# Patient Record
Sex: MALE | Race: WHITE | NOT HISPANIC OR LATINO | Employment: OTHER | URBAN - METROPOLITAN AREA
[De-identification: names, ages, dates, MRNs, and addresses within clinical notes are randomized per-mention and may not be internally consistent; named-entity substitution may affect disease eponyms.]

---

## 2023-11-09 PROBLEM — D18.01 HEMANGIOMA OF SKIN AND SUBCUTANEOUS TISSUE: Status: ACTIVE | Noted: 2023-05-15

## 2023-11-09 PROBLEM — L57.0 ACTINIC KERATOSIS: Status: ACTIVE | Noted: 2023-05-15

## 2023-11-09 PROBLEM — D22.5 MELANOCYTIC NEVI OF TRUNK: Status: ACTIVE | Noted: 2023-05-15

## 2023-11-09 PROBLEM — K40.30 INCARCERATED RIGHT INGUINAL HERNIA: Status: ACTIVE | Noted: 2023-11-09

## 2023-11-09 PROBLEM — L82.1 OTHER SEBORRHEIC KERATOSIS: Status: ACTIVE | Noted: 2023-05-15

## 2023-11-09 PROBLEM — L81.4 OTHER MELANIN HYPERPIGMENTATION: Status: ACTIVE | Noted: 2023-05-15

## 2023-11-09 PROBLEM — D22.62 MELANOCYTIC NEVI OF LEFT UPPER LIMB, INCLUDING SHOULDER: Status: ACTIVE | Noted: 2023-05-15

## 2023-11-09 PROBLEM — D22.72 MELANOCYTIC NEVI OF LEFT LOWER LIMB, INCLUDING HIP: Status: ACTIVE | Noted: 2023-05-15

## 2023-11-09 PROBLEM — L91.8 OTHER HYPERTROPHIC DISORDERS OF THE SKIN: Status: ACTIVE | Noted: 2023-05-15

## 2023-11-09 PROBLEM — D23.9 OTHER BENIGN NEOPLASM OF SKIN, UNSPECIFIED: Status: ACTIVE | Noted: 2023-05-15

## 2023-11-09 PROBLEM — K40.90 RIGHT INGUINAL HERNIA: Status: ACTIVE | Noted: 2023-11-09

## 2023-11-09 PROBLEM — L81.7 PIGMENTED PURPURIC DERMATOSIS: Status: ACTIVE | Noted: 2023-05-15

## 2023-11-09 RX ORDER — ATORVASTATIN CALCIUM 10 MG/1
10 TABLET, FILM COATED ORAL DAILY
COMMUNITY
Start: 2018-06-11

## 2023-11-13 ENCOUNTER — OFFICE VISIT (OUTPATIENT)
Dept: DERMATOLOGY | Facility: CLINIC | Age: 83
End: 2023-11-13
Payer: MEDICARE

## 2023-11-13 DIAGNOSIS — L82.1 SEBORRHEIC KERATOSIS: ICD-10-CM

## 2023-11-13 DIAGNOSIS — L81.4 LENTIGO: ICD-10-CM

## 2023-11-13 DIAGNOSIS — D22.9 NEVUS: ICD-10-CM

## 2023-11-13 DIAGNOSIS — Z12.83 SKIN CANCER SCREENING: Primary | ICD-10-CM

## 2023-11-13 PROCEDURE — 99213 OFFICE O/P EST LOW 20 MIN: CPT | Performed by: NURSE PRACTITIONER

## 2023-11-13 PROCEDURE — 1159F MED LIST DOCD IN RCRD: CPT | Performed by: NURSE PRACTITIONER

## 2023-11-13 ASSESSMENT — ITCH NUMERIC RATING SCALE: HOW SEVERE IS YOUR ITCHING?: 0

## 2023-11-13 NOTE — PROGRESS NOTES
Subjective     Sebas Soto is a 82 y.o. male who presents for the following: Skin Check.   Established patient of Jemima Ambrosio. In for 6 month full body skin exam. Denies any new or changed lesions. Hx of Ak's.          Review of Systems:  No other skin or systemic complaints other than what is documented elsewhere in the note.    The following portions of the chart were reviewed this encounter and updated as appropriate:       Skin Cancer History  No skin cancer on file.    Specialty Problems          Dermatology Problems    Actinic keratosis    Hemangioma of skin and subcutaneous tissue    Melanocytic nevi of left lower limb, including hip    Melanocytic nevi of left upper limb, including shoulder    Melanocytic nevi of trunk    Other benign neoplasm of skin, unspecified    Other hypertrophic disorders of the skin    Other melanin hyperpigmentation    Other seborrheic keratosis    Pigmented purpuric dermatosis     Past Medical History:  Sebas Soto  has a past medical history of Personal history of other diseases of the digestive system and Personal history of other infectious and parasitic diseases.    Past Surgical History:  Sebas Soto  has a past surgical history that includes Hernia repair (07/30/2018); Colonoscopy (07/30/2018); Tonsillectomy (07/30/2018); and Back surgery (07/30/2018).    Family History:  Patient family history is not on file.    Social History:  Sebas Soto  has no history on file for tobacco use, alcohol use, and drug use.    Allergies:  Patient has no known allergies.    Current Medications / CAM's:    Current Outpatient Medications:     atorvastatin (Lipitor) 10 mg tablet, Take by mouth., Disp: , Rfl:      Objective   Well appearing patient in no apparent distress; mood and affect are within normal limits.    A full examination was performed including scalp, head, eyes, ears, nose, lips, neck, chest, axillae, abdomen, back, buttocks, bilateral upper extremities, bilateral  lower extremities, hands, feet, fingers, toes, fingernails, and toenails. All findings within normal limits unless otherwise noted below.    Assessment/Plan

## 2024-06-04 ENCOUNTER — OFFICE VISIT (OUTPATIENT)
Dept: NEUROSURGERY | Facility: CLINIC | Age: 84
End: 2024-06-04
Payer: MEDICARE

## 2024-06-04 VITALS
TEMPERATURE: 97.5 F | SYSTOLIC BLOOD PRESSURE: 114 MMHG | RESPIRATION RATE: 20 BRPM | WEIGHT: 196.8 LBS | HEART RATE: 92 BPM | HEIGHT: 68 IN | DIASTOLIC BLOOD PRESSURE: 62 MMHG | BODY MASS INDEX: 29.83 KG/M2

## 2024-06-04 DIAGNOSIS — M47.12 CERVICAL SPONDYLOSIS WITH MYELOPATHY: Primary | ICD-10-CM

## 2024-06-04 DIAGNOSIS — M48.062 NEUROGENIC CLAUDICATION DUE TO LUMBAR SPINAL STENOSIS: ICD-10-CM

## 2024-06-04 DIAGNOSIS — H90.A31 MIXED CONDUCTIVE AND SENSORINEURAL HEARING LOSS OF RIGHT EAR WITH RESTRICTED HEARING OF LEFT EAR: ICD-10-CM

## 2024-06-04 PROCEDURE — 99204 OFFICE O/P NEW MOD 45 MIN: CPT | Performed by: NEUROLOGICAL SURGERY

## 2024-06-04 PROCEDURE — 1159F MED LIST DOCD IN RCRD: CPT | Performed by: NEUROLOGICAL SURGERY

## 2024-06-04 PROCEDURE — 1036F TOBACCO NON-USER: CPT | Performed by: NEUROLOGICAL SURGERY

## 2024-06-04 PROCEDURE — 1125F AMNT PAIN NOTED PAIN PRSNT: CPT | Performed by: NEUROLOGICAL SURGERY

## 2024-06-04 RX ORDER — TROSPIUM CHLORIDE 20 MG/1
20 TABLET, FILM COATED ORAL DAILY
COMMUNITY
Start: 2023-09-25

## 2024-06-04 ASSESSMENT — PAIN SCALES - GENERAL: PAINLEVEL: 5

## 2024-06-04 NOTE — PROGRESS NOTES
[facility] [place]  Department of Neurological Surgery  New Patient Visit    History of Present Illness:  Sebas Soto is a 83 y.o. year old male who presents to the spine clinic with his wife complaining of significant pain in the back of his right leg when he stands or walks and numbness and weakness on the left side of his body from his arm down to his leg.  Back in 2006 he saw me at [facility] where he was spastic and had significant myelopathy and we decompressed his canal at C2-3.  His walking improved but his left arm numbness and weakness persisted.  Over the last 5 to 10 years his balance has gotten worse.  He is falling at least 1-2 times every year.  He had a significant fall on his bottom on January and has had back pain ever since.  No in his done additional studies since that fall.  I do have MRIs from 2020 one of the cervical and lumbar and I have a new MRI of his cervical from 2024.      Prior Spine Surgeries: Decompressive cervical laminectomy at C2 and C3    Physical Therapy: None recently   Diabetic: No  Osteoporosis: Unknown  Patient's BMI is Body mass index is 29.92 kg/m².    14/14 systems reviewed and negative other than what is listed in the history of present illness    Patient Active Problem List   Diagnosis    Other melanin hyperpigmentation    Other hypertrophic disorders of the skin    Other benign neoplasm of skin, unspecified    Hemangioma of skin and subcutaneous tissue    Melanocytic nevi of trunk    Melanocytic nevi of left upper limb, including shoulder    Melanocytic nevi of left lower limb, including hip    Right inguinal hernia    Incarcerated right inguinal hernia    Pigmented purpuric dermatosis    Other seborrheic keratosis    Actinic keratosis    Mixed conductive and sensorineural hearing loss of right ear with restricted hearing of left ear    Cervical spondylosis with myelopathy     Past Medical History:   Diagnosis Date    Personal history of other  diseases of the digestive system     History of diverticulitis    Personal history of other infectious and parasitic diseases     History of herpes zoster     Past Surgical History:   Procedure Laterality Date    COLONOSCOPY  07/30/2018    Colonoscopy (Fiberoptic)    HERNIA REPAIR  07/30/2018    Hernia Repair    NECK SURGERY  2007    TONSILLECTOMY  07/30/2018    Tonsillectomy     Social History     Tobacco Use    Smoking status: Never    Smokeless tobacco: Never   Substance Use Topics    Alcohol use: Never     family history is not on file.    Current Outpatient Medications:     atorvastatin (Lipitor) 10 mg tablet, Take 1 tablet (10 mg) by mouth once daily., Disp: , Rfl:     trospium (Sanctura) 20 mg tablet, Take 1 tablet (20 mg) by mouth once daily., Disp: , Rfl:   No Known Allergies    Physical Examination      General: NAD, AOx 3,  no aphasia or dysarthria, normal fund of knowledge  Cranial Nerves II-XII: VFF, PERRL, EOMI, Face Symm, Facial SILT, Palate/Tongue midline and symmetric, he is very hard of hearing right significantly less hearing than the left  Motor: 5/5 Throughout on the right but arm weakness on the left side and some leg psoas weakness again on the left,  No drift, no dysmetria on finger to nose  Sensation: SILT and PP throughout all extremities, subjective decreased touch on the left side of his body  DTRS: 2+ Throughout, No Hoffmans or Clonus  He did not get up to walk because he barely can stand    Results    I personally reviewed and interpreted the imaging results which included the MRI of his cervical spine which shows that he has significant myelomalacia at C2-3 and C3-4 but he is adequately decompressed at those levels.  We need to see dynamic studies to see if there is abnormal movement at those levels the scan from 2021 looks essentially the same.  I also reviewed the lumbar scan from 2021 and had lumbar canal stenosis at that time with a grade 1 spondylolisthesis at L4-5.  We have no  studies since.    Assessment and Plan:    Sebas Soto is a 83 y.o. year old male who presents to the spine clinic with progressive falling with known myelomalacia.  We need to get dynamic films to see if there is some instability in the cervical spine.  We also need to get dynamic films of the lumbar spine for the same reason.  We will get a CAT scan of the cervical spine to see if there is anything that needs to be stabilized with a fusion and we will get an MRI of the lumbar spine to see if the lumbar canal stenosis has gotten worse and if it is time to consider surgical decompression.      I have reviewed all prior documentation and reviewed the electronic medical record since admission. I have personally have reviewed all advanced imaging not just the reports and used my interpretation as documented as the relevant findings. I have reviewed the risks and benefits of all treatment recommendations listed in this note with the patient and family. I spent a total of 45 minutes in service to this patient's care during this date of service.      The above clinical summary has been dictated with voice recognition software. It has not been proofread for grammatical errors, typographical mistakes, or other semantic inconsistencies.    Thank you for visiting our office today. It was our pleasure to take part in your healthcare.     Do not hesitate to call with any questions regarding your plan of care after leaving. My office can be reached at [phone] M-F 8am-4pm.     To clinicians, thank you very much for this kind referral. It is a privilege to partner with you in the care of your patients. My office would be delighted to assist you with any further consultations or with questions regarding the plan of care outlined. Do not hesitate to call the office or contact me directly.     Sincerely,    Piotr Carrion MD, FAANS, FACS  Board Certified Neurological Surgeon  , Department of  Neurological Surgery  [facility] [employer]    [facility]  [address]  [address]  [place] [address]    [facility]  [address]  [address]  [place] [address]    Phone: [phone]  Fax: [phone]

## 2024-06-11 ENCOUNTER — APPOINTMENT (OUTPATIENT)
Dept: NEUROSURGERY | Facility: CLINIC | Age: 84
End: 2024-06-11
Payer: MEDICARE

## 2024-06-18 ENCOUNTER — APPOINTMENT (OUTPATIENT)
Dept: DERMATOLOGY | Facility: CLINIC | Age: 84
End: 2024-06-18
Payer: MEDICARE

## 2024-06-18 DIAGNOSIS — L81.4 LENTIGO: ICD-10-CM

## 2024-06-18 DIAGNOSIS — D22.9 NEVUS: ICD-10-CM

## 2024-06-18 DIAGNOSIS — Z12.83 SKIN CANCER SCREENING: Primary | ICD-10-CM

## 2024-06-18 DIAGNOSIS — L82.1 SEBORRHEIC KERATOSIS: ICD-10-CM

## 2024-06-18 PROCEDURE — 1036F TOBACCO NON-USER: CPT | Performed by: NURSE PRACTITIONER

## 2024-06-18 PROCEDURE — 99213 OFFICE O/P EST LOW 20 MIN: CPT | Performed by: NURSE PRACTITIONER

## 2024-06-18 PROCEDURE — 1159F MED LIST DOCD IN RCRD: CPT | Performed by: NURSE PRACTITIONER

## 2024-06-18 NOTE — PROGRESS NOTES
Subjective     Sebas Soto is a 83 y.o. male who presents for the following: waist up skin exam.   Established patient in for a waist up skin exam.     Review of Systems:  No other skin or systemic complaints other than what is documented elsewhere in the note.    The following portions of the chart were reviewed this encounter and updated as appropriate:          Skin Cancer History  No skin cancer on file.      Specialty Problems          Dermatology Problems    Actinic keratosis    Hemangioma of skin and subcutaneous tissue    Melanocytic nevi of left lower limb, including hip    Melanocytic nevi of left upper limb, including shoulder    Melanocytic nevi of trunk    Other benign neoplasm of skin, unspecified    Other hypertrophic disorders of the skin    Other melanin hyperpigmentation    Other seborrheic keratosis    Pigmented purpuric dermatosis        Objective   Well appearing patient in no apparent distress; mood and affect are within normal limits.    A focused skin examination was performed. All findings within normal limits unless otherwise noted below.    Assessment/Plan   1. Skin cancer screening    The patient presented for a routine skin examination today. There are no specific concerns regarding skin health and no new or changing moles, lesions, or rashes.     Assessment: Based on the comprehensive skin examination, there were no concerning or abnormal findings. The patient's skin appeared to be in good health, without any notable dermatologic conditions or lesions.    Plan: Given the absence of any significant skin findings, no specific interventions or treatments are warranted at this time. The patient was educated on the importance of regular skin self-examinations and advised to promptly report any changes or concerns. Routine follow-up for a skin examination was recommended.    -These lesions have benign, reassuring patterns on dermoscopy.  -There were no concerning features found on exam  "today.  -Recommend continued self-observation, and to contact the office if any changes in nevi are  noticed.    Discussed/information given on safe sun practices and use of sunscreen, sun protective clothing or sun avoidance. Recommend to use OTC medication of sunscreen SPF 30 or higher on a daily basis prior to sun exposure to reduce the risk of skin cancer.    Contact Office if: Any lesions change in size, shape or color; itch, bum or bleed.         2. Nevus  Multiple benign appearing flesh colored to pigmented macules and papules     Plan: Counseling.  I counseled the patient regarding the following:  Instructions: Monthly self-skin checks to monitor for any changes in moles are recommended. Expectations: Benign Nevi are pigmented nests of cells within the skin.No treatment is necessary. Contact Office if: Any moles change in size, shape or color; itch, bum or bleed.    3. Lentigo  Scattered tan macules in sun-exposed areas.    Solar lentigo (a type of lentigo also known as a senile lentigo, age spot, or liver spot) is a benign pigmented macule appearing on fair-skinned individuals that is related to ultraviolet radiation (UVR) exposure, typically from the sun.     PLAN:  Limiting sun exposure through avoidance, protective clothing, and use of sunscreens can help prevent the appearance of solar lentigines.    If lesion changes or becomes symptomatic she should return to clinic    4. Seborrheic keratosis    Seborrheic keratoses (SKs) are extremely common benign neoplasms of the skin. There can be few or hundreds of these raised, \"stuck-on\"-appearing papules and plaques with well-defined borders. The cause is unknown, although there is a familial trait for the development of multiple SKs.      SKs tend to increase in incidence and number with increasing age.     Skin Care: Seborrheic Keratoses are benign. No treatment is necessary.    Patient was instructed to call the office if any lesions become irritated or " inflamed

## 2024-06-21 ENCOUNTER — HOSPITAL ENCOUNTER (OUTPATIENT)
Dept: RADIOLOGY | Facility: HOSPITAL | Age: 84
Discharge: HOME | End: 2024-06-21
Payer: MEDICARE

## 2024-06-21 DIAGNOSIS — M48.062 NEUROGENIC CLAUDICATION DUE TO LUMBAR SPINAL STENOSIS: ICD-10-CM

## 2024-06-21 DIAGNOSIS — M47.12 CERVICAL SPONDYLOSIS WITH MYELOPATHY: ICD-10-CM

## 2024-06-21 PROCEDURE — 72125 CT NECK SPINE W/O DYE: CPT

## 2024-06-21 PROCEDURE — 72052 X-RAY EXAM NECK SPINE 6/>VWS: CPT

## 2024-06-21 PROCEDURE — 72148 MRI LUMBAR SPINE W/O DYE: CPT

## 2024-06-21 PROCEDURE — 72120 X-RAY BEND ONLY L-S SPINE: CPT

## 2024-07-16 ENCOUNTER — APPOINTMENT (OUTPATIENT)
Dept: NEUROSURGERY | Facility: CLINIC | Age: 84
End: 2024-07-16
Payer: MEDICARE

## 2024-07-16 VITALS
RESPIRATION RATE: 16 BRPM | HEART RATE: 101 BPM | SYSTOLIC BLOOD PRESSURE: 128 MMHG | DIASTOLIC BLOOD PRESSURE: 80 MMHG | TEMPERATURE: 97.6 F

## 2024-07-16 DIAGNOSIS — G71.20: Primary | ICD-10-CM

## 2024-07-16 DIAGNOSIS — M47.12 CERVICAL SPONDYLOSIS WITH MYELOPATHY: ICD-10-CM

## 2024-07-16 DIAGNOSIS — Q76.1: Primary | ICD-10-CM

## 2024-07-16 DIAGNOSIS — M43.16 SPONDYLOLISTHESIS OF LUMBAR REGION: ICD-10-CM

## 2024-07-16 DIAGNOSIS — Q18.9: Primary | ICD-10-CM

## 2024-07-16 DIAGNOSIS — M48.062 NEUROGENIC CLAUDICATION DUE TO LUMBAR SPINAL STENOSIS: ICD-10-CM

## 2024-07-16 DIAGNOSIS — G95.89 MYELOMALACIA OF CERVICAL CORD (MULTI): ICD-10-CM

## 2024-07-16 DIAGNOSIS — Q87.89: Primary | ICD-10-CM

## 2024-07-16 PROCEDURE — 1036F TOBACCO NON-USER: CPT | Performed by: NEUROLOGICAL SURGERY

## 2024-07-16 PROCEDURE — 1125F AMNT PAIN NOTED PAIN PRSNT: CPT | Performed by: NEUROLOGICAL SURGERY

## 2024-07-16 PROCEDURE — 1159F MED LIST DOCD IN RCRD: CPT | Performed by: NEUROLOGICAL SURGERY

## 2024-07-16 PROCEDURE — 99215 OFFICE O/P EST HI 40 MIN: CPT | Performed by: NEUROLOGICAL SURGERY

## 2024-07-16 ASSESSMENT — ENCOUNTER SYMPTOMS
LOSS OF SENSATION IN FEET: 1
DEPRESSION: 0
OCCASIONAL FEELINGS OF UNSTEADINESS: 1

## 2024-07-16 ASSESSMENT — PATIENT HEALTH QUESTIONNAIRE - PHQ9
1. LITTLE INTEREST OR PLEASURE IN DOING THINGS: NOT AT ALL
SUM OF ALL RESPONSES TO PHQ9 QUESTIONS 1 AND 2: 0
2. FEELING DOWN, DEPRESSED OR HOPELESS: NOT AT ALL

## 2024-07-16 ASSESSMENT — PAIN SCALES - GENERAL: PAINLEVEL: 5

## 2024-07-16 NOTE — PROGRESS NOTES
[facility] [place]  Department of Neurological Surgery  Established Patient Visit    History of Present Illness  Sebas Soto is a 83 y.o. year old male who presents to the spine clinic in follow up with his wife to review his studies of the cervical and the lumbar region.  The MRI that was done in January showed significant myelomalacia where he previously been decompressed.  Since he did well for several years following the decompression it is impossible for me to remember how much signal change had been present at the time of his original surgery.  But I wanted to see if he had instability.  He does seem to have some hypermobility at C4-5.  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  We also ordered images of his lumbar region because he sounded like there was a component of neurogenic claudication.  He does have significant lumbar canal stenosis and he has a grade 1 spondylolisthesis at L4-5 with a very incompetent facet on the right side at L4-5.  Initially I did not know which level of the spine would take priority but with further questioning he clearly talks about the weakness developing on the left side in the leg but also in the arm and that the developed at the same time.  This situation to fix both regions of the spine is getting to be too complex for my skill set in my hospital.  I am going to ask him to see one of my colleagues and possibly 2 colleagues to see if they can give him some possible hope for improvement.    Patient's BMI is There is no height or weight on file to calculate BMI.    14/14 systems reviewed and negative other than what is listed in the history of present illness    Patient Active Problem List   Diagnosis    Other melanin hyperpigmentation    Other hypertrophic disorders of the skin    Other benign neoplasm of skin, unspecified    Hemangioma of skin and subcutaneous tissue    Melanocytic nevi of trunk    Melanocytic nevi of left upper  limb, including shoulder    Melanocytic nevi of left lower limb, including hip    Right inguinal hernia    Incarcerated right inguinal hernia    Pigmented purpuric dermatosis    Other seborrheic keratosis    Actinic keratosis    Mixed conductive and sensorineural hearing loss of right ear with restricted hearing of left ear    Cervical spondylosis with myelopathy    Neurogenic claudication due to lumbar spinal stenosis    Klippel-Feil anomaly, myopathy, and facial dysmorphism syndrome (Multi)    Spondylolisthesis of lumbar region    Myelomalacia of cervical cord (Multi)     Past Medical History:   Diagnosis Date    Personal history of other diseases of the digestive system     History of diverticulitis    Personal history of other infectious and parasitic diseases     History of herpes zoster     Past Surgical History:   Procedure Laterality Date    COLONOSCOPY  07/30/2018    Colonoscopy (Fiberoptic)    HERNIA REPAIR  07/30/2018    Hernia Repair    NECK SURGERY  2007    TONSILLECTOMY  07/30/2018    Tonsillectomy     Social History     Tobacco Use    Smoking status: Never     Passive exposure: Never    Smokeless tobacco: Never   Substance Use Topics    Alcohol use: Not Currently     family history is not on file.    Current Outpatient Medications:     atorvastatin (Lipitor) 10 mg tablet, Take 1 tablet (10 mg) by mouth once daily., Disp: , Rfl:     trospium (Sanctura) 20 mg tablet, Take 1 tablet (20 mg) by mouth once daily., Disp: , Rfl:   No Known Allergies        Results:  I personally reviewed and interpreted the imaging results which included the plain x-rays of his spine which shows that he does have some hypermobility at C4-5 and his degenerative scoliosis in the lumbar spine is significantly more pronounced on his upright films then when he is lying on the table.  It also included the MRI of the lumbar spine which does show that there is multilevels of canal stenosis as well as the spondylolisthesis at  L4-5.    Assessment and Plan:      Sebas Soto is a 83 y.o. year old male who presents to the spine clinic in follow up with his wife to review his studies of the cervical and the lumbar region.  The MRI that was done in January showed significant myelomalacia where he previously been decompressed.  Since he did well for several years following the decompression it is impossible for me to remember how much signal change had been present at the time of his original surgery.  But I wanted to see if he had instability.  He does seem to have some hypermobility at C4-5.  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  We also ordered images of his lumbar region because he sounded like there was a component of neurogenic claudication.  He does have significant lumbar canal stenosis and he has a grade 1 spondylolisthesis at L4-5 with a very incompetent facet on the right side at L4-5.  Initially I did not know which level of the spine would take priority but with further questioning he clearly talks about the weakness developing on the left side in the leg but also in the arm and that the developed at the same time.  This situation to fix both regions of the spine is getting to be too complex for my skill set in my hospital.  I am going to ask him to see one of my colleagues and possibly 2 colleagues to see if they can give him some possible hope for improvement.      I have reviewed all prior documentation and reviewed the electronic medical record since admission. I have personally have reviewed all advanced imaging not just the reports and used my interpretation as documented as the relevant findings. I have reviewed the risks and benefits of all treatment recommendations listed in this note with the patient and family. I spent a total of 50 minutes in service to this patient's care during this date of service.      The above clinical summary has been dictated with voice recognition software. It has  not been proofread for grammatical errors, typographical mistakes, or other semantic inconsistencies.    Thank you for visiting our office today. It was our pleasure to take part in your healthcare.     Do not hesitate to call with any questions regarding your plan of care after leaving. My office can be reached at [phone] M-F 8am-4pm.     To clinicians, thank you very much for this kind referral. It is a privilege to partner with you in the care of your patients. My office would be delighted to assist you with any further consultations or with questions regarding the plan of care outlined. Do not hesitate to call the office or contact me directly.     Sincerely,    Piotr Carrion MD, FAANS, FACS  Board Certified Neurological Surgeon  , Department of Neurological Surgery  [facility] [employer]    [facility]  [address]  [address]  [place] [address]    [facility]  [address]  [address]  [place] [address]    Phone: [phone]  Fax: [phone]

## 2024-09-03 ENCOUNTER — APPOINTMENT (OUTPATIENT)
Dept: NEUROSURGERY | Facility: CLINIC | Age: 84
End: 2024-09-03
Payer: MEDICARE

## 2024-09-03 VITALS
DIASTOLIC BLOOD PRESSURE: 82 MMHG | WEIGHT: 190 LBS | SYSTOLIC BLOOD PRESSURE: 150 MMHG | BODY MASS INDEX: 28.79 KG/M2 | TEMPERATURE: 97.4 F | HEART RATE: 79 BPM | HEIGHT: 68 IN

## 2024-09-03 DIAGNOSIS — M47.12 CERVICAL SPONDYLOSIS WITH MYELOPATHY: Primary | ICD-10-CM

## 2024-09-03 DIAGNOSIS — M43.16 SPONDYLOLISTHESIS OF LUMBAR REGION: ICD-10-CM

## 2024-09-03 DIAGNOSIS — M48.062 NEUROGENIC CLAUDICATION DUE TO LUMBAR SPINAL STENOSIS: ICD-10-CM

## 2024-09-03 DIAGNOSIS — G95.89 MYELOMALACIA OF CERVICAL CORD (MULTI): ICD-10-CM

## 2024-09-03 ASSESSMENT — PATIENT HEALTH QUESTIONNAIRE - PHQ9
10. IF YOU CHECKED OFF ANY PROBLEMS, HOW DIFFICULT HAVE THESE PROBLEMS MADE IT FOR YOU TO DO YOUR WORK, TAKE CARE OF THINGS AT HOME, OR GET ALONG WITH OTHER PEOPLE: NOT DIFFICULT AT ALL
1. LITTLE INTEREST OR PLEASURE IN DOING THINGS: SEVERAL DAYS
2. FEELING DOWN, DEPRESSED OR HOPELESS: NOT AT ALL
SUM OF ALL RESPONSES TO PHQ9 QUESTIONS 1 & 2: 1

## 2024-09-03 ASSESSMENT — PAIN SCALES - GENERAL: PAINLEVEL: 5

## 2024-09-03 NOTE — PROGRESS NOTES
[facility]  Department of Neurological Surgery  Established Patient Visit    History of Present Illness:  Sebas Soto is a 83 y.o. year old male who presents to the spine clinic in follow up with right leg pain.  He s he says that the pain comes to the back of his right leg when he stands or walks and there is associated numbness and weakness on the left side of his body.  He normally ambulates with a walker at baseline.  He gets up the pain involves his leg down the posterior aspect of his thigh and the lower aspect of the leg.  It was worse when he stands and walks.  And much improved when he lays down.  He is minimal to no back pain.  He has some left leg weakness as well as been chronic since his severe fall in 2001.    He has an MRI cervical spine from 2001 showing significant myelomalacia in the upper cervical spine and the evidence of prior cervical laminectomy.    According to the chart and the patient the had cervical decompression where he did well for many years.  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  The numbness and tingling after his surgery in 2001 improved though he has had persistent gait issues since then that has gotten slightly worse.  His left upper extremity and lower extremity weakness has not changed for many years.  His wife says that he has had many falls every year though the worst one has been times January.  He has been unstable on his feet for some time.    He also has pretty significant lower extremity peripheral edema secondary to instability.  He says that he has not worked out for DVTs at [facility] that were negative.    Patient's BMI is 28    Diabetic: no       Osteoporosis: Unknown  No DXA results found for the past 12 months    Review of Systems:  14/14 systems reviewed and negative other than what is listed in the history of present illness    Patient Active Problem List   Diagnosis    Other  melanin hyperpigmentation    Other hypertrophic disorders of the skin    Other benign neoplasm of skin, unspecified    Hemangioma of skin and subcutaneous tissue    Melanocytic nevi of trunk    Melanocytic nevi of left upper limb, including shoulder    Melanocytic nevi of left lower limb, including hip    Right inguinal hernia    Incarcerated right inguinal hernia    Pigmented purpuric dermatosis    Other seborrheic keratosis    Actinic keratosis    Mixed conductive and sensorineural hearing loss of right ear with restricted hearing of left ear    Cervical spondylosis with myelopathy    Neurogenic claudication due to lumbar spinal stenosis    Klippel-Feil anomaly, myopathy, and facial dysmorphism syndrome (Multi)    Spondylolisthesis of lumbar region    Myelomalacia of cervical cord (Multi)     Past Medical History:   Diagnosis Date    Personal history of other diseases of the digestive system     History of diverticulitis    Personal history of other infectious and parasitic diseases     History of herpes zoster     Past Surgical History:   Procedure Laterality Date    COLONOSCOPY  07/30/2018    Colonoscopy (Fiberoptic)    HERNIA REPAIR  07/30/2018    Hernia Repair    NECK SURGERY  2007    TONSILLECTOMY  07/30/2018    Tonsillectomy     Social History     Tobacco Use    Smoking status: Never     Passive exposure: Never    Smokeless tobacco: Never   Substance Use Topics    Alcohol use: Not Currently     family history is not on file.    Current Outpatient Medications:     atorvastatin (Lipitor) 10 mg tablet, Take 1 tablet (10 mg) by mouth once daily., Disp: , Rfl:     trospium (Sanctura) 20 mg tablet, Take 1 tablet (20 mg) by mouth once daily., Disp: , Rfl:   No Known Allergies    Physical Examination:    General: Well developed, awake/alert/oriented x3, no distress, alert and cooperative  Skin: Warm and dry, no lesions, no rashes  ENMT: Mucous membranes moist, no apparent injury, no lesions seen  Head/Neck: Neck  Supple, no apparent injury  Respiratory/Thorax: Normal breath sounds with good chest expansion, thorax symmetric  Cardiovascular: No pitting edema, no JVD    Motor Strength: LUE D3 B/T 4- HG 4+  RUE 5/5  LLE HF4+ KE 4+ DF 4 PF 4-  RLE 5/5  Significant bilateral lower extremity peripheral edema  Muscle Bulk: decreased on left side    Posture:   Loss of lordosis and cervical and lumbar spine  Paraspinal muscle spasm/tenderness absent.     Sensation: intact to light touch  Decreased on the left side    Results:  I personally reviewed and interpreted the imaging results which included MRI L-spine from June 2024 showing various levels of lumbar spondylosis with L1 compression fracture approximately 50% height loss mild to moderate L3-4 lateral recess stenosis with some foraminal stenosis, L4-5 spondylolisthesis with severe lateral recess stenosis and canal stenosis with facet joint effusions, facet joint effusions at L5-S1 CT C-spine showing evidence of global file with autofusion from occiput to C2 with hypoplastic C4 and autofused C5 and C6.  Has also autofused C7-T1 anteriorly.  There is mild spondylolisthesis of C4 on C5.  Evidence of also a cervical laminectomy spanning from C2-C5 there is also evidence of hypoplastic posterior elements and hypoplastic pedicles throughout the upper cervical spine.  X-ray cervical spine showing some mobility of C4 on the C5 with flexion.  X-rays of the lumbar spine showing some dynamic instability at L4-5 with more anterolisthesis on flexion.  '  Although not able to review the actual images the MRI read from January 2024 of the MRI cervical spine showing stable myelomalacia in the cervical cord at C2-3 and 3 4 with no further canal compression.    Assessment and Plan:      Sebas Soto is a 83 y.o. year old male who presents to the spine clinic in follow up with lumbar radiculopathy and neurogenic claudication secondary to the L4-5 spondylolisthesis.  He has severe central  stenosis at L2-3, as well as moderate to severe central stenosis at L3-4 and L4-5.  He has severe foraminal stenosis on the right at L4-5.  In the cervical spine, he has moderate to severe central stenosis at C5-6.  I had a lengthy discussion with the patient regarding his condition and treatment options.  At this time, I do not believe that the stenosis at C5-6 is symptomatic.  I am going to order an EMG/nerve conduction study of the upper extremities for further evaluation.  I do believe that the bulk of his symptoms are from his lumbar stenosis.  I discussed multiple treatment options with the patient, including further conservative care as well as different surgical approaches.  At this time, I am recommending a L2-5 laminectomy with L4-5 TLIF.  I went over the risk associate with surgery, including infection, bleeding, neurologic injury, spinal fluid leak, and the need for further procedures.  All the patient's questions were answered and he is elected to proceed with surgery.

## 2024-09-04 ENCOUNTER — HOSPITAL ENCOUNTER (OUTPATIENT)
Facility: HOSPITAL | Age: 84
Setting detail: OUTPATIENT SURGERY
End: 2024-09-04
Attending: NEUROLOGICAL SURGERY | Admitting: NEUROLOGICAL SURGERY
Payer: MEDICARE

## 2024-09-04 DIAGNOSIS — M48.062 NEUROGENIC CLAUDICATION DUE TO LUMBAR SPINAL STENOSIS: ICD-10-CM

## 2024-09-04 DIAGNOSIS — M43.16 SPONDYLOLISTHESIS, LUMBAR REGION: Primary | ICD-10-CM

## 2024-09-04 DIAGNOSIS — M43.16 SPONDYLOLISTHESIS, LUMBAR REGION: ICD-10-CM

## 2024-09-04 RX ORDER — SODIUM CHLORIDE, SODIUM LACTATE, POTASSIUM CHLORIDE, CALCIUM CHLORIDE 600; 310; 30; 20 MG/100ML; MG/100ML; MG/100ML; MG/100ML
20 INJECTION, SOLUTION INTRAVENOUS CONTINUOUS
OUTPATIENT
Start: 2024-09-04

## 2024-09-10 ENCOUNTER — APPOINTMENT (OUTPATIENT)
Dept: DERMATOLOGY | Facility: CLINIC | Age: 84
End: 2024-09-10
Payer: MEDICARE

## 2024-10-10 ASSESSMENT — DUKE ACTIVITY SCORE INDEX (DASI)
CAN YOU WALK INDOORS, SUCH AS AROUND YOUR HOUSE: YES
CAN YOU HAVE SEXUAL RELATIONS: NO
CAN YOU CLIMB A FLIGHT OF STAIRS OR WALK UP A HILL: NO
TOTAL_SCORE: 10.7
CAN YOU PARTICIPATE IN MODERATE RECREATIONAL ACTIVITIES LIKE GOLF, BOWLING, DANCING, DOUBLES TENNIS OR THROWING A BASEBALL OR FOOTBALL: NO
CAN YOU DO MODERATE WORK AROUND THE HOUSE LIKE VACUUMING, SWEEPING FLOORS OR CARRYING GROCERIES: YES
CAN YOU RUN A SHORT DISTANCE: NO
CAN YOU DO LIGHT WORK AROUND THE HOUSE LIKE DUSTING OR WASHING DISHES: YES
CAN YOU TAKE CARE OF YOURSELF (EAT, DRESS, BATHE, OR USE TOILET): YES
CAN YOU DO YARD WORK LIKE RAKING LEAVES, WEEDING OR PUSHING A MOWER: NO
CAN YOU DO HEAVY WORK AROUND THE HOUSE LIKE SCRUBBING FLOORS OR LIFTING AND MOVING HEAVY FURNITURE: NO
CAN YOU PARTICIPATE IN STRENOUS SPORTS LIKE SWIMMING, SINGLES TENNIS, FOOTBALL, BASKETBALL, OR SKIING: NO
DASI METS SCORE: 4.1
CAN YOU WALK A BLOCK OR TWO ON LEVEL GROUND: NO

## 2024-10-10 ASSESSMENT — LIFESTYLE VARIABLES: SMOKING_STATUS: NONSMOKER

## 2024-10-10 ASSESSMENT — ACTIVITIES OF DAILY LIVING (ADL): ADL_SCORE: 3

## 2024-10-11 ENCOUNTER — HOSPITAL ENCOUNTER (OUTPATIENT)
Dept: RADIOLOGY | Facility: HOSPITAL | Age: 84
Discharge: HOME | End: 2024-10-11
Payer: MEDICARE

## 2024-10-11 ENCOUNTER — LAB (OUTPATIENT)
Dept: LAB | Facility: LAB | Age: 84
End: 2024-10-11
Payer: MEDICARE

## 2024-10-11 ENCOUNTER — PRE-ADMISSION TESTING (OUTPATIENT)
Dept: PREADMISSION TESTING | Facility: HOSPITAL | Age: 84
End: 2024-10-11
Payer: MEDICARE

## 2024-10-11 VITALS
SYSTOLIC BLOOD PRESSURE: 129 MMHG | RESPIRATION RATE: 18 BRPM | HEIGHT: 68 IN | BODY MASS INDEX: 30.27 KG/M2 | DIASTOLIC BLOOD PRESSURE: 67 MMHG | WEIGHT: 199.74 LBS | TEMPERATURE: 97.7 F | HEART RATE: 80 BPM | OXYGEN SATURATION: 96 %

## 2024-10-11 DIAGNOSIS — R79.9 ABNORMAL FINDING OF BLOOD CHEMISTRY, UNSPECIFIED: ICD-10-CM

## 2024-10-11 DIAGNOSIS — M43.16 SPONDYLOLISTHESIS, LUMBAR REGION: ICD-10-CM

## 2024-10-11 DIAGNOSIS — Z01.818 PRE-OP TESTING: ICD-10-CM

## 2024-10-11 DIAGNOSIS — Z01.818 PRE-OP TESTING: Primary | ICD-10-CM

## 2024-10-11 DIAGNOSIS — M48.062 NEUROGENIC CLAUDICATION DUE TO LUMBAR SPINAL STENOSIS: ICD-10-CM

## 2024-10-11 LAB
ABO GROUP (TYPE) IN BLOOD: NORMAL
ANION GAP SERPL CALC-SCNC: 9 MMOL/L (ref 10–20)
ANTIBODY SCREEN: NORMAL
BASOPHILS # BLD AUTO: 0.02 X10*3/UL (ref 0–0.1)
BASOPHILS NFR BLD AUTO: 0.5 %
BUN SERPL-MCNC: 15 MG/DL (ref 6–23)
CALCIUM SERPL-MCNC: 8.9 MG/DL (ref 8.6–10.3)
CHLORIDE SERPL-SCNC: 103 MMOL/L (ref 98–107)
CO2 SERPL-SCNC: 29 MMOL/L (ref 21–32)
CREAT SERPL-MCNC: 0.71 MG/DL (ref 0.5–1.3)
EGFRCR SERPLBLD CKD-EPI 2021: >90 ML/MIN/1.73M*2
EOSINOPHIL # BLD AUTO: 0.09 X10*3/UL (ref 0–0.4)
EOSINOPHIL NFR BLD AUTO: 2.1 %
ERYTHROCYTE [DISTWIDTH] IN BLOOD BY AUTOMATED COUNT: 16.4 % (ref 11.5–14.5)
EST. AVERAGE GLUCOSE BLD GHB EST-MCNC: 114 MG/DL
GLUCOSE SERPL-MCNC: 97 MG/DL (ref 74–99)
HBA1C MFR BLD: 5.6 %
HCT VFR BLD AUTO: 44.9 % (ref 41–52)
HGB BLD-MCNC: 14.7 G/DL (ref 13.5–17.5)
IMM GRANULOCYTES # BLD AUTO: 0.02 X10*3/UL (ref 0–0.5)
IMM GRANULOCYTES NFR BLD AUTO: 0.5 % (ref 0–0.9)
LYMPHOCYTES # BLD AUTO: 1.15 X10*3/UL (ref 0.8–3)
LYMPHOCYTES NFR BLD AUTO: 27.4 %
MCH RBC QN AUTO: 32.4 PG (ref 26–34)
MCHC RBC AUTO-ENTMCNC: 32.7 G/DL (ref 32–36)
MCV RBC AUTO: 99 FL (ref 80–100)
MONOCYTES # BLD AUTO: 0.98 X10*3/UL (ref 0.05–0.8)
MONOCYTES NFR BLD AUTO: 23.3 %
NEUTROPHILS # BLD AUTO: 1.94 X10*3/UL (ref 1.6–5.5)
NEUTROPHILS NFR BLD AUTO: 46.2 %
NRBC BLD-RTO: 0 /100 WBCS (ref 0–0)
PLATELET # BLD AUTO: 166 X10*3/UL (ref 150–450)
POTASSIUM SERPL-SCNC: 4.1 MMOL/L (ref 3.5–5.3)
PREALB SERPL-MCNC: 19.7 MG/DL (ref 18–40)
RBC # BLD AUTO: 4.54 X10*6/UL (ref 4.5–5.9)
RH FACTOR (ANTIGEN D): NORMAL
SODIUM SERPL-SCNC: 137 MMOL/L (ref 136–145)
WBC # BLD AUTO: 4.2 X10*3/UL (ref 4.4–11.3)

## 2024-10-11 PROCEDURE — 36415 COLL VENOUS BLD VENIPUNCTURE: CPT

## 2024-10-11 PROCEDURE — 86850 RBC ANTIBODY SCREEN: CPT

## 2024-10-11 PROCEDURE — 93010 ELECTROCARDIOGRAM REPORT: CPT | Performed by: INTERNAL MEDICINE

## 2024-10-11 PROCEDURE — 71046 X-RAY EXAM CHEST 2 VIEWS: CPT | Mod: FY

## 2024-10-11 PROCEDURE — 86901 BLOOD TYPING SEROLOGIC RH(D): CPT

## 2024-10-11 PROCEDURE — 80048 BASIC METABOLIC PNL TOTAL CA: CPT

## 2024-10-11 PROCEDURE — 85025 COMPLETE CBC W/AUTO DIFF WBC: CPT

## 2024-10-11 PROCEDURE — 87081 CULTURE SCREEN ONLY: CPT | Mod: STJLAB

## 2024-10-11 PROCEDURE — 86900 BLOOD TYPING SEROLOGIC ABO: CPT

## 2024-10-11 PROCEDURE — 83036 HEMOGLOBIN GLYCOSYLATED A1C: CPT

## 2024-10-11 PROCEDURE — 84134 ASSAY OF PREALBUMIN: CPT

## 2024-10-11 PROCEDURE — 99203 OFFICE O/P NEW LOW 30 MIN: CPT | Performed by: NURSE PRACTITIONER

## 2024-10-11 RX ORDER — CHLORHEXIDINE GLUCONATE ORAL RINSE 1.2 MG/ML
SOLUTION DENTAL
Qty: 473 ML | Refills: 0 | Status: SHIPPED | OUTPATIENT
Start: 2024-10-11

## 2024-10-11 ASSESSMENT — ENCOUNTER SYMPTOMS
NECK NEGATIVE: 1
SINUS CONGESTION: 1
ENDOCRINE NEGATIVE: 1
RESPIRATORY NEGATIVE: 1
GASTROINTESTINAL NEGATIVE: 1
EYES NEGATIVE: 1
NEUROLOGICAL NEGATIVE: 1
CONSTITUTIONAL NEGATIVE: 1

## 2024-10-11 ASSESSMENT — PAIN SCALES - GENERAL: PAINLEVEL_OUTOF10: 0 - NO PAIN

## 2024-10-11 ASSESSMENT — PAIN - FUNCTIONAL ASSESSMENT: PAIN_FUNCTIONAL_ASSESSMENT: 0-10

## 2024-10-11 NOTE — PREPROCEDURE INSTRUCTIONS
Medication List            Accurate as of October 11, 2024 11:55 AM. Always use your most recent med list.                atorvastatin 10 mg tablet  Commonly known as: Lipitor  Medication Adjustments for Surgery: Take/Use as prescribed     chlorhexidine 0.12 % solution  Commonly known as: Peridex  Sig: 15 mls swish and spit the night before surgery and 15mls swish and spit the morning of surgery do not swallow  Medication Adjustments for Surgery: Take/Use as prescribed     LUTEIN ORAL  Additional Medication Adjustments for Surgery: Take last dose 7 days before surgery     trospium 20 mg tablet  Commonly known as: Sanctura  Medication Adjustments for Surgery: Take last dose 1 day (24 hours) before surgery                                PRE-OPERATIVE INSTRUCTIONS    You will receive notification one business day prior to your procedure to confirm your arrival time. It is important that you answer your phone and/or check your messages during this time. If you do not hear from the surgery center by 5 pm. the day before your procedure, please call [phone].     Please enter the building through the Outpatient entrance and take the elevator off the lobby to the 2nd floor then check in at the Outpatient Surgery desk on the 2nd floor.    INSTRUCTIONS:  Talk to your surgeon for instructions if you should stop your aspirin, blood thinner, or diabetes medicines.  DO NOT take any multivitamins or over the counter supplements for 7-10 days before surgery.  If not being admitted, you must have an adult immediately available to drive you home after surgery. We also highly recommend you have someone stay with you for the entire day and night of your surgery.  For children having surgery, a parent or legal guardian must accompany them to the surgery center. If this is not possible, please call [phone] to make additional arrangements.  For adults who are unable to consent or make medical decisions for themselves, a legal  guardian or Power of  must accompany them to the surgery center. If this is not possible, please call [phone] to make additional arrangements.  Wear comfortable, loose fitting clothing.  All jewelry and piercings must be removed. If you are unable to remove an item or have a dermal piercing, please be sure to tell the nurse when you arrive for surgery.  Nail polish and make-up must be removed.  Avoid smoking or consuming alcohol for 24 hours before surgery.  To help prevent infection, please take a shower/bath and wash your hair the night before and/or morning of surgery (or follow other specific bathing instructions provided).    Preoperative Fasting Guidelines    Why must I stop eating and drinking near surgery time?  With sedation, food or liquid in your stomach can enter your lungs causing serious complications  Increases nausea and vomiting    When do I need to stop eating and drinking before my surgery?  Do not eat any solid food after midnight the night before your surgery/procedure unless otherwise instructed by your surgeon.   You may have up to 13.5 ounces of clear liquid until TWO hours before your instructed arrival time to the hospital.  This includes water, black tea/coffee, (no milk or cream) apple juice, and electrolyte drinks (Gatorade).   You may chew gum until TWO hours before your surgery/procedure      If applicable, notify your surgeons office immediately of any new skin changes that occur to the surgical limb.      If you have any questions or concerns, please call Pre-Admission Testing at [phone].

## 2024-10-11 NOTE — CPM/PAT H&P
CPM/PAT Evaluation       Name: Sebas Soto (Sebas Soto)  /Age: 1940/83 y.o.     In-Person       Chief Complaint: RLE pain    HPI This is a very pleasant 83-year-old fair to poor historian with a past medical history of hyperlipidemia, lower extremity edema, persistent gait issues,  cervical spine myelopathy, spondylolisthesis, and neurogenic claudication.  Patient reports he has significant right lower extremity leg pain, and bilateral leg weakness.  Patient uses a walker to ambulate.  He denies recent fever or chills    Past Medical History:   Diagnosis Date    Abnormal ECG     Arthritis     Bilateral lower extremity edema     Diverticulosis     Hyperlipidemia     Neurogenic claudication     Personal history of other diseases of the digestive system     History of diverticulitis    Personal history of other infectious and parasitic diseases     History of herpes zoster    Prostate cancer (Multi)     Short-term memory loss     Spondylolisthesis        Past Surgical History:   Procedure Laterality Date    COLONOSCOPY  2018    Colonoscopy (Fiberoptic)    HERNIA REPAIR  2018    Hernia Repair    NECK SURGERY  2007    TONSILLECTOMY  2018    Tonsillectomy       Patient  has no history on file for sexual activity.    Family History   Problem Relation Name Age of Onset    Alzheimer's disease Mother      Anemia Mother      Breast cancer Sister      Stroke Sister         No Known Allergies    Prior to Admission medications    Medication Sig Start Date End Date Taking? Authorizing Provider   atorvastatin (Lipitor) 10 mg tablet Take 1 tablet (10 mg) by mouth once daily.    Historical Provider, MD   LUTEIN ORAL Take 1 capsule by mouth 3 (three) times a week.    Historical Provider, MD   trospium (Sanctura) 20 mg tablet Take 1 tablet (20 mg) by mouth once daily.    Historical Provider, MD EVANS ROS:   Constitutional:   neg    Neuro/Psych:    Denies hx of seizure or stroke  Numbness left  arm  Numbness both legs and weakness and pain with standing  Recent EMG of UE  neg    Eyes:   neg     use of corrective lenses  Ears:    hearing loss  Nose:   neg     sinus congestion  Mouth:   neg    Throat:   neg    Neck:    Significant limited neck range of motion  Due to previous cervical spine surgery  No carotid bruits auscultated  neg    Cardio:    Patient denies any chest pain or new onset shortness of breath  Lower extremity edema is chronic patient has been wearing Velcro compression hose    Saw Dr Coleman in past for LE edema- 2021    Respiratory:   neg    Endocrine:   neg    GI:   neg    :    Prostate cancer s/p radiation   Now has urinary incontinence  Musculoskeletal:    See HPI  Hematologic:   neg    Skin:  neg        Physical Exam  Constitutional:       Appearance: Normal appearance.   HENT:      Head: Normocephalic and atraumatic.      Nose: Nose normal.      Mouth/Throat:      Mouth: Mucous membranes are moist.   Eyes:      Pupils: Pupils are equal, round, and reactive to light.   Neck:      Comments: Significant limited neck range of motion  Due to previous cervical spine surgery  No carotid bruits auscultated  Cardiovascular:      Rate and Rhythm: Normal rate and regular rhythm.   Pulmonary:      Effort: Pulmonary effort is normal.      Breath sounds: Normal breath sounds.   Abdominal:      General: Bowel sounds are normal.      Palpations: Abdomen is soft.   Musculoskeletal:      Comments: 3+ bilateral pedal ankle KIAH tibial edema   Skin:     General: Skin is warm and dry.   Neurological:      General: No focal deficit present.      Mental Status: He is alert and oriented to person, place, and time.   Psychiatric:         Mood and Affect: Mood normal.         Behavior: Behavior normal.      Airway:limited ROM  Anesthesia:  remote hx of 17 years ago difficult intubation                        No problems recent hernia surgery   Teeth: full dentures  ECG: normal sinus rhythm left anterior  fascicular block LVH with QRS widening  No previous EKGs for comparison    Recent Results (from the past 168 hour(s))   ECG 12 lead    Collection Time: 10/11/24 11:18 AM   Result Value Ref Range    Ventricular Rate 77 BPM    Atrial Rate 77 BPM    WY Interval 136 ms    QRS Duration 118 ms    QT Interval 390 ms    QTC Calculation(Bazett) 441 ms    P Axis 2 degrees    R Axis -52 degrees    T Axis 67 degrees    QRS Count 13 beats    Q Onset 208 ms    P Onset 140 ms    P Offset 174 ms    T Offset 403 ms    QTC Fredericia 423 ms   Basic Metabolic Panel    Collection Time: 10/11/24 12:26 PM   Result Value Ref Range    Glucose 97 74 - 99 mg/dL    Sodium 137 136 - 145 mmol/L    Potassium 4.1 3.5 - 5.3 mmol/L    Chloride 103 98 - 107 mmol/L    Bicarbonate 29 21 - 32 mmol/L    Anion Gap 9 (L) 10 - 20 mmol/L    Urea Nitrogen 15 6 - 23 mg/dL    Creatinine 0.71 0.50 - 1.30 mg/dL    eGFR >90 >60 mL/min/1.73m*2    Calcium 8.9 8.6 - 10.3 mg/dL   Type And Screen    Collection Time: 10/11/24 12:26 PM   Result Value Ref Range    ABO TYPE A     Rh TYPE POS     ANTIBODY SCREEN NEG    CBC and Auto Differential    Collection Time: 10/11/24 12:26 PM   Result Value Ref Range    WBC 4.2 (L) 4.4 - 11.3 x10*3/uL    nRBC 0.0 0.0 - 0.0 /100 WBCs    RBC 4.54 4.50 - 5.90 x10*6/uL    Hemoglobin 14.7 13.5 - 17.5 g/dL    Hematocrit 44.9 41.0 - 52.0 %    MCV 99 80 - 100 fL    MCH 32.4 26.0 - 34.0 pg    MCHC 32.7 32.0 - 36.0 g/dL    RDW 16.4 (H) 11.5 - 14.5 %    Platelets 166 150 - 450 x10*3/uL    Neutrophils % 46.2 40.0 - 80.0 %    Immature Granulocytes %, Automated 0.5 0.0 - 0.9 %    Lymphocytes % 27.4 13.0 - 44.0 %    Monocytes % 23.3 2.0 - 10.0 %    Eosinophils % 2.1 0.0 - 6.0 %    Basophils % 0.5 0.0 - 2.0 %    Neutrophils Absolute 1.94 1.60 - 5.50 x10*3/uL    Immature Granulocytes Absolute, Automated 0.02 0.00 - 0.50 x10*3/uL    Lymphocytes Absolute 1.15 0.80 - 3.00 x10*3/uL    Monocytes Absolute 0.98 (H) 0.05 - 0.80 x10*3/uL    Eosinophils  Absolute 0.09 0.00 - 0.40 x10*3/uL    Basophils Absolute 0.02 0.00 - 0.10 x10*3/uL           Temp 36.5  Pulse 80  RR 18   Pulse ox 96  /67    DASI Risk Score      Flowsheet Row Pre-Admission Testing from 10/11/2024 in [facility]   Can you take care of yourself (eat, dress, bathe, or use toilet)?  2.75 filed at 10/10/2024 1424   Can you walk indoors, such as around your house? 1.75 filed at 10/10/2024 1424   Can you walk a block or two on level ground?  0 filed at 10/10/2024 1424   Can you climb a flight of stairs or walk up a hill? 0 filed at 10/10/2024 1424   Can you run a short distance? 0 filed at 10/10/2024 1424   Can you do light work around the house like dusting or washing dishes? 2.7 filed at 10/10/2024 1424   Can you do moderate work around the house like vacuuming, sweeping floors or carrying groceries? 3.5 filed at 10/10/2024 1424   Can you do heavy work around the house like scrubbing floors or lifting and moving heavy furniture?  0 filed at 10/10/2024 1424   Can you do yard work like raking leaves, weeding or pushing a mower? 0 filed at 10/10/2024 1424   Can you have sexual relations? 0 filed at 10/10/2024 1424   Can you participate in moderate recreational activities like golf, bowling, dancing, doubles tennis or throwing a baseball or football? 0 filed at 10/10/2024 1424   Can you participate in strenous sports like swimming, singles tennis, football, basketball, or skiing? 0 filed at 10/10/2024 1424   DASI SCORE 10.7 filed at 10/10/2024 1424   METS Score (Will be calculated only when all the questions are answered) 4.1 filed at 10/10/2024 1424          Caprini DVT Assessment      Flowsheet Row Pre-Admission Testing from 10/11/2024 in [facility]   DVT Score 10 filed at 10/10/2024 1423   Medical Factors Present cancer, chemotherapy, or previous malignancy filed at 10/10/2024 1423   Surgical Factors Major surgery planned, lasting over 3 hours filed at 10/10/2024  1423   BMI 30 or less filed at 10/10/2024 1423          Modified Frailty Index      Flowsheet Row [facility] [place] [place] [facility] [place]   Non-independent functional status (problems with dressing, bathing, personal grooming, or cooking) 0.0909 filed at 10/10/2024 1424   History of diabetes mellitus  0 filed at 10/10/2024 1424   History of COPD 0 filed at 10/10/2024 1424   History of CHF No filed at 10/10/2024 1424   History of MI 0 filed at 10/10/2024 1424   History of Percutaneous Coronary Intervention, Cardiac Surgery, or Angina No filed at 10/10/2024 1424   Hypertension requiring the use of medication  0 filed at 10/10/2024 1424   Peripheral vascular disease 0 filed at 10/10/2024 1424   Impaired sensorium (cognitive impairement or loss, clouding, or delirium) 0.0909 filed at 10/10/2024 1424   TIA or CVA withouy residual deficit 0 filed at 10/10/2024 1424   Cerebrovascular accident with deficit 0 filed at 10/10/2024 1424   Modified Frailty Index Calculator .1818 filed at 10/10/2024 1424          CHADS2 Stroke Risk         N/A 3 to 100%: High Risk   2 to < 3%: Medium Risk   0 to < 2%: Low Risk     Last Change: N/A          This score determines the patient's risk of having a stroke if the patient has atrial fibrillation.        This score is not applicable to this patient. Components are not calculated.          Revised Cardiac Risk Index      Flowsheet Row Pre-Admission Testing [facility] [place]   High-Risk Surgery (Intraperitoneal, Intrathoracic,Suprainguinal vascular) 0 filed at 10/10/2024 1424   History of ischemic heart disease (History of MI, History of positive exercuse test, Current chest paint considered due to myocardial ischemia, Use of nitrate therapy, ECG with pathological Q Waves) 0 filed at 10/10/2024 1424   History of congestive heart failure (pulmonary edemia, bilateral rales or S3 gallop, Paroxysmal nocturnal dyspnea, CXR showing pulmonary  vascular redistribution) 0 filed at 10/10/2024 1424   History of cerebrovascular disease (Prior TIA or stroke) 0 filed at 10/10/2024 1424   Pre-operative insulin treatment 0 filed at 10/10/2024 1424   Pre-operative creatinine>2 mg/dl 0 filed at 10/10/2024 1424   Revised Cardiac Risk Calculator 0 filed at 10/10/2024 1424          Apfel Simplified Score      Flowsheet Row Pre-Admission Testing from 10/11/2024 in [facility]   Smoking status 1 filed at 10/10/2024 1424   History of motion sickness or PONV  0 filed at 10/10/2024 1424   Use of postoperative opioids 1 filed at 10/10/2024 1424   Gender - Female 0=No filed at 10/10/2024 1424   Apfel Simplified Score Calculator 2 filed at 10/10/2024 1424          Risk Analysis Index Results This Encounter         10/10/2024  1425             Do you live in a place other than your own home?: 0    When did you begin living in the place you are currently residing?: Greater than one year ago    Any kidney failure, kidney not working well, or seeing a kidney doctor (nephrologist)? If yes, was this for kidney stones or another problem?: 0 No    Any history of chronic (long-term) congestive heart failure (CHF)?: 0 No    Any shortness of breath when resting?: 0 No    In the past five years, have you been diagnosed with or treated for cancer?: No    During the last 3 months has it become difficult for you to remember things or organize your thoughts?: 1 Yes    Have you lost weight of 10 pounds or more in the past 3 months without trying?: 0 No    Do you have any loss of appetitie?: 0 No    Getting Around (Mobility): 1 Needs help from cane, walker, or scooter    Eatin Needs help planning meals    Toiletin Can use toilet without any help    Personal Hygiene (Bathing, Hand Washing, Changing Clothes): 1 Can shower or bathe without help when prompted    SMITH Cancer History: Patient does not indicate history of cancer    Total Risk Analysis Index Score Without Cancer:  36    Total Risk Analysis Index Score: 36          Stop Bang Score      Flowsheet Row Pre-Admission Testing from 10/11/2024 in [facility]   Do you snore loudly? 0 filed at 10/10/2024 1423   Do you often feel tired or fatigued after your sleep? 1 filed at 10/10/2024 1423   Has anyone ever observed you stop breathing in your sleep? 0 filed at 10/10/2024 1423   Do you have or are you being treated for high blood pressure? 0 filed at 10/10/2024 1423   Recent BMI (Calculated) 28.9 filed at 10/10/2024 1423   Is BMI greater than 35 kg/m2? 0=No filed at 10/10/2024 1423   Age older than 50 years old? 1=Yes filed at 10/10/2024 1423   Is your neck circumference greater than 17 inches (Male) or 16 inches (Female)? 0 filed at 10/10/2024 1423   Gender - Male 1=Yes filed at 10/10/2024 1423   STOP-BANG Total Score 3 filed at 10/10/2024 1423            Assessment and Plan:     Plan or OR on 10/25 for L2-5 laminectomies; L4-5 Fusion Spine Transforaminal Interbody Lumbar with Navigation [90696 (CPT®)]   For   Spondylolisthesis, lumbar region   Neurogenic claudication due to lumbar spinal stenosis   T & S, Prealb, BMP, CBC with diff, MRSA, A1C    HEENT/Airway:  no significant findings on chart review or clinical presentation    Cardiovascular:    saw cardilogy 3+ years ago for LE edema  Abnormal ECG-cardiac clearance requested appointment made with Dr. Leal for October 17 at 10:45 AM  D[facility] Activity Status Index (DASI)  DASI Score: 10.7   MET Score: 4.1    RCRI: 0 points, 3.9%  risk for postoperative MACE       Pulmonary:  no significant findings on chart review or clinical presentation    Preoperative deep breathing educational handout provided to patient.  STOP BANG:  3   points which is a intermediate risk for moderate to severe BENJAMIN    Renal: no significant findings on chart review or clinical presentation    Endocrine:  no significant findings on chart review or clinical presentation    Hematologic:   no  significant findings on chart review or clinical presentation  Preoperative DVT educational handout provided to patient.  Caprini Score: 10   points which is a highest risk of perioperative VTE    Gastrointestinal:   no significant findings on chart review or clinical presentation  Apfel: 2 points 39% risk for post operative N/V    Infectious disease:  no significant findings on chart review or clinical presentation     Musculoskeletal:  persistent gait issues had hx of c spine surgery  Plan for lumbar spine surgery on 10/25    Neuro: Patient denies history of stroke   neurogenic claudicaiton   History of persistent gait issues  Pt is at an increased risk for post operative delirium secondary to age >/= 65 and decreased functinoal status.  Preoperative brain exercise educational handout provided to patient.    The patient is at an increased risk for perioperative stroke secondary to increased age and general anesthesia.

## 2024-10-11 NOTE — PREPROCEDURE INSTRUCTIONS
No outpatient medications have been marked as taking for the 10/25/24 encounter (Hospital Encounter).                       NPO Instructions:    {NPO Instructions:[id number]}    Additional Instructions:     { PAT [facility] AVS INSTR:39401}

## 2024-10-11 NOTE — PREPROCEDURE INSTRUCTIONS
Thank you for visiting Preadmission Testing at [facility]. If you have any changes to your health condition, please call the SURGEON's office to alert them and give them details of your symptoms.        Preoperative Brain Exercises    What are brain exercises?  A brain exercise is any activity that engages your thinking (cognitive) skills.    What types of activities are considered brain exercises?  Jigsaw puzzles, crossword puzzles, word jumble, memory games, word search, and many more.  Many can be found free online or on your phone via a mobile noreen.    Why should I do brain exercises before my surgery?  More recent research has shown brain exercise before surgery can lower the risk of postoperative delirium (confusion) which can be especially important for older adults.  Patients who did brain exercises for 5 to 10 hours the days before surgery, cut their risk of postoperative delirium in half up to 1 week after surgery.      Preoperative Deep Breathing Exercises    Why it is important to do deep breathing exercises before my surgery?  Deep breathing exercises strengthen your breathing muscles.  This helps you to recover after your surgery and decreases the chance of breathing complications.    How are the deep breathing exercises done?  Sit straight with your back supported.  Breathe in deeply and slowly through your nose. Your lower rib cage should expand and your abdomen may move forward.  Hold that breath for 3 to 5 seconds.  Breathe out through pursed lips, slowly and completely.  Rest and repeat 10 times every hour while awake.  Rest longer if you become dizzy or lightheaded.      Patient and Family Education   Ways You Can Help Prevent Blood Clots     This handout explains some simple things you can do to help prevent blood clots.      Blood clots are blockages that can form in the body's veins. When a blood clot forms in your deep veins, it may be called a deep vein thrombosis, or DVT for short. Blood clots can  happen in any part of the body where blood flows, but they are most common in the arms and legs. If a piece of a blood clot breaks free and travels to the lungs, it is called a pulmonary embolus (PE). A PE can be a very serious problem.      Being in the hospital or having surgery can raise your chances of getting a blood clot because you may not be well enough to move around as much as you normally do.      Ways you can help prevent blood clots in the hospital         Wearing SCDs. SCDs stands for Sequential Compression Devices.   SCDs are special sleeves that wrap around your legs  They attach to a pump that fills them with air to gently squeeze your legs every few minutes.   This helps return the blood in your legs to your heart.   SCDs should only be taken off when walking or bathing.   SCDs may not be comfortable, but they can help save your life.               Wearing compression stockings - if your doctor orders them. These special snug fitting stockings gently squeeze your legs to help blood flow.       Walking. Walking helps move the blood in your legs.   If your doctor says it is ok, try walking the halls at least   5 times a day. Ask us to help you get up, so you don't fall.      Taking any blood thinning medicines your doctor orders.          ©[facility]; 3/23        Ways you can help prevent blood clots at home       Wearing compression stockings - if your doctor orders them. ? Walking - to help move the blood in your legs.       Taking any blood thinning medicines your doctor orders.      Signs of a blood clot or PE      Tell your doctor or nurse know right away if you have of the problems listed below.    If you are at home, seek medical care right away. Call 911 for chest pain or problems breathing.          Signs of a blood clot (DVT) - such as pain,  swelling, redness or warmth in your arm or leg      Signs of a pulmonary embolism (PE) - such as chest     pain or feeling short of breath

## 2024-10-13 LAB — STAPHYLOCOCCUS SPEC CULT: NORMAL

## 2024-10-15 ENCOUNTER — TELEPHONE (OUTPATIENT)
Facility: CLINIC | Age: 84
End: 2024-10-15
Payer: MEDICARE

## 2024-10-15 DIAGNOSIS — M48.062 NEUROGENIC CLAUDICATION DUE TO LUMBAR SPINAL STENOSIS: Primary | ICD-10-CM

## 2024-10-16 LAB
ATRIAL RATE: 77 BPM
P AXIS: 2 DEGREES
P OFFSET: 174 MS
P ONSET: 140 MS
PR INTERVAL: 136 MS
Q ONSET: 208 MS
QRS COUNT: 13 BEATS
QRS DURATION: 118 MS
QT INTERVAL: 390 MS
QTC CALCULATION(BAZETT): 441 MS
QTC FREDERICIA: 423 MS
R AXIS: -52 DEGREES
T AXIS: 67 DEGREES
T OFFSET: 403 MS
VENTRICULAR RATE: 77 BPM

## 2024-10-17 ENCOUNTER — APPOINTMENT (OUTPATIENT)
Dept: CARDIOLOGY | Facility: CLINIC | Age: 84
End: 2024-10-17
Payer: MEDICARE

## 2024-11-15 ENCOUNTER — APPOINTMENT (OUTPATIENT)
Facility: CLINIC | Age: 84
End: 2024-11-15
Payer: MEDICARE

## 2024-12-02 ENCOUNTER — TELEPHONE (OUTPATIENT)
Dept: NEUROSURGERY | Facility: CLINIC | Age: 84
End: 2024-12-02
Payer: MEDICARE

## 2024-12-26 ENCOUNTER — OFFICE VISIT (OUTPATIENT)
Facility: CLINIC | Age: 84
End: 2024-12-26
Payer: MEDICARE

## 2024-12-26 VITALS
HEART RATE: 78 BPM | HEIGHT: 68 IN | WEIGHT: 190 LBS | SYSTOLIC BLOOD PRESSURE: 120 MMHG | BODY MASS INDEX: 28.79 KG/M2 | DIASTOLIC BLOOD PRESSURE: 76 MMHG

## 2024-12-26 DIAGNOSIS — M43.16 SPONDYLOLISTHESIS OF LUMBAR REGION: Primary | ICD-10-CM

## 2024-12-26 DIAGNOSIS — M48.062 NEUROGENIC CLAUDICATION DUE TO LUMBAR SPINAL STENOSIS: ICD-10-CM

## 2024-12-26 RX ORDER — METOPROLOL SUCCINATE 25 MG/1
25 TABLET, EXTENDED RELEASE ORAL DAILY
COMMUNITY
Start: 2024-11-22

## 2024-12-26 ASSESSMENT — PATIENT HEALTH QUESTIONNAIRE - PHQ9
10. IF YOU CHECKED OFF ANY PROBLEMS, HOW DIFFICULT HAVE THESE PROBLEMS MADE IT FOR YOU TO DO YOUR WORK, TAKE CARE OF THINGS AT HOME, OR GET ALONG WITH OTHER PEOPLE: SOMEWHAT DIFFICULT
SUM OF ALL RESPONSES TO PHQ9 QUESTIONS 1 & 2: 1
1. LITTLE INTEREST OR PLEASURE IN DOING THINGS: SEVERAL DAYS
2. FEELING DOWN, DEPRESSED OR HOPELESS: NOT AT ALL

## 2024-12-26 ASSESSMENT — PAIN SCALES - GENERAL: PAINLEVEL_OUTOF10: 5

## 2024-12-26 NOTE — PROGRESS NOTES
[facility] [place]  Department of Neurological Surgery  Established Patient Visit  History of Present Illness:  Sebas Soto is a 83 y.o. year old male who presents to the spine clinic in follow up with right leg pain. He has been seen previously as well and scheduled for surgery.  The pain continues to go down the back of his right leg when he stands or walks and there is associated numbness and weakness on the left side of his body.  He normally ambulates with a walker at baseline.  It is worse when he stands and walks and improves with rest.  He has minimal back pain. He has some left leg weakness as well as been chronic since his severe fall in 2001.     He has an MRI cervical spine from 2001 showing significant myelomalacia in the upper cervical spine and the evidence of prior cervical laminectomy.     According to the chart and the patient the had cervical decompression where he did well for many years.  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  The numbness and tingling after his surgery in 2001 improved though he has had persistent gait issues since then that has gotten slightly worse.  His left upper extremity and lower extremity weakness has not changed for many years.  His wife says that he has had many falls every year though the worst one has been times January.  He has been unstable on his feet for some time.     Since we last saw him, he had a trial of PT which has failed to improve his symptoms. He is here to discuss surgery again.    BMI: There is no height or weight on file to calculate BMI.    Constitutional: No fever or chills  Respiratory: No shortness of breath or wheezing  Musculoskeletal: see HPI above   Neurologic: See HPI above    Patient Active Problem List   Diagnosis    Other melanin hyperpigmentation    Other hypertrophic disorders of the skin    Other benign neoplasm of skin, unspecified    Hemangioma of skin  and subcutaneous tissue    Melanocytic nevi of trunk    Melanocytic nevi of left upper limb, including shoulder    Melanocytic nevi of left lower limb, including hip    Right inguinal hernia    Incarcerated right inguinal hernia    Pigmented purpuric dermatosis    Other seborrheic keratosis    Actinic keratosis    Mixed conductive and sensorineural hearing loss of right ear with restricted hearing of left ear    Cervical spondylosis with myelopathy    Neurogenic claudication due to lumbar spinal stenosis    Klippel-Feil anomaly, myopathy, and facial dysmorphism syndrome (Multi)    Spondylolisthesis of lumbar region    Myelomalacia of cervical cord    Spondylolisthesis, lumbar region     Past Medical History:   Diagnosis Date    Abnormal ECG     Arthritis     Bilateral lower extremity edema     Diverticulosis     Hyperlipidemia     Neurogenic claudication     Personal history of other diseases of the digestive system     History of diverticulitis    Personal history of other infectious and parasitic diseases     History of herpes zoster    Prostate cancer (Multi)     Short-term memory loss     Spondylolisthesis      Past Surgical History:   Procedure Laterality Date    COLONOSCOPY  07/30/2018    Colonoscopy (Fiberoptic)    HERNIA REPAIR  07/30/2018    Hernia Repair    NECK SURGERY  2007    TONSILLECTOMY  07/30/2018    Tonsillectomy     Social History     Tobacco Use    Smoking status: Former     Types: Cigarettes     Passive exposure: Never    Smokeless tobacco: Never   Substance Use Topics    Alcohol use: Never     family history includes Alzheimer's disease in his mother; Anemia in his mother; Breast cancer in his sister; Stroke in his sister.    Current Outpatient Medications:     atorvastatin (Lipitor) 10 mg tablet, Take 1 tablet (10 mg) by mouth once daily., Disp: , Rfl:     chlorhexidine (Peridex) 0.12 % solution, Sig: 15 mls swish and spit the night before surgery and 15mls swish and spit the morning of  surgery do not swallow, Disp: 473 mL, Rfl: 0    LUTEIN ORAL, Take 1 capsule by mouth 3 (three) times a week., Disp: , Rfl:     trospium (Sanctura) 20 mg tablet, Take 1 tablet (20 mg) by mouth once daily., Disp: , Rfl:   No Known Allergies    Physical Examination:     General: Well developed, awake/alert/oriented x3, no distress, alert and cooperative  Skin: Warm and dry, no lesions, no rashes  ENMT: Mucous membranes moist, no apparent injury, no lesions seen  Head/Neck: Neck Supple, no apparent injury  Respiratory/Thorax: Normal breath sounds with good chest expansion, thorax symmetric  Cardiovascular: No pitting edema, no JVD     Motor Strength: LUE D3 B/T 4- HG 4+  RUE 5/5  LLE HF4+ KE 4+ DF 4 PF 4-  RLE 5/5  Significant bilateral lower extremity peripheral edema  Muscle Bulk: decreased on left side     Posture:   Loss of lordosis and cervical and lumbar spine  Paraspinal muscle spasm/tenderness absent.      Sensation: intact to light touch  Decreased on the left side      Results:  I personally reviewed and interpreted the imaging results which included MRI L-spine from June 2024 showing various levels of lumbar spondylosis with L1 compression fracture approximately 50% height loss mild to moderate L3-4 lateral recess stenosis with some foraminal stenosis, L4-5 spondylolisthesis with severe lateral recess stenosis and canal stenosis with facet joint effusions, facet joint effusions at L5-S1 CT C-spine showing evidence of global file with autofusion from occiput to C2 with hypoplastic C4 and autofused C5 and C6.  Has also autofused C7-T1 anteriorly.  There is mild spondylolisthesis of C4 on C5.  Evidence of also a cervical laminectomy spanning from C2-C5 there is also evidence of hypoplastic posterior elements and hypoplastic pedicles throughout the upper cervical spine.  X-ray cervical spine showing some mobility of C4 on the C5 with flexion.  X-rays of the lumbar spine showing some dynamic instability at L4-5  with more anterolisthesis on flexion.  '  Although not able to review the actual images the MRI read from January 2024 of the MRI cervical spine showing stable myelomalacia in the cervical cord at C2-3 and 3 4 with no further canal compression.    Assessment and Plan:    Sebas Soto is a 83 y.o. year old male who presents to the spine clinic in follow up with lumbar radiculopathy and neurogenic claudication secondary to the L4-5 spondylolisthesis.  He has severe central stenosis at L2-3, as well as moderate to severe central stenosis at L3-4 and L4-5.  He has severe foraminal stenosis on the right at L4-5.  In the cervical spine, he has moderate to severe central stenosis at C5-6.  I had a lengthy discussion with the patient regarding his condition and treatment options.  At this time, I do not believe that the stenosis at C5-6 is symptomatic.  I am going to order an EMG/nerve conduction study of the upper extremities for further evaluation.  I do believe that the bulk of his symptoms are from his lumbar stenosis.  I discussed multiple treatment options with the patient, including further conservative care as well as different surgical approaches.  At this time, I am recommending a L2-5 laminectomy with L4-5 TLIF.  I went over the risk associate with surgery, including infection, bleeding, neurologic injury, spinal fluid leak, and the need for further procedures.  All the patient's questions were answered and he is elected to proceed with surgery.

## 2024-12-27 DIAGNOSIS — M43.16 SPONDYLOLISTHESIS OF LUMBAR REGION: Primary | ICD-10-CM

## 2024-12-27 DIAGNOSIS — M48.062 NEUROGENIC CLAUDICATION DUE TO LUMBAR SPINAL STENOSIS: ICD-10-CM

## 2025-01-21 ENCOUNTER — LAB (OUTPATIENT)
Dept: LAB | Facility: LAB | Age: 85
End: 2025-01-21
Payer: MEDICARE

## 2025-01-21 ENCOUNTER — PRE-ADMISSION TESTING (OUTPATIENT)
Dept: PREADMISSION TESTING | Facility: HOSPITAL | Age: 85
End: 2025-01-21
Payer: MEDICARE

## 2025-01-21 VITALS
TEMPERATURE: 97.3 F | WEIGHT: 201.28 LBS | HEIGHT: 68 IN | SYSTOLIC BLOOD PRESSURE: 149 MMHG | OXYGEN SATURATION: 95 % | RESPIRATION RATE: 18 BRPM | DIASTOLIC BLOOD PRESSURE: 60 MMHG | BODY MASS INDEX: 30.51 KG/M2 | HEART RATE: 65 BPM

## 2025-01-21 DIAGNOSIS — R73.09 OTHER ABNORMAL GLUCOSE: ICD-10-CM

## 2025-01-21 DIAGNOSIS — M48.062 NEUROGENIC CLAUDICATION DUE TO LUMBAR SPINAL STENOSIS: ICD-10-CM

## 2025-01-21 DIAGNOSIS — Z01.818 PREOP EXAMINATION: ICD-10-CM

## 2025-01-21 DIAGNOSIS — M43.16 SPONDYLOLISTHESIS OF LUMBAR REGION: ICD-10-CM

## 2025-01-21 DIAGNOSIS — Z01.818 PREOP EXAMINATION: Primary | ICD-10-CM

## 2025-01-21 LAB
ABO GROUP (TYPE) IN BLOOD: NORMAL
ANION GAP SERPL CALC-SCNC: 12 MMOL/L (ref 10–20)
ANTIBODY SCREEN: NORMAL
BUN SERPL-MCNC: 16 MG/DL (ref 6–23)
CALCIUM SERPL-MCNC: 8.9 MG/DL (ref 8.6–10.3)
CHLORIDE SERPL-SCNC: 106 MMOL/L (ref 98–107)
CO2 SERPL-SCNC: 26 MMOL/L (ref 21–32)
CREAT SERPL-MCNC: 0.68 MG/DL (ref 0.5–1.3)
EGFRCR SERPLBLD CKD-EPI 2021: >90 ML/MIN/1.73M*2
ERYTHROCYTE [DISTWIDTH] IN BLOOD BY AUTOMATED COUNT: 16.3 % (ref 11.5–14.5)
EST. AVERAGE GLUCOSE BLD GHB EST-MCNC: 128 MG/DL
GLUCOSE SERPL-MCNC: 89 MG/DL (ref 74–99)
HBA1C MFR BLD: 6.1 %
HCT VFR BLD AUTO: 40.8 % (ref 41–52)
HGB BLD-MCNC: 13.4 G/DL (ref 13.5–17.5)
MCH RBC QN AUTO: 32.1 PG (ref 26–34)
MCHC RBC AUTO-ENTMCNC: 32.8 G/DL (ref 32–36)
MCV RBC AUTO: 98 FL (ref 80–100)
NRBC BLD-RTO: 0 /100 WBCS (ref 0–0)
PLATELET # BLD AUTO: 182 X10*3/UL (ref 150–450)
POTASSIUM SERPL-SCNC: 4.1 MMOL/L (ref 3.5–5.3)
PREALB SERPL-MCNC: 24.9 MG/DL (ref 18–40)
RBC # BLD AUTO: 4.18 X10*6/UL (ref 4.5–5.9)
RH FACTOR (ANTIGEN D): NORMAL
SODIUM SERPL-SCNC: 140 MMOL/L (ref 136–145)
WBC # BLD AUTO: 6 X10*3/UL (ref 4.4–11.3)

## 2025-01-21 PROCEDURE — 87081 CULTURE SCREEN ONLY: CPT | Mod: STJLAB

## 2025-01-21 PROCEDURE — 83036 HEMOGLOBIN GLYCOSYLATED A1C: CPT

## 2025-01-21 PROCEDURE — 99202 OFFICE O/P NEW SF 15 MIN: CPT

## 2025-01-21 PROCEDURE — 84134 ASSAY OF PREALBUMIN: CPT

## 2025-01-21 RX ORDER — CHLORHEXIDINE GLUCONATE ORAL RINSE 1.2 MG/ML
SOLUTION DENTAL
Qty: 473 ML | Refills: 0 | Status: SHIPPED | OUTPATIENT
Start: 2025-01-21

## 2025-01-21 ASSESSMENT — DUKE ACTIVITY SCORE INDEX (DASI)
CAN YOU DO MODERATE WORK AROUND THE HOUSE LIKE VACUUMING, SWEEPING FLOORS OR CARRYING GROCERIES: NO
CAN YOU WALK INDOORS, SUCH AS AROUND YOUR HOUSE: YES
DASI METS SCORE: 3.3
CAN YOU DO YARD WORK LIKE RAKING LEAVES, WEEDING OR PUSHING A MOWER: NO
CAN YOU WALK A BLOCK OR TWO ON LEVEL GROUND: NO
CAN YOU HAVE SEXUAL RELATIONS: NO
CAN YOU RUN A SHORT DISTANCE: NO
CAN YOU DO HEAVY WORK AROUND THE HOUSE LIKE SCRUBBING FLOORS OR LIFTING AND MOVING HEAVY FURNITURE: NO
CAN YOU PARTICIPATE IN MODERATE RECREATIONAL ACTIVITIES LIKE GOLF, BOWLING, DANCING, DOUBLES TENNIS OR THROWING A BASEBALL OR FOOTBALL: NO
CAN YOU TAKE CARE OF YOURSELF (EAT, DRESS, BATHE, OR USE TOILET): YES
CAN YOU DO LIGHT WORK AROUND THE HOUSE LIKE DUSTING OR WASHING DISHES: NO
CAN YOU CLIMB A FLIGHT OF STAIRS OR WALK UP A HILL: NO
TOTAL_SCORE: 4.5
CAN YOU PARTICIPATE IN STRENOUS SPORTS LIKE SWIMMING, SINGLES TENNIS, FOOTBALL, BASKETBALL, OR SKIING: NO

## 2025-01-21 ASSESSMENT — LIFESTYLE VARIABLES: SMOKING_STATUS: NONSMOKER

## 2025-01-21 ASSESSMENT — ACTIVITIES OF DAILY LIVING (ADL): ADL_SCORE: 1

## 2025-01-21 ASSESSMENT — PAIN - FUNCTIONAL ASSESSMENT: PAIN_FUNCTIONAL_ASSESSMENT: 0-10

## 2025-01-21 NOTE — PREPROCEDURE INSTRUCTIONS
Medication List            Accurate as of January 21, 2025 11:51 AM. Always use your most recent med list.                atorvastatin 10 mg tablet  Commonly known as: Lipitor  Medication Adjustments for Surgery: Take/Use as prescribed     chlorhexidine 0.12 % solution  Commonly known as: Peridex  15 milliliter(s) orally once a day for 2 doses 15 ml  the night before surgery and 15 ml morning of surgery - swish for 30 seconds -DO NOT SWALLOW, SPIT OUT     metoprolol succinate XL 25 mg 24 hr tablet  Commonly known as: Toprol-XL  Medication Adjustments for Surgery: Take/Use as prescribed                                PRE-OPERATIVE INSTRUCTIONS    You will receive notification one business day prior to your procedure to confirm your arrival time. It is important that you answer your phone and/or check your messages during this time. If you do not hear from the surgery center by 5 pm. the day before your procedure, please call [phone].     Please enter the building through the Outpatient entrance and take the elevator off the lobby to the 2nd floor then check in at the Outpatient Surgery desk on the 2nd floor.    INSTRUCTIONS:  Talk to your surgeon for instructions if you should stop your aspirin, blood thinner, or diabetes medicines.  DO NOT take any multivitamins or over the counter supplements for 7-10 days before surgery.  If not being admitted, you must have an adult immediately available to drive you home after surgery. We also highly recommend you have someone stay with you for the entire day and night of your surgery.  For children having surgery, a parent or legal guardian must accompany them to the surgery center. If this is not possible, please call [phone] to make additional arrangements.  For adults who are unable to consent or make medical decisions for themselves, a legal guardian or Power of  must accompany them to the surgery center. If this is not possible, please call  [phone] to make additional arrangements.  Wear comfortable, loose fitting clothing.  All jewelry and piercings must be removed. If you are unable to remove an item or have a dermal piercing, please be sure to tell the nurse when you arrive for surgery.  Nail polish and make-up must be removed.  Avoid smoking or consuming alcohol for 24 hours before surgery.  To help prevent infection, please take a shower/bath and wash your hair the night before and/or morning of surgery (or follow other specific bathing instructions provided).    Preoperative Fasting Guidelines    Why must I stop eating and drinking near surgery time?  With sedation, food or liquid in your stomach can enter your lungs causing serious complications  Increases nausea and vomiting    When do I need to stop eating and drinking before my surgery?  Do not eat any solid food after midnight the night before your surgery/procedure unless otherwise instructed by your surgeon.   You may have up to 13.5 ounces of clear liquid until TWO hours before your instructed arrival time to the hospital.  This includes water, black tea/coffee, (no milk or cream) apple juice, and electrolyte drinks (Gatorade).   You may chew gum until TWO hours before your surgery/procedure      If applicable, notify your surgeons office immediately of any new skin changes that occur to the surgical limb.      If you have any questions or concerns, please call Pre-Admission Testing at [phone].

## 2025-01-21 NOTE — CPM/PAT H&P
CPM/PAT Evaluation       Name: Sebas Soto (Sebas Soto)  /Age: 1940/84 y.o.     In-Person       Chief Complaint: Low back pain     HPI  Pleasant 84-year-old male presents with spondylitic listhesis of lumbar region and neurogenic claudication due to lumbar spinal stenosis scheduled for L2-5 laminectomies, L4-5 fusion spine lumbar on 2025. He has pain in the lower back that radiates down his right leg. He also has numbness/weakness in legs. He uses a walker for ambulation and a wheelchair for longer distances.     Past Medical History:   Diagnosis Date    Abnormal ECG     Arthritis     Bilateral lower extremity edema     Diverticulosis     Hyperlipidemia     Neurogenic claudication     Personal history of other diseases of the digestive system     History of diverticulitis    Personal history of other infectious and parasitic diseases     History of herpes zoster    Prostate cancer (Multi)     Short-term memory loss     Spondylolisthesis        Past Surgical History:   Procedure Laterality Date    COLONOSCOPY  2018    Colonoscopy (Fiberoptic)    HERNIA REPAIR  2018    Hernia Repair    NECK SURGERY      TONSILLECTOMY  2018    Tonsillectomy       Patient  has no history on file for sexual activity.    Family History   Problem Relation Name Age of Onset    Alzheimer's disease Mother      Anemia Mother      Breast cancer Sister      Stroke Sister         No Known Allergies    Prior to Admission medications    Medication Sig Start Date End Date Taking? Authorizing Provider   atorvastatin (Lipitor) 10 mg tablet Take 1 tablet (10 mg) by mouth once daily.  Patient not taking: Reported on 2024    Historical Provider, MD   chlorhexidine (Peridex) 0.12 % solution Sig: 15 mls swish and spit the night before surgery and 15mls swish and spit the morning of surgery do not swallow  Patient not taking: Reported on 2024 10/11/24   Philippe Qureshi MD PhD   LUTEIN ORAL Take 1  capsule by mouth 3 (three) times a week.  Patient not taking: Reported on 12/26/2024    Historical Provider, MD   metoprolol succinate XL (Toprol-XL) 25 mg 24 hr tablet Take 1 tablet (25 mg) by mouth once daily. 11/22/24   Historical Provider, MD   trospium (Sanctura) 20 mg tablet Take 1 tablet (20 mg) by mouth once daily.  Patient not taking: Reported on 12/26/2024    Historical Provider, MD        Constitutional: Negative for fever, chills, or sweats   ENMT: Negative for nasal discharge, congestion, ear pain, mouth pain, throat pain. Positive for glasses. Positive for complete dentures. Positive for chronic sinus drainage.   Respiratory: Negative for cough, wheezing, shortness of breath   Cardiac: Negative for chest pain, dyspnea on exertion, palpitations   Gastrointestinal: Negative for nausea, vomiting, diarrhea, constipation, abdominal pain  Genitourinary: Negative for dysuria, flank pain, frequency, hematuria. Positive for urge incontinence-chronic and nocturia.    Musculoskeletal: Negative for decreased ROM, pain, swelling, weakness. Positive for LE edema. Positive for bilat arm numbness/tingling. Positive for limited range of motion of bilat arms. Uses walker/wheelchair.   Neurological: Negative for dizziness, confusion, headache  Psychiatric: Negative for mood changes   Skin: Negative for itching, rash, ulcer    Hematologic/Lymph: Negative for bruising, easy bleeding  Allergic/Immunologic: Negative itching, sneezing, swelling      Physical Exam  Vitals reviewed.   Constitutional:       Appearance: Normal appearance.   HENT:      Head: Normocephalic.      Mouth/Throat:      Mouth: Mucous membranes are moist.      Pharynx: Oropharynx is clear.   Eyes:      Pupils: Pupils are equal, round, and reactive to light.   Cardiovascular:      Rate and Rhythm: Normal rate and regular rhythm.      Heart sounds: Normal heart sounds.      Comments: +2 lower extremity edema   Pulmonary:      Effort: Pulmonary effort is  "normal.      Breath sounds: Normal breath sounds.   Abdominal:      General: Bowel sounds are normal.      Palpations: Abdomen is soft.   Musculoskeletal:      Cervical back: Rigidity present. Decreased range of motion.      Comments: Generalized weakness    Skin:     General: Skin is warm and dry.   Neurological:      General: No focal deficit present.      Mental Status: He is alert and oriented to person, place, and time.   Psychiatric:         Mood and Affect: Mood normal.         Behavior: Behavior normal.          PAT AIRWAY:   Airway:     Mallampati::  III    Neck ROM::  Limited   lower dentures and upper dentures      Testing/Diagnostic:   Echo on file from November under media     Patient Specialist/PCP: PCP: Dr. Balderas   Cardiology: Dr. Coleman     Visit Vitals  /60   Pulse 65   Temp 36.3 °C (97.3 °F) (Temporal)   Resp 18   Ht 1.727 m (5' 8\")   Wt 91.3 kg (201 lb 4.5 oz)   SpO2 95%   BMI 30.60 kg/m²   Smoking Status Former   BSA 2.09 m²       DASI Risk Score      Flowsheet Row Questionnaire Series Submission from 1/10/2025 in [facility] with Generic Provider Kristopher Pre-Admission Testing from 10/11/2024 in [facility]   Can you take care of yourself (eat, dress, bathe, or use toilet)?  2.75  filed at 01/10/2025 1237 2.75 filed at 10/10/2024 1424   Can you walk indoors, such as around your house? 1.75  filed at 01/10/2025 1237 1.75 filed at 10/10/2024 1424   Can you walk a block or two on level ground?  0  filed at 01/10/2025 1237 0 filed at 10/10/2024 1424   Can you climb a flight of stairs or walk up a hill? 0  filed at 01/10/2025 1237 0 filed at 10/10/2024 1424   Can you run a short distance? 0  filed at 01/10/2025 1237 0 filed at 10/10/2024 1424   Can you do light work around the house like dusting or washing dishes? 0  filed at 01/10/2025 1237 2.7 filed at 10/10/2024 1424   Can you do moderate work around the house like vacuuming, sweeping floors or carrying groceries? 0  filed at " 01/10/2025 1237 3.5 filed at 10/10/2024 1424   Can you do heavy work around the house like scrubbing floors or lifting and moving heavy furniture?  0  filed at 01/10/2025 1237 0 filed at 10/10/2024 1424   Can you do yard work like raking leaves, weeding or pushing a mower? 0  filed at 01/10/2025 1237 0 filed at 10/10/2024 1424   Can you have sexual relations? 0  filed at 01/10/2025 1237 0 filed at 10/10/2024 1424   Can you participate in moderate recreational activities like golf, bowling, dancing, doubles tennis or throwing a baseball or football? 0  filed at 01/10/2025 1237 0 filed at 10/10/2024 1424   Can you participate in strenous sports like swimming, singles tennis, football, basketball, or skiing? 0  filed at 01/10/2025 1237 0 filed at 10/10/2024 1424   DASI SCORE 4.5  filed at 01/10/2025 1237 10.7 filed at 10/10/2024 1424   METS Score (Will be calculated only when all the questions are answered) 3.3  filed at 01/10/2025 1237 4.1 filed at 10/10/2024 1424          Caprini DVT Assessment      Flowsheet Row Pre-Admission Testing from 1/21/2025 in [facility] Pre-Admission Testing from 10/11/2024 in [facility]   DVT Score (IF A SCORE IS NOT CALCULATING, MUST SELECT A BMI TO COMPLETE) 11 filed at 01/21/2025 1138 10 filed at 10/10/2024 1423   Medical Factors Present cancer, chemotherapy, or previous malignancy filed at 01/21/2025 1138 Present cancer, chemotherapy, or previous malignancy filed at 10/10/2024 1423   Surgical Factors Major surgery planned, lasting over 3 hours filed at 01/21/2025 1138 Major surgery planned, lasting over 3 hours filed at 10/10/2024 1423   BMI (BMI MUST BE CHOSEN) 30 or less filed at 01/21/2025 1138 30 or less filed at 10/10/2024 1423          Modified Frailty Index      Flowsheet Row Pre-Admission Testing from 10/11/2024 in [facility]   Non-independent functional status (problems with dressing, bathing, personal grooming, or cooking)  0.0909 filed at 10/10/2024 1424   History of diabetes mellitus  0 filed at 10/10/2024 1424   History of COPD 0 filed at 10/10/2024 1424   History of CHF No filed at 10/10/2024 1424   History of MI 0 filed at 10/10/2024 1424   History of Percutaneous Coronary Intervention, Cardiac Surgery, or Angina No filed at 10/10/2024 1424   Hypertension requiring the use of medication  0 filed at 10/10/2024 1424   Peripheral vascular disease 0 filed at 10/10/2024 1424   Impaired sensorium (cognitive impairement or loss, clouding, or delirium) 0.0909 filed at 10/10/2024 1424   TIA or CVA withouy residual deficit 0 filed at 10/10/2024 1424   Cerebrovascular accident with deficit 0 filed at 10/10/2024 1424   Modified Frailty Index Calculator .1818 filed at 10/10/2024 1424          CHADS2 Stroke Risk  Current as of 5 hours ago        N/A 3 to 100%: High Risk   2 to < 3%: Medium Risk   0 to < 2%: Low Risk     Last Change: N/A          This score determines the patient's risk of having a stroke if the patient has atrial fibrillation.        This score is not applicable to this patient. Components are not calculated.          Revised Cardiac Risk Index      Flowsheet Row Pre-Admission Testing from 10/11/2024 in [facility]   High-Risk Surgery (Intraperitoneal, Intrathoracic,Suprainguinal vascular) 0 filed at 10/10/2024 1424   History of ischemic heart disease (History of MI, History of positive exercuse test, Current chest paint considered due to myocardial ischemia, Use of nitrate therapy, ECG with pathological Q Waves) 0 filed at 10/10/2024 1424   History of congestive heart failure (pulmonary edemia, bilateral rales or S3 gallop, Paroxysmal nocturnal dyspnea, CXR showing pulmonary vascular redistribution) 0 filed at 10/10/2024 1424   History of cerebrovascular disease (Prior TIA or stroke) 0 filed at 10/10/2024 1424   Pre-operative insulin treatment 0 filed at 10/10/2024 1424   Pre-operative creatinine>2 mg/dl 0 filed  at 10/10/2024 1424   Revised Cardiac Risk Calculator 0 filed at 10/10/2024 1424          Apfel Simplified Score      Flowsheet Row Pre-Admission Testing from 10/11/2024 in [facility]   Smoking status 1 filed at 10/10/2024 1424   History of motion sickness or PONV  0 filed at 10/10/2024 1424   Use of postoperative opioids 1 filed at 10/10/2024 1424   Gender - Female 0=No filed at 10/10/2024 1424   Apfel Simplified Score Calculator 2 filed at 10/10/2024 1424          Risk Analysis Index Results This Encounter    No data found in the last 10 encounters.       Stop Bang Score      Flowsheet Row Questionnaire Series Submission from 1/10/2025 in [facility] with Generic Provider Kristopher Pre-Admission Testing from 10/11/2024 in [facility]   Do you snore loudly? 0  filed at 01/10/2025 1237 0 filed at 10/10/2024 1423   Do you often feel tired or fatigued after your sleep? 0  filed at 01/10/2025 1237 1 filed at 10/10/2024 1423   Has anyone ever observed you stop breathing in your sleep? 0  filed at 01/10/2025 1237 0 filed at 10/10/2024 1423   Do you have or are you being treated for high blood pressure? 0  filed at 01/10/2025 1237 0 filed at 10/10/2024 1423   Recent BMI (Calculated) 28.9  filed at 01/10/2025 1237 28.9 filed at 10/10/2024 1423   Is BMI greater than 35 kg/m2? 0=No  filed at 01/10/2025 1237 0=No filed at 10/10/2024 1423   Age older than 50 years old? 1=Yes  filed at 01/10/2025 1237 1=Yes filed at 10/10/2024 1423   Is your neck circumference greater than 17 inches (Male) or 16 inches (Female)? -- 0 filed at 10/10/2024 1423   Gender - Male 1=Yes  filed at 01/10/2025 1237 1=Yes filed at 10/10/2024 1423   STOP-BANG Total Score -- 3 filed at 10/10/2024 1423          Prodigy: High Risk  Total Score: 24              Prodigy Age Score      Prodigy Gender Score          ARISCAT Score for Postoperative Pulmonary Complications    No data to display       Dragan Perioperative Risk for  Myocardial Infarction or Cardiac Arrest (KISHAN)    No data to display         Assessment and Plan:     Assessment and Plan:     Preop:   OR with Dr. Qureshi on 1/31/24 for L2-5 laminectomies; L4-5 fusion spine   Labs ordered per Dr. Qureshi.   EKG on file from 10/11/24-[facility]. [facility]. [facility].   CXR and EKG routed to Dr. Qureshi for his review prior to surgery    Requested cardiac clearance from Dr. Coleman-had an appt last Friday     Neurologic:   Short-term memory loss: Has been discussed with PCP. No acute changes or concerns at this time   The patient is at an increased risk for post operative delirium secondary to age >/=65 , decrease functional status, and type and duration of surgery . Preoperative brain exercise educational handout provided to patient.  The patient is at an increased risk for perioperative stroke secondary to increased age, HLD, and general anesthesia.    Cardiac:  Hyperlipidemia: On statin   States he is on metoprolol to relax his heart muscles-not for hypertension.   Dependent edema: Encouraged to wear velcro-wraps per PCP. Believed to be due to lack of mobility/activity.   [facility] Activity Status Index (DASI)  DASI Score: 4.5   MET Score: 3.3  Limited physical activity due to severe low back pain-reason for upcoming procedure   RCRI  0 which is 3.9% 30 day risk of MACE (risk for cardiac death, nonfatal myocardial infarction, and nonfactal cardiac arrest)  KISHAN score which indicates a   0.24% risk of intraoperative or 30-day postoperative MACE    Pulmonary:   STOP-BANG score of  2 . Low  risk of obstructive sleep apnea.   ARISCAT:    47  points which is a high (42.1%) risk of in-hospital post-op pulmonary complications     GI:  Apfel: 2 points 39% risk for post operative N/V    /Renal:   Prostate cancer: S/P radiation     Neuro-muscular:   Cervical myelopathy: Very stiff neck, limited ROM   Lumbar spinal stenosis: Reason for upcoming procedure     Hematologic:   Caprini score 11, patient at high  "risk for perioperative DVT. Patient provided with VTE education/handout.     Skin check: Patient was instructed to make surgeon aware of any skin changes/concerns prior to surgery.     Anesthesia: States he was told that it took roughly 30 minutes to intubate him due to \"esophageal narrowing\"-this was over 15 years ago prior to his neck surgery. Anesthesia and OR scheduling group was notified of this information.      *See risk scores as previously documented   "

## 2025-01-21 NOTE — H&P (VIEW-ONLY)
CPM/PAT Evaluation       Name: Sebas Soto (Sebas Soto)  /Age: 1940/84 y.o.     In-Person       Chief Complaint: Low back pain     HPI  Pleasant 84-year-old male presents with spondylitic listhesis of lumbar region and neurogenic claudication due to lumbar spinal stenosis scheduled for L2-5 laminectomies, L4-5 fusion spine lumbar on 2025. He has pain in the lower back that radiates down his right leg. He also has numbness/weakness in legs. He uses a walker for ambulation and a wheelchair for longer distances.     Past Medical History:   Diagnosis Date    Abnormal ECG     Arthritis     Bilateral lower extremity edema     Diverticulosis     Hyperlipidemia     Neurogenic claudication     Personal history of other diseases of the digestive system     History of diverticulitis    Personal history of other infectious and parasitic diseases     History of herpes zoster    Prostate cancer (Multi)     Short-term memory loss     Spondylolisthesis        Past Surgical History:   Procedure Laterality Date    COLONOSCOPY  2018    Colonoscopy (Fiberoptic)    HERNIA REPAIR  2018    Hernia Repair    NECK SURGERY      TONSILLECTOMY  2018    Tonsillectomy       Patient  has no history on file for sexual activity.    Family History   Problem Relation Name Age of Onset    Alzheimer's disease Mother      Anemia Mother      Breast cancer Sister      Stroke Sister         No Known Allergies    Prior to Admission medications    Medication Sig Start Date End Date Taking? Authorizing Provider   atorvastatin (Lipitor) 10 mg tablet Take 1 tablet (10 mg) by mouth once daily.  Patient not taking: Reported on 2024    Historical Provider, MD   chlorhexidine (Peridex) 0.12 % solution Sig: 15 mls swish and spit the night before surgery and 15mls swish and spit the morning of surgery do not swallow  Patient not taking: Reported on 2024 10/11/24   Philippe Qureshi MD PhD   LUTEIN ORAL Take 1  capsule by mouth 3 (three) times a week.  Patient not taking: Reported on 12/26/2024    Historical Provider, MD   metoprolol succinate XL (Toprol-XL) 25 mg 24 hr tablet Take 1 tablet (25 mg) by mouth once daily. 11/22/24   Historical Provider, MD   trospium (Sanctura) 20 mg tablet Take 1 tablet (20 mg) by mouth once daily.  Patient not taking: Reported on 12/26/2024    Historical Provider, MD        Constitutional: Negative for fever, chills, or sweats   ENMT: Negative for nasal discharge, congestion, ear pain, mouth pain, throat pain. Positive for glasses. Positive for complete dentures. Positive for chronic sinus drainage.   Respiratory: Negative for cough, wheezing, shortness of breath   Cardiac: Negative for chest pain, dyspnea on exertion, palpitations   Gastrointestinal: Negative for nausea, vomiting, diarrhea, constipation, abdominal pain  Genitourinary: Negative for dysuria, flank pain, frequency, hematuria. Positive for urge incontinence-chronic and nocturia.    Musculoskeletal: Negative for decreased ROM, pain, swelling, weakness. Positive for LE edema. Positive for bilat arm numbness/tingling. Positive for limited range of motion of bilat arms. Uses walker/wheelchair.   Neurological: Negative for dizziness, confusion, headache  Psychiatric: Negative for mood changes   Skin: Negative for itching, rash, ulcer    Hematologic/Lymph: Negative for bruising, easy bleeding  Allergic/Immunologic: Negative itching, sneezing, swelling      Physical Exam  Vitals reviewed.   Constitutional:       Appearance: Normal appearance.   HENT:      Head: Normocephalic.      Mouth/Throat:      Mouth: Mucous membranes are moist.      Pharynx: Oropharynx is clear.   Eyes:      Pupils: Pupils are equal, round, and reactive to light.   Cardiovascular:      Rate and Rhythm: Normal rate and regular rhythm.      Heart sounds: Normal heart sounds.      Comments: +2 lower extremity edema   Pulmonary:      Effort: Pulmonary effort is  "normal.      Breath sounds: Normal breath sounds.   Abdominal:      General: Bowel sounds are normal.      Palpations: Abdomen is soft.   Musculoskeletal:      Cervical back: Rigidity present. Decreased range of motion.      Comments: Generalized weakness    Skin:     General: Skin is warm and dry.   Neurological:      General: No focal deficit present.      Mental Status: He is alert and oriented to person, place, and time.   Psychiatric:         Mood and Affect: Mood normal.         Behavior: Behavior normal.          PAT AIRWAY:   Airway:     Mallampati::  III    Neck ROM::  Limited   lower dentures and upper dentures      Testing/Diagnostic:   Echo on file from November under media     Patient Specialist/PCP: PCP: Dr. Balderas   Cardiology: Dr. Coleman     Visit Vitals  /60   Pulse 65   Temp 36.3 °C (97.3 °F) (Temporal)   Resp 18   Ht 1.727 m (5' 8\")   Wt 91.3 kg (201 lb 4.5 oz)   SpO2 95%   BMI 30.60 kg/m²   Smoking Status Former   BSA 2.09 m²       DASI Risk Score      Flowsheet Row Questionnaire Series Submission from 1/10/2025 in [facility] with Generic Provider Kristopher Pre-Admission Testing from 10/11/2024 in [facility]   Can you take care of yourself (eat, dress, bathe, or use toilet)?  2.75  filed at 01/10/2025 1237 2.75 filed at 10/10/2024 1424   Can you walk indoors, such as around your house? 1.75  filed at 01/10/2025 1237 1.75 filed at 10/10/2024 1424   Can you walk a block or two on level ground?  0  filed at 01/10/2025 1237 0 filed at 10/10/2024 1424   Can you climb a flight of stairs or walk up a hill? 0  filed at 01/10/2025 1237 0 filed at 10/10/2024 1424   Can you run a short distance? 0  filed at 01/10/2025 1237 0 filed at 10/10/2024 1424   Can you do light work around the house like dusting or washing dishes? 0  filed at 01/10/2025 1237 2.7 filed at 10/10/2024 1424   Can you do moderate work around the house like vacuuming, sweeping floors or carrying groceries? 0  filed at " 01/10/2025 1237 3.5 filed at 10/10/2024 1424   Can you do heavy work around the house like scrubbing floors or lifting and moving heavy furniture?  0  filed at 01/10/2025 1237 0 filed at 10/10/2024 1424   Can you do yard work like raking leaves, weeding or pushing a mower? 0  filed at 01/10/2025 1237 0 filed at 10/10/2024 1424   Can you have sexual relations? 0  filed at 01/10/2025 1237 0 filed at 10/10/2024 1424   Can you participate in moderate recreational activities like golf, bowling, dancing, doubles tennis or throwing a baseball or football? 0  filed at 01/10/2025 1237 0 filed at 10/10/2024 1424   Can you participate in strenous sports like swimming, singles tennis, football, basketball, or skiing? 0  filed at 01/10/2025 1237 0 filed at 10/10/2024 1424   DASI SCORE 4.5  filed at 01/10/2025 1237 10.7 filed at 10/10/2024 1424   METS Score (Will be calculated only when all the questions are answered) 3.3  filed at 01/10/2025 1237 4.1 filed at 10/10/2024 1424          Caprini DVT Assessment      Flowsheet Row Pre-Admission Testing from 1/21/2025 in [facility] Pre-Admission Testing from 10/11/2024 in [facility]   DVT Score (IF A SCORE IS NOT CALCULATING, MUST SELECT A BMI TO COMPLETE) 11 filed at 01/21/2025 1138 10 filed at 10/10/2024 1423   Medical Factors Present cancer, chemotherapy, or previous malignancy filed at 01/21/2025 1138 Present cancer, chemotherapy, or previous malignancy filed at 10/10/2024 1423   Surgical Factors Major surgery planned, lasting over 3 hours filed at 01/21/2025 1138 Major surgery planned, lasting over 3 hours filed at 10/10/2024 1423   BMI (BMI MUST BE CHOSEN) 30 or less filed at 01/21/2025 1138 30 or less filed at 10/10/2024 1423          Modified Frailty Index      Flowsheet Row Pre-Admission Testing from 10/11/2024 in [facility]   Non-independent functional status (problems with dressing, bathing, personal grooming, or cooking)  0.0909 filed at 10/10/2024 1424   History of diabetes mellitus  0 filed at 10/10/2024 1424   History of COPD 0 filed at 10/10/2024 1424   History of CHF No filed at 10/10/2024 1424   History of MI 0 filed at 10/10/2024 1424   History of Percutaneous Coronary Intervention, Cardiac Surgery, or Angina No filed at 10/10/2024 1424   Hypertension requiring the use of medication  0 filed at 10/10/2024 1424   Peripheral vascular disease 0 filed at 10/10/2024 1424   Impaired sensorium (cognitive impairement or loss, clouding, or delirium) 0.0909 filed at 10/10/2024 1424   TIA or CVA withouy residual deficit 0 filed at 10/10/2024 1424   Cerebrovascular accident with deficit 0 filed at 10/10/2024 1424   Modified Frailty Index Calculator .1818 filed at 10/10/2024 1424          CHADS2 Stroke Risk  Current as of 5 hours ago        N/A 3 to 100%: High Risk   2 to < 3%: Medium Risk   0 to < 2%: Low Risk     Last Change: N/A          This score determines the patient's risk of having a stroke if the patient has atrial fibrillation.        This score is not applicable to this patient. Components are not calculated.          Revised Cardiac Risk Index      Flowsheet Row Pre-Admission Testing from 10/11/2024 in [facility]   High-Risk Surgery (Intraperitoneal, Intrathoracic,Suprainguinal vascular) 0 filed at 10/10/2024 1424   History of ischemic heart disease (History of MI, History of positive exercuse test, Current chest paint considered due to myocardial ischemia, Use of nitrate therapy, ECG with pathological Q Waves) 0 filed at 10/10/2024 1424   History of congestive heart failure (pulmonary edemia, bilateral rales or S3 gallop, Paroxysmal nocturnal dyspnea, CXR showing pulmonary vascular redistribution) 0 filed at 10/10/2024 1424   History of cerebrovascular disease (Prior TIA or stroke) 0 filed at 10/10/2024 1424   Pre-operative insulin treatment 0 filed at 10/10/2024 1424   Pre-operative creatinine>2 mg/dl 0 filed  at 10/10/2024 1424   Revised Cardiac Risk Calculator 0 filed at 10/10/2024 1424          Apfel Simplified Score      Flowsheet Row Pre-Admission Testing from 10/11/2024 in [facility]   Smoking status 1 filed at 10/10/2024 1424   History of motion sickness or PONV  0 filed at 10/10/2024 1424   Use of postoperative opioids 1 filed at 10/10/2024 1424   Gender - Female 0=No filed at 10/10/2024 1424   Apfel Simplified Score Calculator 2 filed at 10/10/2024 1424          Risk Analysis Index Results This Encounter    No data found in the last 10 encounters.       Stop Bang Score      Flowsheet Row Questionnaire Series Submission from 1/10/2025 in [facility] with Generic Provider Kristopher Pre-Admission Testing from 10/11/2024 in [facility]   Do you snore loudly? 0  filed at 01/10/2025 1237 0 filed at 10/10/2024 1423   Do you often feel tired or fatigued after your sleep? 0  filed at 01/10/2025 1237 1 filed at 10/10/2024 1423   Has anyone ever observed you stop breathing in your sleep? 0  filed at 01/10/2025 1237 0 filed at 10/10/2024 1423   Do you have or are you being treated for high blood pressure? 0  filed at 01/10/2025 1237 0 filed at 10/10/2024 1423   Recent BMI (Calculated) 28.9  filed at 01/10/2025 1237 28.9 filed at 10/10/2024 1423   Is BMI greater than 35 kg/m2? 0=No  filed at 01/10/2025 1237 0=No filed at 10/10/2024 1423   Age older than 50 years old? 1=Yes  filed at 01/10/2025 1237 1=Yes filed at 10/10/2024 1423   Is your neck circumference greater than 17 inches (Male) or 16 inches (Female)? -- 0 filed at 10/10/2024 1423   Gender - Male 1=Yes  filed at 01/10/2025 1237 1=Yes filed at 10/10/2024 1423   STOP-BANG Total Score -- 3 filed at 10/10/2024 1423          Prodigy: High Risk  Total Score: 24              Prodigy Age Score      Prodigy Gender Score          ARISCAT Score for Postoperative Pulmonary Complications    No data to display       Dragan Perioperative Risk for  Myocardial Infarction or Cardiac Arrest (KISHAN)    No data to display         Assessment and Plan:     Assessment and Plan:     Preop:   OR with Dr. Qureshi on 1/31/24 for L2-5 laminectomies; L4-5 fusion spine   Labs ordered per Dr. Qureshi.   EKG on file from 10/11/24-[facility]. [facility]. [facility].   CXR and EKG routed to Dr. Qureshi for his review prior to surgery    Requested cardiac clearance from Dr. Coleman-had an appt last Friday     Neurologic:   Short-term memory loss: Has been discussed with PCP. No acute changes or concerns at this time   The patient is at an increased risk for post operative delirium secondary to age >/=65 , decrease functional status, and type and duration of surgery . Preoperative brain exercise educational handout provided to patient.  The patient is at an increased risk for perioperative stroke secondary to increased age, HLD, and general anesthesia.    Cardiac:  Hyperlipidemia: On statin   States he is on metoprolol to relax his heart muscles-not for hypertension.   Dependent edema: Encouraged to wear velcro-wraps per PCP. Believed to be due to lack of mobility/activity.   [facility] Activity Status Index (DASI)  DASI Score: 4.5   MET Score: 3.3  Limited physical activity due to severe low back pain-reason for upcoming procedure   RCRI  0 which is 3.9% 30 day risk of MACE (risk for cardiac death, nonfatal myocardial infarction, and nonfactal cardiac arrest)  KISHAN score which indicates a   0.24% risk of intraoperative or 30-day postoperative MACE    Pulmonary:   STOP-BANG score of  2 . Low  risk of obstructive sleep apnea.   ARISCAT:    47  points which is a high (42.1%) risk of in-hospital post-op pulmonary complications     GI:  Apfel: 2 points 39% risk for post operative N/V    /Renal:   Prostate cancer: S/P radiation     Neuro-muscular:   Cervical myelopathy: Very stiff neck, limited ROM   Lumbar spinal stenosis: Reason for upcoming procedure     Hematologic:   Caprini score 11, patient at high  "risk for perioperative DVT. Patient provided with VTE education/handout.     Skin check: Patient was instructed to make surgeon aware of any skin changes/concerns prior to surgery.     Anesthesia: States he was told that it took roughly 30 minutes to intubate him due to \"esophageal narrowing\"-this was over 15 years ago prior to his neck surgery. Anesthesia and OR scheduling group was notified of this information.      *See risk scores as previously documented   "

## 2025-01-21 NOTE — PREPROCEDURE INSTRUCTIONS
Thank you for visiting Preadmission Testing at [facility]. If you have any changes to your health condition, please call the SURGEON's office to alert them and give them details of your symptoms.  STOP all NSAIDS (Ibuprofen, Motrin, Aleve), vitamins, herbals, supplements, and all over the counter medications for 7 days prior to surgery  Tylenol is okay to take up until the morning of surgery for pain or headache if needed         Preoperative Brain Exercises    What are brain exercises?  A brain exercise is any activity that engages your thinking (cognitive) skills.    What types of activities are considered brain exercises?  Jigsaw puzzles, crossword puzzles, word jumble, memory games, word search, and many more.  Many can be found free online or on your phone via a mobile noreen.    Why should I do brain exercises before my surgery?  More recent research has shown brain exercise before surgery can lower the risk of postoperative delirium (confusion) which can be especially important for older adults.  Patients who did brain exercises for 5 to 10 hours the days before surgery, cut their risk of postoperative delirium in half up to 1 week after surgery.      Preoperative Deep Breathing Exercises    Why it is important to do deep breathing exercises before my surgery?  Deep breathing exercises strengthen your breathing muscles.  This helps you to recover after your surgery and decreases the chance of breathing complications.    How are the deep breathing exercises done?  Sit straight with your back supported.  Breathe in deeply and slowly through your nose. Your lower rib cage should expand and your abdomen may move forward.  Hold that breath for 3 to 5 seconds.  Breathe out through pursed lips, slowly and completely.  Rest and repeat 10 times every hour while awake.  Rest longer if you become dizzy or lightheaded.      Patient and Family Education   Ways You Can Help Prevent Blood Clots     This handout explains some simple  things you can do to help prevent blood clots.      Blood clots are blockages that can form in the body's veins. When a blood clot forms in your deep veins, it may be called a deep vein thrombosis, or DVT for short. Blood clots can happen in any part of the body where blood flows, but they are most common in the arms and legs. If a piece of a blood clot breaks free and travels to the lungs, it is called a pulmonary embolus (PE). A PE can be a very serious problem.      Being in the hospital or having surgery can raise your chances of getting a blood clot because you may not be well enough to move around as much as you normally do.      Ways you can help prevent blood clots in the hospital         Wearing SCDs. SCDs stands for Sequential Compression Devices.   SCDs are special sleeves that wrap around your legs  They attach to a pump that fills them with air to gently squeeze your legs every few minutes.   This helps return the blood in your legs to your heart.   SCDs should only be taken off when walking or bathing.   SCDs may not be comfortable, but they can help save your life.               Wearing compression stockings - if your doctor orders them. These special snug fitting stockings gently squeeze your legs to help blood flow.       Walking. Walking helps move the blood in your legs.   If your doctor says it is ok, try walking the halls at least   5 times a day. Ask us to help you get up, so you don't fall.      Taking any blood thinning medicines your doctor orders.          ©[facility]; 3/23        Ways you can help prevent blood clots at home       Wearing compression stockings - if your doctor orders them. ? Walking - to help move the blood in your legs.       Taking any blood thinning medicines your doctor orders.      Signs of a blood clot or PE      Tell your doctor or nurse know right away if you have of the problems listed below.    If you are at home, seek medical care right away. Call [phone]  for chest pain or problems breathing.          Signs of a blood clot (DVT) - such as pain,  swelling, redness or warmth in your arm or leg      Signs of a pulmonary embolism (PE) - such as chest     pain or feeling short of breath

## 2025-01-23 ENCOUNTER — APPOINTMENT (OUTPATIENT)
Facility: CLINIC | Age: 85
End: 2025-01-23
Payer: MEDICARE

## 2025-01-23 LAB — STAPHYLOCOCCUS SPEC CULT: NORMAL

## 2025-01-30 ENCOUNTER — ANESTHESIA EVENT (OUTPATIENT)
Dept: OPERATING ROOM | Facility: HOSPITAL | Age: 85
End: 2025-01-30
Payer: MEDICARE

## 2025-01-31 ENCOUNTER — HOSPITAL ENCOUNTER (INPATIENT)
Facility: HOSPITAL | Age: 85
LOS: 4 days | Discharge: NURSING, REHAB OR LONG-TERM CARE | End: 2025-02-06
Attending: NEUROLOGICAL SURGERY | Admitting: NEUROLOGICAL SURGERY
Payer: MEDICARE

## 2025-01-31 ENCOUNTER — APPOINTMENT (OUTPATIENT)
Dept: RADIOLOGY | Facility: HOSPITAL | Age: 85
End: 2025-01-31
Payer: MEDICARE

## 2025-01-31 ENCOUNTER — ANESTHESIA (OUTPATIENT)
Dept: OPERATING ROOM | Facility: HOSPITAL | Age: 85
End: 2025-01-31
Payer: MEDICARE

## 2025-01-31 DIAGNOSIS — M48.062 LUMBAR STENOSIS WITH NEUROGENIC CLAUDICATION: Primary | ICD-10-CM

## 2025-01-31 DIAGNOSIS — G89.18 ACUTE POSTOPERATIVE PAIN: ICD-10-CM

## 2025-01-31 DIAGNOSIS — M43.16 SPONDYLOLISTHESIS OF LUMBAR REGION: ICD-10-CM

## 2025-01-31 DIAGNOSIS — M48.062 NEUROGENIC CLAUDICATION DUE TO LUMBAR SPINAL STENOSIS: ICD-10-CM

## 2025-01-31 LAB
ABO GROUP (TYPE) IN BLOOD: NORMAL
ALBUMIN SERPL BCP-MCNC: 3.9 G/DL (ref 3.4–5)
ANION GAP BLDA CALCULATED.4IONS-SCNC: 9 MMO/L (ref 10–25)
ANION GAP SERPL CALC-SCNC: 11 MMOL/L (ref 10–20)
ANTIBODY SCREEN: NORMAL
APTT PPP: 29 SECONDS (ref 27–38)
BASE EXCESS BLDA CALC-SCNC: -0.5 MMOL/L (ref -2–3)
BASOPHILS # BLD AUTO: 0 X10*3/UL (ref 0–0.1)
BASOPHILS NFR BLD AUTO: 0 %
BODY TEMPERATURE: 37 DEGREES CELSIUS
BUN SERPL-MCNC: 18 MG/DL (ref 6–23)
CA-I BLDA-SCNC: 1.11 MMOL/L (ref 1.1–1.33)
CALCIUM SERPL-MCNC: 8 MG/DL (ref 8.6–10.3)
CHLORIDE BLDA-SCNC: 106 MMOL/L (ref 98–107)
CHLORIDE SERPL-SCNC: 105 MMOL/L (ref 98–107)
CO2 SERPL-SCNC: 26 MMOL/L (ref 21–32)
CREAT SERPL-MCNC: 0.88 MG/DL (ref 0.5–1.3)
EGFRCR SERPLBLD CKD-EPI 2021: 85 ML/MIN/1.73M*2
EOSINOPHIL # BLD AUTO: 0 X10*3/UL (ref 0–0.4)
EOSINOPHIL NFR BLD AUTO: 0 %
ERYTHROCYTE [DISTWIDTH] IN BLOOD BY AUTOMATED COUNT: 16.6 % (ref 11.5–14.5)
GLUCOSE BLD MANUAL STRIP-MCNC: 228 MG/DL (ref 74–99)
GLUCOSE BLDA-MCNC: 141 MG/DL (ref 74–99)
GLUCOSE SERPL-MCNC: 273 MG/DL (ref 74–99)
HCO3 BLDA-SCNC: 25.4 MMOL/L (ref 22–26)
HCT VFR BLD AUTO: 28.8 % (ref 41–52)
HCT VFR BLD EST: 32 % (ref 41–52)
HGB BLD-MCNC: 9.2 G/DL (ref 13.5–17.5)
HGB BLDA-MCNC: 10.5 G/DL (ref 13.5–17.5)
IMM GRANULOCYTES # BLD AUTO: 0.04 X10*3/UL (ref 0–0.5)
IMM GRANULOCYTES NFR BLD AUTO: 0.6 % (ref 0–0.9)
INR PPP: 1.3 (ref 0.9–1.1)
LACTATE BLDA-SCNC: 1 MMOL/L (ref 0.4–2)
LYMPHOCYTES # BLD AUTO: 0.58 X10*3/UL (ref 0.8–3)
LYMPHOCYTES NFR BLD AUTO: 8.2 %
MAGNESIUM SERPL-MCNC: 1.95 MG/DL (ref 1.6–2.4)
MCH RBC QN AUTO: 32.7 PG (ref 26–34)
MCHC RBC AUTO-ENTMCNC: 31.9 G/DL (ref 32–36)
MCV RBC AUTO: 103 FL (ref 80–100)
MONOCYTES # BLD AUTO: 0.94 X10*3/UL (ref 0.05–0.8)
MONOCYTES NFR BLD AUTO: 13.3 %
NEUTROPHILS # BLD AUTO: 5.52 X10*3/UL (ref 1.6–5.5)
NEUTROPHILS NFR BLD AUTO: 77.9 %
NRBC BLD-RTO: 0 /100 WBCS (ref 0–0)
OXYHGB MFR BLDA: 97.5 % (ref 94–98)
PCO2 BLDA: 46 MM HG (ref 38–42)
PH BLDA: 7.35 PH (ref 7.38–7.42)
PHOSPHATE SERPL-MCNC: 5.1 MG/DL (ref 2.5–4.9)
PLATELET # BLD AUTO: 154 X10*3/UL (ref 150–450)
PO2 BLDA: 284 MM HG (ref 85–95)
POTASSIUM BLDA-SCNC: 4.5 MMOL/L (ref 3.5–5.3)
POTASSIUM SERPL-SCNC: 4.4 MMOL/L (ref 3.5–5.3)
PROTHROMBIN TIME: 14.4 SECONDS (ref 9.8–12.8)
RBC # BLD AUTO: 2.81 X10*6/UL (ref 4.5–5.9)
RH FACTOR (ANTIGEN D): NORMAL
SAO2 % BLDA: 100 % (ref 94–100)
SODIUM BLDA-SCNC: 136 MMOL/L (ref 136–145)
SODIUM SERPL-SCNC: 138 MMOL/L (ref 136–145)
WBC # BLD AUTO: 7.1 X10*3/UL (ref 4.4–11.3)

## 2025-01-31 PROCEDURE — 84132 ASSAY OF SERUM POTASSIUM: CPT | Performed by: STUDENT IN AN ORGANIZED HEALTH CARE EDUCATION/TRAINING PROGRAM

## 2025-01-31 PROCEDURE — C1889 IMPLANT/INSERT DEVICE, NOC: HCPCS | Performed by: NEUROLOGICAL SURGERY

## 2025-01-31 PROCEDURE — 86923 COMPATIBILITY TEST ELECTRIC: CPT

## 2025-01-31 PROCEDURE — 83735 ASSAY OF MAGNESIUM: CPT | Performed by: STUDENT IN AN ORGANIZED HEALTH CARE EDUCATION/TRAINING PROGRAM

## 2025-01-31 PROCEDURE — C1734 ORTH/DEVIC/DRUG BN/BN,TIS/BN: HCPCS | Performed by: NEUROLOGICAL SURGERY

## 2025-01-31 PROCEDURE — 01NB0ZZ RELEASE LUMBAR NERVE, OPEN APPROACH: ICD-10-PCS | Performed by: NEUROLOGICAL SURGERY

## 2025-01-31 PROCEDURE — C1713 ANCHOR/SCREW BN/BN,TIS/BN: HCPCS | Performed by: NEUROLOGICAL SURGERY

## 2025-01-31 PROCEDURE — 2720000007 HC OR 272 NO HCPCS: Performed by: NEUROLOGICAL SURGERY

## 2025-01-31 PROCEDURE — 63048 LAM FACETEC &FORAMOT EA ADDL: CPT | Performed by: NEUROLOGICAL SURGERY

## 2025-01-31 PROCEDURE — 0SB20ZZ EXCISION OF LUMBAR VERTEBRAL DISC, OPEN APPROACH: ICD-10-PCS | Performed by: NEUROLOGICAL SURGERY

## 2025-01-31 PROCEDURE — C1776 JOINT DEVICE (IMPLANTABLE): HCPCS | Performed by: NEUROLOGICAL SURGERY

## 2025-01-31 PROCEDURE — 63047 LAM FACETEC & FORAMOT LUMBAR: CPT | Performed by: NEUROLOGICAL SURGERY

## 2025-01-31 PROCEDURE — 61783 SCAN PROC SPINAL: CPT | Performed by: NEUROLOGICAL SURGERY

## 2025-01-31 PROCEDURE — 82947 ASSAY GLUCOSE BLOOD QUANT: CPT

## 2025-01-31 PROCEDURE — 2500000004 HC RX 250 GENERAL PHARMACY W/ HCPCS (ALT 636 FOR OP/ED): Performed by: STUDENT IN AN ORGANIZED HEALTH CARE EDUCATION/TRAINING PROGRAM

## 2025-01-31 PROCEDURE — 2780000003 HC OR 278 NO HCPCS: Performed by: NEUROLOGICAL SURGERY

## 2025-01-31 PROCEDURE — 36415 COLL VENOUS BLD VENIPUNCTURE: CPT | Performed by: STUDENT IN AN ORGANIZED HEALTH CARE EDUCATION/TRAINING PROGRAM

## 2025-01-31 PROCEDURE — 2500000005 HC RX 250 GENERAL PHARMACY W/O HCPCS: Performed by: STUDENT IN AN ORGANIZED HEALTH CARE EDUCATION/TRAINING PROGRAM

## 2025-01-31 PROCEDURE — 63052 LAM FACETC/FRMT ARTHRD LUM 1: CPT | Performed by: NEUROLOGICAL SURGERY

## 2025-01-31 PROCEDURE — 3600000004 HC OR TIME - INITIAL BASE CHARGE - PROCEDURE LEVEL FOUR: Performed by: NEUROLOGICAL SURGERY

## 2025-01-31 PROCEDURE — 2500000004 HC RX 250 GENERAL PHARMACY W/ HCPCS (ALT 636 FOR OP/ED): Mod: JZ | Performed by: ANESTHESIOLOGY

## 2025-01-31 PROCEDURE — 2500000004 HC RX 250 GENERAL PHARMACY W/ HCPCS (ALT 636 FOR OP/ED): Performed by: ANESTHESIOLOGIST ASSISTANT

## 2025-01-31 PROCEDURE — P9045 ALBUMIN (HUMAN), 5%, 250 ML: HCPCS | Mod: JZ | Performed by: ANESTHESIOLOGIST ASSISTANT

## 2025-01-31 PROCEDURE — 3700000002 HC GENERAL ANESTHESIA TIME - EACH INCREMENTAL 1 MINUTE: Performed by: NEUROLOGICAL SURGERY

## 2025-01-31 PROCEDURE — 0SG00K1 FUSION OF LUMBAR VERTEBRAL JOINT WITH NONAUTOLOGOUS TISSUE SUBSTITUTE, POSTERIOR APPROACH, POSTERIOR COLUMN, OPEN APPROACH: ICD-10-PCS | Performed by: NEUROLOGICAL SURGERY

## 2025-01-31 PROCEDURE — 2500000005 HC RX 250 GENERAL PHARMACY W/O HCPCS: Performed by: ANESTHESIOLOGIST ASSISTANT

## 2025-01-31 PROCEDURE — C1762 CONN TISS, HUMAN(INC FASCIA): HCPCS | Performed by: NEUROLOGICAL SURGERY

## 2025-01-31 PROCEDURE — 85025 COMPLETE CBC W/AUTO DIFF WBC: CPT | Performed by: STUDENT IN AN ORGANIZED HEALTH CARE EDUCATION/TRAINING PROGRAM

## 2025-01-31 PROCEDURE — 2500000001 HC RX 250 WO HCPCS SELF ADMINISTERED DRUGS (ALT 637 FOR MEDICARE OP): Performed by: STUDENT IN AN ORGANIZED HEALTH CARE EDUCATION/TRAINING PROGRAM

## 2025-01-31 PROCEDURE — 3600000009 HC OR TIME - EACH INCREMENTAL 1 MINUTE - PROCEDURE LEVEL FOUR: Performed by: NEUROLOGICAL SURGERY

## 2025-01-31 PROCEDURE — 7100000002 HC RECOVERY ROOM TIME - EACH INCREMENTAL 1 MINUTE: Performed by: NEUROLOGICAL SURGERY

## 2025-01-31 PROCEDURE — 84132 ASSAY OF SERUM POTASSIUM: CPT

## 2025-01-31 PROCEDURE — 7100000001 HC RECOVERY ROOM TIME - INITIAL BASE CHARGE: Performed by: NEUROLOGICAL SURGERY

## 2025-01-31 PROCEDURE — 2500000004 HC RX 250 GENERAL PHARMACY W/ HCPCS (ALT 636 FOR OP/ED): Performed by: NEUROLOGICAL SURGERY

## 2025-01-31 PROCEDURE — 86901 BLOOD TYPING SEROLOGIC RH(D): CPT | Performed by: STUDENT IN AN ORGANIZED HEALTH CARE EDUCATION/TRAINING PROGRAM

## 2025-01-31 PROCEDURE — 96372 THER/PROPH/DIAG INJ SC/IM: CPT | Performed by: ANESTHESIOLOGIST ASSISTANT

## 2025-01-31 PROCEDURE — 22633 ARTHRD CMBN 1NTRSPC LUMBAR: CPT | Performed by: NEUROLOGICAL SURGERY

## 2025-01-31 PROCEDURE — P9045 ALBUMIN (HUMAN), 5%, 250 ML: HCPCS | Mod: JZ | Performed by: ANESTHESIOLOGY

## 2025-01-31 PROCEDURE — 2500000004 HC RX 250 GENERAL PHARMACY W/ HCPCS (ALT 636 FOR OP/ED): Performed by: NURSE PRACTITIONER

## 2025-01-31 PROCEDURE — 0SG00AJ FUSION OF LUMBAR VERTEBRAL JOINT WITH INTERBODY FUSION DEVICE, POSTERIOR APPROACH, ANTERIOR COLUMN, OPEN APPROACH: ICD-10-PCS | Performed by: NEUROLOGICAL SURGERY

## 2025-01-31 PROCEDURE — 3700000001 HC GENERAL ANESTHESIA TIME - INITIAL BASE CHARGE: Performed by: NEUROLOGICAL SURGERY

## 2025-01-31 PROCEDURE — 22840 INSERT SPINE FIXATION DEVICE: CPT | Performed by: NEUROLOGICAL SURGERY

## 2025-01-31 PROCEDURE — 85610 PROTHROMBIN TIME: CPT | Performed by: STUDENT IN AN ORGANIZED HEALTH CARE EDUCATION/TRAINING PROGRAM

## 2025-01-31 PROCEDURE — 7100000011 HC EXTENDED STAY RECOVERY HOURLY - NURSING UNIT

## 2025-01-31 PROCEDURE — 2500000005 HC RX 250 GENERAL PHARMACY W/O HCPCS: Performed by: ANESTHESIOLOGY

## 2025-01-31 PROCEDURE — 22853 INSJ BIOMECHANICAL DEVICE: CPT | Performed by: NEUROLOGICAL SURGERY

## 2025-01-31 DEVICE — SCREW SET, SOLERA VOYAGER, 5.5/6.0: Type: IMPLANTABLE DEVICE | Site: BACK | Status: FUNCTIONAL

## 2025-01-31 DEVICE — SCREW, SOLERA 5.5/6.0 ATS, 7.5 X 50: Type: IMPLANTABLE DEVICE | Site: BACK | Status: FUNCTIONAL

## 2025-01-31 DEVICE — IMPLANTABLE DEVICE: Type: IMPLANTABLE DEVICE | Site: BACK | Status: FUNCTIONAL

## 2025-01-31 RX ORDER — SODIUM CHLORIDE 9 MG/ML
75 INJECTION, SOLUTION INTRAVENOUS CONTINUOUS
Status: ACTIVE | OUTPATIENT
Start: 2025-01-31 | End: 2025-02-01

## 2025-01-31 RX ORDER — LIDOCAINE HYDROCHLORIDE AND EPINEPHRINE 10; 10 UG/ML; MG/ML
INJECTION, SOLUTION INFILTRATION; PERINEURAL AS NEEDED
Status: DISCONTINUED | OUTPATIENT
Start: 2025-01-31 | End: 2025-01-31 | Stop reason: HOSPADM

## 2025-01-31 RX ORDER — ALBUMIN HUMAN 50 G/1000ML
SOLUTION INTRAVENOUS AS NEEDED
Status: DISCONTINUED | OUTPATIENT
Start: 2025-01-31 | End: 2025-01-31

## 2025-01-31 RX ORDER — TOPICAL ANESTHETIC 200 MG/ML
SPRAY DENTAL; PERIODONTAL AS NEEDED
Status: DISCONTINUED | OUTPATIENT
Start: 2025-01-31 | End: 2025-01-31

## 2025-01-31 RX ORDER — ACETAMINOPHEN 325 MG/1
975 TABLET ORAL EVERY 8 HOURS
Status: DISCONTINUED | OUTPATIENT
Start: 2025-01-31 | End: 2025-02-06 | Stop reason: HOSPADM

## 2025-01-31 RX ORDER — ALBUMIN HUMAN 50 G/1000ML
12.5 SOLUTION INTRAVENOUS ONCE
Status: COMPLETED | OUTPATIENT
Start: 2025-01-31 | End: 2025-01-31

## 2025-01-31 RX ORDER — VANCOMYCIN HYDROCHLORIDE 1 G/20ML
INJECTION, POWDER, LYOPHILIZED, FOR SOLUTION INTRAVENOUS AS NEEDED
Status: DISCONTINUED | OUTPATIENT
Start: 2025-01-31 | End: 2025-01-31 | Stop reason: HOSPADM

## 2025-01-31 RX ORDER — POLYETHYLENE GLYCOL 3350 17 G/17G
17 POWDER, FOR SOLUTION ORAL 2 TIMES DAILY
Status: DISCONTINUED | OUTPATIENT
Start: 2025-01-31 | End: 2025-02-06 | Stop reason: HOSPADM

## 2025-01-31 RX ORDER — DIPHENHYDRAMINE HYDROCHLORIDE 50 MG/ML
12.5 INJECTION INTRAMUSCULAR; INTRAVENOUS ONCE AS NEEDED
Status: DISCONTINUED | OUTPATIENT
Start: 2025-01-31 | End: 2025-01-31 | Stop reason: HOSPADM

## 2025-01-31 RX ORDER — HYDROMORPHONE HYDROCHLORIDE 0.2 MG/ML
0.2 INJECTION INTRAMUSCULAR; INTRAVENOUS; SUBCUTANEOUS EVERY 5 MIN PRN
Status: DISCONTINUED | OUTPATIENT
Start: 2025-01-31 | End: 2025-01-31 | Stop reason: HOSPADM

## 2025-01-31 RX ORDER — HEPARIN SODIUM 5000 [USP'U]/ML
5000 INJECTION, SOLUTION INTRAVENOUS; SUBCUTANEOUS EVERY 8 HOURS
Status: DISCONTINUED | OUTPATIENT
Start: 2025-02-01 | End: 2025-02-06 | Stop reason: HOSPADM

## 2025-01-31 RX ORDER — MAGNESIUM SULFATE HEPTAHYDRATE 500 MG/ML
INJECTION, SOLUTION INTRAMUSCULAR; INTRAVENOUS AS NEEDED
Status: DISCONTINUED | OUTPATIENT
Start: 2025-01-31 | End: 2025-01-31

## 2025-01-31 RX ORDER — ONDANSETRON HYDROCHLORIDE 2 MG/ML
INJECTION, SOLUTION INTRAVENOUS AS NEEDED
Status: DISCONTINUED | OUTPATIENT
Start: 2025-01-31 | End: 2025-01-31

## 2025-01-31 RX ORDER — ALBUTEROL SULFATE 0.83 MG/ML
2.5 SOLUTION RESPIRATORY (INHALATION) ONCE AS NEEDED
Status: DISCONTINUED | OUTPATIENT
Start: 2025-01-31 | End: 2025-01-31 | Stop reason: HOSPADM

## 2025-01-31 RX ORDER — CYCLOBENZAPRINE HCL 5 MG
5 TABLET ORAL 3 TIMES DAILY PRN
Status: DISCONTINUED | OUTPATIENT
Start: 2025-01-31 | End: 2025-02-02

## 2025-01-31 RX ORDER — PROPOFOL 10 MG/ML
INJECTION, EMULSION INTRAVENOUS CONTINUOUS PRN
Status: DISCONTINUED | OUTPATIENT
Start: 2025-01-31 | End: 2025-01-31

## 2025-01-31 RX ORDER — ONDANSETRON 4 MG/1
4 TABLET, FILM COATED ORAL EVERY 8 HOURS PRN
Status: DISCONTINUED | OUTPATIENT
Start: 2025-01-31 | End: 2025-02-06 | Stop reason: HOSPADM

## 2025-01-31 RX ORDER — GLYCOPYRROLATE 0.2 MG/ML
INJECTION INTRAMUSCULAR; INTRAVENOUS AS NEEDED
Status: DISCONTINUED | OUTPATIENT
Start: 2025-01-31 | End: 2025-01-31

## 2025-01-31 RX ORDER — LABETALOL HYDROCHLORIDE 5 MG/ML
5 INJECTION, SOLUTION INTRAVENOUS ONCE AS NEEDED
Status: DISCONTINUED | OUTPATIENT
Start: 2025-01-31 | End: 2025-01-31 | Stop reason: HOSPADM

## 2025-01-31 RX ORDER — METOPROLOL SUCCINATE 25 MG/1
25 TABLET, EXTENDED RELEASE ORAL DAILY
Status: DISCONTINUED | OUTPATIENT
Start: 2025-02-01 | End: 2025-02-06 | Stop reason: HOSPADM

## 2025-01-31 RX ORDER — CEFAZOLIN SODIUM 2 G/100ML
INJECTION, SOLUTION INTRAVENOUS AS NEEDED
Status: DISCONTINUED | OUTPATIENT
Start: 2025-01-31 | End: 2025-01-31

## 2025-01-31 RX ORDER — ONDANSETRON HYDROCHLORIDE 2 MG/ML
4 INJECTION, SOLUTION INTRAVENOUS ONCE AS NEEDED
Status: DISCONTINUED | OUTPATIENT
Start: 2025-01-31 | End: 2025-01-31 | Stop reason: HOSPADM

## 2025-01-31 RX ORDER — NALOXONE HYDROCHLORIDE 0.4 MG/ML
0.2 INJECTION, SOLUTION INTRAMUSCULAR; INTRAVENOUS; SUBCUTANEOUS EVERY 5 MIN PRN
Status: DISCONTINUED | OUTPATIENT
Start: 2025-01-31 | End: 2025-02-06 | Stop reason: HOSPADM

## 2025-01-31 RX ORDER — FENTANYL CITRATE 50 UG/ML
INJECTION, SOLUTION INTRAMUSCULAR; INTRAVENOUS AS NEEDED
Status: DISCONTINUED | OUTPATIENT
Start: 2025-01-31 | End: 2025-01-31

## 2025-01-31 RX ORDER — ROCURONIUM BROMIDE 50 MG/5 ML
SYRINGE (ML) INTRAVENOUS AS NEEDED
Status: DISCONTINUED | OUTPATIENT
Start: 2025-01-31 | End: 2025-01-31

## 2025-01-31 RX ORDER — MIDAZOLAM HYDROCHLORIDE 1 MG/ML
INJECTION, SOLUTION INTRAMUSCULAR; INTRAVENOUS AS NEEDED
Status: DISCONTINUED | OUTPATIENT
Start: 2025-01-31 | End: 2025-01-31

## 2025-01-31 RX ORDER — ATORVASTATIN CALCIUM 10 MG/1
10 TABLET, FILM COATED ORAL DAILY
Status: DISCONTINUED | OUTPATIENT
Start: 2025-02-01 | End: 2025-02-06 | Stop reason: HOSPADM

## 2025-01-31 RX ORDER — OXYCODONE HYDROCHLORIDE 5 MG/1
2.5 TABLET ORAL EVERY 4 HOURS PRN
Status: DISCONTINUED | OUTPATIENT
Start: 2025-01-31 | End: 2025-02-02

## 2025-01-31 RX ORDER — OXYCODONE AND ACETAMINOPHEN 5; 325 MG/1; MG/1
1 TABLET ORAL EVERY 4 HOURS PRN
Status: DISCONTINUED | OUTPATIENT
Start: 2025-01-31 | End: 2025-01-31 | Stop reason: HOSPADM

## 2025-01-31 RX ORDER — OXYCODONE HYDROCHLORIDE 10 MG/1
10 TABLET ORAL EVERY 4 HOURS PRN
Status: DISCONTINUED | OUTPATIENT
Start: 2025-01-31 | End: 2025-02-02

## 2025-01-31 RX ORDER — SODIUM CHLORIDE, SODIUM LACTATE, POTASSIUM CHLORIDE, CALCIUM CHLORIDE 600; 310; 30; 20 MG/100ML; MG/100ML; MG/100ML; MG/100ML
100 INJECTION, SOLUTION INTRAVENOUS CONTINUOUS
Status: DISCONTINUED | OUTPATIENT
Start: 2025-01-31 | End: 2025-01-31 | Stop reason: HOSPADM

## 2025-01-31 RX ORDER — ONDANSETRON HYDROCHLORIDE 2 MG/ML
4 INJECTION, SOLUTION INTRAVENOUS EVERY 8 HOURS PRN
Status: DISCONTINUED | OUTPATIENT
Start: 2025-01-31 | End: 2025-02-06 | Stop reason: HOSPADM

## 2025-01-31 RX ORDER — PHENYLEPHRINE 10 MG/250 ML(40 MCG/ML)IN 0.9 % SOD.CHLORIDE INTRAVENOUS
CONTINUOUS PRN
Status: DISCONTINUED | OUTPATIENT
Start: 2025-01-31 | End: 2025-01-31

## 2025-01-31 RX ORDER — DEXTROSE 50 % IN WATER (D50W) INTRAVENOUS SYRINGE
25
Status: DISCONTINUED | OUTPATIENT
Start: 2025-01-31 | End: 2025-02-06 | Stop reason: HOSPADM

## 2025-01-31 RX ORDER — DEXTROSE 50 % IN WATER (D50W) INTRAVENOUS SYRINGE
12.5
Status: DISCONTINUED | OUTPATIENT
Start: 2025-01-31 | End: 2025-02-06 | Stop reason: HOSPADM

## 2025-01-31 RX ORDER — HYDROMORPHONE HYDROCHLORIDE 0.2 MG/ML
0.2 INJECTION INTRAMUSCULAR; INTRAVENOUS; SUBCUTANEOUS EVERY 4 HOURS PRN
Status: DISCONTINUED | OUTPATIENT
Start: 2025-01-31 | End: 2025-02-02

## 2025-01-31 RX ORDER — NORETHINDRONE AND ETHINYL ESTRADIOL 0.5-0.035
KIT ORAL AS NEEDED
Status: DISCONTINUED | OUTPATIENT
Start: 2025-01-31 | End: 2025-01-31

## 2025-01-31 RX ORDER — LIDOCAINE 560 MG/1
1 PATCH PERCUTANEOUS; TOPICAL; TRANSDERMAL DAILY
Status: DISCONTINUED | OUTPATIENT
Start: 2025-01-31 | End: 2025-02-06 | Stop reason: HOSPADM

## 2025-01-31 RX ORDER — OXYCODONE HYDROCHLORIDE 5 MG/1
5 TABLET ORAL EVERY 4 HOURS PRN
Status: DISCONTINUED | OUTPATIENT
Start: 2025-01-31 | End: 2025-01-31 | Stop reason: HOSPADM

## 2025-01-31 RX ORDER — LIDOCAINE HYDROCHLORIDE 10 MG/ML
0.1 INJECTION, SOLUTION INFILTRATION; PERINEURAL ONCE
Status: DISCONTINUED | OUTPATIENT
Start: 2025-01-31 | End: 2025-01-31 | Stop reason: HOSPADM

## 2025-01-31 RX ORDER — PHENYLEPHRINE HYDROCHLORIDE 10 MG/ML
INJECTION INTRAVENOUS AS NEEDED
Status: DISCONTINUED | OUTPATIENT
Start: 2025-01-31 | End: 2025-01-31

## 2025-01-31 RX ORDER — SUCCINYLCHOLINE/SOD CL,ISO/PF 100 MG/5ML
SYRINGE (ML) INTRAVENOUS AS NEEDED
Status: DISCONTINUED | OUTPATIENT
Start: 2025-01-31 | End: 2025-01-31

## 2025-01-31 RX ORDER — BUPIVACAINE HYDROCHLORIDE 5 MG/ML
INJECTION, SOLUTION PERINEURAL AS NEEDED
Status: DISCONTINUED | OUTPATIENT
Start: 2025-01-31 | End: 2025-01-31 | Stop reason: HOSPADM

## 2025-01-31 RX ORDER — ACETAMINOPHEN 325 MG/1
975 TABLET ORAL ONCE
Status: DISCONTINUED | OUTPATIENT
Start: 2025-01-31 | End: 2025-01-31 | Stop reason: HOSPADM

## 2025-01-31 RX ORDER — OXYCODONE HYDROCHLORIDE 5 MG/1
5 TABLET ORAL EVERY 4 HOURS PRN
Status: DISCONTINUED | OUTPATIENT
Start: 2025-01-31 | End: 2025-02-02

## 2025-01-31 RX ORDER — HYDRALAZINE HYDROCHLORIDE 20 MG/ML
5 INJECTION INTRAMUSCULAR; INTRAVENOUS EVERY 30 MIN PRN
Status: DISCONTINUED | OUTPATIENT
Start: 2025-01-31 | End: 2025-01-31 | Stop reason: HOSPADM

## 2025-01-31 RX ADMIN — Medication 20 MG: at 13:20

## 2025-01-31 RX ADMIN — SUGAMMADEX 150 MG: 100 INJECTION, SOLUTION INTRAVENOUS at 14:26

## 2025-01-31 RX ADMIN — SODIUM CHLORIDE, POTASSIUM CHLORIDE, SODIUM LACTATE AND CALCIUM CHLORIDE: 600; 310; 30; 20 INJECTION, SOLUTION INTRAVENOUS at 08:40

## 2025-01-31 RX ADMIN — Medication 10 MG: at 12:38

## 2025-01-31 RX ADMIN — Medication 2 L/MIN: at 18:09

## 2025-01-31 RX ADMIN — ALBUMIN HUMAN 250 ML: 0.05 INJECTION, SOLUTION INTRAVENOUS at 13:13

## 2025-01-31 RX ADMIN — Medication 50 MG: at 10:25

## 2025-01-31 RX ADMIN — SODIUM CHLORIDE 500 ML: 9 INJECTION, SOLUTION INTRAVENOUS at 20:25

## 2025-01-31 RX ADMIN — PHENYLEPHRINE HYDROCHLORIDE 200 MCG: 10 INJECTION INTRAVENOUS at 09:21

## 2025-01-31 RX ADMIN — CYCLOBENZAPRINE HYDROCHLORIDE 5 MG: 5 TABLET, FILM COATED ORAL at 18:09

## 2025-01-31 RX ADMIN — GLYCOPYRROLATE 0.2 MG: 0.2 INJECTION, SOLUTION INTRAMUSCULAR; INTRAVENOUS at 09:49

## 2025-01-31 RX ADMIN — PHENYLEPHRINE HYDROCHLORIDE 80 MCG: 10 INJECTION INTRAVENOUS at 13:11

## 2025-01-31 RX ADMIN — CEFAZOLIN SODIUM 2 G: 2 INJECTION, SOLUTION INTRAVENOUS at 13:20

## 2025-01-31 RX ADMIN — TOPICAL ANESTHETIC 1 SPRAY: 200 SPRAY DENTAL; PERIODONTAL at 08:49

## 2025-01-31 RX ADMIN — ALBUMIN HUMAN 250 ML: 0.05 INJECTION, SOLUTION INTRAVENOUS at 12:37

## 2025-01-31 RX ADMIN — LIDOCAINE 4% 1 PATCH: 40 PATCH TOPICAL at 18:09

## 2025-01-31 RX ADMIN — PHENYLEPHRINE HYDROCHLORIDE 100 MCG: 10 INJECTION INTRAVENOUS at 11:10

## 2025-01-31 RX ADMIN — PHENYLEPHRINE HYDROCHLORIDE 300 MCG: 10 INJECTION INTRAVENOUS at 09:08

## 2025-01-31 RX ADMIN — FENTANYL CITRATE 50 MCG: 50 INJECTION, SOLUTION INTRAMUSCULAR; INTRAVENOUS at 14:14

## 2025-01-31 RX ADMIN — Medication 4 L/MIN: at 15:00

## 2025-01-31 RX ADMIN — PHENYLEPHRINE HYDROCHLORIDE 80 MCG: 10 INJECTION INTRAVENOUS at 14:27

## 2025-01-31 RX ADMIN — Medication 100 MG: at 09:16

## 2025-01-31 RX ADMIN — HYDROMORPHONE HYDROCHLORIDE 0.2 MG: 0.2 INJECTION, SOLUTION INTRAMUSCULAR; INTRAVENOUS; SUBCUTANEOUS at 15:14

## 2025-01-31 RX ADMIN — PHENYLEPHRINE HYDROCHLORIDE 120 MCG: 10 INJECTION INTRAVENOUS at 12:46

## 2025-01-31 RX ADMIN — PHENYLEPHRINE HYDROCHLORIDE 200 MCG: 10 INJECTION INTRAVENOUS at 09:25

## 2025-01-31 RX ADMIN — EPHEDRINE SULFATE 50 MG: 50 INJECTION, SOLUTION INTRAVENOUS at 14:02

## 2025-01-31 RX ADMIN — CEFAZOLIN SODIUM 2 G: 2 INJECTION, SOLUTION INTRAVENOUS at 09:26

## 2025-01-31 RX ADMIN — DEXAMETHASONE SODIUM PHOSPHATE 6 MG: 4 INJECTION, SOLUTION INTRAMUSCULAR; INTRAVENOUS at 11:26

## 2025-01-31 RX ADMIN — ONDANSETRON 4 MG: 2 INJECTION INTRAMUSCULAR; INTRAVENOUS at 14:10

## 2025-01-31 RX ADMIN — SODIUM CHLORIDE 75 ML/HR: 9 INJECTION, SOLUTION INTRAVENOUS at 18:08

## 2025-01-31 RX ADMIN — PHENYLEPHRINE HYDROCHLORIDE 80 MCG: 10 INJECTION INTRAVENOUS at 13:41

## 2025-01-31 RX ADMIN — PHENYLEPHRINE HYDROCHLORIDE 80 MCG: 10 INJECTION INTRAVENOUS at 13:29

## 2025-01-31 RX ADMIN — Medication 25 MG: at 08:50

## 2025-01-31 RX ADMIN — SUGAMMADEX 50 MG: 100 INJECTION, SOLUTION INTRAVENOUS at 14:18

## 2025-01-31 RX ADMIN — Medication 15 MG: at 10:54

## 2025-01-31 RX ADMIN — Medication 3 L/MIN: at 16:15

## 2025-01-31 RX ADMIN — Medication 8 L/MIN: at 14:50

## 2025-01-31 RX ADMIN — ALBUMIN HUMAN 12.5 G: 0.05 INJECTION, SOLUTION INTRAVENOUS at 15:50

## 2025-01-31 RX ADMIN — PHENYLEPHRINE HYDROCHLORIDE 300 MCG: 10 INJECTION INTRAVENOUS at 08:59

## 2025-01-31 RX ADMIN — SODIUM CHLORIDE, POTASSIUM CHLORIDE, SODIUM LACTATE AND CALCIUM CHLORIDE: 600; 310; 30; 20 INJECTION, SOLUTION INTRAVENOUS at 12:01

## 2025-01-31 RX ADMIN — ACETAMINOPHEN 975 MG: 325 TABLET ORAL at 18:09

## 2025-01-31 RX ADMIN — Medication 100 MG: at 08:54

## 2025-01-31 RX ADMIN — Medication 2 L/MIN: at 20:26

## 2025-01-31 RX ADMIN — FENTANYL CITRATE 50 MCG: 50 INJECTION, SOLUTION INTRAMUSCULAR; INTRAVENOUS at 08:50

## 2025-01-31 RX ADMIN — Medication 0.3 MCG/KG/MIN: at 09:25

## 2025-01-31 RX ADMIN — MIDAZOLAM 1 MG: 1 INJECTION INTRAMUSCULAR; INTRAVENOUS at 08:50

## 2025-01-31 RX ADMIN — HYDROMORPHONE HYDROCHLORIDE 0.5 MG: 1 INJECTION, SOLUTION INTRAMUSCULAR; INTRAVENOUS; SUBCUTANEOUS at 15:26

## 2025-01-31 RX ADMIN — PROPOFOL 80 MCG/KG/MIN: 10 INJECTION, EMULSION INTRAVENOUS at 08:50

## 2025-01-31 RX ADMIN — PHENYLEPHRINE HYDROCHLORIDE 120 MCG: 10 INJECTION INTRAVENOUS at 14:17

## 2025-01-31 RX ADMIN — MAGNESIUM SULFATE HEPTAHYDRATE 1 G: 500 INJECTION, SOLUTION INTRAMUSCULAR; INTRAVENOUS at 12:37

## 2025-01-31 RX ADMIN — PHENYLEPHRINE HYDROCHLORIDE 120 MCG: 10 INJECTION INTRAVENOUS at 14:21

## 2025-01-31 SDOH — ECONOMIC STABILITY: FOOD INSECURITY: WITHIN THE PAST 12 MONTHS, THE FOOD YOU BOUGHT JUST DIDN'T LAST AND YOU DIDN'T HAVE MONEY TO GET MORE.: NEVER TRUE

## 2025-01-31 SDOH — SOCIAL STABILITY: SOCIAL INSECURITY: ARE THERE ANY APPARENT SIGNS OF INJURIES/BEHAVIORS THAT COULD BE RELATED TO ABUSE/NEGLECT?: NO

## 2025-01-31 SDOH — ECONOMIC STABILITY: INCOME INSECURITY: IN THE PAST 12 MONTHS HAS THE ELECTRIC, GAS, OIL, OR WATER COMPANY THREATENED TO SHUT OFF SERVICES IN YOUR HOME?: NO

## 2025-01-31 SDOH — SOCIAL STABILITY: SOCIAL INSECURITY
WITHIN THE LAST YEAR, HAVE YOU BEEN RAPED OR FORCED TO HAVE ANY KIND OF SEXUAL ACTIVITY BY YOUR PARTNER OR EX-PARTNER?: NO

## 2025-01-31 SDOH — SOCIAL STABILITY: SOCIAL INSECURITY: WITHIN THE LAST YEAR, HAVE YOU BEEN HUMILIATED OR EMOTIONALLY ABUSED IN OTHER WAYS BY YOUR PARTNER OR EX-PARTNER?: NO

## 2025-01-31 SDOH — SOCIAL STABILITY: SOCIAL INSECURITY: WITHIN THE LAST YEAR, HAVE YOU BEEN AFRAID OF YOUR PARTNER OR EX-PARTNER?: NO

## 2025-01-31 SDOH — SOCIAL STABILITY: SOCIAL INSECURITY: WERE YOU ABLE TO COMPLETE ALL THE BEHAVIORAL HEALTH SCREENINGS?: YES

## 2025-01-31 SDOH — HEALTH STABILITY: MENTAL HEALTH: CURRENT SMOKER: 0

## 2025-01-31 SDOH — SOCIAL STABILITY: SOCIAL INSECURITY
WITHIN THE LAST YEAR, HAVE YOU BEEN KICKED, HIT, SLAPPED, OR OTHERWISE PHYSICALLY HURT BY YOUR PARTNER OR EX-PARTNER?: NO

## 2025-01-31 SDOH — SOCIAL STABILITY: SOCIAL INSECURITY: HAS ANYONE EVER THREATENED TO HURT YOUR FAMILY OR YOUR PETS?: NO

## 2025-01-31 SDOH — ECONOMIC STABILITY: FOOD INSECURITY: WITHIN THE PAST 12 MONTHS, YOU WORRIED THAT YOUR FOOD WOULD RUN OUT BEFORE YOU GOT THE MONEY TO BUY MORE.: NEVER TRUE

## 2025-01-31 SDOH — SOCIAL STABILITY: SOCIAL INSECURITY: HAVE YOU HAD THOUGHTS OF HARMING ANYONE ELSE?: NO

## 2025-01-31 SDOH — SOCIAL STABILITY: SOCIAL INSECURITY: DOES ANYONE TRY TO KEEP YOU FROM HAVING/CONTACTING OTHER FRIENDS OR DOING THINGS OUTSIDE YOUR HOME?: NO

## 2025-01-31 SDOH — SOCIAL STABILITY: SOCIAL INSECURITY: DO YOU FEEL ANYONE HAS EXPLOITED OR TAKEN ADVANTAGE OF YOU FINANCIALLY OR OF YOUR PERSONAL PROPERTY?: NO

## 2025-01-31 SDOH — SOCIAL STABILITY: SOCIAL INSECURITY: ABUSE: ADULT

## 2025-01-31 SDOH — SOCIAL STABILITY: SOCIAL INSECURITY: DO YOU FEEL UNSAFE GOING BACK TO THE PLACE WHERE YOU ARE LIVING?: NO

## 2025-01-31 SDOH — SOCIAL STABILITY: SOCIAL INSECURITY: ARE YOU OR HAVE YOU BEEN THREATENED OR ABUSED PHYSICALLY, EMOTIONALLY, OR SEXUALLY BY ANYONE?: NO

## 2025-01-31 ASSESSMENT — PAIN - FUNCTIONAL ASSESSMENT
PAIN_FUNCTIONAL_ASSESSMENT: 0-10

## 2025-01-31 ASSESSMENT — COGNITIVE AND FUNCTIONAL STATUS - GENERAL
EATING MEALS: A LITTLE
MOBILITY SCORE: 12
DRESSING REGULAR UPPER BODY CLOTHING: A LITTLE
TOILETING: A LITTLE
TURNING FROM BACK TO SIDE WHILE IN FLAT BAD: A LITTLE
STANDING UP FROM CHAIR USING ARMS: A LOT
MOVING TO AND FROM BED TO CHAIR: A LOT
MOVING FROM LYING ON BACK TO SITTING ON SIDE OF FLAT BED WITH BEDRAILS: A LITTLE
MOBILITY SCORE: 12
CLIMB 3 TO 5 STEPS WITH RAILING: TOTAL
DRESSING REGULAR LOWER BODY CLOTHING: A LITTLE
MOVING TO AND FROM BED TO CHAIR: A LOT
TURNING FROM BACK TO SIDE WHILE IN FLAT BAD: A LITTLE
HELP NEEDED FOR BATHING: A LITTLE
PATIENT BASELINE BEDBOUND: NO
TOILETING: A LITTLE
DRESSING REGULAR UPPER BODY CLOTHING: A LITTLE
WALKING IN HOSPITAL ROOM: TOTAL
DAILY ACTIVITIY SCORE: 19
PERSONAL GROOMING: A LITTLE
WALKING IN HOSPITAL ROOM: TOTAL
DAILY ACTIVITIY SCORE: 18
STANDING UP FROM CHAIR USING ARMS: A LOT
HELP NEEDED FOR BATHING: A LITTLE
DRESSING REGULAR LOWER BODY CLOTHING: A LITTLE
MOVING FROM LYING ON BACK TO SITTING ON SIDE OF FLAT BED WITH BEDRAILS: A LITTLE
PERSONAL GROOMING: A LITTLE
CLIMB 3 TO 5 STEPS WITH RAILING: TOTAL

## 2025-01-31 ASSESSMENT — PAIN SCALES - GENERAL
PAIN_LEVEL: 3
PAINLEVEL_OUTOF10: 5 - MODERATE PAIN
PAINLEVEL_OUTOF10: 5 - MODERATE PAIN
PAINLEVEL_OUTOF10: 6
PAINLEVEL_OUTOF10: 5 - MODERATE PAIN
PAINLEVEL_OUTOF10: 2
PAINLEVEL_OUTOF10: 5 - MODERATE PAIN
PAINLEVEL_OUTOF10: 5 - MODERATE PAIN
PAINLEVEL_OUTOF10: 0 - NO PAIN
PAINLEVEL_OUTOF10: 0 - NO PAIN

## 2025-01-31 ASSESSMENT — PATIENT HEALTH QUESTIONNAIRE - PHQ9
2. FEELING DOWN, DEPRESSED OR HOPELESS: NOT AT ALL
1. LITTLE INTEREST OR PLEASURE IN DOING THINGS: NOT AT ALL
SUM OF ALL RESPONSES TO PHQ9 QUESTIONS 1 & 2: 0

## 2025-01-31 ASSESSMENT — LIFESTYLE VARIABLES
HOW OFTEN DO YOU HAVE A DRINK CONTAINING ALCOHOL: NEVER
HOW OFTEN DO YOU HAVE 6 OR MORE DRINKS ON ONE OCCASION: NEVER
SKIP TO QUESTIONS 9-10: 1
HOW MANY STANDARD DRINKS CONTAINING ALCOHOL DO YOU HAVE ON A TYPICAL DAY: PATIENT DOES NOT DRINK
AUDIT-C TOTAL SCORE: 0
AUDIT-C TOTAL SCORE: 0

## 2025-01-31 ASSESSMENT — ACTIVITIES OF DAILY LIVING (ADL)
TOILETING: NEEDS ASSISTANCE
BATHING: NEEDS ASSISTANCE
WALKS IN HOME: NEEDS ASSISTANCE
HEARING - RIGHT EAR: FUNCTIONAL
JUDGMENT_ADEQUATE_SAFELY_COMPLETE_DAILY_ACTIVITIES: YES
FEEDING YOURSELF: INDEPENDENT
PATIENT'S MEMORY ADEQUATE TO SAFELY COMPLETE DAILY ACTIVITIES?: YES
ASSISTIVE_DEVICE: WALKER
ADEQUATE_TO_COMPLETE_ADL: YES
HEARING - LEFT EAR: FUNCTIONAL
GROOMING: INDEPENDENT
DRESSING YOURSELF: NEEDS ASSISTANCE
LACK_OF_TRANSPORTATION: NO

## 2025-01-31 ASSESSMENT — PAIN DESCRIPTION - ORIENTATION
ORIENTATION: LOWER
ORIENTATION: LOWER

## 2025-01-31 ASSESSMENT — COLUMBIA-SUICIDE SEVERITY RATING SCALE - C-SSRS
6. HAVE YOU EVER DONE ANYTHING, STARTED TO DO ANYTHING, OR PREPARED TO DO ANYTHING TO END YOUR LIFE?: NO
1. IN THE PAST MONTH, HAVE YOU WISHED YOU WERE DEAD OR WISHED YOU COULD GO TO SLEEP AND NOT WAKE UP?: NO
1. IN THE PAST MONTH, HAVE YOU WISHED YOU WERE DEAD OR WISHED YOU COULD GO TO SLEEP AND NOT WAKE UP?: NO
2. HAVE YOU ACTUALLY HAD ANY THOUGHTS OF KILLING YOURSELF?: NO
6. HAVE YOU EVER DONE ANYTHING, STARTED TO DO ANYTHING, OR PREPARED TO DO ANYTHING TO END YOUR LIFE?: NO
2. HAVE YOU ACTUALLY HAD ANY THOUGHTS OF KILLING YOURSELF?: NO

## 2025-01-31 ASSESSMENT — PAIN DESCRIPTION - LOCATION
LOCATION: BACK
LOCATION: BACK

## 2025-01-31 NOTE — HOSPITAL COURSE
84M with history of HLD, prostate cancer, presenting with bilateral lower extremity radiculopathy s/p L2-5 lami, L4/5 TLIF

## 2025-01-31 NOTE — ANESTHESIA PROCEDURE NOTES
Arterial Line:    Date/Time: 1/31/2025 9:11 AM    Staffing  Performed: CAA   Authorized by: Lavell Sainz MD    Performed by: BRITNEY Toussaint    An arterial line was placed. Procedure performed using surface landmarks.in the OR for the following indication(s): continuous blood pressure monitoring.    A 20 gauge (size) (length), Arrow (type) catheter was placed into the Left radial artery, secured by Tegaderm,   Seldinger technique not used.  Events:  patient tolerated procedure well with no complications.

## 2025-01-31 NOTE — ANESTHESIA POSTPROCEDURE EVALUATION
Patient: Sebas Soto    Procedure Summary       Date: 01/31/25 Room / Location: [facility] OR 09 / Virtual [facility] OR    Anesthesia Start: 0841 Anesthesia Stop: 1451    Procedure: L2-5 laminectomies; L4-5 Fusion Spine Transforaminal Interbody Lumbar with Navigation    DIFFICULT INTUBATION Diagnosis:       Spondylolisthesis of lumbar region      Neurogenic claudication due to lumbar spinal stenosis      (Spondylolisthesis of lumbar region [M43.16])      (Neurogenic claudication due to lumbar spinal stenosis [M48.062])    Surgeons: Philippe Qureshi MD PhD Responsible Provider: Lavell Sainz MD    Anesthesia Type: general ASA Status: 3            Anesthesia Type: general    Vitals Value Taken Time   /65 01/31/25 1447   Temp 36.1 01/31/25 1452   Pulse 77 01/31/25 1450   Resp 14 01/31/25 1452   SpO2 98 % 01/31/25 1450   Vitals shown include unfiled device data.    Anesthesia Post Evaluation    Patient location during evaluation: PACU  Patient participation: complete - patient participated  Level of consciousness: sleepy but conscious  Pain score: 3  Pain management: adequate  Airway patency: patent  Cardiovascular status: acceptable, hemodynamically stable and stable  Respiratory status: acceptable, nonlabored ventilation, face mask and spontaneous ventilation  Hydration status: acceptable  Postoperative Nausea and Vomiting: none        There were no known notable events for this encounter.

## 2025-01-31 NOTE — ANESTHESIA PROCEDURE NOTES
Peripheral IV  Date/Time: 1/31/2025 9:02 AM      Placement  Needle size: 16 G  Laterality: left  Location: hand  Site prep: alcohol  Technique: anatomical landmarks  Attempts: 1

## 2025-01-31 NOTE — ANESTHESIA PREPROCEDURE EVALUATION
Patient: Sebas Soto    Procedure Information       Date/Time: 01/31/25 0830    Procedure: L2-5 laminectomies; L4-5 Fusion Spine Transforaminal Interbody Lumbar with Navigation    DIFFICULT INTUBATION - DOCTOR REQUESTS 4HRS FOR THIS PROCEDURE  ALL CPT CODES FOR THIS PROCEDURE  70186, 59066, 97593, 38480, 63048 x2, 45884, 91365, 21174    Location: [facility] OR 09 / [facility] OR    Surgeons: Philippe Qureshi MD PhD            Relevant Problems   Musculoskeletal   (+) Cervical spondylosis with myelopathy   (+) Lumbar stenosis with neurogenic claudication      HEENT   (+) Mixed conductive and sensorineural hearing loss of right ear with restricted hearing of left ear      Skin   (+) Pigmented purpuric dermatosis       Clinical information reviewed:   Tobacco  Allergies  Meds   Med Hx  Surg Hx   Fam Hx          NPO Detail:  NPO/Void Status  Date of Last Liquid: 01/31/25  Time of Last Liquid: 0600  Date of Last Solid: 01/30/25  Time of Last Solid: 1900         Physical Exam    Airway  Mallampati: III  TM distance: <3 FB  Neck ROM: limited  Comments: Known difficult intubation   Cardiovascular   Rhythm: regular  Rate: normal     Dental - normal exam     Pulmonary   Breath sounds clear to auscultation     Abdominal            Anesthesia Plan    History of general anesthesia?: yes  History of complications of general anesthesia?: no    ASA 3     general     The patient is not a current smoker.    intravenous induction   Postoperative administration of opioids is intended.  Anesthetic plan and risks discussed with patient.    Plan discussed with CAA.

## 2025-01-31 NOTE — DISCHARGE INSTR - ACTIVITY
Call Dr. Qureshi for any problems and/or concerns.  *Maintain spine precautions  *Log roll as instructed  *Walk as much as possible  *Avoid pushing, pulling, bending, twisting, and sitting/laying down in the same position for long periods of time  *No baths, hot tubs, or pools until cleared by physician; no lotions, creams, ointments over incision  *You may shower, do not soak or scrub incision; pat dry  *Continue the coughing and deep breathing exercises that you learned in the hospital  *Do not engage in sports, heavy work or lifting  *If you have a brace/collar-wear as instructed by physician and/or therapy, keep the brace/collar clean and dry, check the skin around the brace/collar every day and notify your physician of any concerns    Call Doctor right away for:  *Fever above 100.4/shaking chills  *New pain, weakness, warmth, or numbness in your legs  *Foot, ankle, or calf swelling that is not relieved by elevating your feet  *Inability to urinate/move bowels  *A severe headache  *Chest pain/shortness of breath-call 911  *Difficulty swallowing (cervical surgery)    If your doctor has ordered compression stockings, wear as directed as they help to prevent blood clots and reduce swelling in your legs    Do not use any products that contain nicotine or tobacco, such as cigarettes, e-cigarettes, and chewing tobacco. These can delay bone healing after surgery. If you need help quitting, ask your healthcare provider    -No heavy lifting or straining until patient is seen in the office.  -Encourage ambulation at least 3 times a day.  -You must be accompanied by a responsible adult upon discharge and for 24 hours after surgery.  Do not drive a motor vehicle, operate machinery, power tools or appliances, drink alcoholic beverages, or make critical decisions for 24 hours and while on narcotic pain meds.  -Be aware of dizziness, which may cause a fall.  Change positions slowly.  -You may resume your regular diet, but do  so slowly.  -Use your pain medication prescription as prescribed by your physician.  If possible, take your medication with food, and drink plenty of fluids.  -Call your surgeon if you have questions, temperature of 100.4° or greater, increased bleeding swelling or pain, drainage from the incision or increased redness around the incision.

## 2025-01-31 NOTE — DISCHARGE INSTR - OTHER ORDERS
If you have any questions or concerns about your discharge instructions, please call the unit at  and ask to speak with the charge nurse. Thank you for choosing [facility]. It was our pleasure to care for you.

## 2025-01-31 NOTE — OP NOTE
L2-5 laminectomies & L4-5 TLIF Operative Note     Date: 2025  OR Location: [facility] OR    Name: Sebas Soto, : 1940, Age: 84 y.o., MRN: [id number], Sex: male    Diagnosis  Pre-op Diagnosis      * Spondylolisthesis of lumbar region [M43.16]     * Neurogenic claudication due to lumbar spinal stenosis [M48.062] Post-op Diagnosis     * Spondylolisthesis of lumbar region [M43.16]     * Neurogenic claudication due to lumbar spinal stenosis [M48.062]     Procedures  L2-L5 laminectomies and bilateral foraminotomies; L4-L5 transforaminal interbody fusion; L4-L5 instrumented posterolateral fusion; use of autograft and allograft for fusion; use of spinal stereotactic navigation    Surgeons      * Philippe Qureshi - Primary    Resident/Fellow/Other Assistant:  Surgeons and Role:     * Denise Bryson MD - Assisting    Staff:   Surgical Assistant:   Circulator: Michael  Scrub Person: Renetta  Scrub Person: Shirley  Scrub Person: Jayant Boyce Scrub: Frankie    Anesthesia Staff: Anesthesiologist: Lavell Sainz MD  C-AA: BRITNEY Toussaint    Procedure Summary  Anesthesia: General  ASA: III  Estimated Blood Loss: 400 mL  Intra-op Medications:   Administrations occurring from 0900 to 1605 on 25:   Medication Name Total Dose   lidocaine-epinephrine (Xylocaine W/EPI) 1 %-1:100,000 injection 20 mL   vancomycin (Vancocin) vial for injection 1 g   BUPivacaine HCl (Marcaine) 0.5 % (5 mg/mL) injection 20 mL   HYDROmorphone PF (Dilaudid) injection 0.2 mg 0.2 mg   oxygen (O2) therapy 340 L   albumin human 5 % 500 mL   ceFAZolin (Ancef) IV 2 g in 100 mL dextrose (iso) - premix 4 g   dexAMETHasone (Decadron) injection 4 mg/mL 6 mg   ePHEDrine injection 50 mg   fentaNYL (Sublimaze) injection 50 mcg/mL 50 mcg   glycopyrrolate (Robinul) injection 0.2 mg   ketamine injection 50 mg/ 5 mL (10 mg/mL) 25 mg   LR bolus Cannot be calculated   magnesium sulfate 50 % injection 1 g   ondansetron (Zofran) 2 mg/mL injection 4 mg    phenylephrine (Jose Armando-Synephrine) 10 mg/250 mL NS (40 mcg/mL) infusion 12.81 mg   phenylephrine (Jose Armando-Synephrine) injection 1,480 mcg   propofol (Diprivan) injection 10 mg/mL Cannot be calculated   rocuronium (Zemuron) 50 mg/5 mL prefilled syringe 170 mg   sugammadex (Bridion) 200 mg/2 mL injection 200 mg              Anesthesia Record               Intraprocedure I/O Totals          Intake    LR bolus 1300.00 mL    Phenylephrine Drip 320.17 mL    The total shown is the total volume documented since Anesthesia Start was filed.    albumin human 5 % 500.00 mL    ceFAZolin 2 gram/100 mL 200.00 mL    Total Intake 2320.17 mL       Output    Urine 400 mL    Est. Blood Loss 400 mL    Total Output 800 mL       Net    Net Volume 1520.17 mL          Specimen: No specimens collected              Drains and/or Catheters:   Closed/Suction Drain Inferior Back Accordion 10 Fr. (Active)       Urethral Catheter 16 Fr. (Active)           Implants:  Implants       Type Name Action Serial No.       fabiana dbf with cannulas 6cc Implanted [id number]      umtb deborah viable allograft 5cc Implanted [id number]     Spinal Hardware SCREW, SOLERA 5.5/6.0 ATS, 7.5 X 50 - [id number] Implanted      Spinal Hardware SCREW SET, SOLERA VOYAGER, 5.5/6.0 - [id number] Implanted       9MM X 22.5MM CATALYFT PL EXPANDABLE SPINAL CAGE Implanted      Screw OSCAR, SOLERA CHROMOLOY, 30MM - [id number] Implanted       30 oscar ti Implanted               Findings: Significant lumbar stenosis.  Good decompression and instrumentation at the conclusion of the case    Indications: Sebas Soto is an 84 y.o. male who is having surgery for Spondylolisthesis of lumbar region [M43.16]  Neurogenic claudication due to lumbar spinal stenosis [M48.062].     The patient was seen in the preoperative area. The risks, benefits, complications, treatment options, non-operative alternatives, expected recovery and outcomes were discussed with the patient. The possibilities of reaction  to medication, pulmonary aspiration, injury to surrounding structures, bleeding, recurrent infection, the need for additional procedures, failure to diagnose a condition, and creating a complication requiring transfusion or operation were discussed with the patient. The patient concurred with the proposed plan, giving informed consent.  The site of surgery was properly noted/marked if necessary per policy. The patient has been actively warmed in preoperative area. Preoperative antibiotics have been ordered and given within 1 hours of incision. Venous thrombosis prophylaxis have been ordered including bilateral sequential compression devices    Procedure Details:   Patient was identified in the preop and brought back to the operating room. A safety huddle was performed and patient identifiers, operative site, medications and allergies reviewed. General anesthesia was achieved and patient underwent endotracheal intubation.   Patient was then flipped prone onto a Chris 4 post table with hip and chest pads and arms positioned in a superman position.  All pressure points were meticulously padded.  Using lateral fluoroscopy ADL levels of the operation were localized from L2-L5 and a midline incision was marked.  Skin was then prepped and draped in usual sterile fashion.    A preincision huddle was performed operation, patient identifiers, medication allergies were reviewed for the second time.  Incision was then infiltrated with lidocaine with epinephrine.  Incision was opened using 10 blade and carried down to the level of fascia with combination of monopolar and bipolar electrocautery.  Spinous processes, lamina, pars and medial edge of the and joints were exposed from L2-L5.  Additionally the joints were exposed at L3-4 and L4-5 up to the lateral aspect of the joint and the TP on both sides.  Care was taken not to disrupt the facet capsules.  Once adequate exposure was obtained hemostasis was achieved a second  fluoroscopy shot was obtained to confirm the level of interest from L2-L5.    At this time we turned our attention to L4, L5 pedicle screw placement. To begin we placed a spinous process clamp onL3 and an intraoperative CT scan was obtained of the lumbar spine for stereotactic navigation. This was merged in 3-Dimensional space and registered to the patient. Using a stereotactic probe we marked the entry sites for pedicle cannulation at leftL4. Using a high speed drill we then placed a  hole.  Pedicle was then cannulated with navigated gearshift.  Pedicle trajectory was probed using a feeler ball.  Once good trajectory was confirmed a 75 x 50 mm (Medtronic) pedicle screw was placed.  Same procedure was repeated at right L4 and bilateral L5 without complication.     After pedicle screw placement, we performed laminectomies at L2-3, L3-4 and L4-L5 using a Leksell rongeur, high-speed drill and combination of Kerrison rongeurs and curettes.  The dura was completely freed up from L2-L5.  Once adequate decompression was obtained hemostasis was achieved using combination of Floseal and cottonoid patties.  We also performed a bilateral total facetectomy at L4-L5 with a high speed drill making an osteotomy with a high speed drill to disarticulate the inferior articulating process and superior articulating process. After removal of the bone we used Kerrison rongeurs, curettes, and blunt dissection to free the traversing and exiting nerve roots. After removal of all of the soft tissue and bone we confirmed adequate decompression and then achieved hemostasis with a bipolar and Floseal. The traversing and exiting nerve roots were free in all planes.    After decompression was completed we then turned our attention to placement of the interbody into the disc space.  We used jacek, rongeurs, and pituitary instruments to perform a discectomy. After completion of the discectomy and end plate preparation for arthrodesis, we  packed the disc space with autologous bone graft mixed with allograft comprised of demineralized bone matrix and cancellous chips. Then using live lateral fluoroscopy a 9mm expandable cage (Medtronic) was inserted into the disc space.  Placement was confirmed with lateral fluoroscopy and the cage was carefully expanded to the desirable height.  After adequate placement of our cage we then placed more autologous bone graft into the disc space for fusion mixed with demineralized bone matrix allograft. After placement of our cage and fusion graft we then completed our instrumentation with placement of rods. We performed reduction to restore alignment and compression across our hardware to achieve lordosis and correct deformity at this level. We then placed locking caps and these were final tightened and secured it into place. We then performed posterolateral arthrodesis on the remaining posterior elements of the spine exposed across the transverse processes and remaining aspects of the joint. We then packed more bone graft posterolaterally.    Next 1 gram of vancomycin powder was placed over the hardware, a 10Fr round drain was tunneled in a subfascial fashion, and hemostasis was achieved with Floseal and bipolar cautery. We then acquired final x-rays of our hardware confirming placement at our level of interest at L4-L5 and accurate placement within the bony elements and disc space. The muscle and fascia were approximated with 0 Vicryl sutures and then 2-0 Vicryl sutures were placed into the dermal layers and skin was closed with 3 -0 strata fix subcuticular and mesh with glue was applied over the skin.  Patient was then flipped supine and they were extubated and returned to PACU in stable condition.     Complications:  None; patient tolerated the procedure well.    Disposition: PACU - hemodynamically stable.  Condition: stable         Task Performed by RNFA or Surgical Assistant:  Assistance with perioperative tasks,  opening and closure.          Additional Details: none    01/31/25 at 3:22 PM - Denise Bryson MD     Attending Attestation: I was present and scrubbed for the key portions of the procedure.    Philippe Qureshi  Phone Number: [phone]

## 2025-02-01 ENCOUNTER — APPOINTMENT (OUTPATIENT)
Dept: RADIOLOGY | Facility: HOSPITAL | Age: 85
End: 2025-02-01
Payer: MEDICARE

## 2025-02-01 LAB
ANION GAP SERPL CALC-SCNC: 12 MMOL/L (ref 10–20)
BASOPHILS # BLD AUTO: 0.01 X10*3/UL (ref 0–0.1)
BASOPHILS NFR BLD AUTO: 0.1 %
BUN SERPL-MCNC: 26 MG/DL (ref 6–23)
CALCIUM SERPL-MCNC: 8 MG/DL (ref 8.6–10.3)
CHLORIDE SERPL-SCNC: 109 MMOL/L (ref 98–107)
CO2 SERPL-SCNC: 23 MMOL/L (ref 21–32)
CREAT SERPL-MCNC: 1.07 MG/DL (ref 0.5–1.3)
EGFRCR SERPLBLD CKD-EPI 2021: 69 ML/MIN/1.73M*2
EOSINOPHIL # BLD AUTO: 0.04 X10*3/UL (ref 0–0.4)
EOSINOPHIL NFR BLD AUTO: 0.4 %
ERYTHROCYTE [DISTWIDTH] IN BLOOD BY AUTOMATED COUNT: 17.3 % (ref 11.5–14.5)
ERYTHROCYTE [DISTWIDTH] IN BLOOD BY AUTOMATED COUNT: 17.8 % (ref 11.5–14.5)
GLUCOSE BLD MANUAL STRIP-MCNC: 117 MG/DL (ref 74–99)
GLUCOSE BLD MANUAL STRIP-MCNC: 124 MG/DL (ref 74–99)
GLUCOSE BLD MANUAL STRIP-MCNC: 138 MG/DL (ref 74–99)
GLUCOSE SERPL-MCNC: 148 MG/DL (ref 74–99)
HCT VFR BLD AUTO: 28.5 % (ref 41–52)
HCT VFR BLD AUTO: 28.6 % (ref 41–52)
HGB BLD-MCNC: 8.6 G/DL (ref 13.5–17.5)
HGB BLD-MCNC: 9 G/DL (ref 13.5–17.5)
IMM GRANULOCYTES # BLD AUTO: 0.05 X10*3/UL (ref 0–0.5)
IMM GRANULOCYTES NFR BLD AUTO: 0.5 % (ref 0–0.9)
LYMPHOCYTES # BLD AUTO: 1.41 X10*3/UL (ref 0.8–3)
LYMPHOCYTES NFR BLD AUTO: 14.7 %
MCH RBC QN AUTO: 32.5 PG (ref 26–34)
MCH RBC QN AUTO: 32.8 PG (ref 26–34)
MCHC RBC AUTO-ENTMCNC: 30.1 G/DL (ref 32–36)
MCHC RBC AUTO-ENTMCNC: 31.6 G/DL (ref 32–36)
MCV RBC AUTO: 103 FL (ref 80–100)
MCV RBC AUTO: 109 FL (ref 80–100)
MONOCYTES # BLD AUTO: 1.86 X10*3/UL (ref 0.05–0.8)
MONOCYTES NFR BLD AUTO: 19.5 %
NEUTROPHILS # BLD AUTO: 6.19 X10*3/UL (ref 1.6–5.5)
NEUTROPHILS NFR BLD AUTO: 64.8 %
NRBC BLD-RTO: 0 /100 WBCS (ref 0–0)
NRBC BLD-RTO: 0 /100 WBCS (ref 0–0)
PLATELET # BLD AUTO: 121 X10*3/UL (ref 150–450)
PLATELET # BLD AUTO: 138 X10*3/UL (ref 150–450)
POTASSIUM SERPL-SCNC: 4.7 MMOL/L (ref 3.5–5.3)
RBC # BLD AUTO: 2.62 X10*6/UL (ref 4.5–5.9)
RBC # BLD AUTO: 2.77 X10*6/UL (ref 4.5–5.9)
SODIUM SERPL-SCNC: 139 MMOL/L (ref 136–145)
WBC # BLD AUTO: 9.5 X10*3/UL (ref 4.4–11.3)
WBC # BLD AUTO: 9.6 X10*3/UL (ref 4.4–11.3)

## 2025-02-01 PROCEDURE — 2500000004 HC RX 250 GENERAL PHARMACY W/ HCPCS (ALT 636 FOR OP/ED): Performed by: STUDENT IN AN ORGANIZED HEALTH CARE EDUCATION/TRAINING PROGRAM

## 2025-02-01 PROCEDURE — 2500000004 HC RX 250 GENERAL PHARMACY W/ HCPCS (ALT 636 FOR OP/ED): Performed by: NURSE PRACTITIONER

## 2025-02-01 PROCEDURE — 85027 COMPLETE CBC AUTOMATED: CPT | Performed by: STUDENT IN AN ORGANIZED HEALTH CARE EDUCATION/TRAINING PROGRAM

## 2025-02-01 PROCEDURE — 2500000005 HC RX 250 GENERAL PHARMACY W/O HCPCS: Performed by: STUDENT IN AN ORGANIZED HEALTH CARE EDUCATION/TRAINING PROGRAM

## 2025-02-01 PROCEDURE — 2500000001 HC RX 250 WO HCPCS SELF ADMINISTERED DRUGS (ALT 637 FOR MEDICARE OP): Performed by: STUDENT IN AN ORGANIZED HEALTH CARE EDUCATION/TRAINING PROGRAM

## 2025-02-01 PROCEDURE — 7100000011 HC EXTENDED STAY RECOVERY HOURLY - NURSING UNIT

## 2025-02-01 PROCEDURE — 36415 COLL VENOUS BLD VENIPUNCTURE: CPT | Performed by: STUDENT IN AN ORGANIZED HEALTH CARE EDUCATION/TRAINING PROGRAM

## 2025-02-01 PROCEDURE — 85025 COMPLETE CBC W/AUTO DIFF WBC: CPT | Performed by: STUDENT IN AN ORGANIZED HEALTH CARE EDUCATION/TRAINING PROGRAM

## 2025-02-01 PROCEDURE — 96372 THER/PROPH/DIAG INJ SC/IM: CPT | Performed by: STUDENT IN AN ORGANIZED HEALTH CARE EDUCATION/TRAINING PROGRAM

## 2025-02-01 PROCEDURE — 82947 ASSAY GLUCOSE BLOOD QUANT: CPT

## 2025-02-01 PROCEDURE — 97161 PT EVAL LOW COMPLEX 20 MIN: CPT | Mod: GP

## 2025-02-01 PROCEDURE — 80048 BASIC METABOLIC PNL TOTAL CA: CPT | Performed by: STUDENT IN AN ORGANIZED HEALTH CARE EDUCATION/TRAINING PROGRAM

## 2025-02-01 PROCEDURE — 96372 THER/PROPH/DIAG INJ SC/IM: CPT | Performed by: NURSE PRACTITIONER

## 2025-02-01 PROCEDURE — 97165 OT EVAL LOW COMPLEX 30 MIN: CPT | Mod: GO | Performed by: OCCUPATIONAL THERAPIST

## 2025-02-01 RX ORDER — HALOPERIDOL LACTATE 5 MG/ML
2 INJECTION, SOLUTION INTRAMUSCULAR ONCE
Status: COMPLETED | OUTPATIENT
Start: 2025-02-01 | End: 2025-02-01

## 2025-02-01 RX ADMIN — Medication 2 L/MIN: at 08:21

## 2025-02-01 RX ADMIN — SODIUM CHLORIDE, POTASSIUM CHLORIDE, SODIUM LACTATE AND CALCIUM CHLORIDE 1000 ML: 600; 310; 30; 20 INJECTION, SOLUTION INTRAVENOUS at 09:52

## 2025-02-01 RX ADMIN — LIDOCAINE 4% 1 PATCH: 40 PATCH TOPICAL at 09:44

## 2025-02-01 RX ADMIN — ATORVASTATIN CALCIUM 10 MG: 10 TABLET, FILM COATED ORAL at 09:44

## 2025-02-01 RX ADMIN — OXYCODONE HYDROCHLORIDE 2.5 MG: 5 TABLET ORAL at 20:43

## 2025-02-01 RX ADMIN — HEPARIN SODIUM 5000 UNITS: 5000 INJECTION INTRAVENOUS; SUBCUTANEOUS at 18:11

## 2025-02-01 RX ADMIN — ACETAMINOPHEN 975 MG: 325 TABLET ORAL at 09:44

## 2025-02-01 RX ADMIN — SODIUM CHLORIDE 75 ML/HR: 9 INJECTION, SOLUTION INTRAVENOUS at 07:34

## 2025-02-01 RX ADMIN — ACETAMINOPHEN 975 MG: 325 TABLET ORAL at 18:11

## 2025-02-01 RX ADMIN — SODIUM CHLORIDE 500 ML: 9 INJECTION, SOLUTION INTRAVENOUS at 00:05

## 2025-02-01 RX ADMIN — POLYETHYLENE GLYCOL 3350 17 G: 17 POWDER, FOR SOLUTION ORAL at 20:43

## 2025-02-01 RX ADMIN — HEPARIN SODIUM 5000 UNITS: 5000 INJECTION INTRAVENOUS; SUBCUTANEOUS at 09:43

## 2025-02-01 RX ADMIN — Medication 2 L/MIN: at 20:00

## 2025-02-01 RX ADMIN — POLYETHYLENE GLYCOL 3350 17 G: 17 POWDER, FOR SOLUTION ORAL at 09:44

## 2025-02-01 RX ADMIN — HALOPERIDOL LACTATE 2 MG: 5 INJECTION, SOLUTION INTRAMUSCULAR at 23:15

## 2025-02-01 ASSESSMENT — COGNITIVE AND FUNCTIONAL STATUS - GENERAL
PERSONAL GROOMING: TOTAL
TOILETING: A LITTLE
MOVING FROM LYING ON BACK TO SITTING ON SIDE OF FLAT BED WITH BEDRAILS: TOTAL
DRESSING REGULAR UPPER BODY CLOTHING: TOTAL
CLIMB 3 TO 5 STEPS WITH RAILING: TOTAL
MOVING FROM LYING ON BACK TO SITTING ON SIDE OF FLAT BED WITH BEDRAILS: A LITTLE
EATING MEALS: A LITTLE
EATING MEALS: A LOT
TURNING FROM BACK TO SIDE WHILE IN FLAT BAD: TOTAL
MOBILITY SCORE: 12
CLIMB 3 TO 5 STEPS WITH RAILING: TOTAL
DAILY ACTIVITIY SCORE: 18
DRESSING REGULAR LOWER BODY CLOTHING: A LITTLE
PERSONAL GROOMING: A LITTLE
DAILY ACTIVITIY SCORE: 7
DRESSING REGULAR UPPER BODY CLOTHING: A LITTLE
STANDING UP FROM CHAIR USING ARMS: A LOT
WALKING IN HOSPITAL ROOM: TOTAL
HELP NEEDED FOR BATHING: TOTAL
TOILETING: TOTAL
HELP NEEDED FOR BATHING: A LITTLE
MOVING TO AND FROM BED TO CHAIR: TOTAL
MOVING TO AND FROM BED TO CHAIR: A LOT
MOBILITY SCORE: 6
TURNING FROM BACK TO SIDE WHILE IN FLAT BAD: A LITTLE
WALKING IN HOSPITAL ROOM: TOTAL
DRESSING REGULAR LOWER BODY CLOTHING: TOTAL
STANDING UP FROM CHAIR USING ARMS: TOTAL

## 2025-02-01 ASSESSMENT — PAIN SCALES - GENERAL
PAINLEVEL_OUTOF10: 3
PAINLEVEL_OUTOF10: 0 - NO PAIN
PAINLEVEL_OUTOF10: 9
PAINLEVEL_OUTOF10: 2
PAINLEVEL_OUTOF10: 3
PAINLEVEL_OUTOF10: 2
PAINLEVEL_OUTOF10: 3

## 2025-02-01 ASSESSMENT — PAIN - FUNCTIONAL ASSESSMENT
PAIN_FUNCTIONAL_ASSESSMENT: 0-10

## 2025-02-01 NOTE — NURSING NOTE
Colette Zhang NP notified that patient is having left calf pain, 150cc out from his drain, and a small amount of drainage from his back. Per Colette we will give flexeril for the calf pain and continue to leave incision LEBRON.

## 2025-02-01 NOTE — PROGRESS NOTES
Physical Therapy    Physical Therapy Evaluation & Treatment    Patient Name: Sebas Soto  MRN: [id number]  Today's Date: 2/1/2025   Time Calculation  Start Time: 1036  Stop Time: 1052  Time Calculation (min): 16 min  4127/4127-A    Assessment/Plan   PT Assessment  PT Assessment Results: Decreased strength, Decreased endurance, Impaired balance, Decreased mobility, Impaired judgement, Decreased safety awareness, Orthopedic restrictions  Rehab Prognosis: Fair  Barriers to Discharge Home: Caregiver assistance, Physical needs  Caregiver Assistance: Caregiver assistance needed per identified barriers - however, level of patient's required assistance exceeds assistance available at home  Physical Needs: Stair navigation into home limited by function/safety, 24hr mobility assistance needed, High falls risk due to function or environment, Ambulating household distances limited by function/safety  Barriers to Participation: Comorbidities  End of Session Communication: Bedside nurse  Assessment Comment: Pt requires 24 hour hands on assist for bed mobility, transfers and gait.  Pt unable to stand or ambulate today.  Pt requires cues for safety and technique.  Pt is easily fatigued.  Pt would benefit from continued, moderate intensity PT to improve independence with all functional mobility.  End of Session Patient Position: Bed, 3 rail up, Alarm on  IP OR SWING BED PT PLAN  Inpatient or Swing Bed: Inpatient  PT Plan  Treatment/Interventions: Bed mobility, Transfer training, Gait training, Balance training, Strengthening, Endurance training, Therapeutic exercise, Therapeutic activity (Pt education)  PT Plan: Ongoing PT  PT Frequency: 4 times per week  PT Discharge Recommendations: Moderate intensity level of continued care  PT - OK to Discharge:  OK to discharge from acute PT services to the next level of care when cleared by the medical team.    Current Problem:  Patient Active Problem List   Diagnosis    Other melanin  hyperpigmentation    Other hypertrophic disorders of the skin    Other benign neoplasm of skin, unspecified    Hemangioma of skin and subcutaneous tissue    Melanocytic nevi of trunk    Melanocytic nevi of left upper limb, including shoulder    Melanocytic nevi of left lower limb, including hip    Right inguinal hernia    Incarcerated right inguinal hernia    Pigmented purpuric dermatosis    Other seborrheic keratosis    Actinic keratosis    Mixed conductive and sensorineural hearing loss of right ear with restricted hearing of left ear    Cervical spondylosis with myelopathy    Neurogenic claudication due to lumbar spinal stenosis    Klippel-Feil anomaly, myopathy, and facial dysmorphism syndrome (Multi)    Spondylolisthesis of lumbar region    Myelomalacia of cervical cord    Spondylolisthesis, lumbar region    Lumbar stenosis with neurogenic claudication       Subjective     General Visit Information:  General  Reason for Referral: Unable to ambulate, weakness, s/p Procedures  1. L2-5 laminectomies  2. L4-5 Transforaminal lumbar Interbody fusion   3. Use of allograft and autograft  4. Use of IntraOp neuronavigation  65853 - [place] ARTHRODESIS COMBINED TQ 1NTRSPC LUMBAR  Referred By: Dr. Qureshi  Co-Treatment: OT  Co-Treatment Reason: Co-treatment to maximize functional independence and for safety.  Prior to Session Communication: Bedside nurse  Patient Position Received: Up in chair, Alarm on  General Comment: Pt in chair upon therapist entering the room.  Nursing staff in the room and pt unable to stand with assist from nursing.  Pt hypotensive and eyes closing.  Pt laid back into supine in the recliner.    Home Living:  Home Living  Home Living Comments: Pt reports he resides in a bilevel home with his wife, 3 stairs to enter with handrail to lower level, 12 stairs up with handrail to upper main level.    Prior Level of Function:  Prior Function Per Pt/Caregiver Report  Prior Function Comments: Pt ambulates  modified independent with a wheeled walker, assist for ADLs, spouse completes chores.  RN reports pt has fallen 3 times this week prior to admit.    Precautions:  Precautions  Medical Precautions: Fall precautions  Post-Surgical Precautions: Spinal precautions    Vital Signs:     Objective     Pain:  Pain Assessment  Pain Assessment: 0-10  0-10 (Numeric) Pain Score: 3  Pain Type: Surgical pain  Pain Location: Back    Cognition:  Cognition  Orientation Level: Disoriented to situation  Insight: Severe  Impulsive: Severely    General Assessments:            Strength  Strength Comments: B ankles 3+/5, B knee ext 2+/5 (L UE flaccid)                   Functional Assessments:     Bed Mobility  Bed Mobility:  (roll right and left dependent x3.)  Transfers  Transfer:  (Pt dependently transferred from reclined position in recliner with assist of 7 people and transfer board due to pt unable to stand.)             Extremity/Trunk Assessments:        RLE   RLE :  (R LE ROM WFL)  LLE   LLE :  (L LE ROM WFL)    Treatments:           Bed Mobility  Bed Mobility:  (roll right and left dependent x3.)     Transfers  Transfer:  (Pt dependently transferred from reclined position in recliner with assist of 7 people and transfer board due to pt unable to stand.)       Outcome Measures:  [facility] Basic Mobility  Turning from your back to your side while in a flat bed without using bedrails: Total  Moving from lying on your back to sitting on the side of a flat bed without using bedrails: Total  Moving to and from bed to chair (including a wheelchair): Total  Standing up from a chair using your arms (e.g. wheelchair or bedside chair): Total  To walk in hospital room: Total  Climbing 3-5 steps with railing: Total  Basic Mobility - Total Score: 6                            Goals:  Encounter Problems       Encounter Problems (Active)       PT Problem       PT Goal 1 (Progressing)       Start:  02/01/25    Expected End:  02/15/25       Pt able to  perform bed mobility with max assist of 1-2.           PT Goal 2 (Progressing)       Start:  02/01/25    Expected End:  02/15/25       Pt able to complete all transfers with max assist of 1-2.            PT Goal 3 (Progressing)       Start:  02/01/25    Expected End:  02/15/25       Pt able to ambulate 5 feet with front wheeled walker and max assist of 1-2.                Education Documentation  Precautions, taught by Milly Alaniz, PT at 2/1/2025 12:11 PM.  Learner: Patient  Readiness: Acceptance  Method: Explanation  Response: Needs Reinforcement    Mobility Training, taught by Milly Alaniz, PT at 2/1/2025 12:11 PM.  Learner: Patient  Readiness: Acceptance  Method: Explanation  Response: Needs Reinforcement    Education Comments  No comments found.

## 2025-02-01 NOTE — CARE PLAN
Problem: Pain - Adult  Goal: Verbalizes/displays adequate comfort level or baseline comfort level  2/1/2025 1746 by Libia Yanez RN  Outcome: Progressing  2/1/2025 1746 by Libia Yanez RN  Outcome: Progressing     Problem: Safety - Adult  Goal: Free from fall injury  2/1/2025 1746 by Libia Yanez RN  Outcome: Progressing  2/1/2025 1746 by Libia Yanez RN  Outcome: Progressing     Problem: Chronic Conditions and Co-morbidities  Goal: Patient's chronic conditions and co-morbidity symptoms are monitored and maintained or improved  2/1/2025 1746 by Libia Yanez RN  Outcome: Progressing  2/1/2025 1746 by Libia Yanez RN  Outcome: Progressing

## 2025-02-01 NOTE — PROGRESS NOTES
"Sebas Soto is a 84 y.o. male on day 0 of admission presenting with Lumbar stenosis with neurogenic claudication.    Subjective   Soft SBP, pain controlled, holding metop, hgb 9       Objective     Physical Exam  BUE 5/5  RLE HF 4- KE 4+ DF/PF 5  LLE HF 3 KE4- DF/PF 5  Drain intact    Last Recorded Vitals  Blood pressure 106/58, pulse 72, temperature 36.1 °C (97 °F), temperature source Temporal, resp. rate 16, height 1.727 m (5' 8\"), weight 90.7 kg (200 lb), SpO2 97%.     Intake/Output last 3 Shifts:  I/O last 3 completed shifts:  In: 2320.2 (25.6 mL/kg) [I.V.:320.2 (3.5 mL/kg); IV Piggyback:2000]  Out: 1110 (12.2 mL/kg) [Urine:530 (0.2 mL/kg/hr); Drains:180; Blood:400]  Weight: 90.7 kg     Relevant Results  Lab Results   Component Value Date    WBC 7.1 01/31/2025    HGB 9.2 (L) 01/31/2025    HCT 28.8 (L) 01/31/2025     (H) 01/31/2025     01/31/2025      Lab Results   Component Value Date    GLUCOSE 273 (H) 01/31/2025    CALCIUM 8.0 (L) 01/31/2025     01/31/2025    K 4.4 01/31/2025    CO2 26 01/31/2025     01/31/2025    BUN 18 01/31/2025    CREATININE 0.88 01/31/2025      Lab Results   Component Value Date    CREATININE 0.88 01/31/2025    BUN 18 01/31/2025     01/31/2025    K 4.4 01/31/2025     01/31/2025    CO2 26 01/31/2025      Lab Results   Component Value Date    CALCIUM 8.0 (L) 01/31/2025    PHOS 5.1 (H) 01/31/2025      Lab Results   Component Value Date    INR 1.3 (H) 01/31/2025    PROTIME 14.4 (H) 01/31/2025                        This patient has a urinary catheter   Reason for the urinary catheter remaining today? Urine catheter unnecessary, will be removed today               Assessment/Plan   Assessment & Plan  Lumbar stenosis with neurogenic claudication    Neurogenic claudication due to lumbar spinal stenosis    Spondylolisthesis of lumbar region      84M with history of HLD, prostate cancer, presenting with bilateral lower extremity radiculopathy s/p L2-5 " lami, L4/5 TLIF      Floor  Drain, maintain   BID labs  Xrays today  Dc suazo  Holding metop  PM CBC( possible transfuse if SBP still soft and hgb<9)  Bowel reg- miralax  PO pain mgmt  SCD, SQH   PTOT          Kaleb Mcclelland MD

## 2025-02-01 NOTE — CARE PLAN
Problem: Skin  Goal: Decreased wound size/increased tissue granulation at next dressing change  2/1/2025 1846 by Libia Yanez RN  Flowsheets (Taken 2/1/2025 1846)  Decreased wound size/increased tissue granulation at next dressing change: Promote sleep for wound healing  2/1/2025 1746 by Libia Yanez RN  Outcome: Progressing  Goal: Participates in plan/prevention/treatment measures  2/1/2025 1846 by Libia Yanez RN  Flowsheets (Taken 2/1/2025 1846)  Participates in plan/prevention/treatment measures: Elevate heels  2/1/2025 1746 by Libia Yanez RN  Outcome: Progressing  Goal: Prevent/manage excess moisture  2/1/2025 1846 by Libia Yanez RN  Flowsheets (Taken 2/1/2025 1846)  Prevent/manage excess moisture: Cleanse incontinence/protect with barrier cream  2/1/2025 1746 by Libia Yanez RN  Outcome: Progressing  Goal: Prevent/minimize sheer/friction injuries  2/1/2025 1846 by Libia Yanez RN  Flowsheets (Taken 2/1/2025 1846)  Prevent/minimize sheer/friction injuries: HOB 30 degrees or less  2/1/2025 1746 by Libia Yanez RN  Outcome: Progressing  Goal: Promote/optimize nutrition  2/1/2025 1846 by Libia Yanez RN  Flowsheets (Taken 2/1/2025 1846)  Promote/optimize nutrition: Assist with feeding  2/1/2025 1746 by Libia Yanez RN  Outcome: Progressing  Goal: Promote skin healing  2/1/2025 1846 by Libia Yanez RN  Flowsheets (Taken 2/1/2025 1846)  Promote skin healing: Assess skin/pad under line(s)/device(s)  2/1/2025 1746 by Libia Yanez RN  Outcome: Progressing

## 2025-02-01 NOTE — CARE PLAN
Problem: Pain - Adult  Goal: Verbalizes/displays adequate comfort level or baseline comfort level  Outcome: Progressing     Problem: Safety - Adult  Goal: Free from fall injury  Outcome: Progressing     Problem: Discharge Planning  Goal: Discharge to home or other facility with appropriate resources  Outcome: Progressing     Problem: Chronic Conditions and Co-morbidities  Goal: Patient's chronic conditions and co-morbidity symptoms are monitored and maintained or improved  Outcome: Progressing     Problem: Fall/Injury  Goal: Not fall by end of shift  Outcome: Progressing  Goal: Be free from injury by end of the shift  Outcome: Progressing  Goal: Verbalize understanding of personal risk factors for fall in the hospital  Outcome: Progressing     Problem: Skin  Goal: Promote skin healing  Outcome: Progressing     Problem: Pain  Goal: Performs ADL's with improved pain control throughout shift  Outcome: Progressing   The patient's goals for the shift include      The clinical goals for the shift include Patient will remain hemodynamically stable this shift    Patient remains safe and free of falls or injury this shift. Patient alert and oriented. Patient had minimal c/o lower back pain and denied the need for pain medication this shift. Patient attempted to stand and reposition in the chair this shift with heavy assist to stand from the chair with the walker. Patient c/o double vision to right eye at start of shift. [facility] and Neurosurgery resident notified with no new orders. Vision resolved and returned to normal with reassessment at midnight. Blood remained soft this shift as low as 80's systolic. Patient given bolus x2 of 500 mL this shift and BP improved but remains soft with SBP of 's, see flowsheet. Neurovascular remains unchanged this shift with left arm weakness at baseline per patient and BLE weakness. BLE remained swollen with 3+ edema noted. Patient preferred to keep foot of recliner down d/t back  pain with feet elevated. Patient also remained in the chair this shift for comfort. Hemovac had 110 mL out in first 2 hours of shift. [facility] [place] and neurosurgery resident aware. Hemovac output slowed throughout the shift.

## 2025-02-01 NOTE — PROGRESS NOTES
Occupational Therapy    Evaluation    Patient Name: Sebas Soto  MRN: [id number]  Today's Date: 2/1/2025  Time Calculation  Start Time: 1037  Stop Time: 1052  Time Calculation (min): 15 min  4127/4127-A  Eval only     Assessment  IP OT Assessment  Prognosis: Fair  End of Session Communication: Bedside nurse  End of Session Patient Position: Bed, 3 rail up, Alarm on  Patient presents with decline in ADLs, functional transfers, and functional mobility tasks and would benefit from OT during acute stay to maximize functional independence and safety.  Patient requires 24 hours hands on assistance.  Recommend moderate intensity OT to maximize functional independence and safety.     Plan:  Treatment Interventions: ADL retraining, Functional transfer training, Equipment evaluation/education, Patient/family training, Compensatory technique education  OT Frequency: 4 times per week  OT Discharge Recommendations: Moderate intensity level of continued care  OT - OK to Discharge: Yes from acute care OT services to the next level of care when cleared by medical team      Subjective   Current Problem:  1. Spondylolisthesis of lumbar region  Insert and maintain peripheral IV    Saline lock IV    Type And Screen    Place in outpatient/hospital ambulatory surgery    Insert and maintain peripheral IV    Saline lock IV    Place in outpatient/hospital ambulatory surgery    DISCONTINUED: povidone-iodine 5 % kit kit    CANCELED: Full code    CANCELED: NPO Diet Except: Sips with meds; Effective now    CANCELED: Height and weight    CANCELED: Apply sequential compression device    CANCELED: Apply SAGAR hose    CANCELED: Full code    CANCELED: NPO Diet Except: Sips with meds; Effective now    CANCELED: Height and weight    CANCELED: Apply sequential compression device    CANCELED: Apply SAGAR hose    CANCELED: Inpatient consult to Respiratory Care    CANCELED: Inpatient consult to Respiratory Care      2. Lumbar stenosis with neurogenic  claudication        3. Neurogenic claudication due to lumbar spinal stenosis  Insert and maintain peripheral IV    Saline lock IV    Type And Screen    Place in outpatient/hospital ambulatory surgery    Insert and maintain peripheral IV    Saline lock IV    Place in outpatient/hospital ambulatory surgery    DISCONTINUED: povidone-iodine 5 % kit kit    CANCELED: Full code    CANCELED: NPO Diet Except: Sips with meds; Effective now    CANCELED: Height and weight    CANCELED: Apply sequential compression device    CANCELED: Apply SAGAR hose    CANCELED: Full code    CANCELED: NPO Diet Except: Sips with meds; Effective now    CANCELED: Height and weight    CANCELED: Apply sequential compression device    CANCELED: Apply SAGAR hose    CANCELED: Inpatient consult to Respiratory Care    CANCELED: Inpatient consult to Respiratory Care          General:  General  Reason for Referral: recent spine surgery  Referred By: Dr Qureshi  Past Medical History Relevant to Rehab: Admitted 1/31/2025 after having the following completed by Dr Qureshi: L2-5 laminectomies; L4-5 Fusion Spine Transforaminal Interbody Lumbar with Navigation  PMH: HLD, neurogenic claudication , hernia repair  Co-Treatment: PT  Co-Treatment Reason: for safety  Prior to Session Communication: Bedside nurse  Patient Position Received: Up in chair, Alarm on  General Comment: Pt in chair upon therapist entering the room.  Nursing staff in the room and pt unable to stand with assist from nursing.  Pt hypotensive and with eyes closing.  Pt laid back into supine in the recliner.  Lines: hemovac, suazo    Precautions:  Medical Precautions: Fall precautions  Post-Surgical Precautions: Spinal precautions    Pain:  Pain Assessment  Pain Assessment: 0-10  0-10 (Numeric) Pain Score: 3  Pain Type: Surgical pain  Pain Location: Back  Pain Interventions: Rest    Objective   Cognition:  Orientation Level: Disoriented to situation  Insight: Severe    Home Living:  Home Living  Comments: Pt reports he resides in a bilevel home with his wife, 3 stairs to enter with handrail to lower level, 12 stairs up with handrail to upper main level     Prior Function:  Prior Function Comments: Pt ambulates modified independent with a wheeled walker, assist for ADLs, spouse completes chores.  RN reports pt has fallen 3 times this week prior to admit.    ADL:  LE Dressing Assistance: Total    Activity Tolerance:  Endurance: Decreased tolerance for upright activites    Bed Mobility/Transfers:   Bed Mobility  Bed Mobility:  (dependent assist of 3 to roll left and right)  Transfers  Transfer:  (Patient dependently transferred from reclined position in recliner with assist of 7 people and transfer board due to patient unable to stand.)    Extremities: RUE   RUE :  (AROM <25%) and LUE   LUE:  (no AROM left shoulder and elbow.  Gross grasp hand)    Outcome Measures: [facility] Daily Activity  Putting on and taking off regular lower body clothing: Total  Bathing (including washing, rinsing, drying): Total  Putting on and taking off regular upper body clothing: Total  Toileting, which includes using toilet, bedpan or urinal: Total  Taking care of personal grooming such as brushing teeth: Total  Eating Meals: A lot  Daily Activity - Total Score: 7    EDUCATION:  Education  Individual(s) Educated: Patient  Education Provided: Fall precautons (safety)  Patient Response to Education: Patient/Caregiver Verbalized Understanding of Information    Goals:   Encounter Problems       Encounter Problems (Active)       Eating       STG - Patient feeds self with min assist with adapted utensils PRN (Progressing)       Start:  02/01/25    Expected End:  02/15/25               Functional Balance       STG-Patient will be CGA static sit balance >5 minutes for ADL participation  (Progressing)       Start:  02/01/25    Expected End:  02/15/25               OT Transfers       STG-Patient will be min assist with sit to stand transition in  preparation for transfers (Progressing)       Start:  02/01/25    Expected End:  02/15/25

## 2025-02-02 ENCOUNTER — APPOINTMENT (OUTPATIENT)
Dept: CARDIOLOGY | Facility: HOSPITAL | Age: 85
End: 2025-02-02
Payer: MEDICARE

## 2025-02-02 LAB
ANION GAP SERPL CALC-SCNC: 11 MMOL/L (ref 10–20)
BUN SERPL-MCNC: 20 MG/DL (ref 6–23)
CALCIUM SERPL-MCNC: 8.3 MG/DL (ref 8.6–10.3)
CHLORIDE SERPL-SCNC: 108 MMOL/L (ref 98–107)
CO2 SERPL-SCNC: 25 MMOL/L (ref 21–32)
CREAT SERPL-MCNC: 0.67 MG/DL (ref 0.5–1.3)
EGFRCR SERPLBLD CKD-EPI 2021: >90 ML/MIN/1.73M*2
ERYTHROCYTE [DISTWIDTH] IN BLOOD BY AUTOMATED COUNT: 17.4 % (ref 11.5–14.5)
GLUCOSE SERPL-MCNC: 125 MG/DL (ref 74–99)
HCT VFR BLD AUTO: 26.3 % (ref 41–52)
HGB BLD-MCNC: 8.3 G/DL (ref 13.5–17.5)
MCH RBC QN AUTO: 32.3 PG (ref 26–34)
MCHC RBC AUTO-ENTMCNC: 31.6 G/DL (ref 32–36)
MCV RBC AUTO: 102 FL (ref 80–100)
NRBC BLD-RTO: 0 /100 WBCS (ref 0–0)
PLATELET # BLD AUTO: 133 X10*3/UL (ref 150–450)
POTASSIUM SERPL-SCNC: 4.1 MMOL/L (ref 3.5–5.3)
RBC # BLD AUTO: 2.57 X10*6/UL (ref 4.5–5.9)
SODIUM SERPL-SCNC: 140 MMOL/L (ref 136–145)
WBC # BLD AUTO: 9.4 X10*3/UL (ref 4.4–11.3)

## 2025-02-02 PROCEDURE — 2500000001 HC RX 250 WO HCPCS SELF ADMINISTERED DRUGS (ALT 637 FOR MEDICARE OP): Performed by: STUDENT IN AN ORGANIZED HEALTH CARE EDUCATION/TRAINING PROGRAM

## 2025-02-02 PROCEDURE — 2500000005 HC RX 250 GENERAL PHARMACY W/O HCPCS: Performed by: STUDENT IN AN ORGANIZED HEALTH CARE EDUCATION/TRAINING PROGRAM

## 2025-02-02 PROCEDURE — 93005 ELECTROCARDIOGRAM TRACING: CPT

## 2025-02-02 PROCEDURE — 1100000001 HC PRIVATE ROOM DAILY

## 2025-02-02 PROCEDURE — 2500000004 HC RX 250 GENERAL PHARMACY W/ HCPCS (ALT 636 FOR OP/ED): Performed by: NEUROLOGICAL SURGERY

## 2025-02-02 PROCEDURE — 80048 BASIC METABOLIC PNL TOTAL CA: CPT | Performed by: STUDENT IN AN ORGANIZED HEALTH CARE EDUCATION/TRAINING PROGRAM

## 2025-02-02 PROCEDURE — 96372 THER/PROPH/DIAG INJ SC/IM: CPT | Performed by: STUDENT IN AN ORGANIZED HEALTH CARE EDUCATION/TRAINING PROGRAM

## 2025-02-02 PROCEDURE — 36415 COLL VENOUS BLD VENIPUNCTURE: CPT | Performed by: STUDENT IN AN ORGANIZED HEALTH CARE EDUCATION/TRAINING PROGRAM

## 2025-02-02 PROCEDURE — 85027 COMPLETE CBC AUTOMATED: CPT | Performed by: STUDENT IN AN ORGANIZED HEALTH CARE EDUCATION/TRAINING PROGRAM

## 2025-02-02 PROCEDURE — 2500000004 HC RX 250 GENERAL PHARMACY W/ HCPCS (ALT 636 FOR OP/ED): Performed by: STUDENT IN AN ORGANIZED HEALTH CARE EDUCATION/TRAINING PROGRAM

## 2025-02-02 PROCEDURE — 93010 ELECTROCARDIOGRAM REPORT: CPT | Performed by: INTERNAL MEDICINE

## 2025-02-02 PROCEDURE — 96372 THER/PROPH/DIAG INJ SC/IM: CPT | Performed by: NEUROLOGICAL SURGERY

## 2025-02-02 RX ORDER — QUETIAPINE FUMARATE 25 MG/1
25 TABLET, FILM COATED ORAL 2 TIMES DAILY
Status: DISCONTINUED | OUTPATIENT
Start: 2025-02-02 | End: 2025-02-02

## 2025-02-02 RX ORDER — TRAMADOL HYDROCHLORIDE 50 MG/1
25 TABLET ORAL EVERY 6 HOURS PRN
Status: DISCONTINUED | OUTPATIENT
Start: 2025-02-02 | End: 2025-02-06 | Stop reason: HOSPADM

## 2025-02-02 RX ORDER — HALOPERIDOL LACTATE 5 MG/ML
2 INJECTION, SOLUTION INTRAMUSCULAR ONCE
Status: DISCONTINUED | OUTPATIENT
Start: 2025-02-02 | End: 2025-02-06 | Stop reason: HOSPADM

## 2025-02-02 RX ORDER — ACETAMINOPHEN 500 MG
5 TABLET ORAL NIGHTLY
Status: DISCONTINUED | OUTPATIENT
Start: 2025-02-02 | End: 2025-02-06 | Stop reason: HOSPADM

## 2025-02-02 RX ORDER — TRAMADOL HYDROCHLORIDE 50 MG/1
50 TABLET ORAL EVERY 6 HOURS PRN
Status: DISCONTINUED | OUTPATIENT
Start: 2025-02-02 | End: 2025-02-06 | Stop reason: HOSPADM

## 2025-02-02 RX ORDER — KETOROLAC TROMETHAMINE 15 MG/ML
15 INJECTION, SOLUTION INTRAMUSCULAR; INTRAVENOUS EVERY 6 HOURS
Status: DISPENSED | OUTPATIENT
Start: 2025-02-02 | End: 2025-02-04

## 2025-02-02 RX ORDER — DIAZEPAM 2 MG/1
2 TABLET ORAL EVERY 8 HOURS PRN
Status: DISCONTINUED | OUTPATIENT
Start: 2025-02-02 | End: 2025-02-06 | Stop reason: HOSPADM

## 2025-02-02 RX ADMIN — ATORVASTATIN CALCIUM 10 MG: 10 TABLET, FILM COATED ORAL at 09:35

## 2025-02-02 RX ADMIN — Medication 2 L/MIN: at 09:24

## 2025-02-02 RX ADMIN — KETOROLAC TROMETHAMINE 15 MG: 15 INJECTION, SOLUTION INTRAMUSCULAR; INTRAVENOUS at 09:35

## 2025-02-02 RX ADMIN — POLYETHYLENE GLYCOL 3350 17 G: 17 POWDER, FOR SOLUTION ORAL at 09:35

## 2025-02-02 RX ADMIN — HEPARIN SODIUM 5000 UNITS: 5000 INJECTION INTRAVENOUS; SUBCUTANEOUS at 18:12

## 2025-02-02 RX ADMIN — HEPARIN SODIUM 5000 UNITS: 5000 INJECTION INTRAVENOUS; SUBCUTANEOUS at 09:50

## 2025-02-02 RX ADMIN — POLYETHYLENE GLYCOL 3350 17 G: 17 POWDER, FOR SOLUTION ORAL at 21:56

## 2025-02-02 RX ADMIN — ACETAMINOPHEN 975 MG: 325 TABLET ORAL at 18:11

## 2025-02-02 RX ADMIN — METOPROLOL SUCCINATE 25 MG: 25 TABLET, EXTENDED RELEASE ORAL at 09:36

## 2025-02-02 RX ADMIN — LIDOCAINE 4% 1 PATCH: 40 PATCH TOPICAL at 09:35

## 2025-02-02 ASSESSMENT — COGNITIVE AND FUNCTIONAL STATUS - GENERAL
TOILETING: A LOT
STANDING UP FROM CHAIR USING ARMS: A LOT
DRESSING REGULAR UPPER BODY CLOTHING: A LITTLE
HELP NEEDED FOR BATHING: A LOT
MOVING TO AND FROM BED TO CHAIR: A LOT
CLIMB 3 TO 5 STEPS WITH RAILING: TOTAL
PERSONAL GROOMING: A LOT
DAILY ACTIVITIY SCORE: 15
MOVING FROM LYING ON BACK TO SITTING ON SIDE OF FLAT BED WITH BEDRAILS: A LOT
MOBILITY SCORE: 10
TURNING FROM BACK TO SIDE WHILE IN FLAT BAD: A LOT
DRESSING REGULAR LOWER BODY CLOTHING: A LOT
WALKING IN HOSPITAL ROOM: TOTAL

## 2025-02-02 ASSESSMENT — PAIN SCALES - GENERAL
PAINLEVEL_OUTOF10: 0 - NO PAIN
PAINLEVEL_OUTOF10: 5 - MODERATE PAIN

## 2025-02-02 ASSESSMENT — PAIN - FUNCTIONAL ASSESSMENT
PAIN_FUNCTIONAL_ASSESSMENT: 0-10
PAIN_FUNCTIONAL_ASSESSMENT: 0-10

## 2025-02-02 NOTE — PROGRESS NOTES
02/02/25 1439   Discharge Planning   Living Arrangements Spouse/significant other   Support Systems Spouse/significant other   Type of Residence Private residence   Home or Post Acute Services Post acute facilities (Rehab/SNF/etc)   Type of Post Acute Facility Services Skilled nursing   Expected Discharge Disposition SNF   Does the patient need discharge transport arranged? Yes   RoundTrip coordination needed? Yes     Met with patient at bedside. Admitted for lumbar stenosis. Pt lives with spouse who assists patient with bathing, dressing, meals and ambulation. Pt has been unable to walk lately. PCP is Jassi Balderas. PT [facility] 6 OT 7, recommending moderate intensity therapy. Pt is unwilling/unable to make any discharge decisions. Nursing states wife wants patient to go to [facility]. Attempted to call spouse Chrissie x2 to discuss discharge planning. TCC team will continue to follow.

## 2025-02-02 NOTE — NURSING NOTE
"Writer messaged Dr. Qureshi via secure chat to clarify about suazo removal order.     1052: Writer stated \"I seen order to dc suazo. I dont think its an appropriate time to remove suazo. It took 7 people to put him back in bed. With the fluid bolus, and the inability to even hold a urinal, can we keep suazo in until tomorrow?\"     1054: Dr. Qureshi replied, \"sure, that is fine\"     Writer modified order to remove suazo tomorrow, 2-2-25 at 0900.   "

## 2025-02-02 NOTE — NURSING NOTE
2300: Pt is getting really confused and non-redirectable, he is very agitated and he fights with our staff. He took his O2 off and he does not allow us to put it back. He does not even allow us to touch him, he is very combative. PAWAN De Guzman notified at this time.  Order is in.     2315: It took three of our staff to hold him to give him the haldol. This RN also wrapped his two IV accesses with gauze to prevent him to pull them out.      0000: Pt is calming down a little bit. Vitals are stable, see chart. But Pt is still refusing his heparin injection, meds and NC O2. His SPO2 is fluctuating from 91 to 94%.  Pt still does not allow us to touch him.      0200: Pt is resting in bed, bed alarm is on, call light in reach, will continue to watch.     0400: Pt is calm down some more, but he is still confused with time, place and the situation and he is restless again in bed. Vitals taken, VSS except HR is fast due to his moving.      0500: This RN asked him a few times if he needs some tylenol for pain, he denies pain and refused Tylenol.  Offered him some water, repositioned him, redirected and re-oriented him a few times throughout the night.     0600: His urine output is 900 mL and his drain output is 80 mL this shift. Bed in lowest position, bed alarm is on, call light in reach, will continue to monitor.

## 2025-02-02 NOTE — CARE PLAN
The clinical goals for the shift include Pt will remain safe and HDS this shift.    Over the shift, the patient did make progress toward the above goals.       Problem: Pain - Adult  Goal: Verbalizes/displays adequate comfort level or baseline comfort level  Outcome: Progressing     Problem: Safety - Adult  Goal: Free from fall injury  Outcome: Progressing     Problem: Discharge Planning  Goal: Discharge to home or other facility with appropriate resources  Outcome: Progressing     Problem: Chronic Conditions and Co-morbidities  Goal: Patient's chronic conditions and co-morbidity symptoms are monitored and maintained or improved  Outcome: Progressing     Problem: Nutrition  Goal: Nutrient intake appropriate for maintaining nutritional needs  Outcome: Progressing     Problem: Fall/Injury  Goal: Not fall by end of shift  Outcome: Progressing  Goal: Be free from injury by end of the shift  Outcome: Progressing  Goal: Verbalize understanding of personal risk factors for fall in the hospital  Outcome: Progressing  Goal: Verbalize understanding of risk factor reduction measures to prevent injury from fall in the home  Outcome: Progressing  Goal: Use assistive devices by end of the shift  Outcome: Progressing  Goal: Pace activities to prevent fatigue by end of the shift  Outcome: Progressing     Problem: Skin  Goal: Decreased wound size/increased tissue granulation at next dressing change  Outcome: Progressing  Flowsheets (Taken 2/2/2025 0633)  Decreased wound size/increased tissue granulation at next dressing change:   Promote sleep for wound healing   Protective dressings over bony prominences  Goal: Participates in plan/prevention/treatment measures  Outcome: Progressing  Flowsheets (Taken 2/2/2025 0633)  Participates in plan/prevention/treatment measures: Elevate heels  Goal: Prevent/manage excess moisture  Outcome: Progressing  Flowsheets (Taken 2/2/2025 0633)  Prevent/manage excess moisture:   Monitor for/manage  Notified by RN, QTc, 464. EKG reviewed. Advised RN to hold evening Tikosyn dose. Repeat EKG in am prior to next scheduled dose of Tikosyn. infection if present   Cleanse incontinence/protect with barrier cream  Goal: Prevent/minimize sheer/friction injuries  Outcome: Progressing  Flowsheets (Taken 2/2/2025 0633)  Prevent/minimize sheer/friction injuries:   Use pull sheet   HOB 30 degrees or less  Goal: Promote/optimize nutrition  Outcome: Progressing  Flowsheets (Taken 2/2/2025 0633)  Promote/optimize nutrition:   Assist with feeding   Monitor/record intake including meals   Offer water/supplements/favorite foods  Goal: Promote skin healing  Outcome: Progressing  Flowsheets (Taken 2/2/2025 0633)  Promote skin healing:   Assess skin/pad under line(s)/device(s)   Rotate device position/do not position patient on device     Problem: Pain  Goal: Takes deep breaths with improved pain control throughout the shift  Outcome: Progressing  Goal: Turns in bed with improved pain control throughout the shift  Outcome: Progressing  Goal: Walks with improved pain control throughout the shift  Outcome: Progressing  Goal: Performs ADL's with improved pain control throughout shift  Outcome: Progressing  Goal: Participates in PT with improved pain control throughout the shift  Outcome: Progressing  Goal: Free from opioid side effects throughout the shift  Outcome: Progressing  Goal: Free from acute confusion related to pain meds throughout the shift  Outcome: Progressing     Problem: Eating  Goal: STG - Patient feeds self with min assist with adapted utensils PRN  Outcome: Progressing

## 2025-02-03 ENCOUNTER — APPOINTMENT (OUTPATIENT)
Dept: RADIOLOGY | Facility: HOSPITAL | Age: 85
End: 2025-02-03
Payer: MEDICARE

## 2025-02-03 LAB
ANION GAP SERPL CALC-SCNC: 12 MMOL/L (ref 10–20)
ATRIAL RATE: 103 BPM
BLOOD EXPIRATION DATE: NORMAL
BUN SERPL-MCNC: 20 MG/DL (ref 6–23)
CALCIUM SERPL-MCNC: 8.4 MG/DL (ref 8.6–10.3)
CHLORIDE SERPL-SCNC: 107 MMOL/L (ref 98–107)
CO2 SERPL-SCNC: 25 MMOL/L (ref 21–32)
CREAT SERPL-MCNC: 0.69 MG/DL (ref 0.5–1.3)
DISPENSE STATUS: NORMAL
EGFRCR SERPLBLD CKD-EPI 2021: >90 ML/MIN/1.73M*2
ERYTHROCYTE [DISTWIDTH] IN BLOOD BY AUTOMATED COUNT: 18 % (ref 11.5–14.5)
ERYTHROCYTE [DISTWIDTH] IN BLOOD BY AUTOMATED COUNT: 18.6 % (ref 11.5–14.5)
GLUCOSE SERPL-MCNC: 100 MG/DL (ref 74–99)
HCT VFR BLD AUTO: 24.3 % (ref 41–52)
HCT VFR BLD AUTO: 26.1 % (ref 41–52)
HGB BLD-MCNC: 7.8 G/DL (ref 13.5–17.5)
HGB BLD-MCNC: 8.6 G/DL (ref 13.5–17.5)
HOLD SPECIMEN: NORMAL
MCH RBC QN AUTO: 32.1 PG (ref 26–34)
MCH RBC QN AUTO: 32.5 PG (ref 26–34)
MCHC RBC AUTO-ENTMCNC: 32.1 G/DL (ref 32–36)
MCHC RBC AUTO-ENTMCNC: 33 G/DL (ref 32–36)
MCV RBC AUTO: 100 FL (ref 80–100)
MCV RBC AUTO: 99 FL (ref 80–100)
NRBC BLD-RTO: 0 /100 WBCS (ref 0–0)
NRBC BLD-RTO: 0 /100 WBCS (ref 0–0)
P AXIS: 44 DEGREES
P OFFSET: 177 MS
P ONSET: 121 MS
PLATELET # BLD AUTO: 138 X10*3/UL (ref 150–450)
PLATELET # BLD AUTO: 140 X10*3/UL (ref 150–450)
POTASSIUM SERPL-SCNC: 4 MMOL/L (ref 3.5–5.3)
PR INTERVAL: 172 MS
PRODUCT BLOOD TYPE: 6200
PRODUCT CODE: NORMAL
Q ONSET: 207 MS
QRS COUNT: 17 BEATS
QRS DURATION: 108 MS
QT INTERVAL: 352 MS
QTC CALCULATION(BAZETT): 461 MS
QTC FREDERICIA: 421 MS
R AXIS: -41 DEGREES
RBC # BLD AUTO: 2.43 X10*6/UL (ref 4.5–5.9)
RBC # BLD AUTO: 2.65 X10*6/UL (ref 4.5–5.9)
SODIUM SERPL-SCNC: 140 MMOL/L (ref 136–145)
T AXIS: 92 DEGREES
T OFFSET: 383 MS
UNIT ABO: NORMAL
UNIT NUMBER: NORMAL
UNIT RH: NORMAL
UNIT VOLUME: 286
VENTRICULAR RATE: 103 BPM
WBC # BLD AUTO: 8 X10*3/UL (ref 4.4–11.3)
WBC # BLD AUTO: 8.9 X10*3/UL (ref 4.4–11.3)
XM INTEP: NORMAL

## 2025-02-03 PROCEDURE — 2500000004 HC RX 250 GENERAL PHARMACY W/ HCPCS (ALT 636 FOR OP/ED): Performed by: STUDENT IN AN ORGANIZED HEALTH CARE EDUCATION/TRAINING PROGRAM

## 2025-02-03 PROCEDURE — 2500000001 HC RX 250 WO HCPCS SELF ADMINISTERED DRUGS (ALT 637 FOR MEDICARE OP): Performed by: STUDENT IN AN ORGANIZED HEALTH CARE EDUCATION/TRAINING PROGRAM

## 2025-02-03 PROCEDURE — 85027 COMPLETE CBC AUTOMATED: CPT | Performed by: PHYSICIAN ASSISTANT

## 2025-02-03 PROCEDURE — 2500000005 HC RX 250 GENERAL PHARMACY W/O HCPCS: Performed by: STUDENT IN AN ORGANIZED HEALTH CARE EDUCATION/TRAINING PROGRAM

## 2025-02-03 PROCEDURE — 36430 TRANSFUSION BLD/BLD COMPNT: CPT

## 2025-02-03 PROCEDURE — 80048 BASIC METABOLIC PNL TOTAL CA: CPT | Performed by: STUDENT IN AN ORGANIZED HEALTH CARE EDUCATION/TRAINING PROGRAM

## 2025-02-03 PROCEDURE — 85027 COMPLETE CBC AUTOMATED: CPT | Performed by: STUDENT IN AN ORGANIZED HEALTH CARE EDUCATION/TRAINING PROGRAM

## 2025-02-03 PROCEDURE — 1100000001 HC PRIVATE ROOM DAILY

## 2025-02-03 PROCEDURE — 36415 COLL VENOUS BLD VENIPUNCTURE: CPT | Performed by: PHYSICIAN ASSISTANT

## 2025-02-03 PROCEDURE — P9016 RBC LEUKOCYTES REDUCED: HCPCS

## 2025-02-03 PROCEDURE — 36415 COLL VENOUS BLD VENIPUNCTURE: CPT | Performed by: STUDENT IN AN ORGANIZED HEALTH CARE EDUCATION/TRAINING PROGRAM

## 2025-02-03 RX ADMIN — ACETAMINOPHEN 975 MG: 325 TABLET ORAL at 08:33

## 2025-02-03 RX ADMIN — POLYETHYLENE GLYCOL 3350 17 G: 17 POWDER, FOR SOLUTION ORAL at 08:22

## 2025-02-03 RX ADMIN — LIDOCAINE 4% 1 PATCH: 40 PATCH TOPICAL at 08:23

## 2025-02-03 RX ADMIN — KETOROLAC TROMETHAMINE 15 MG: 15 INJECTION, SOLUTION INTRAMUSCULAR; INTRAVENOUS at 23:05

## 2025-02-03 RX ADMIN — METOPROLOL SUCCINATE 25 MG: 25 TABLET, EXTENDED RELEASE ORAL at 08:23

## 2025-02-03 RX ADMIN — KETOROLAC TROMETHAMINE 15 MG: 15 INJECTION, SOLUTION INTRAMUSCULAR; INTRAVENOUS at 08:23

## 2025-02-03 RX ADMIN — HEPARIN SODIUM 5000 UNITS: 5000 INJECTION INTRAVENOUS; SUBCUTANEOUS at 02:02

## 2025-02-03 RX ADMIN — Medication 5 MG: at 23:05

## 2025-02-03 RX ADMIN — POLYETHYLENE GLYCOL 3350 17 G: 17 POWDER, FOR SOLUTION ORAL at 23:05

## 2025-02-03 RX ADMIN — ATORVASTATIN CALCIUM 10 MG: 10 TABLET, FILM COATED ORAL at 08:23

## 2025-02-03 ASSESSMENT — PAIN SCALES - GENERAL
PAINLEVEL_OUTOF10: 3
PAINLEVEL_OUTOF10: 0 - NO PAIN
PAINLEVEL_OUTOF10: 0 - NO PAIN

## 2025-02-03 ASSESSMENT — PAIN - FUNCTIONAL ASSESSMENT: PAIN_FUNCTIONAL_ASSESSMENT: 0-10

## 2025-02-03 NOTE — CARE PLAN
Problem: Skin  Goal: Decreased wound size/increased tissue granulation at next dressing change  2/3/2025 1702 by Libia Yanez RN  Flowsheets (Taken 2/2/2025 0633 by Pam Pineda RN)  Decreased wound size/increased tissue granulation at next dressing change:   Promote sleep for wound healing   Protective dressings over bony prominences  2/3/2025 1700 by Libia Yanez RN  Outcome: Progressing  Goal: Participates in plan/prevention/treatment measures  2/3/2025 1702 by Libia Yanez RN  Flowsheets (Taken 2/2/2025 0633 by Pam Pineda RN)  Participates in plan/prevention/treatment measures: Elevate heels  2/3/2025 1700 by Libia Yanez RN  Outcome: Progressing  Goal: Prevent/manage excess moisture  2/3/2025 1702 by Libia Yanez RN  Flowsheets (Taken 2/2/2025 0633 by Pam Pineda RN)  Prevent/manage excess moisture:   Monitor for/manage infection if present   Cleanse incontinence/protect with barrier cream  2/3/2025 1700 by Libia Yanez RN  Outcome: Progressing  Goal: Prevent/minimize sheer/friction injuries  2/3/2025 1702 by Libia Yanez RN  Flowsheets (Taken 2/2/2025 0633 by Pam Pineda RN)  Prevent/minimize sheer/friction injuries:   Use pull sheet   HOB 30 degrees or less  2/3/2025 1700 by Libia Yanez RN  Outcome: Progressing  Goal: Promote/optimize nutrition  2/3/2025 1702 by Libia Yanez RN  Flowsheets (Taken 2/2/2025 0633 by Pam Pineda RN)  Promote/optimize nutrition:   Assist with feeding   Monitor/record intake including meals   Offer water/supplements/favorite foods  2/3/2025 1700 by Libia Yanez RN  Outcome: Progressing  Goal: Promote skin healing  2/3/2025 1702 by Libia Yanez RN  Flowsheets (Taken 2/2/2025 0633 by Pam Pineda RN)  Promote skin healing:   Assess skin/pad under line(s)/device(s)   Rotate device position/do not position patient on device  2/3/2025 1700 by Libia Yanez RN  Outcome: Progressing

## 2025-02-03 NOTE — PROGRESS NOTES
"Sebas Soto is a 84 y.o. male on day 1 of admission presenting with Lumbar stenosis with neurogenic claudication.    Subjective   Gurrola remained, hemoglobin 7.8 with BP low. Ordered 1 unit PRBC. , drain output around 95 cc. Patient agitated overnight and received haldol. Likely 2/2 narcotic. Oxy dc'd and tramadol ordered. Continues with hallucinations though improved.       Objective     Physical Exam  BUE 5/5  RLE HF 3 KE 4 DF/PF 5  LLE HF 3 KE4- DF/PF 5  Drain intact    Last Recorded Vitals  Blood pressure 125/72, pulse 74, temperature 36.6 °C (97.9 °F), temperature source Temporal, resp. rate 18, height 1.727 m (5' 8\"), weight 90.7 kg (200 lb), SpO2 96%.     Intake/Output last 3 Shifts:  I/O last 3 completed shifts:  In: 460 (5.1 mL/kg) [P.O.:460]  Out: 2570 (28.3 mL/kg) [Urine:2325 (0.7 mL/kg/hr); Drains:245]  Weight: 90.7 kg     Relevant Results  Lab Results   Component Value Date    WBC 8.9 02/03/2025    HGB 7.8 (L) 02/03/2025    HCT 24.3 (L) 02/03/2025     02/03/2025     (L) 02/03/2025      Lab Results   Component Value Date    GLUCOSE 100 (H) 02/03/2025    CALCIUM 8.4 (L) 02/03/2025     02/03/2025    K 4.0 02/03/2025    CO2 25 02/03/2025     02/03/2025    BUN 20 02/03/2025    CREATININE 0.69 02/03/2025      Lab Results   Component Value Date    CREATININE 0.69 02/03/2025    BUN 20 02/03/2025     02/03/2025    K 4.0 02/03/2025     02/03/2025    CO2 25 02/03/2025      Lab Results   Component Value Date    CALCIUM 8.4 (L) 02/03/2025    PHOS 5.1 (H) 01/31/2025      Lab Results   Component Value Date    INR 1.3 (H) 01/31/2025    PROTIME 14.4 (H) 01/31/2025            This patient has a urinary catheter   Reason for the urinary catheter remaining today?  Hallucinations, patient care        Lab Results   Component Value Date    WBC 8.9 02/03/2025    HGB 7.8 (L) 02/03/2025    HCT 24.3 (L) 02/03/2025     02/03/2025     (L) 02/03/2025     Lab Results   Component " "Value Date    GLUCOSE 100 (H) 02/03/2025    CALCIUM 8.4 (L) 02/03/2025     02/03/2025    K 4.0 02/03/2025    CO2 25 02/03/2025     02/03/2025    BUN 20 02/03/2025    CREATININE 0.69 02/03/2025     No components found for: \"URINALYSIS\"      Assessment/Plan   Assessment & Plan  Lumbar stenosis with neurogenic claudication    Neurogenic claudication due to lumbar spinal stenosis    Spondylolisthesis of lumbar region      84M with history of HLD, prostate cancer, presenting with bilateral lower extremity radiculopathy s/p L2-5 lami, L4/5 TLIF      Floor  Drain, maintain (last 95cc output)  Labs reviewed, hgb 7.8. will transfusion 1 unit  Daily labs  Tylenol/tramadol for pain, dc'd oxy and dilaudid  Xrays when able to stand better  Possible DC Gurrola tomorrow if ambulating more  Bowel reg- miralax  PO pain mgmt, Toradol started today  SCD, SQH   PTOT-moderate intensity          Beau Agarwal PA-C     "

## 2025-02-03 NOTE — PROGRESS NOTES
Occupational Therapy                 Therapy Communication Note    Patient Name: Sebas Soto  MRN: [id number]  Department: [address]  Room: [address] [place] [address]  Today's Date: 2/3/2025     Discipline: Occupational Therapy    OT Missed Visit: Yes     Missed Visit Reason: Missed Visit Reason: Other (Comment)    Missed Time: Attempt    Comment: Attempted OT treatment, pt receiving PRBC's at this time. Will continue to follow and attempt at next opportunity as appropriate.

## 2025-02-03 NOTE — CARE PLAN
Problem: Skin  Goal: Decreased wound size/increased tissue granulation at next dressing change  2/3/2025 1705 by Libia Yanez RN  Outcome: Progressing  Flowsheets (Taken 2/2/2025 0633 by Pam Pineda RN)  Decreased wound size/increased tissue granulation at next dressing change:   Promote sleep for wound healing   Protective dressings over bony prominences  2/3/2025 1702 by Libia Yanez RN  Flowsheets (Taken 2/2/2025 0633 by Pam Pineda RN)  Decreased wound size/increased tissue granulation at next dressing change:   Promote sleep for wound healing   Protective dressings over bony prominences  2/3/2025 1700 by Libia Yanez RN  Outcome: Progressing  Goal: Participates in plan/prevention/treatment measures  2/3/2025 1705 by Libia Yanez RN  Flowsheets (Taken 2/2/2025 0633 by Pam Pineda RN)  Participates in plan/prevention/treatment measures: Elevate heels  2/3/2025 1702 by Libia Yanez RN  Flowsheets (Taken 2/2/2025 0633 by Pam Pineda RN)  Participates in plan/prevention/treatment measures: Elevate heels  2/3/2025 1700 by Libia Yanez RN  Outcome: Progressing  Goal: Prevent/manage excess moisture  2/3/2025 1705 by Libia Yanez RN  Flowsheets (Taken 2/2/2025 0633 by Pam Pineda RN)  Prevent/manage excess moisture:   Monitor for/manage infection if present   Cleanse incontinence/protect with barrier cream  2/3/2025 1702 by Libia Yanez RN  Flowsheets (Taken 2/2/2025 0633 by Pam Pineda RN)  Prevent/manage excess moisture:   Monitor for/manage infection if present   Cleanse incontinence/protect with barrier cream  2/3/2025 1700 by Libia Yanez RN  Outcome: Progressing  Goal: Prevent/minimize sheer/friction injuries  2/3/2025 1705 by Libia Yanez RN  Flowsheets (Taken 2/2/2025 0633 by Pam Pineda RN)  Prevent/minimize sheer/friction injuries:   Use pull sheet   HOB 30 degrees or less  2/3/2025 1702 by Libia Yanez RN  Flowsheets (Taken 2/2/2025 0633 by Pam Pineda  RN)  Prevent/minimize sheer/friction injuries:   Use pull sheet   HOB 30 degrees or less  2/3/2025 1700 by Libia Yanez RN  Outcome: Progressing  Goal: Promote/optimize nutrition  2/3/2025 1705 by Libia Yanez RN  Flowsheets (Taken 2/2/2025 0633 by Pam Pineda RN)  Promote/optimize nutrition:   Assist with feeding   Monitor/record intake including meals   Offer water/supplements/favorite foods  2/3/2025 1702 by Libia Yanez RN  Flowsheets (Taken 2/2/2025 0633 by Pam Pineda RN)  Promote/optimize nutrition:   Assist with feeding   Monitor/record intake including meals   Offer water/supplements/favorite foods  2/3/2025 1700 by Libia Yanez RN  Outcome: Progressing  Goal: Promote skin healing  2/3/2025 1705 by Libia Yanez RN  Flowsheets (Taken 2/2/2025 0633 by Pam Pineda RN)  Promote skin healing:   Assess skin/pad under line(s)/device(s)   Rotate device position/do not position patient on device  2/3/2025 1702 by Libia Yanez RN  Flowsheets (Taken 2/2/2025 0633 by Pam Pineda RN)  Promote skin healing:   Assess skin/pad under line(s)/device(s)   Rotate device position/do not position patient on device  2/3/2025 1700 by Libia Yanez RN  Outcome: Progressing

## 2025-02-03 NOTE — PROGRESS NOTES
Physical Therapy                 Therapy Communication Note    Patient Name: Sebas Soto  MRN: [id number]  Department: [address]  Room: [address] [place] [address]  Today's Date: 2/3/2025     Discipline: Physical Therapy    PT Missed Visit: Yes     Missed Visit Reason: Missed Visit Reason: Other (Comment)    Missed Time: Attempt    Comment: Pt receiving blood at this time. Will continue to see per POC.

## 2025-02-03 NOTE — PROGRESS NOTES
No family at bedside. TC to wife Chrissie to discuss dc plans. Chrissie is requesting a referral to [facility]. Reviewed with her that it is possible the pt will not be accepted/approved and we should explore SNF choices as well. She will be coming to see the pt today and this TCC will provide a SNF list to her at that time. DSC tasked to send AR referral.     [facility] is reviewing. Pt wife and dtr present, given SNF list for a back up plan.     [facility] is following along but did not accept/decline. Family updated at bedside and encouraged to pick a SNF back up plan.

## 2025-02-04 LAB
ANION GAP SERPL CALC-SCNC: 10 MMOL/L (ref 10–20)
BUN SERPL-MCNC: 24 MG/DL (ref 6–23)
CALCIUM SERPL-MCNC: 8 MG/DL (ref 8.6–10.3)
CHLORIDE SERPL-SCNC: 106 MMOL/L (ref 98–107)
CO2 SERPL-SCNC: 25 MMOL/L (ref 21–32)
CREAT SERPL-MCNC: 0.71 MG/DL (ref 0.5–1.3)
EGFRCR SERPLBLD CKD-EPI 2021: >90 ML/MIN/1.73M*2
ERYTHROCYTE [DISTWIDTH] IN BLOOD BY AUTOMATED COUNT: 18 % (ref 11.5–14.5)
GLUCOSE SERPL-MCNC: 117 MG/DL (ref 74–99)
HCT VFR BLD AUTO: 25.7 % (ref 41–52)
HGB BLD-MCNC: 8.2 G/DL (ref 13.5–17.5)
MCH RBC QN AUTO: 31.7 PG (ref 26–34)
MCHC RBC AUTO-ENTMCNC: 31.9 G/DL (ref 32–36)
MCV RBC AUTO: 99 FL (ref 80–100)
NRBC BLD-RTO: 0 /100 WBCS (ref 0–0)
PLATELET # BLD AUTO: 128 X10*3/UL (ref 150–450)
POTASSIUM SERPL-SCNC: 4.2 MMOL/L (ref 3.5–5.3)
RBC # BLD AUTO: 2.59 X10*6/UL (ref 4.5–5.9)
SODIUM SERPL-SCNC: 137 MMOL/L (ref 136–145)
WBC # BLD AUTO: 7.4 X10*3/UL (ref 4.4–11.3)

## 2025-02-04 PROCEDURE — 2500000001 HC RX 250 WO HCPCS SELF ADMINISTERED DRUGS (ALT 637 FOR MEDICARE OP): Performed by: STUDENT IN AN ORGANIZED HEALTH CARE EDUCATION/TRAINING PROGRAM

## 2025-02-04 PROCEDURE — 85027 COMPLETE CBC AUTOMATED: CPT | Performed by: STUDENT IN AN ORGANIZED HEALTH CARE EDUCATION/TRAINING PROGRAM

## 2025-02-04 PROCEDURE — 2500000004 HC RX 250 GENERAL PHARMACY W/ HCPCS (ALT 636 FOR OP/ED): Performed by: STUDENT IN AN ORGANIZED HEALTH CARE EDUCATION/TRAINING PROGRAM

## 2025-02-04 PROCEDURE — 1100000001 HC PRIVATE ROOM DAILY

## 2025-02-04 PROCEDURE — 97530 THERAPEUTIC ACTIVITIES: CPT | Mod: GP,CQ

## 2025-02-04 PROCEDURE — 97535 SELF CARE MNGMENT TRAINING: CPT | Mod: GO,CO

## 2025-02-04 PROCEDURE — 36415 COLL VENOUS BLD VENIPUNCTURE: CPT | Performed by: STUDENT IN AN ORGANIZED HEALTH CARE EDUCATION/TRAINING PROGRAM

## 2025-02-04 PROCEDURE — 97110 THERAPEUTIC EXERCISES: CPT | Mod: GP,CQ

## 2025-02-04 PROCEDURE — 80048 BASIC METABOLIC PNL TOTAL CA: CPT | Performed by: STUDENT IN AN ORGANIZED HEALTH CARE EDUCATION/TRAINING PROGRAM

## 2025-02-04 RX ORDER — ACETAMINOPHEN 325 MG/1
650 TABLET ORAL EVERY 6 HOURS PRN
Qty: 240 TABLET | Refills: 0 | Status: ON HOLD | OUTPATIENT
Start: 2025-02-04 | End: 2025-03-06

## 2025-02-04 RX ORDER — TRAMADOL HYDROCHLORIDE 50 MG/1
50 TABLET ORAL EVERY 6 HOURS PRN
Qty: 20 TABLET | Refills: 0 | Status: SHIPPED | OUTPATIENT
Start: 2025-02-04 | End: 2025-02-09

## 2025-02-04 RX ADMIN — POLYETHYLENE GLYCOL 3350 17 G: 17 POWDER, FOR SOLUTION ORAL at 08:38

## 2025-02-04 RX ADMIN — POLYETHYLENE GLYCOL 3350 17 G: 17 POWDER, FOR SOLUTION ORAL at 23:01

## 2025-02-04 RX ADMIN — HEPARIN SODIUM 5000 UNITS: 5000 INJECTION INTRAVENOUS; SUBCUTANEOUS at 05:34

## 2025-02-04 RX ADMIN — KETOROLAC TROMETHAMINE 15 MG: 15 INJECTION, SOLUTION INTRAMUSCULAR; INTRAVENOUS at 05:33

## 2025-02-04 RX ADMIN — METOPROLOL SUCCINATE 25 MG: 25 TABLET, EXTENDED RELEASE ORAL at 08:38

## 2025-02-04 RX ADMIN — Medication 5 MG: at 23:01

## 2025-02-04 RX ADMIN — HEPARIN SODIUM 5000 UNITS: 5000 INJECTION INTRAVENOUS; SUBCUTANEOUS at 13:23

## 2025-02-04 RX ADMIN — HEPARIN SODIUM 5000 UNITS: 5000 INJECTION INTRAVENOUS; SUBCUTANEOUS at 23:01

## 2025-02-04 RX ADMIN — ATORVASTATIN CALCIUM 10 MG: 10 TABLET, FILM COATED ORAL at 08:38

## 2025-02-04 ASSESSMENT — COGNITIVE AND FUNCTIONAL STATUS - GENERAL
TOILETING: TOTAL
HELP NEEDED FOR BATHING: TOTAL
MOVING TO AND FROM BED TO CHAIR: A LOT
MOBILITY SCORE: 8
CLIMB 3 TO 5 STEPS WITH RAILING: A LOT
DAILY ACTIVITIY SCORE: 9
MOVING TO AND FROM BED TO CHAIR: TOTAL
PERSONAL GROOMING: A LOT
PERSONAL GROOMING: A LOT
DAILY ACTIVITIY SCORE: 12
MOVING FROM LYING ON BACK TO SITTING ON SIDE OF FLAT BED WITH BEDRAILS: A LOT
EATING MEALS: A LOT
DRESSING REGULAR LOWER BODY CLOTHING: A LOT
STANDING UP FROM CHAIR USING ARMS: A LOT
MOVING FROM LYING ON BACK TO SITTING ON SIDE OF FLAT BED WITH BEDRAILS: A LOT
DRESSING REGULAR UPPER BODY CLOTHING: A LOT
DRESSING REGULAR UPPER BODY CLOTHING: A LOT
STANDING UP FROM CHAIR USING ARMS: A LOT
DRESSING REGULAR LOWER BODY CLOTHING: TOTAL
HELP NEEDED FOR BATHING: A LOT
WALKING IN HOSPITAL ROOM: TOTAL
WALKING IN HOSPITAL ROOM: A LOT
CLIMB 3 TO 5 STEPS WITH RAILING: TOTAL
EATING MEALS: A LOT
TOILETING: A LOT
TURNING FROM BACK TO SIDE WHILE IN FLAT BAD: TOTAL
TURNING FROM BACK TO SIDE WHILE IN FLAT BAD: A LOT
MOBILITY SCORE: 12

## 2025-02-04 ASSESSMENT — PAIN SCALES - GENERAL
PAINLEVEL_OUTOF10: 0 - NO PAIN

## 2025-02-04 ASSESSMENT — PAIN - FUNCTIONAL ASSESSMENT
PAIN_FUNCTIONAL_ASSESSMENT: 0-10

## 2025-02-04 ASSESSMENT — ACTIVITIES OF DAILY LIVING (ADL): HOME_MANAGEMENT_TIME_ENTRY: 39

## 2025-02-04 NOTE — PROGRESS NOTES
02/04/25 0828   Discharge Planning   Living Arrangements Spouse/significant other   Support Systems Spouse/significant other   Type of Residence Private residence   Home or Post Acute Services Post acute facilities (Rehab/SNF/etc)   Type of Post Acute Facility Services Rehab   Expected Discharge Disposition Rehab   Does the patient need discharge transport arranged? Yes   RoundTrip coordination needed? Yes     Therapy updates sent in [facility]. Awaiting an answer from [facility].

## 2025-02-04 NOTE — PROGRESS NOTES
"Sebas Soto is a 84 y.o. male on day 2 of admission presenting with Lumbar stenosis with neurogenic claudication.    Subjective   Gurrola remained, hemoglobin 8.2 from 7.8 prior day. Drain output reduced and dc'd at bedside. Oxy dc'd and tramadol ordered. Continues with hallucinations though improved.       Objective     Physical Exam  BUE 5/5  RLE HF 3 KE 4 DF/PF 5  LLE HF 3 KE4- DF/PF 5  Drain intact    Last Recorded Vitals  Blood pressure 144/73, pulse 75, temperature 36 °C (96.8 °F), resp. rate 18, height 1.727 m (5' 8\"), weight 90.7 kg (200 lb), SpO2 96%.     Intake/Output last 3 Shifts:  I/O last 3 completed shifts:  In: 286 (3.2 mL/kg) [Blood:286]  Out: 1670 (18.4 mL/kg) [Urine:1600 (0.5 mL/kg/hr); Drains:70]  Weight: 90.7 kg     Relevant Results  Lab Results   Component Value Date    WBC 7.4 02/04/2025    HGB 8.2 (L) 02/04/2025    HCT 25.7 (L) 02/04/2025    MCV 99 02/04/2025     (L) 02/04/2025      Lab Results   Component Value Date    GLUCOSE 117 (H) 02/04/2025    CALCIUM 8.0 (L) 02/04/2025     02/04/2025    K 4.2 02/04/2025    CO2 25 02/04/2025     02/04/2025    BUN 24 (H) 02/04/2025    CREATININE 0.71 02/04/2025      Lab Results   Component Value Date    CREATININE 0.71 02/04/2025    BUN 24 (H) 02/04/2025     02/04/2025    K 4.2 02/04/2025     02/04/2025    CO2 25 02/04/2025      Lab Results   Component Value Date    CALCIUM 8.0 (L) 02/04/2025    PHOS 5.1 (H) 01/31/2025      Lab Results   Component Value Date    INR 1.3 (H) 01/31/2025    PROTIME 14.4 (H) 01/31/2025            This patient has a urinary catheter   Reason for the urinary catheter remaining today?  Hallucinations, patient care        Lab Results   Component Value Date    WBC 7.4 02/04/2025    HGB 8.2 (L) 02/04/2025    HCT 25.7 (L) 02/04/2025    MCV 99 02/04/2025     (L) 02/04/2025     Lab Results   Component Value Date    GLUCOSE 117 (H) 02/04/2025    CALCIUM 8.0 (L) 02/04/2025     02/04/2025    " "K 4.2 02/04/2025    CO2 25 02/04/2025     02/04/2025    BUN 24 (H) 02/04/2025    CREATININE 0.71 02/04/2025     No components found for: \"URINALYSIS\"      Assessment/Plan   Assessment & Plan  Lumbar stenosis with neurogenic claudication    Neurogenic claudication due to lumbar spinal stenosis    Spondylolisthesis of lumbar region      84M with history of HLD, prostate cancer, presenting with bilateral lower extremity radiculopathy s/p L2-5 lami, L4/5 TLIF      Floor  Drain, dc'd  Labs reviewed, hgb 8.2   Daily labs  Tylenol/tramadol for pain, dc'd oxy and dilaudid  Xrays when able to stand better  DC Gurrola today  Bowel reg- miralax  PO pain mgmt, Toradol started today  SCD, SQH   PTOT-moderate intensity  Patient has significant ambulatory difficulty as well as cervical stenosis with arm coordination trouble. Would benefit greatly from acute rehab at discharge to avoid decline, falls, injury readmission, etc.             Beau Agarwal PA-C     "

## 2025-02-04 NOTE — PROGRESS NOTES
Physical Therapy    Physical Therapy    Physical Therapy Treatment    Patient Name: Sebas Soto  MRN: [id number]  Today's Date: 2/4/2025  Time Calculation  Start Time: 1014  Stop Time: 1052  Time Calculation (min): 38 min     4107/4107-A       02/04/25 1014   PT  Visit   PT Received On 02/04/25   Response to Previous Treatment Patient with no complaints from previous session.   General   Reason for Referral recent spine surgery   Referred By Dr Qureshi   Past Medical History Relevant to Rehab Admitted 1/31/2025 after having the following completed by Dr Qureshi: L2-5 laminectomies; L4-5 Fusion Spine Transforaminal Interbody Lumbar with Navigation  PMH: HLD, neurogenic claudication , hernia repair   Prior to Session Communication Bedside nurse   Patient Position Received Alarm on;Bed, 3 rail up   General Comment Pt is agreeable to therapy and cleared for participatin.   Precautions   Medical Precautions Fall precautions   Post-Surgical Precautions Spinal precautions   Pain Assessment   Pain Assessment 0-10   0-10 (Numeric) Pain Score   (Pt did not rate pain, however, states pain in L flank during activity.)   Cognition   Overall Cognitive Status WFL   Therapeutic Exercise   Therapeutic Exercise Performed Yes   Therapeutic Exercise Activity 1 AP, LAQ, marching x10 with AAROM   Therapeutic Activity   Therapeutic Activity Performed Yes   Therapeutic Activity 1 seated balance activities at EOB. variable assist levels to maintain midline, maxAx2>minAx1. Max v/t cues for anterior and R weight shift d/t retropulsion and L lean. Multiple LOB's noted. Unable to maintain midline w/o assist. Pt able to perform hygiene and gown change with Tadeo, retro LOB noted. Static standing trials x2, pt unable to complete upright stance.   Bed Mobility   Bed Mobility Yes  (dependent assist of 3 to roll left and right)   Bed Mobility 1   Bed Mobility 1 Supine to sitting   Level of Assistance 1 Maximum assistance;Maximum verbal cues;x 3    Bed Mobility Comments 1 draw sheet used to complete   Bed Mobility 2   Bed Mobility  2 Sitting to supine   Level of Assistance 2 Maximum assistance;Maximum verbal cues;x 3   Bed Mobility Comments 2 Increased time and effort for all bed mobility, dependent to move up  in bed   Transfers   Transfer Yes  (Patient dependently transferred from reclined position in recliner with assist of 7 people and transfer board due to patient unable to stand.)   Transfer 1   Transfer From 1 Bed to   Transfer to 1 Sit;Stand   Technique 1 Sit to stand;Stand to sit   Transfer Device 1 Walker;Gait belt   Transfer Level of Assistance 1 Maximum assistance;Maximum verbal cues;Maximum tactile cues;+2   Trials/Comments 1 B feet blocked for safety. Max v/t cues for technique and safety. Pt unable to complete full stand x2 trials, increased hip flexion/flexed posture with max v/t cues to facilitate upright stance.   Other Activity   Other Activity Performed Yes   Activity Tolerance   Endurance Tolerates 10 - 20 min exercise with multiple rests   PT Assessment   PT Assessment Results Decreased strength;Decreased endurance;Impaired balance;Decreased mobility;Impaired judgement;Decreased safety awareness;Orthopedic restrictions   Rehab Prognosis Fair   Barriers to Discharge Home Caregiver assistance;Physical needs   Caregiver Assistance Caregiver assistance needed per identified barriers - however, level of patient's required assistance exceeds assistance available at home   Physical Needs Stair navigation into home limited by function/safety;24hr mobility assistance needed;High falls risk due to function or environment;Ambulating household distances limited by function/safety   End of Session Communication Bedside nurse   Assessment Comment Pt put forth fair effort. Pt able to sit EOB 15+ minutes. Variable assist levels throughout.   End of Session Patient Position Bed, 3 rail up;Alarm on  (bedinchair mode.)     Outcome Measures:  [facility] Basic  Mobility  Turning from your back to your side while in a flat bed without using bedrails: A lot  Moving from lying on your back to sitting on the side of a flat bed without using bedrails: Total  Moving to and from bed to chair (including a wheelchair): Total  Standing up from a chair using your arms (e.g. wheelchair or bedside chair): A lot  To walk in hospital room: Total  Climbing 3-5 steps with railing: Total  Basic Mobility - Total Score: 8                             EDUCATION:     Education Documentation  Precautions, taught by Jemima Morgan PTA at 2/4/2025 11:03 AM.  Learner: Patient  Readiness: Acceptance  Method: Explanation  Response: Verbalizes Understanding, Needs Reinforcement    Mobility Training, taught by Jemima Morgan PTA at 2/4/2025 11:03 AM.  Learner: Patient  Readiness: Acceptance  Method: Explanation  Response: Verbalizes Understanding, Needs Reinforcement    Education Comments  No comments found.        GOALS:  Encounter Problems       Encounter Problems (Active)       PT Problem       PT Goal 1 (Progressing)       Start:  02/01/25    Expected End:  02/15/25       Pt able to perform bed mobility with max assist of 1-2.           PT Goal 2 (Progressing)       Start:  02/01/25    Expected End:  02/15/25       Pt able to complete all transfers with max assist of 1-2.            PT Goal 3 (Progressing)       Start:  02/01/25    Expected End:  02/15/25       Pt able to ambulate 5 feet with front wheeled walker and max assist of 1-2.              Pain - Adult

## 2025-02-04 NOTE — PROGRESS NOTES
"Occupational Therapy    OT Treatment    Patient Name: Sebas Soto  MRN: [id number]  Today's Date: 2/4/2025  Time Calculation  Start Time: 1013  Stop Time: 1052  Time Calculation (min): 39 min       4107/4107-A    Assessment:  End of Session Communication: Bedside nurse  End of Session Patient Position: Bed, 3 rail up, Alarm on (bed placed in chair mode to  promote change in position)       Plan:  OT Frequency: 4 times per week  OT Discharge Recommendations: Moderate intensity level of continued care     Subjective      02/04/25 1013   OT Last Visit   OT Received On 02/04/25   General   Reason for Referral recent spine surgery   Referred By Dr Qureshi   Past Medical History Relevant to Rehab Admitted 1/31/2025 after having the following completed by Dr Qureshi: L2-5 laminectomies; L4-5 Fusion Spine Transforaminal Interbody Lumbar with Navigation  PMH: HLD, neurogenic claudication , hernia repair   Prior to Session Communication Bedside nurse   Patient Position Received Bed, 3 rail up;Alarm on   General Comment Pt is agreeble to tx and cleared for participation; drain removed this morning. Pt with limited L UE AROM.    Redness noted to pt's back once seated EOB, nursing informed, swapped covidean draw sheet for folded draw sheet.    Precautions   Medical Precautions Fall precautions   Post-Surgical Precautions Spinal precautions   Precautions Comment suazo catheter   Pain Assessment   Pain Assessment 0-10   0-10 (Numeric) Pain Score   (did not rate, c/o L sided \"waist pain\" during activity)   Pain Interventions Distraction;Repositioned   Cognition   Overall Cognitive Status WFL  (pt states \"I'm disoriented\" and c/o having hallucinations at times)   Orientation Level   (pt answers questions appropriately, reports location as \"[place]\" is corrected and is able to correct self later in session)   Grooming   Grooming Level of Assistance Contact guard   Grooming Where Assessed Edge of bed   Grooming Comments Pt washed " face with wipes,cues and CGA for safety.   UE Bathing   UE Bathing Level of Assistance Moderate assistance   UE Bathing Where Assessed Edge of bed   UE Bathing Comments assist to wash back and maintain seated balance   UE Dressing   UE Dressing Level of Assistance Maximum assistance   UE Dressing Where Assessed Edge of bed   UE Dressing Comments cues for comp techniques to don L UE first   Bed Mobility 1   Bed Mobility 1 Supine to sitting;Sitting to supine   Level of Assistance 1 Maximum assistance  (x2-3)   Bed Mobility Comments 1 instructed in log roll, draw sheet required to complete transition   Transfer 1   Technique 1 Sit to stand;Stand to sit   Transfer Device 1 Gait belt;Walker   Transfer Level of Assistance 1 Maximum assistance;+2   Trials/Comments 1 STS from EOB to fww level x2 trials, v/t cues for technique, B feet blocked. Pt with flexed stance, unable to facilitate upright stance despite Max A x2   Static Sitting Balance   Static Sitting-Balance Support Feet supported;Bilateral upper extremity supported   Static Sitting-Level of Assistance Maximum assistance x2;Contact guard   Static Sitting-Comment/Number of Minutes initially retro, improves with v/t cues and increased time   Dynamic Sitting Balance   Dynamic Sitting-Balance Support Feet supported;No upper extremity supported   Dynamic Sitting-Level of Assistance Contact guard;Maximum assistance x2   Dynamic Sitting-Balance Reaching for objects;Trunk control activities   Dynamic Sitting-Comments occasional R sided and retro LOB requiring assist to correct; incorporated UE functional reaching to facilitate improved balance, limited reaching with L UE.   Static Standing Balance   Static Standing-Balance Support Bilateral upper extremity supported   Static Standing-Level of Assistance Maximum assistance  (x2)   Static Standing-Comment/Number of Minutes unable to facilitate full, upright stance   IP OT Assessment   End of Session Communication Bedside  nurse   End of Session Patient Position Bed, 3 rail up;Alarm on  (bed placed in chair mode to  promote change in position)   Inpatient Plan   OT Frequency 4 times per week   OT Discharge Recommendations Moderate intensity level of continued care     Outcome Measures:[facility] Daily Activity  Putting on and taking off regular lower body clothing: Total  Bathing (including washing, rinsing, drying): Total  Putting on and taking off regular upper body clothing: A lot  Toileting, which includes using toilet, bedpan or urinal: Total  Taking care of personal grooming such as brushing teeth: A lot  Eating Meals: A lot  Daily Activity - Total Score: 9  Education Documentation  No documentation found.  Education Comments  No comments found.            Goals:  Encounter Problems       Encounter Problems (Active)       Eating       STG - Patient feeds self with min assist with adapted utensils PRN (Progressing)       Start:  02/01/25    Expected End:  02/15/25               Functional Balance       STG-Patient will be CGA static sit balance >5 minutes for ADL participation  (Progressing)       Start:  02/01/25    Expected End:  02/15/25               OT Transfers       STG-Patient will be min assist with sit to stand transition in preparation for transfers (Progressing)       Start:  02/01/25    Expected End:  02/15/25

## 2025-02-04 NOTE — CARE PLAN
The patient's goals for the shift include      The clinical goals for the shift include pt will remain safe and stable throughout shift

## 2025-02-05 ENCOUNTER — APPOINTMENT (OUTPATIENT)
Dept: RADIOLOGY | Facility: HOSPITAL | Age: 85
End: 2025-02-05
Payer: MEDICARE

## 2025-02-05 LAB
ANION GAP SERPL CALC-SCNC: 11 MMOL/L (ref 10–20)
BUN SERPL-MCNC: 20 MG/DL (ref 6–23)
CALCIUM SERPL-MCNC: 8.2 MG/DL (ref 8.6–10.3)
CHLORIDE SERPL-SCNC: 105 MMOL/L (ref 98–107)
CO2 SERPL-SCNC: 26 MMOL/L (ref 21–32)
CREAT SERPL-MCNC: 0.59 MG/DL (ref 0.5–1.3)
EGFRCR SERPLBLD CKD-EPI 2021: >90 ML/MIN/1.73M*2
ERYTHROCYTE [DISTWIDTH] IN BLOOD BY AUTOMATED COUNT: 16.8 % (ref 11.5–14.5)
GLUCOSE SERPL-MCNC: 116 MG/DL (ref 74–99)
HCT VFR BLD AUTO: 28.3 % (ref 41–52)
HGB BLD-MCNC: 9.2 G/DL (ref 13.5–17.5)
MCH RBC QN AUTO: 31.9 PG (ref 26–34)
MCHC RBC AUTO-ENTMCNC: 32.5 G/DL (ref 32–36)
MCV RBC AUTO: 98 FL (ref 80–100)
NRBC BLD-RTO: 0 /100 WBCS (ref 0–0)
PLATELET # BLD AUTO: 168 X10*3/UL (ref 150–450)
POTASSIUM SERPL-SCNC: 3.8 MMOL/L (ref 3.5–5.3)
RBC # BLD AUTO: 2.88 X10*6/UL (ref 4.5–5.9)
SODIUM SERPL-SCNC: 138 MMOL/L (ref 136–145)
WBC # BLD AUTO: 6.9 X10*3/UL (ref 4.4–11.3)

## 2025-02-05 PROCEDURE — 97116 GAIT TRAINING THERAPY: CPT | Mod: GP,CQ

## 2025-02-05 PROCEDURE — 97110 THERAPEUTIC EXERCISES: CPT | Mod: GP,CQ

## 2025-02-05 PROCEDURE — 80048 BASIC METABOLIC PNL TOTAL CA: CPT | Performed by: STUDENT IN AN ORGANIZED HEALTH CARE EDUCATION/TRAINING PROGRAM

## 2025-02-05 PROCEDURE — 2500000005 HC RX 250 GENERAL PHARMACY W/O HCPCS: Performed by: PHYSICIAN ASSISTANT

## 2025-02-05 PROCEDURE — 2500000004 HC RX 250 GENERAL PHARMACY W/ HCPCS (ALT 636 FOR OP/ED): Performed by: STUDENT IN AN ORGANIZED HEALTH CARE EDUCATION/TRAINING PROGRAM

## 2025-02-05 PROCEDURE — 36415 COLL VENOUS BLD VENIPUNCTURE: CPT | Performed by: STUDENT IN AN ORGANIZED HEALTH CARE EDUCATION/TRAINING PROGRAM

## 2025-02-05 PROCEDURE — 85027 COMPLETE CBC AUTOMATED: CPT | Performed by: STUDENT IN AN ORGANIZED HEALTH CARE EDUCATION/TRAINING PROGRAM

## 2025-02-05 PROCEDURE — 2500000005 HC RX 250 GENERAL PHARMACY W/O HCPCS: Performed by: STUDENT IN AN ORGANIZED HEALTH CARE EDUCATION/TRAINING PROGRAM

## 2025-02-05 PROCEDURE — 2500000001 HC RX 250 WO HCPCS SELF ADMINISTERED DRUGS (ALT 637 FOR MEDICARE OP): Performed by: STUDENT IN AN ORGANIZED HEALTH CARE EDUCATION/TRAINING PROGRAM

## 2025-02-05 PROCEDURE — 97530 THERAPEUTIC ACTIVITIES: CPT | Mod: GO,CO

## 2025-02-05 PROCEDURE — 1100000001 HC PRIVATE ROOM DAILY

## 2025-02-05 RX ORDER — HYDROCORTISONE 1 %
CREAM (GRAM) TOPICAL 2 TIMES DAILY
Status: DISCONTINUED | OUTPATIENT
Start: 2025-02-05 | End: 2025-02-06 | Stop reason: HOSPADM

## 2025-02-05 RX ADMIN — ATORVASTATIN CALCIUM 10 MG: 10 TABLET, FILM COATED ORAL at 09:01

## 2025-02-05 RX ADMIN — ACETAMINOPHEN 975 MG: 325 TABLET ORAL at 06:32

## 2025-02-05 RX ADMIN — ACETAMINOPHEN 975 MG: 325 TABLET ORAL at 15:27

## 2025-02-05 RX ADMIN — HEPARIN SODIUM 5000 UNITS: 5000 INJECTION INTRAVENOUS; SUBCUTANEOUS at 06:14

## 2025-02-05 RX ADMIN — POLYETHYLENE GLYCOL 3350 17 G: 17 POWDER, FOR SOLUTION ORAL at 09:01

## 2025-02-05 RX ADMIN — Medication 5 MG: at 22:13

## 2025-02-05 RX ADMIN — HYDROCORTISONE: 1 CREAM TOPICAL at 16:36

## 2025-02-05 RX ADMIN — HEPARIN SODIUM 5000 UNITS: 5000 INJECTION INTRAVENOUS; SUBCUTANEOUS at 15:28

## 2025-02-05 RX ADMIN — POLYETHYLENE GLYCOL 3350 17 G: 17 POWDER, FOR SOLUTION ORAL at 22:13

## 2025-02-05 RX ADMIN — LIDOCAINE 4% 1 PATCH: 40 PATCH TOPICAL at 09:02

## 2025-02-05 RX ADMIN — HYDROCORTISONE: 1 CREAM TOPICAL at 22:13

## 2025-02-05 RX ADMIN — ACETAMINOPHEN 975 MG: 325 TABLET ORAL at 22:13

## 2025-02-05 RX ADMIN — HEPARIN SODIUM 5000 UNITS: 5000 INJECTION INTRAVENOUS; SUBCUTANEOUS at 22:13

## 2025-02-05 RX ADMIN — METOPROLOL SUCCINATE 25 MG: 25 TABLET, EXTENDED RELEASE ORAL at 09:01

## 2025-02-05 ASSESSMENT — COGNITIVE AND FUNCTIONAL STATUS - GENERAL
CLIMB 3 TO 5 STEPS WITH RAILING: TOTAL
STANDING UP FROM CHAIR USING ARMS: A LOT
MOBILITY SCORE: 11
MOVING TO AND FROM BED TO CHAIR: TOTAL
TURNING FROM BACK TO SIDE WHILE IN FLAT BAD: A LOT
TURNING FROM BACK TO SIDE WHILE IN FLAT BAD: TOTAL
WALKING IN HOSPITAL ROOM: A LOT
TOILETING: TOTAL
EATING MEALS: A LOT
PERSONAL GROOMING: A LOT
MOVING TO AND FROM BED TO CHAIR: A LOT
DAILY ACTIVITIY SCORE: 9
STANDING UP FROM CHAIR USING ARMS: A LOT
CLIMB 3 TO 5 STEPS WITH RAILING: TOTAL
MOBILITY SCORE: 9
DRESSING REGULAR UPPER BODY CLOTHING: A LOT
WALKING IN HOSPITAL ROOM: A LOT
DRESSING REGULAR LOWER BODY CLOTHING: TOTAL
MOVING FROM LYING ON BACK TO SITTING ON SIDE OF FLAT BED WITH BEDRAILS: A LOT
HELP NEEDED FOR BATHING: TOTAL
MOVING FROM LYING ON BACK TO SITTING ON SIDE OF FLAT BED WITH BEDRAILS: A LOT

## 2025-02-05 ASSESSMENT — PAIN SCALES - WONG BAKER
WONGBAKER_NUMERICALRESPONSE: HURTS LITTLE BIT

## 2025-02-05 ASSESSMENT — PAIN SCALES - PAIN ASSESSMENT IN ADVANCED DEMENTIA (PAINAD)
BODYLANGUAGE: RELAXED
BREATHING: NORMAL
CONSOLABILITY: NO NEED TO CONSOLE
FACIALEXPRESSION: SMILING OR INEXPRESSIVE
TOTALSCORE: MEDICATION (SEE MAR)
TOTALSCORE: 0

## 2025-02-05 ASSESSMENT — PAIN - FUNCTIONAL ASSESSMENT
PAIN_FUNCTIONAL_ASSESSMENT: 0-10

## 2025-02-05 ASSESSMENT — ACTIVITIES OF DAILY LIVING (ADL): EFFECT OF PAIN ON DAILY ACTIVITIES: .

## 2025-02-05 ASSESSMENT — PAIN SCALES - GENERAL: PAINLEVEL_OUTOF10: 0 - NO PAIN

## 2025-02-05 NOTE — PROGRESS NOTES
"Occupational Therapy    OT Treatment    Patient Name: Sebas Soto  MRN: [id number]  Today's Date: 2/5/2025  Time Calculation  Start Time: 1345  Stop Time: 1408  Time Calculation (min): 23 min       4107/4107-A    Assessment:  End of Session Communication: Bedside nurse  End of Session Patient Position: Bed, 3 rail up, Alarm on (bed placed in chair mode to  promote change in position)       Plan:  OT Frequency: 4 times per week  OT Discharge Recommendations: Moderate intensity level of continued care     Subjective      02/05/25 1345   OT Last Visit   OT Received On 02/05/25   General   Reason for Referral recent spine surgery   Referred By Dr Qureshi   Past Medical History Relevant to Rehab Admitted 1/31/2025 after having the following completed by Dr Qureshi: L2-5 laminectomies; L4-5 Fusion Spine Transforaminal Interbody Lumbar with Navigation  PMH: HLD, neurogenic claudication , hernia repair   Prior to Session Communication Bedside nurse   Patient Position Received Bed, 3 rail up;Alarm on   General Comment Pt is agreeble to tx and cleared for participation.   Precautions   Medical Precautions Fall precautions   Post-Surgical Precautions Spinal precautions   Precautions Comment pure wick   Pain Assessment   Pain Assessment 0-10   Prado-Baker FACES Pain Rating 2   Pain Type Surgical pain   Pain Location Back   Pain Orientation Lower;Left   Pain Interventions Distraction;Rest   Cognition   Orientation Level Oriented X4  (answers questions appropriately but states \"is tripping\" at times)   Bed Mobility 1   Bed Mobility 1 Supine to sitting;Sitting to supine   Level of Assistance 1 Dependent  (x2-3)   Bed Mobility Comments 1 instructed in log roll, heavy use of draw sheet to elevate trunk   Transfer 1   Technique 1 Sit to stand;Stand to sit   Transfer Device 1 Gait belt;Walker   Transfer Level of Assistance 1 Maximum assistance;+2   Trials/Comments 1 STS from EOB to fww level x2 trials, v/t cues for upright stance, " proper UE support; pt completed shuffling sidesteps toward HOB, B knees buckling with second trial and pt returned to sitting safely EOB.   Static Sitting Balance   Static Sitting-Balance Support Feet supported;Bilateral upper extremity supported   Static Sitting-Level of Assistance Maximum assistance;Minimum assistance   Static Sitting-Comment/Number of Minutes varying assist needed to maintain balance Max A initially>Min A   Dynamic Sitting Balance   Dynamic Sitting-Balance Support Feet supported;No upper extremity supported   Dynamic Sitting-Level of Assistance Minimum assistance;Maximum assistance   Dynamic Sitting-Balance Reaching for objects;Trunk control activities   Dynamic Sitting-Comments incorporating reaching through functional planes while adhering to precautions   Static Standing Balance   Static Standing-Balance Support Bilateral upper extremity supported   Static Standing-Level of Assistance Maximum assistance  (x2)   Static Standing-Comment/Number of Minutes flexed posture   IP OT Assessment   End of Session Communication Bedside nurse   End of Session Patient Position Bed, 3 rail up;Alarm on  (bed placed in chair mode to  promote change in position)   Inpatient Plan   OT Frequency 4 times per week   OT Discharge Recommendations Moderate intensity level of continued care     Outcome Measures:[facility] Daily Activity  Putting on and taking off regular lower body clothing: Total  Bathing (including washing, rinsing, drying): Total  Putting on and taking off regular upper body clothing: A lot  Toileting, which includes using toilet, bedpan or urinal: Total  Taking care of personal grooming such as brushing teeth: A lot  Eating Meals: A lot  Daily Activity - Total Score: 9  Education Documentation  No documentation found.  Education Comments  No comments found.            Goals:  Encounter Problems       Encounter Problems (Active)       Eating       STG - Patient feeds self with min assist with adapted  utensils PRN (Progressing)       Start:  02/01/25    Expected End:  02/15/25               Functional Balance       STG-Patient will be CGA static sit balance >5 minutes for ADL participation  (Progressing)       Start:  02/01/25    Expected End:  02/15/25               OT Transfers       STG-Patient will be min assist with sit to stand transition in preparation for transfers (Progressing)       Start:  02/01/25    Expected End:  02/15/25

## 2025-02-05 NOTE — PROGRESS NOTES
02/05/25 1130   Discharge Planning   Living Arrangements Spouse/significant other   Support Systems Spouse/significant other   Type of Residence Private residence   Home or Post Acute Services Post acute facilities (Rehab/SNF/etc)   Type of Post Acute Facility Services Skilled nursing   Expected Discharge Disposition SNF   Does the patient need discharge transport arranged? Yes   RoundTrip coordination needed? Yes     Spouse and daughter approached me. They would like SNF referrals sent to [facility] 1. [facility] and 2. [facility]. Requested DSC send referrals.

## 2025-02-05 NOTE — PROGRESS NOTES
02/05/25 0907   Discharge Planning   Home or Post Acute Services Post acute facilities (Rehab/SNF/etc)   Type of Post Acute Facility Services Skilled nursing   Expected Discharge Disposition SNF     Spoke with pt's wife, Chrissie. Notified her that [facility] not accept. Reviewed need for SNF choices since pt is discharged. Chrissie has a SNF. She is on her way to the hospital and will review FOC when she arrives. Sw to follow.     11:15 TCC asked me to return call from pt's wife. Called placed to pt's wife. VM left with call back number.    3:20 Met with pt's wife. Reviewed accepting facilities She would like [facility]. Facility updated and pre-cert was started.     4:57 Pre-cert received for [facility]. Discharge paperwork needs to be completed in order to arrange discharge. Request was sent to Physician and NP.

## 2025-02-05 NOTE — DISCHARGE SUMMARY
Discharge Diagnosis  Lumbar stenosis with neurogenic claudication    Issues Requiring Follow-Up  hallucination    Test Results Pending At Discharge  Pending Labs       No current pending labs.            Hospital Course      84M with history of HLD, prostate cancer, presenting with bilateral lower extremity radiculopathy s/p L2-5 lami, L4/5 TLIF     Patient underwent lumbar decompression and fusion by Dr. Qureshi. He recovered well in PACU and transferred to [facility] floor for remainder of his care. He has tolerated oral intake for nutrition and pain medication well. He is urinating on own with no complications. He is ambulatory with assistance. On day of discharge He is medically stable. Will DC to acute rehab.         Pertinent Physical Exam At Time of Discharge  Physical Exam  General: Well developed, awake/alert/oriented x3, no distress, alert and cooperative  Skin: Warm and dry, no lesions, no rashes  ENMT: Mucous membranes moist, no apparent injury, no lesions seen  Head/Neck: Neck Supple, no apparent injury  Respiratory/Thorax: Normal breath sounds with good chest expansion, thorax symmetric  Cardiovascular: No pitting edema, no JVD    Motor Strength:   BUE 5/5  RLE HF 3 KE 4 DF/PF 5  LLE HF 3 KE4- DF/PF 5    Muscle Bulk: Normal and symmetric in all extremities    Posture:   -- Cervical: Normal  -- Thoracic: Normal  -- Lumbar : Normal  Paraspinal muscle spasm/tenderness present  Incision CDI    Sensation: intact to light touch      Home Medications     Medication List      START taking these medications     acetaminophen 325 mg tablet; Commonly known as: Tylenol; Take 2 tablets   (650 mg) by mouth every 6 hours if needed for mild pain (1 - 3).   traMADol 50 mg tablet; Commonly known as: Ultram; Take 1 tablet (50 mg)   by mouth every 6 hours if needed for severe pain (7 - 10) or moderate pain   (4 - 6) for up to 5 days.     CONTINUE taking these medications     atorvastatin 10 mg tablet; Commonly known as:  Lipitor   chlorhexidine 0.12 % solution; Commonly known as: Peridex; 15   milliliter(s) orally once a day for 2 doses 15 ml  the night before   surgery and 15 ml morning of surgery - swish for 30 seconds -DO NOT   SWALLOW, SPIT OUT   metoprolol succinate XL 25 mg 24 hr tablet; Commonly known as: Toprol-XL       Outpatient Follow-Up  Future Appointments   Date Time Provider Department Center   2/21/2025  1:00 PM MARCELLA Cortés [id number] [place]   4/17/2025 10:30 AM Philippe Qureshi MD PhD [id number] None   6/24/2025  1:30 PM MARCELLA Dominguez [id number] [place]       Beau Agarwal PA-C

## 2025-02-05 NOTE — PROGRESS NOTES
Physical Therapy    Physical Therapy    Physical Therapy Treatment    Patient Name: Sebas Soto  MRN: [id number]  Today's Date: 2/5/2025  Time Calculation  Start Time: 1344  Stop Time: 1415  Time Calculation (min): 31 min     4107/4107-A    Assessment/Plan   PT Assessment  PT Assessment Results: Decreased strength, Decreased endurance, Impaired balance, Decreased mobility  Rehab Prognosis: Fair  Barriers to Discharge Home: Caregiver assistance, Physical needs  Caregiver Assistance: Caregiver assistance needed per identified barriers - however, level of patient's required assistance exceeds assistance available at home  End of Session Communication: Bedside nurse  End of Session Patient Position: Bed, 3 rail up, Alarm on  PT Plan  Inpatient/Swing Bed or Outpatient: Inpatient  PT Plan  Treatment/Interventions: Bed mobility, Transfer training, Gait training, Balance training, Strengthening, Endurance training, Therapeutic exercise, Therapeutic activity (Pt education)  PT Plan: Ongoing PT  PT Frequency: 4 times per week  PT Discharge Recommendations: Moderate intensity level of continued care  PT - OK to Discharge:  (     02/05/25 1344   PT  Visit   PT Received On 02/05/25   Response to Previous Treatment Patient with no complaints from previous session.   General   Reason for Referral recent spine surgery   Referred By Dr Qureshi   Past Medical History Relevant to Rehab Admitted 1/31/2025 after having the following completed by Dr Qureshi: L2-5 laminectomies; L4-5 Fusion Spine Transforaminal Interbody Lumbar with Navigation  PMH: HLD, neurogenic claudication , hernia repair   Prior to Session Communication Bedside nurse   Patient Position Received Bed, 3 rail up   General Comment Pt is agreeable to therapy and cleared for participatin.   Precautions   Medical Precautions Fall precautions   Post-Surgical Precautions Spinal precautions   Pain Assessment   Pain Assessment 0-10   Prado-Baker FACES Pain Rating 2   Pain  Type Surgical pain   Pain Location Back   Pain Orientation Lower   Effect of Pain on Daily Activities .   Cognition   Orientation Level Oriented X4   Therapeutic Exercise   Therapeutic Exercise Performed Yes   Therapeutic Exercise Activity 1 AP, SAQ x 15 each B   Bed Mobility   Bed Mobility Yes   Bed Mobility 1   Bed Mobility 1 Supine to sitting   Level of Assistance 1 Dependent   Bed Mobility 2   Bed Mobility  2 Sitting to supine   Level of Assistance 2 Dependent   Ambulation/Gait Training   Ambulation/Gait Training Performed Yes   Ambulation/Gait Training 1   Surface 1 Level tile   Device 1 Rolling walker   Gait Support Devices Gait belt   Assistance 1 Maximum assistance  (x2)   Comments/Distance (ft) 1 3 side steps x 2 trials  (Patient needign cues to fully extend elbows and knee especially L side)   Transfers   Transfer Yes   Transfer 1   Technique 1 Sit to stand;Stand to sit   Transfer Device 1 Walker;Gait belt   Transfer Level of Assistance 1 Maximum assistance  (x2)   Trials/Comments 1 x2   PT Assessment   PT Assessment Results Decreased strength;Decreased endurance;Impaired balance;Decreased mobility   Rehab Prognosis Fair   Barriers to Discharge Home Caregiver assistance;Physical needs   Caregiver Assistance Caregiver assistance needed per identified barriers - however, level of patient's required assistance exceeds assistance available at home   End of Session Communication Bedside nurse   End of Session Patient Position Bed, 3 rail up;Alarm on   Outpatient Education   Individual(s) Educated Patient   Education Provided Fall Risk   PT Plan   Inpatient/Swing Bed or Outpatient Inpatient     Outcome Measures:  [facility]  Turning from your back to your side while in a flat bed without using bedrails: A lot  Moving from lying on your back to sitting on the side of a flat bed without using bedrails: Total  Moving to and from bed to chair (including a wheelchair): Total  Standing up from a chair using  your arms (e.g. wheelchair or bedside chair): A lot  To walk in hospital room: A lot  Climbing 3-5 steps with railing: Total  Basic Mobility - Total Score: 9                             EDUCATION:  Outpatient Education  Individual(s) Educated: Patient  Education Provided: Fall Risk  Education Documentation  No documentation found.  Education Comments  No comments found.        GOALS:  Encounter Problems       Encounter Problems (Active)       PT Problem       PT Goal 1 (Progressing)       Start:  02/01/25    Expected End:  02/15/25       Pt able to perform bed mobility with max assist of 1-2.           PT Goal 2 (Progressing)       Start:  02/01/25    Expected End:  02/15/25       Pt able to complete all transfers with max assist of 1-2.            PT Goal 3 (Progressing)       Start:  02/01/25    Expected End:  02/15/25       Pt able to ambulate 5 feet with front wheeled walker and max assist of 1-2.              Pain - Adult

## 2025-02-05 NOTE — PROGRESS NOTES
Spiritual Care Visit  Spiritual Care Request    Reason for Visit:  Routine Visit: Introduction     Request Received From:       Focus of Care:  Visited With: Patient         Refer to :          Spiritual Care Assessment    Spiritual Assessment:                      Care Provided:       Sense of Community and or Yazidism Affiliation:  Decline to Answer         Addressed Needs/Concerns and/or Afua Through:          Outcome:        Advance Directives:         Spiritual Care Annotation    Annotation:  Patienmt being fed when I approached, did not appear cognizant

## 2025-02-05 NOTE — CARE PLAN
The patient's goals for the shift include      The clinical goals for the shift include pt. wll remain free from falls    Over the shift, the patient did make progress toward the following goal of being free from falls.

## 2025-02-06 VITALS
WEIGHT: 199.96 LBS | TEMPERATURE: 98.6 F | HEIGHT: 68 IN | DIASTOLIC BLOOD PRESSURE: 72 MMHG | SYSTOLIC BLOOD PRESSURE: 115 MMHG | HEART RATE: 75 BPM | BODY MASS INDEX: 30.31 KG/M2 | RESPIRATION RATE: 15 BRPM | OXYGEN SATURATION: 95 %

## 2025-02-06 PROCEDURE — 2500000001 HC RX 250 WO HCPCS SELF ADMINISTERED DRUGS (ALT 637 FOR MEDICARE OP): Performed by: STUDENT IN AN ORGANIZED HEALTH CARE EDUCATION/TRAINING PROGRAM

## 2025-02-06 PROCEDURE — 2500000004 HC RX 250 GENERAL PHARMACY W/ HCPCS (ALT 636 FOR OP/ED): Performed by: STUDENT IN AN ORGANIZED HEALTH CARE EDUCATION/TRAINING PROGRAM

## 2025-02-06 PROCEDURE — 2500000005 HC RX 250 GENERAL PHARMACY W/O HCPCS: Performed by: STUDENT IN AN ORGANIZED HEALTH CARE EDUCATION/TRAINING PROGRAM

## 2025-02-06 RX ADMIN — POLYETHYLENE GLYCOL 3350 17 G: 17 POWDER, FOR SOLUTION ORAL at 10:12

## 2025-02-06 RX ADMIN — ATORVASTATIN CALCIUM 10 MG: 10 TABLET, FILM COATED ORAL at 10:12

## 2025-02-06 RX ADMIN — ACETAMINOPHEN 975 MG: 325 TABLET ORAL at 06:12

## 2025-02-06 RX ADMIN — METOPROLOL SUCCINATE 25 MG: 25 TABLET, EXTENDED RELEASE ORAL at 10:12

## 2025-02-06 RX ADMIN — HEPARIN SODIUM 5000 UNITS: 5000 INJECTION INTRAVENOUS; SUBCUTANEOUS at 06:12

## 2025-02-06 RX ADMIN — TRAMADOL HYDROCHLORIDE 50 MG: 50 TABLET, COATED ORAL at 10:34

## 2025-02-06 RX ADMIN — HYDROCORTISONE: 1 CREAM TOPICAL at 10:12

## 2025-02-06 RX ADMIN — LIDOCAINE 4% 1 PATCH: 40 PATCH TOPICAL at 10:11

## 2025-02-06 ASSESSMENT — PAIN SCALES - PAIN ASSESSMENT IN ADVANCED DEMENTIA (PAINAD): TOTALSCORE: MEDICATION (SEE MAR)

## 2025-02-06 ASSESSMENT — PAIN DESCRIPTION - LOCATION: LOCATION: BACK

## 2025-02-06 ASSESSMENT — COGNITIVE AND FUNCTIONAL STATUS - GENERAL
TURNING FROM BACK TO SIDE WHILE IN FLAT BAD: A LOT
CLIMB 3 TO 5 STEPS WITH RAILING: TOTAL
WALKING IN HOSPITAL ROOM: A LOT
DAILY ACTIVITIY SCORE: 12
EATING MEALS: A LOT
HELP NEEDED FOR BATHING: A LOT
PERSONAL GROOMING: A LOT
TOILETING: A LOT
MOBILITY SCORE: 12
MOVING FROM LYING ON BACK TO SITTING ON SIDE OF FLAT BED WITH BEDRAILS: A LITTLE
DRESSING REGULAR UPPER BODY CLOTHING: A LOT
DRESSING REGULAR LOWER BODY CLOTHING: A LOT
STANDING UP FROM CHAIR USING ARMS: A LOT
MOVING TO AND FROM BED TO CHAIR: A LOT

## 2025-02-06 ASSESSMENT — PAIN SCALES - GENERAL: PAINLEVEL_OUTOF10: 8

## 2025-02-06 ASSESSMENT — PAIN - FUNCTIONAL ASSESSMENT: PAIN_FUNCTIONAL_ASSESSMENT: 0-10

## 2025-02-06 NOTE — PROGRESS NOTES
02/06/25 1139   Discharge Planning   Home or Post Acute Services Post acute facilities (Rehab/SNF/etc)   Type of Post Acute Facility Services Skilled nursing   Expected Discharge Disposition SNF  ([facility])   What day is the transport expected? 02/06/25   What time is the transport expected? 0130   Intensity of Service   Intensity of Service >30 min     Pt cleared for transfer to [facility] today at 1:30. Pt and wife are aware of discharge and time. Discharge paperwork sent to facility. Nursing will call report prior to discharge.

## 2025-02-21 ENCOUNTER — APPOINTMENT (OUTPATIENT)
Dept: NEUROSURGERY | Facility: CLINIC | Age: 85
End: 2025-02-21
Payer: MEDICARE

## 2025-02-21 ENCOUNTER — HOSPITAL ENCOUNTER (INPATIENT)
Facility: HOSPITAL | Age: 85
LOS: 7 days | Discharge: NURSING, REHAB OR LONG-TERM CARE | DRG: 862 | End: 2025-02-28
Attending: EMERGENCY MEDICINE | Admitting: STUDENT IN AN ORGANIZED HEALTH CARE EDUCATION/TRAINING PROGRAM
Payer: MEDICARE

## 2025-02-21 ENCOUNTER — APPOINTMENT (OUTPATIENT)
Dept: RADIOLOGY | Facility: HOSPITAL | Age: 85
DRG: 862 | End: 2025-02-21
Payer: MEDICARE

## 2025-02-21 DIAGNOSIS — K59.00 CONSTIPATION, UNSPECIFIED CONSTIPATION TYPE: ICD-10-CM

## 2025-02-21 DIAGNOSIS — K63.89 PNEUMATOSIS OF INTESTINES: ICD-10-CM

## 2025-02-21 DIAGNOSIS — T81.40XA POSTOPERATIVE INFECTION, UNSPECIFIED TYPE, INITIAL ENCOUNTER: Primary | ICD-10-CM

## 2025-02-21 DIAGNOSIS — Z98.1 S/P LUMBAR FUSION: Primary | ICD-10-CM

## 2025-02-21 LAB
ALBUMIN SERPL BCP-MCNC: 3.7 G/DL (ref 3.4–5)
ALP SERPL-CCNC: 99 U/L (ref 33–136)
ALT SERPL W P-5'-P-CCNC: 109 U/L (ref 10–52)
ANION GAP SERPL CALC-SCNC: 12 MMOL/L (ref 10–20)
AST SERPL W P-5'-P-CCNC: 44 U/L (ref 9–39)
BASOPHILS # BLD AUTO: 0.03 X10*3/UL (ref 0–0.1)
BASOPHILS NFR BLD AUTO: 0.3 %
BILIRUB SERPL-MCNC: 0.6 MG/DL (ref 0–1.2)
BUN SERPL-MCNC: 18 MG/DL (ref 6–23)
CALCIUM SERPL-MCNC: 8.8 MG/DL (ref 8.6–10.3)
CHLORIDE SERPL-SCNC: 103 MMOL/L (ref 98–107)
CO2 SERPL-SCNC: 26 MMOL/L (ref 21–32)
CREAT SERPL-MCNC: 0.62 MG/DL (ref 0.5–1.3)
CRP SERPL-MCNC: 4.88 MG/DL
EGFRCR SERPLBLD CKD-EPI 2021: >90 ML/MIN/1.73M*2
EOSINOPHIL # BLD AUTO: 0.26 X10*3/UL (ref 0–0.4)
EOSINOPHIL NFR BLD AUTO: 2.5 %
ERYTHROCYTE [DISTWIDTH] IN BLOOD BY AUTOMATED COUNT: 16.9 % (ref 11.5–14.5)
ERYTHROCYTE [SEDIMENTATION RATE] IN BLOOD BY WESTERGREN METHOD: 30 MM/H (ref 0–20)
GLUCOSE SERPL-MCNC: 97 MG/DL (ref 74–99)
HCT VFR BLD AUTO: 37 % (ref 41–52)
HGB BLD-MCNC: 11.7 G/DL (ref 13.5–17.5)
HOLD SPECIMEN: NORMAL
IMM GRANULOCYTES # BLD AUTO: 0.06 X10*3/UL (ref 0–0.5)
IMM GRANULOCYTES NFR BLD AUTO: 0.6 % (ref 0–0.9)
LACTATE SERPL-SCNC: 1.5 MMOL/L (ref 0.4–2)
LYMPHOCYTES # BLD AUTO: 1.69 X10*3/UL (ref 0.8–3)
LYMPHOCYTES NFR BLD AUTO: 16.2 %
MCH RBC QN AUTO: 31.2 PG (ref 26–34)
MCHC RBC AUTO-ENTMCNC: 31.6 G/DL (ref 32–36)
MCV RBC AUTO: 99 FL (ref 80–100)
MONOCYTES # BLD AUTO: 1.54 X10*3/UL (ref 0.05–0.8)
MONOCYTES NFR BLD AUTO: 14.8 %
NEUTROPHILS # BLD AUTO: 6.86 X10*3/UL (ref 1.6–5.5)
NEUTROPHILS NFR BLD AUTO: 65.6 %
NRBC BLD-RTO: 0 /100 WBCS (ref 0–0)
PLATELET # BLD AUTO: 327 X10*3/UL (ref 150–450)
POTASSIUM SERPL-SCNC: 4.3 MMOL/L (ref 3.5–5.3)
PROT SERPL-MCNC: 6.8 G/DL (ref 6.4–8.2)
RBC # BLD AUTO: 3.75 X10*6/UL (ref 4.5–5.9)
SODIUM SERPL-SCNC: 137 MMOL/L (ref 136–145)
WBC # BLD AUTO: 10.4 X10*3/UL (ref 4.4–11.3)

## 2025-02-21 PROCEDURE — 96365 THER/PROPH/DIAG IV INF INIT: CPT | Mod: 59

## 2025-02-21 PROCEDURE — 96372 THER/PROPH/DIAG INJ SC/IM: CPT | Performed by: NURSE PRACTITIONER

## 2025-02-21 PROCEDURE — 86140 C-REACTIVE PROTEIN: CPT | Performed by: PHYSICIAN ASSISTANT

## 2025-02-21 PROCEDURE — 96361 HYDRATE IV INFUSION ADD-ON: CPT

## 2025-02-21 PROCEDURE — 85025 COMPLETE CBC W/AUTO DIFF WBC: CPT | Performed by: PHYSICIAN ASSISTANT

## 2025-02-21 PROCEDURE — 80053 COMPREHEN METABOLIC PANEL: CPT | Performed by: PHYSICIAN ASSISTANT

## 2025-02-21 PROCEDURE — 2500000004 HC RX 250 GENERAL PHARMACY W/ HCPCS (ALT 636 FOR OP/ED): Performed by: NURSE PRACTITIONER

## 2025-02-21 PROCEDURE — 99236 HOSP IP/OBS SAME DATE HI 85: CPT | Performed by: NURSE PRACTITIONER

## 2025-02-21 PROCEDURE — A9575 INJ GADOTERATE MEGLUMI 0.1ML: HCPCS | Performed by: EMERGENCY MEDICINE

## 2025-02-21 PROCEDURE — 96367 TX/PROPH/DG ADDL SEQ IV INF: CPT

## 2025-02-21 PROCEDURE — 2500000004 HC RX 250 GENERAL PHARMACY W/ HCPCS (ALT 636 FOR OP/ED): Performed by: PHYSICIAN ASSISTANT

## 2025-02-21 PROCEDURE — 85652 RBC SED RATE AUTOMATED: CPT | Performed by: PHYSICIAN ASSISTANT

## 2025-02-21 PROCEDURE — 2500000004 HC RX 250 GENERAL PHARMACY W/ HCPCS (ALT 636 FOR OP/ED): Performed by: EMERGENCY MEDICINE

## 2025-02-21 PROCEDURE — 96366 THER/PROPH/DIAG IV INF ADDON: CPT

## 2025-02-21 PROCEDURE — 87040 BLOOD CULTURE FOR BACTERIA: CPT | Mod: STJLAB | Performed by: PHYSICIAN ASSISTANT

## 2025-02-21 PROCEDURE — 2550000001 HC RX 255 CONTRASTS: Performed by: EMERGENCY MEDICINE

## 2025-02-21 PROCEDURE — 1036F TOBACCO NON-USER: CPT | Performed by: NURSE PRACTITIONER

## 2025-02-21 PROCEDURE — 96360 HYDRATION IV INFUSION INIT: CPT | Mod: 59

## 2025-02-21 PROCEDURE — 83605 ASSAY OF LACTIC ACID: CPT | Performed by: PHYSICIAN ASSISTANT

## 2025-02-21 PROCEDURE — 1111F DSCHRG MED/CURRENT MED MERGE: CPT | Performed by: NURSE PRACTITIONER

## 2025-02-21 PROCEDURE — 1100000001 HC PRIVATE ROOM DAILY

## 2025-02-21 PROCEDURE — 99285 EMERGENCY DEPT VISIT HI MDM: CPT | Mod: 25 | Performed by: EMERGENCY MEDICINE

## 2025-02-21 PROCEDURE — 87185 SC STD ENZYME DETCJ PER NZM: CPT | Mod: STJLAB | Performed by: PHYSICIAN ASSISTANT

## 2025-02-21 PROCEDURE — 36415 COLL VENOUS BLD VENIPUNCTURE: CPT | Performed by: PHYSICIAN ASSISTANT

## 2025-02-21 PROCEDURE — 72158 MRI LUMBAR SPINE W/O & W/DYE: CPT

## 2025-02-21 PROCEDURE — 99024 POSTOP FOLLOW-UP VISIT: CPT | Performed by: NURSE PRACTITIONER

## 2025-02-21 PROCEDURE — 72158 MRI LUMBAR SPINE W/O & W/DYE: CPT | Performed by: RADIOLOGY

## 2025-02-21 RX ORDER — ATORVASTATIN CALCIUM 10 MG/1
10 TABLET, FILM COATED ORAL DAILY
Status: DISCONTINUED | OUTPATIENT
Start: 2025-02-22 | End: 2025-02-28 | Stop reason: HOSPADM

## 2025-02-21 RX ORDER — VANCOMYCIN 1.75 GRAM/500 ML IN 0.9 % SODIUM CHLORIDE INTRAVENOUS
1750 ONCE
Status: COMPLETED | OUTPATIENT
Start: 2025-02-21 | End: 2025-02-21

## 2025-02-21 RX ORDER — TRAMADOL HYDROCHLORIDE 50 MG/1
25 TABLET ORAL EVERY 4 HOURS PRN
Status: DISCONTINUED | OUTPATIENT
Start: 2025-02-21 | End: 2025-02-28 | Stop reason: HOSPADM

## 2025-02-21 RX ORDER — ENOXAPARIN SODIUM 100 MG/ML
40 INJECTION SUBCUTANEOUS EVERY 24 HOURS
Status: DISCONTINUED | OUTPATIENT
Start: 2025-02-21 | End: 2025-02-28 | Stop reason: HOSPADM

## 2025-02-21 RX ORDER — VANCOMYCIN HYDROCHLORIDE 1 G/20ML
INJECTION, POWDER, LYOPHILIZED, FOR SOLUTION INTRAVENOUS DAILY PRN
Status: DISCONTINUED | OUTPATIENT
Start: 2025-02-21 | End: 2025-02-21

## 2025-02-21 RX ORDER — ACETAMINOPHEN 325 MG/1
650 TABLET ORAL EVERY 6 HOURS PRN
Status: DISCONTINUED | OUTPATIENT
Start: 2025-02-21 | End: 2025-02-21

## 2025-02-21 RX ORDER — POLYETHYLENE GLYCOL 3350 17 G/17G
17 POWDER, FOR SOLUTION ORAL DAILY
Status: DISCONTINUED | OUTPATIENT
Start: 2025-02-21 | End: 2025-02-28 | Stop reason: HOSPADM

## 2025-02-21 RX ORDER — GADOTERATE MEGLUMINE 376.9 MG/ML
18 INJECTION INTRAVENOUS
Status: COMPLETED | OUTPATIENT
Start: 2025-02-21 | End: 2025-02-21

## 2025-02-21 RX ORDER — METOPROLOL SUCCINATE 25 MG/1
25 TABLET, EXTENDED RELEASE ORAL DAILY
Status: DISCONTINUED | OUTPATIENT
Start: 2025-02-22 | End: 2025-02-28 | Stop reason: HOSPADM

## 2025-02-21 RX ORDER — VANCOMYCIN HYDROCHLORIDE 1.25 G/250ML
1250 INJECTION, SOLUTION INTRAVITREAL EVERY 12 HOURS
Status: DISCONTINUED | OUTPATIENT
Start: 2025-02-22 | End: 2025-02-23

## 2025-02-21 RX ORDER — VANCOMYCIN HYDROCHLORIDE 1 G/20ML
INJECTION, POWDER, LYOPHILIZED, FOR SOLUTION INTRAVENOUS DAILY PRN
Status: DISCONTINUED | OUTPATIENT
Start: 2025-02-21 | End: 2025-02-24

## 2025-02-21 RX ADMIN — GADOTERATE MEGLUMINE 18 ML: 376.9 INJECTION INTRAVENOUS at 18:43

## 2025-02-21 RX ADMIN — Medication 1750 MG: at 16:39

## 2025-02-21 RX ADMIN — ENOXAPARIN SODIUM 40 MG: 100 INJECTION SUBCUTANEOUS at 19:44

## 2025-02-21 RX ADMIN — PIPERACILLIN SODIUM AND TAZOBACTAM SODIUM 3.38 G: 3; .375 INJECTION, SOLUTION INTRAVENOUS at 23:07

## 2025-02-21 RX ADMIN — SODIUM CHLORIDE, POTASSIUM CHLORIDE, SODIUM LACTATE AND CALCIUM CHLORIDE 1000 ML: 600; 310; 30; 20 INJECTION, SOLUTION INTRAVENOUS at 16:27

## 2025-02-21 RX ADMIN — PIPERACILLIN SODIUM AND TAZOBACTAM SODIUM 3.38 G: 3; .375 INJECTION, SOLUTION INTRAVENOUS at 15:18

## 2025-02-21 SDOH — ECONOMIC STABILITY: INCOME INSECURITY: IN THE PAST 12 MONTHS HAS THE ELECTRIC, GAS, OIL, OR WATER COMPANY THREATENED TO SHUT OFF SERVICES IN YOUR HOME?: NO

## 2025-02-21 SDOH — SOCIAL STABILITY: SOCIAL INSECURITY: ARE THERE ANY APPARENT SIGNS OF INJURIES/BEHAVIORS THAT COULD BE RELATED TO ABUSE/NEGLECT?: NO

## 2025-02-21 SDOH — SOCIAL STABILITY: SOCIAL INSECURITY: WITHIN THE LAST YEAR, HAVE YOU BEEN AFRAID OF YOUR PARTNER OR EX-PARTNER?: NO

## 2025-02-21 SDOH — SOCIAL STABILITY: SOCIAL INSECURITY: WERE YOU ABLE TO COMPLETE ALL THE BEHAVIORAL HEALTH SCREENINGS?: YES

## 2025-02-21 SDOH — ECONOMIC STABILITY: FOOD INSECURITY: WITHIN THE PAST 12 MONTHS, THE FOOD YOU BOUGHT JUST DIDN'T LAST AND YOU DIDN'T HAVE MONEY TO GET MORE.: NEVER TRUE

## 2025-02-21 SDOH — SOCIAL STABILITY: SOCIAL INSECURITY: HAVE YOU HAD ANY THOUGHTS OF HARMING ANYONE ELSE?: NO

## 2025-02-21 SDOH — ECONOMIC STABILITY: FOOD INSECURITY: WITHIN THE PAST 12 MONTHS, YOU WORRIED THAT YOUR FOOD WOULD RUN OUT BEFORE YOU GOT THE MONEY TO BUY MORE.: NEVER TRUE

## 2025-02-21 SDOH — SOCIAL STABILITY: SOCIAL INSECURITY: ABUSE: ADULT

## 2025-02-21 SDOH — SOCIAL STABILITY: SOCIAL INSECURITY: DO YOU FEEL ANYONE HAS EXPLOITED OR TAKEN ADVANTAGE OF YOU FINANCIALLY OR OF YOUR PERSONAL PROPERTY?: NO

## 2025-02-21 SDOH — SOCIAL STABILITY: SOCIAL INSECURITY: WITHIN THE LAST YEAR, HAVE YOU BEEN HUMILIATED OR EMOTIONALLY ABUSED IN OTHER WAYS BY YOUR PARTNER OR EX-PARTNER?: NO

## 2025-02-21 SDOH — SOCIAL STABILITY: SOCIAL INSECURITY: HAS ANYONE EVER THREATENED TO HURT YOUR FAMILY OR YOUR PETS?: NO

## 2025-02-21 SDOH — SOCIAL STABILITY: SOCIAL INSECURITY: HAVE YOU HAD THOUGHTS OF HARMING ANYONE ELSE?: NO

## 2025-02-21 SDOH — SOCIAL STABILITY: SOCIAL INSECURITY: ARE YOU OR HAVE YOU BEEN THREATENED OR ABUSED PHYSICALLY, EMOTIONALLY, OR SEXUALLY BY ANYONE?: NO

## 2025-02-21 SDOH — SOCIAL STABILITY: SOCIAL INSECURITY: DO YOU FEEL UNSAFE GOING BACK TO THE PLACE WHERE YOU ARE LIVING?: NO

## 2025-02-21 SDOH — SOCIAL STABILITY: SOCIAL INSECURITY: DOES ANYONE TRY TO KEEP YOU FROM HAVING/CONTACTING OTHER FRIENDS OR DOING THINGS OUTSIDE YOUR HOME?: NO

## 2025-02-21 ASSESSMENT — ACTIVITIES OF DAILY LIVING (ADL)
DRESSING YOURSELF: NEEDS ASSISTANCE
GROOMING: NEEDS ASSISTANCE
FEEDING YOURSELF: NEEDS ASSISTANCE
LACK_OF_TRANSPORTATION: NO
PATIENT'S MEMORY ADEQUATE TO SAFELY COMPLETE DAILY ACTIVITIES?: YES
BATHING: NEEDS ASSISTANCE
ASSISTIVE_DEVICE: WALKER
HEARING - LEFT EAR: FUNCTIONAL
WALKS IN HOME: NEEDS ASSISTANCE
ADEQUATE_TO_COMPLETE_ADL: YES
JUDGMENT_ADEQUATE_SAFELY_COMPLETE_DAILY_ACTIVITIES: YES
TOILETING: NEEDS ASSISTANCE
HEARING - RIGHT EAR: FUNCTIONAL

## 2025-02-21 ASSESSMENT — LIFESTYLE VARIABLES
EVER HAD A DRINK FIRST THING IN THE MORNING TO STEADY YOUR NERVES TO GET RID OF A HANGOVER: NO
AUDIT-C TOTAL SCORE: 0
HAVE YOU EVER FELT YOU SHOULD CUT DOWN ON YOUR DRINKING: NO
EVER FELT BAD OR GUILTY ABOUT YOUR DRINKING: NO
HOW OFTEN DO YOU HAVE A DRINK CONTAINING ALCOHOL: NEVER
HOW OFTEN DO YOU HAVE 6 OR MORE DRINKS ON ONE OCCASION: NEVER
AUDIT-C TOTAL SCORE: 0
HOW MANY STANDARD DRINKS CONTAINING ALCOHOL DO YOU HAVE ON A TYPICAL DAY: PATIENT DOES NOT DRINK
HAVE PEOPLE ANNOYED YOU BY CRITICIZING YOUR DRINKING: NO
SKIP TO QUESTIONS 9-10: 1
TOTAL SCORE: 0

## 2025-02-21 ASSESSMENT — ENCOUNTER SYMPTOMS
CONSTIPATION: 0
DYSURIA: 0
CHILLS: 0
SHORTNESS OF BREATH: 0
DIFFICULTY URINATING: 0
AGITATION: 0
SORE THROAT: 0
NECK STIFFNESS: 1
PALPITATIONS: 0
WOUND: 1
TROUBLE SWALLOWING: 0
CONFUSION: 0
CHEST TIGHTNESS: 0
FEVER: 0
DIARRHEA: 0
WEAKNESS: 1
ABDOMINAL PAIN: 0
BACK PAIN: 1
DIZZINESS: 0

## 2025-02-21 ASSESSMENT — PAIN SCALES - GENERAL
PAINLEVEL_OUTOF10: 5 - MODERATE PAIN
PAINLEVEL_OUTOF10: 0 - NO PAIN

## 2025-02-21 ASSESSMENT — COLUMBIA-SUICIDE SEVERITY RATING SCALE - C-SSRS
2. HAVE YOU ACTUALLY HAD ANY THOUGHTS OF KILLING YOURSELF?: NO
1. IN THE PAST MONTH, HAVE YOU WISHED YOU WERE DEAD OR WISHED YOU COULD GO TO SLEEP AND NOT WAKE UP?: NO
6. HAVE YOU EVER DONE ANYTHING, STARTED TO DO ANYTHING, OR PREPARED TO DO ANYTHING TO END YOUR LIFE?: NO

## 2025-02-21 ASSESSMENT — COGNITIVE AND FUNCTIONAL STATUS - GENERAL
PATIENT BASELINE BEDBOUND: NO
EATING MEALS: A LOT
HELP NEEDED FOR BATHING: TOTAL
MOVING FROM LYING ON BACK TO SITTING ON SIDE OF FLAT BED WITH BEDRAILS: A LITTLE
DAILY ACTIVITIY SCORE: 10
WALKING IN HOSPITAL ROOM: TOTAL
STANDING UP FROM CHAIR USING ARMS: A LOT
MOBILITY SCORE: 11
TOILETING: A LOT
DRESSING REGULAR LOWER BODY CLOTHING: TOTAL
TURNING FROM BACK TO SIDE WHILE IN FLAT BAD: A LOT
PERSONAL GROOMING: A LOT
CLIMB 3 TO 5 STEPS WITH RAILING: TOTAL
MOVING TO AND FROM BED TO CHAIR: A LOT
DRESSING REGULAR UPPER BODY CLOTHING: A LOT

## 2025-02-21 ASSESSMENT — PAIN DESCRIPTION - LOCATION: LOCATION: BACK

## 2025-02-21 ASSESSMENT — PAIN - FUNCTIONAL ASSESSMENT
PAIN_FUNCTIONAL_ASSESSMENT: 0-10
PAIN_FUNCTIONAL_ASSESSMENT: 0-10

## 2025-02-21 ASSESSMENT — PATIENT HEALTH QUESTIONNAIRE - PHQ9
2. FEELING DOWN, DEPRESSED OR HOPELESS: NOT AT ALL
SUM OF ALL RESPONSES TO PHQ9 QUESTIONS 1 & 2: 0
1. LITTLE INTEREST OR PLEASURE IN DOING THINGS: NOT AT ALL

## 2025-02-21 ASSESSMENT — PAIN DESCRIPTION - PAIN TYPE: TYPE: ACUTE PAIN

## 2025-02-21 NOTE — PROGRESS NOTES
Advanced Practice Admission Note    History Of Present Illness  Sebas Soto is a 84 y.o. male with history significant for hyperlipidemia, prostate cancer, cervical spondylosis, macular degeneration presenting to [facility] on 2/21/2025 from [facility] with drainage from surgical site (L2-L5).  Patient tells me he had surgery 3 weeks ago with Dr. Qureshi, went to SNF postoperatively.  Patient was noted to have drainage from surgical site at SNF, subsequently came to emergency department.  Per ED report, pt has been very weak postoperatively requiring Julia lift to get out of bed. On exam, patient with generalized weakness, unable to pick legs off bed, left arm weaker than right; pt states limited ROM of neck which is ongoing since childhood.  Patient denies sweats or chills, dizziness, vision changes, chest pain, palpitations, shortness of breath, nausea/vomiting/diarrhea or constipation.  Patient states urinary urgency.  Tells me he does have some tenderness in his low back at incision site.    Of note patient tells me during last hospitalization he had hallucinations.    Labs done today revealed sodium 137, potassium 4.3, chloride 103, bicarb 26, anion gap 12, BUN 18, creatinine 0.62, calcium 8.8, glucose 97, lactic 1.5, CRP 4.88, WBC 10.4, hemoglobin 11.7, hematocrit 37, platelets 327.  Blood culture and wound culture were sent in the emergency department.  Urine culture is ordered for urinary urgency.      Past Medical History  Past Medical History:   Diagnosis Date    Abnormal ECG     Arthritis     Bilateral lower extremity edema     Diverticulosis     Hyperlipidemia     Neurogenic claudication     Personal history of other diseases of the digestive system     History of diverticulitis    Personal history of other infectious and parasitic diseases     History of herpes zoster    Prostate cancer (Multi)     Short-term memory loss     Spondylolisthesis        Surgical History  Past Surgical History:    Procedure Laterality Date    COLONOSCOPY  07/30/2018    Colonoscopy (Fiberoptic)    HERNIA REPAIR  07/30/2018    Hernia Repair-x3    NECK SURGERY  2007    TONSILLECTOMY  07/30/2018    Tonsillectomy        Social History  He reports that he has quit smoking. His smoking use included cigarettes. He has never been exposed to tobacco smoke. He has never used smokeless tobacco. He reports that he does not drink alcohol and does not use drugs.    Family History  Family History   Problem Relation Name Age of Onset    Alzheimer's disease Mother      Anemia Mother      Breast cancer Sister      Stroke Sister          Allergies  Patient has no known allergies.    Review of Systems   Constitutional:  Negative for chills and fever.   HENT:  Negative for sore throat and trouble swallowing.    Respiratory:  Negative for chest tightness and shortness of breath.    Cardiovascular:  Negative for chest pain and palpitations.   Gastrointestinal:  Negative for abdominal pain, constipation and diarrhea.   Genitourinary:  Positive for urgency. Negative for difficulty urinating and dysuria.   Musculoskeletal:  Positive for back pain and neck stiffness.   Skin:  Positive for wound.   Neurological:  Positive for weakness. Negative for dizziness.   Psychiatric/Behavioral:  Negative for agitation and confusion.         Physical Exam  Constitutional:       Appearance: Normal appearance.   HENT:      Head: Normocephalic.      Nose: Nose normal.      Mouth/Throat:      Mouth: Mucous membranes are dry.   Eyes:      Extraocular Movements: Extraocular movements intact.   Neck:      Comments: Limited range of motion of neck, patient states this is chronic since childhood  Cardiovascular:      Rate and Rhythm: Normal rate and regular rhythm.      Pulses: Normal pulses.      Heart sounds: Normal heart sounds.   Pulmonary:      Effort: Pulmonary effort is normal.      Breath sounds: Normal breath sounds.   Abdominal:      General: Bowel sounds are  "normal.      Palpations: Abdomen is soft.   Musculoskeletal:         General: Swelling present. No tenderness.      Comments: Generalized weakness  Strength:  Left arm 2-3 out of 5  Right arm 4 out of 5  Bilateral lower extremities, patient unable to pick legs off bed   Skin:     General: Skin is warm and dry.      Capillary Refill: Capillary refill takes less than 2 seconds.      Comments: Lumbar incision with malodorous drainage  Small open area   Neurological:      General: No focal deficit present.      Mental Status: He is alert and oriented to person, place, and time.   Psychiatric:         Mood and Affect: Mood normal.         Behavior: Behavior normal.          Last Recorded Vitals  Visit Vitals  /66   Pulse 80   Temp 36.2 °C (97.2 °F) (Temporal)   Resp 15   Ht 1.727 m (5' 8\")   Wt 90.7 kg (200 lb)   SpO2 97%   BMI 30.41 kg/m²   Smoking Status Former   BSA 2.09 m²        Relevant Results  Results for orders placed or performed during the hospital encounter of 02/21/25 (from the past 24 hours)   CBC and Auto Differential   Result Value Ref Range    WBC 10.4 4.4 - 11.3 x10*3/uL    nRBC 0.0 0.0 - 0.0 /100 WBCs    RBC 3.75 (L) 4.50 - 5.90 x10*6/uL    Hemoglobin 11.7 (L) 13.5 - 17.5 g/dL    Hematocrit 37.0 (L) 41.0 - 52.0 %    MCV 99 80 - 100 fL    MCH 31.2 26.0 - 34.0 pg    MCHC 31.6 (L) 32.0 - 36.0 g/dL    RDW 16.9 (H) 11.5 - 14.5 %    Platelets 327 150 - 450 x10*3/uL    Neutrophils % 65.6 40.0 - 80.0 %    Immature Granulocytes %, Automated 0.6 0.0 - 0.9 %    Lymphocytes % 16.2 13.0 - 44.0 %    Monocytes % 14.8 2.0 - 10.0 %    Eosinophils % 2.5 0.0 - 6.0 %    Basophils % 0.3 0.0 - 2.0 %    Neutrophils Absolute 6.86 (H) 1.60 - 5.50 x10*3/uL    Immature Granulocytes Absolute, Automated 0.06 0.00 - 0.50 x10*3/uL    Lymphocytes Absolute 1.69 0.80 - 3.00 x10*3/uL    Monocytes Absolute 1.54 (H) 0.05 - 0.80 x10*3/uL    Eosinophils Absolute 0.26 0.00 - 0.40 x10*3/uL    Basophils Absolute 0.03 0.00 - 0.10 x10*3/uL "   Comprehensive metabolic panel   Result Value Ref Range    Glucose 97 74 - 99 mg/dL    Sodium 137 136 - 145 mmol/L    Potassium 4.3 3.5 - 5.3 mmol/L    Chloride 103 98 - 107 mmol/L    Bicarbonate 26 21 - 32 mmol/L    Anion Gap 12 10 - 20 mmol/L    Urea Nitrogen 18 6 - 23 mg/dL    Creatinine 0.62 0.50 - 1.30 mg/dL    eGFR >90 >60 mL/min/1.73m*2    Calcium 8.8 8.6 - 10.3 mg/dL    Albumin 3.7 3.4 - 5.0 g/dL    Alkaline Phosphatase 99 33 - 136 U/L    Total Protein 6.8 6.4 - 8.2 g/dL    AST 44 (H) 9 - 39 U/L    Bilirubin, Total 0.6 0.0 - 1.2 mg/dL     (H) 10 - 52 U/L   C-reactive protein   Result Value Ref Range    C-Reactive Protein 4.88 (H) <1.00 mg/dL   Sedimentation rate, automated   Result Value Ref Range    Sedimentation Rate 30 (H) 0 - 20 mm/h   Lactate   Result Value Ref Range    Lactate 1.5 0.4 - 2.0 mmol/L   Light Blue Top   Result Value Ref Range    Extra Tube Hold for add-ons.    Lavender Top   Result Value Ref Range    Extra Tube Hold for add-ons.    PST Top   Result Value Ref Range    Extra Tube Hold for add-ons.         Home Medications  Prior to Admission medications    Medication Sig Start Date End Date Taking? Authorizing Provider   atorvastatin (Lipitor) 10 mg tablet Take 1 tablet (10 mg) by mouth once daily.   Yes Historical Provider, MD   metoprolol succinate XL (Toprol-XL) 25 mg 24 hr tablet Take 1 tablet (25 mg) by mouth once daily.   Yes Historical Provider, MD   acetaminophen (Tylenol) 325 mg tablet Take 2 tablets (650 mg) by mouth every 6 hours if needed for mild pain (1 - 3). 2/4/25 3/6/25  Beau Agarwal PA-C   chlorhexidine (Peridex) 0.12 % solution 15 milliliter(s) orally once a day for 2 doses 15 ml  the night before surgery and 15 ml morning of surgery - swish for 30 seconds -DO NOT SWALLOW, SPIT OUT 1/21/25   Lyndsey Reyes, APRN-CNP       Medications  Scheduled medications  enoxaparin, 40 mg, subcutaneous, q24h  piperacillin-tazobactam, 3.375 g, intravenous, q8h  polyethylene  glycol, 17 g, oral, Daily  vancomycin, 1,750 mg, intravenous, Once  [START ON 2/22/2025] vancomycin, 1,250 mg, intravenous, q12h      Continuous medications     PRN medications  HYDROmorphone, 0.25 mg, Once PRN  vancomycin, , Daily PRN        Imaging  ECG 12 Lead    Result Date: 2/3/2025  Sinus tachycardia Left axis deviation Left ventricular hypertrophy with repolarization abnormality Abnormal ECG When compared with ECG of 11-OCT-2024 11:18, Minimal criteria for Septal infarct are no longer Present    FL fluoro images no charge    Result Date: 1/31/2025  These images are not reportable by radiology and will not be interpreted by  Radiologists.         Assessment/Plan   Assessment & Plan  Postoperative infection, unspecified type, initial encounter  Neurosurgery is consulted  MRI lumbar spine with and without contrast  Wound care is consulted  Continue vancomycin and Zosyn  Wound and blood cultures ordered  PT and OT eval & treatment for generalized weakness  Postop pain: prn tylenol  Continue home medication regimen as appropriate  UA with reflex culture for urinary urgency  Pt with hx of hallucinations with hospitalization; obtain baseline EKG (baseline qtc)      VTE prophylaxis:   DVT prophylaxis: subcutaneous Lovenox    Code Status  Full code    I spent 55 minutes in the professional and overall care of this patient.      Admission history and physical to follow by attending physician. Plan to resume home medications as appropriate when list is available from pharmacy, see orders for additional plan elements, management deferred to attending and consult physicians.      Lore Kruger, [facility]  Medical [place] [phone]

## 2025-02-21 NOTE — ED PROVIDER NOTES
Emergency Department Provider Note        History of Present Illness     History provided by: Patient  Limitations to History: None  External Records Reviewed with Brief Summary:  Colette Zhang' neurosurgery NP's note, previous records, discharge summary    HPI:  Sebas Soto is a 84 y.o. male who presents today for possible postoperative infection, patient had a L2 L5 laminectomy with TLIF of L4-L5 with Dr. Qureshi on 1/31/25 was discharged earlier this month to a rehab facility, [facility]  She states that a nurse there noticed that there was some dehiscence of the wound about a week ago, this was the first time that they realized something might be wrong.  Patient has not been progressing as normal, has not yet ambulated, they are still having to use a Julia lift per neurosurgery notes.  He states he has been working on his physical therapy.  When he was seen today for his first appointment since the wound dehisced, Colette Reyes, NP became concerned for possible postoperative infection.  Patient states that he has baseline incontinence from previous prostate cancer, but states that that is unchanged, no new saddle paresthesias, he does endorse some weakness mostly in his left leg but that is from previous to surgery.  Denies any numbness or tingling.  He does endorse that he has had chills at times but no documented fevers.    Physical Exam   Triage vitals:  T 36.2 °C (97.2 °F)  HR 77  /58  RR 18  O2 99 % None (Room air)    Physical Exam  Vitals and nursing note reviewed.   Constitutional:       General: He is not in acute distress.     Appearance: Normal appearance. He is not toxic-appearing.   HENT:      Head: Normocephalic and atraumatic.      Nose: Nose normal.   Eyes:      Extraocular Movements: Extraocular movements intact.   Cardiovascular:      Rate and Rhythm: Normal rate and regular rhythm.   Pulmonary:       Effort: Pulmonary effort is normal.   Abdominal:      Palpations: Abdomen is soft.   Musculoskeletal:         General: Normal range of motion.      Cervical back: Normal range of motion and neck supple.      Comments: Lower lumbar incision is noted to have areas of erythema with central whitish discoloration and drainage on bandage, appears to track relatively deep, wound culture obtained.    Patient has difficulty with lifting either leg off the bed but states that this is chronic in his left leg is worse even prior to surgery, equal dorsal and plantarflexion, sensation intact and equal.   Skin:     General: Skin is warm and dry.   Neurological:      General: No focal deficit present.      Mental Status: He is alert.   Psychiatric:         Mood and Affect: Mood normal.         Thought Content: Thought content normal.          Medical Decision Making & ED Course   Medical Decision Makin y.o. male presents today for evaluation of a postoperative infection, see above HPI and physical exam.  I did speak with Colette Zhang via epic chat, she recommends an MRI with and without contrast, admission to medicine, wound care consult inpatient. I also ordered blood cultures, lactate, wound culture labs and inflammatory markers given that patient's blood pressure is 109/58, he is not sure whether this is chronic for him, I reviewed his previous records and his systolics have been in the 140s before, he was ordered a liter of fluids initially.  Also discussed case with pharmacy who recommended vancomycin and Zosyn for broad coverage.  With 1 L of fluids patient's blood pressure significantly improved to the 120s to 140s, his CRP and ESR both elevated, lactate normal, no leukocytosis, CMP unremarkable, wound culture was obtained to be sent as well as blood cultures.  MRI was ordered per neurosurgery recommendations, patient will be admitted to medicine for further evaluation and management.  ----      Differential diagnoses  considered include but are not limited to: Sepsis, bacteremia, postoperative wound infection, cauda equina, abscess, etc.     Social Determinants of Health which Significantly Impact Care: None identified     EKG Independent Interpretation: EKG not obtained    Independent Result Review and Interpretation: Relevant laboratory and radiographic results were reviewed and independently interpreted by myself.  As necessary, they are commented on in the ED Course.    Chronic conditions affecting the patient's care: As documented above in [facility]    The patient was discussed with the following consultants/services: Colette with NSG, admitting DR Simeon    Care Considerations: As documented above in [facility]    ED Course:  ED Course as of 02/21/25 1818 Fri Feb 21, 2025   1451 Spoke with Colette Zhang, neurosurgery who recommended MRI with and without contrast [MC]      ED Course User Index  [MC] Jen Luna PA-C         Diagnoses as of 02/21/25 1818   Postoperative infection, unspecified type, initial encounter     Disposition   As a result of their workup, the patient will require admission to the hospital.  The patient was informed of his diagnosis.  The patient was given the opportunity to ask questions and I answered them. The patient agreed to be admitted to the hospital.    Procedures   Procedures    This was a shared visit with an ED attending.  The patient was seen and discussed with the ED attending    Jen Luna PA-C  Emergency Medicine       Jen Luna PA-C  02/21/25 1818

## 2025-02-21 NOTE — PROGRESS NOTES
[facility] [place]  Department of Neurological Surgery  Post Operative Patient Visit      History of Present Illness:  Sebas Soto is a 84 y.o.  male who presents to the spine clinic in a post operative visit.  He underwent L2-L5 laminectomies & L4-5 TLIF at [facility] by Dr. Philippe Qureshi on 1/31/25. He was discharged to [facility] on 2/6/25, where he remains.    Since surgery, he has been attempting to do therapy at rehab but it has been a slow process. He initially was having hallucinations upon admission to the rehab facility so that slowed his progress. He is still requiring the use of a liz lift. He is not really walking at this point. He reports pain is tolerable. He has been having a lot of incisional drainage. He denies known fevers.     On exam:  A&O x 3, speech clear / fluent  Respirations even / unlabored  BUE 5/5  BL DF 4/5, PF 5/5, KE 4-/5, HF 3/5  SILT  SURGICAL INCISION: erythema. Areas of slough. There are open areas of the incision distally. Purulent and malodorous drainage noted from the incision. (Picture is uploaded in media)  He is in a wheelchair.    Sebas Soto was seen in a post-operative follow up visit today. Unfortunately he is going to be directed to the ER for further evaluation for non-healing wound and to rule out infectious process. He may need I&D of the incision pending further workup. Will reach out to on call practice physician with an update. Patient and his wife understand and are agreeable to plan.     The above clinical summary has been dictated with voice recognition software. It has not been proofread for grammatical errors, typographical mistakes, or other semantic inconsistencies.     Thank you for visiting our office today. It was our pleasure to take part in your healthcare.      Do not hesitate to call with any questions regarding your plan of care after leaving at [phone] M-F 8am-4pm.      To clinicians, thank you very  much for this kind referral. It is a privilege to partner with you in the care of your patients. My office would be delighted to assist you with any further consultations or with questions regarding the plan of care outlined. Do not hesitate to call the office or contact me directly.         Sincerely,          Colette BRAVO[facility]  [address] [address]  [address] [place] [id number]    [address] [place] [address]  [address] [place] [id number]     Phone: [phone]  Fax: [phone]

## 2025-02-21 NOTE — CONSULTS
Vancomycin Dosing by Pharmacy- INITIAL    Sebas Soto is a 84 y.o. year old male who Pharmacy has been consulted for vancomycin dosing for cellulitis, skin and soft tissue. Based on the patient's indication and renal status this patient will be dosed based on a goal AUC of 400-600.     Renal function is currently stable.    Visit Vitals  /70   Pulse 68   Temp 36.2 °C (97.2 °F) (Temporal)   Resp 16        Lab Results   Component Value Date    CREATININE 0.62 2025    CREATININE 0.59 2025    CREATININE 0.71 2025    CREATININE 0.69 2025        Patient weight is as follows:   Vitals:    25 1355   Weight: 90.7 kg (200 lb)       Cultures:  No results found for the encounter in last 14 days.        No intake/output data recorded.  I/O during current shift:  No intake/output data recorded.    Temp (24hrs), Av.2 °C (97.2 °F), Min:36.2 °C (97.2 °F), Max:36.2 °C (97.2 °F)         Assessment/Plan     Patient has already been given a loading dose of 1750 mg.  Will initiate vancomycin maintenance,  1250 mg every 12 hours.    This dosing regimen is predicted by [employer] to result in the following pharmacokinetic parameters:  Loading dose:1750mg  Regimen: 1250 mg IV every 12 hours.  Start time: 04:39 on 2025  Exposure target: AUC24 (range)400-600 mg/L.hr   JOH35-02: 510 mg/L.hr  AUC24,ss: 564 mg/L.hr  Probability of AUC24 > 400: 83 %  Ctrough,ss: 17.8 mg/L  Probability of Ctrough,ss > 20: 41 %      Follow-up level will be ordered on  at 0500 unless clinically indicated sooner.  Will continue to monitor renal function daily while on vancomycin and order serum creatinine at least every 48 hours if not already ordered.  Follow for continued vancomycin needs, clinical response, and signs/symptoms of toxicity.       Daljit Mathews, PharmD

## 2025-02-21 NOTE — ASSESSMENT & PLAN NOTE
Neurosurgery is consulted  MRI lumbar spine with and without contrast  Wound care is consulted  Continue vancomycin and Zosyn  Wound and blood cultures ordered  PT and OT eval & treatment for generalized weakness  Postop pain: prn tylenol  Continue home medication regimen as appropriate  UA with reflex culture for urinary urgency  Pt with hx of hallucinations with hospitalization; obtain baseline EKG (baseline qtc)

## 2025-02-21 NOTE — CONSULTS
"Vancomycin Dosing by Pharmacy - Emergency Department    Sebas Soto is a 84 y.o. male who pharmacy has been consulted for vancomycin one-time dosing for other post operative wound infection lumbar spine  by an Emergency Department (ED) provider.    CrCl cannot be calculated (Patient's most recent lab result is older than the maximum 3 days allowed.).    Blood pressure 109/58, pulse 77, temperature 36.2 °C (97.2 °F), temperature source Temporal, resp. rate 18, height 1.727 m (5' 8\"), weight 90.7 kg (200 lb), SpO2 99%.    Assessment/Plan:  Will initiate vancomycin ED dose, 1750 mg x 1 dose.  Pharmacy will now discontinue the one time dose consult. Maintenance dose and continuation of vancomycin to be determined by the admitting team. If vancomycin appropriate for continuation, another pharmacy to dose vancomycin consult will be placed at that time.      Thank you for allowing me to take part in the care of this patient. Please contact pharmacy with any questions.    Juliette Rockwell, PharmD  2/21/2025 2:53 PM          "

## 2025-02-22 ENCOUNTER — APPOINTMENT (OUTPATIENT)
Dept: RADIOLOGY | Facility: HOSPITAL | Age: 85
DRG: 862 | End: 2025-02-22
Payer: MEDICARE

## 2025-02-22 LAB
ALBUMIN SERPL BCP-MCNC: 3.1 G/DL (ref 3.4–5)
ALP SERPL-CCNC: 84 U/L (ref 33–136)
ALT SERPL W P-5'-P-CCNC: 76 U/L (ref 10–52)
ANION GAP SERPL CALC-SCNC: 11 MMOL/L (ref 10–20)
APPEARANCE UR: ABNORMAL
AST SERPL W P-5'-P-CCNC: 29 U/L (ref 9–39)
BILIRUB SERPL-MCNC: 0.6 MG/DL (ref 0–1.2)
BILIRUB UR STRIP.AUTO-MCNC: NEGATIVE MG/DL
BUN SERPL-MCNC: 15 MG/DL (ref 6–23)
CALCIUM SERPL-MCNC: 8.4 MG/DL (ref 8.6–10.3)
CHLORIDE SERPL-SCNC: 106 MMOL/L (ref 98–107)
CO2 SERPL-SCNC: 25 MMOL/L (ref 21–32)
COLOR UR: YELLOW
CREAT SERPL-MCNC: 0.6 MG/DL (ref 0.5–1.3)
EGFRCR SERPLBLD CKD-EPI 2021: >90 ML/MIN/1.73M*2
ERYTHROCYTE [DISTWIDTH] IN BLOOD BY AUTOMATED COUNT: 16.7 % (ref 11.5–14.5)
GLUCOSE SERPL-MCNC: 102 MG/DL (ref 74–99)
GLUCOSE UR STRIP.AUTO-MCNC: NORMAL MG/DL
HCT VFR BLD AUTO: 32.5 % (ref 41–52)
HGB BLD-MCNC: 10.1 G/DL (ref 13.5–17.5)
HOLD SPECIMEN: NORMAL
KETONES UR STRIP.AUTO-MCNC: ABNORMAL MG/DL
LEUKOCYTE ESTERASE UR QL STRIP.AUTO: ABNORMAL
MCH RBC QN AUTO: 30.6 PG (ref 26–34)
MCHC RBC AUTO-ENTMCNC: 31.1 G/DL (ref 32–36)
MCV RBC AUTO: 99 FL (ref 80–100)
NITRITE UR QL STRIP.AUTO: NEGATIVE
NRBC BLD-RTO: 0 /100 WBCS (ref 0–0)
PH UR STRIP.AUTO: 7 [PH]
PLATELET # BLD AUTO: 276 X10*3/UL (ref 150–450)
POTASSIUM SERPL-SCNC: 3.7 MMOL/L (ref 3.5–5.3)
PROT SERPL-MCNC: 5.6 G/DL (ref 6.4–8.2)
PROT UR STRIP.AUTO-MCNC: ABNORMAL MG/DL
RBC # BLD AUTO: 3.3 X10*6/UL (ref 4.5–5.9)
RBC # UR STRIP.AUTO: ABNORMAL MG/DL
RBC #/AREA URNS AUTO: >20 /HPF
SODIUM SERPL-SCNC: 138 MMOL/L (ref 136–145)
SP GR UR STRIP.AUTO: 1.03
TRANS CELLS #/AREA UR COMP ASSIST: ABNORMAL /HPF
UROBILINOGEN UR STRIP.AUTO-MCNC: NORMAL MG/DL
WBC # BLD AUTO: 7.8 X10*3/UL (ref 4.4–11.3)
WBC #/AREA URNS AUTO: >50 /HPF

## 2025-02-22 PROCEDURE — 72131 CT LUMBAR SPINE W/O DYE: CPT | Performed by: RADIOLOGY

## 2025-02-22 PROCEDURE — 2500000004 HC RX 250 GENERAL PHARMACY W/ HCPCS (ALT 636 FOR OP/ED): Performed by: PHYSICIAN ASSISTANT

## 2025-02-22 PROCEDURE — 81001 URINALYSIS AUTO W/SCOPE: CPT | Performed by: NURSE PRACTITIONER

## 2025-02-22 PROCEDURE — 72131 CT LUMBAR SPINE W/O DYE: CPT

## 2025-02-22 PROCEDURE — 1100000001 HC PRIVATE ROOM DAILY

## 2025-02-22 PROCEDURE — 36415 COLL VENOUS BLD VENIPUNCTURE: CPT | Performed by: PHYSICIAN ASSISTANT

## 2025-02-22 PROCEDURE — 85027 COMPLETE CBC AUTOMATED: CPT | Performed by: PHYSICIAN ASSISTANT

## 2025-02-22 PROCEDURE — 2500000001 HC RX 250 WO HCPCS SELF ADMINISTERED DRUGS (ALT 637 FOR MEDICARE OP): Performed by: PHYSICIAN ASSISTANT

## 2025-02-22 PROCEDURE — 80053 COMPREHEN METABOLIC PANEL: CPT | Performed by: PHYSICIAN ASSISTANT

## 2025-02-22 PROCEDURE — 87086 URINE CULTURE/COLONY COUNT: CPT | Mod: STJLAB | Performed by: NURSE PRACTITIONER

## 2025-02-22 RX ADMIN — VANCOMYCIN HYDROCHLORIDE 1250 MG: 1.25 INJECTION, SOLUTION INTRAVITREAL at 15:13

## 2025-02-22 RX ADMIN — METOPROLOL SUCCINATE 25 MG: 25 TABLET, EXTENDED RELEASE ORAL at 09:14

## 2025-02-22 RX ADMIN — POLYETHYLENE GLYCOL 3350 17 G: 17 POWDER, FOR SOLUTION ORAL at 09:14

## 2025-02-22 RX ADMIN — PIPERACILLIN SODIUM AND TAZOBACTAM SODIUM 3.38 G: 3; .375 INJECTION, SOLUTION INTRAVENOUS at 16:38

## 2025-02-22 RX ADMIN — ENOXAPARIN SODIUM 40 MG: 100 INJECTION SUBCUTANEOUS at 16:42

## 2025-02-22 RX ADMIN — VANCOMYCIN HYDROCHLORIDE 1250 MG: 1.25 INJECTION, SOLUTION INTRAVITREAL at 03:18

## 2025-02-22 RX ADMIN — PIPERACILLIN SODIUM AND TAZOBACTAM SODIUM 3.38 G: 3; .375 INJECTION, SOLUTION INTRAVENOUS at 06:47

## 2025-02-22 ASSESSMENT — COGNITIVE AND FUNCTIONAL STATUS - GENERAL
MOVING TO AND FROM BED TO CHAIR: A LOT
TURNING FROM BACK TO SIDE WHILE IN FLAT BAD: A LOT
EATING MEALS: A LOT
WALKING IN HOSPITAL ROOM: TOTAL
DRESSING REGULAR LOWER BODY CLOTHING: TOTAL
PERSONAL GROOMING: A LOT
MOBILITY SCORE: 11
TOILETING: A LOT
DRESSING REGULAR UPPER BODY CLOTHING: A LOT
DAILY ACTIVITIY SCORE: 10
CLIMB 3 TO 5 STEPS WITH RAILING: TOTAL
HELP NEEDED FOR BATHING: TOTAL
MOVING FROM LYING ON BACK TO SITTING ON SIDE OF FLAT BED WITH BEDRAILS: A LITTLE
STANDING UP FROM CHAIR USING ARMS: A LOT

## 2025-02-22 ASSESSMENT — PAIN SCALES - GENERAL
PAINLEVEL_OUTOF10: 0 - NO PAIN
PAINLEVEL_OUTOF10: 0 - NO PAIN

## 2025-02-22 ASSESSMENT — PAIN - FUNCTIONAL ASSESSMENT: PAIN_FUNCTIONAL_ASSESSMENT: 0-10

## 2025-02-22 NOTE — PROGRESS NOTES
Physical Therapy                 Therapy Communication Note    Patient Name: Sebas Soto  MRN: [id number]  Department: [facility]  Room: [facility]-A  Today's Date: 2/22/2025     Discipline: Physical Therapy    PT Missed Visit: Yes     Missed Visit Reason:  Patient pending surgical intervention for washout and reclosure due wound dehiscence and post op infection. Will re-attempt pending POC as medically appropriate    Missed Time: Attempt

## 2025-02-22 NOTE — CARE PLAN
The patient's goals for the shift include monitor back wound and antibiotic therapy      The clinical goals for the shift include see poc    Over the shift, the patient did not make progress toward the following goals. Barriers to progression include potential infection. Recommendations to address these barriers include antibiotics, collaborate with neurosurgery for plan.

## 2025-02-22 NOTE — PROGRESS NOTES
02/22/25 1541   Discharge Planning   Support Systems Spouse/significant other   Type of Residence Skilled nursing facility   Home or Post Acute Services Post acute facilities (Rehab/SNF/etc)   Type of Post Acute Facility Services Skilled nursing   Expected Discharge Disposition SNF     Met with the pt to introduce self and role. He states that he was recently at [facility] ([facility] His wife picked him up and brought him to the hospital. He was there receiving skilled care for wound care and therapy at the facility. He will likely need snf placement at d/c. Per nursing, pt will likely go to surgery for an open back wound. Neurosurgery is on consult. Will follow to assist with d/c planning as pt progresses.

## 2025-02-22 NOTE — CONSULTS
Reason For Consult  Wound dehiscence    History Of Present Illness  Sebas Soto is a 84 y.o. male presenting with wound dehiscence. Sebas has a history of L2-5 laminectomy with L4-5 TLIF with Dr. Qureshi on 1/31/25. His course was complicated by hallucinations and encephalopathy, there is no CSF leak documented in the operative report. Patient now presents for follow-up and was sent to ER from clinic for wound dehiscence and drainage. Patient this morning states he is feeling better after the antibiotics. He denies significant or worsening back pain, denies new weakness, numbness, fevers, chills.     Past Medical History  He has a past medical history of Abnormal ECG, Arthritis, Bilateral lower extremity edema, Diverticulosis, Hyperlipidemia, Neurogenic claudication, Personal history of other diseases of the digestive system, Personal history of other infectious and parasitic diseases, Prostate cancer (Multi), Short-term memory loss, and Spondylolisthesis.    Surgical History  He has a past surgical history that includes Hernia repair (07/30/2018); Colonoscopy (07/30/2018); Tonsillectomy (07/30/2018); and Neck surgery (2007).     Social History  He reports that he has quit smoking. His smoking use included cigarettes. He has never been exposed to tobacco smoke. He has never used smokeless tobacco. He reports that he does not drink alcohol and does not use drugs.    Family History  Family History   Problem Relation Name Age of Onset    Alzheimer's disease Mother      Anemia Mother      Breast cancer Sister      Stroke Sister          Allergies  Patient has no known allergies.     Physical Exam  Physical Exam  Constitutional:       Appearance: Normal appearance.   HENT:      Head: Normocephalic and atraumatic.      Mouth/Throat:      Mouth: Mucous membranes are moist.      Pharynx: Oropharynx is clear.   Eyes:      Conjunctiva/sclera: Conjunctivae normal.      Pupils: Pupils are equal, round, and reactive to  "light.   Pulmonary:      Effort: Pulmonary effort is normal.   Abdominal:      General: Abdomen is flat.      Palpations: Abdomen is soft.   Skin:     General: Skin is warm and dry.   Neurological:      Mental Status: He is alert.      Comments: Alert, appropriate, Ox3  RUE D4+ B/T5 HG5 IO4  LUE D1 (chronic) B/T 5 HG5 IO4  BLE HF4+ KE4+ DF/PF 5  Back incision with areas of dehiscence with serous drainage           Last Recorded Vitals  Blood pressure 111/57, pulse 77, temperature 35.6 °C (96.1 °F), temperature source Temporal, resp. rate 18, height 1.727 m (5' 8\"), weight 83.6 kg (184 lb 4.9 oz), SpO2 99%.    Relevant Results  MR lumbar spine w and wo IV contrast    Result Date: 2/21/2025  Interpreted By:  Jericho Mariano, STUDY: MR LUMBAR SPINE W AND WO IV CONTRAST;  2/21/2025 6:43 pm   INDICATION: Signs/Symptoms:post operative infection.     COMPARISON: MRI lumbar spine June 21, 2024. CT cervical spine June 21, 2024 and chest CT August 8, 2013 are also reviewed with respect to the spine.   ACCESSION NUMBER(S): [id number]   ORDERING CLINICIAN: OLYA RODRIGUEZ   TECHNIQUE: Multiplanar, multisequence MR imaging of the lumbar spine performed prior to and following administration of 18 ML Dotarem intravenous contrast.   FINDINGS: There are segmentation anomalies at cervical and thoracic levels demonstrated on the comparison studies. The labeling of the levels is somewhat arbitrary. For the sake of consistency, L5-S1 level we will again be defined as axial T2 series 8 image 43. Please note: This method of labeling levels assumes 5 lumbar vertebra and therefore T12 has a very hypoplastic right rib and an atretic left rib.   Alignment: There is again a slight retrolisthesis at L1-2 level. At L4-5 level, there was previouslyd 7 mm anterolisthesis. There is similar anterior subluxation at L4-5 level on the current study, although precise measurements are not possible due to the postoperative changes and a new mild " compression compression fracture deformity of the L5 superior endplate.   Vertebrae: Interval surgery with decompressive laminectomies at L3 and L4 levels, and interval bilateral foraminotomies at L4-5 level. Posterior fusion of L4 and L5 with bilateral pedicle screws and interval placement of interbody spacer within the disc space. This hardware appears to be normally positioned within the limitations of MR imaging.   The compression deformity of the L1 vertebral body is maturing as expected, with resolving bone marrow edema, now mild in degree. Mild enhancement along the endplate as expected for a subacute healing fracture. No evidence for discitis or osteomyelitis is seen.   There is a new mild deformity of the L5 posterior-superior vertebral endplate, with mild retropulsion or broad-based buckling of the endplate back into the canal by approximately 4 mm.   Discs: Postoperative changes within the L4-5 disc space, interval placement of interbody disc spacer which appears to be normally positioned. Disc elsewhere unchanged with similar multilevel disc desiccation, mild disc space narrowing and small desiccated disc bulges. No evidence for a new disc herniation.   Spinal cord: Signal within the spinal cord is normal. The conus medullaris terminates at L1 level..   Spinal canal: Interval decompression of the spinal canal at the levels being labeled as L3 and L4. No evidence for epidural hemorrhage, abscess or acute disc herniation. Fluid within the laminectomy site mildly bulges inward, contacting and slightly deforming the thecal sac but without compression of the cauda equina. The canal appears to be well decompressed, the degree of spinal canal narrowing markedly improved at L4-5 level. No evidence for subdural hygroma, or arachnoiditis.   Paraspinal soft tissues: Fluid within the laminectomy site as detailed in the report above, without significant mass effect upon the thecal sac.       1. Multiple cervical and  thoracic segmentation variations in the anatomy. Study again dictated assuming 5 lumbar vertebra, although T12 therefore has only a single and hypoplastic rib. 2. There are expected postoperative changes from recent decompressive lumbar laminectomies, L4-5 foraminotomies, and posterior fusion at L4-5 level. 3. There is a small amount of fluid within the laminectomy site. The imaging appearance looks fairly typical for expected postprocedural seroma. MR imaging can not determine whether or not this fluid is infected or sterile. That being said, there are no aggressive imaging features. 4. MR imaging suggests new mild deformity of the L5 superior vertebral endplate with mild retropulsion, although this does not narrow the thecal sac. A new mild compression fracture of this endplate, or bone erosion of the endplate due to osteomyelitis, are not excluded. However I do not see imaging features suggestive of discitis. 5. Evaluation of bones is inherently limited on MR imaging, and the recent postoperative changes further limit evaluation of the L5 vertebral endplate. Recommend CT imaging for better evaluation of the bones.   MACRO: None   Signed by: Jericho Mariano 2/21/2025 7:51 PM Dictation workstation:   [id number]       Assessment/Plan     Sebas Soto is a 84 y.o. male with a history of L2-5 laminectomy with L4-5 TLIF with Dr. Qureshi on 1/31/25 presenting with wound dehiscence and incisional drainage, also found to have UTI. Physical examination and MRI overall reassuring, but the dehiscence will require surgical intervention for washout and reclosure. Given the degree of tissue sloughing, will likely require plastics surgery comanagement.    Tissue culture pending  Agree with continued IV antibiotics  Recommend CT L spine to evaluate hardware (ordered today by neurosurgery)  Will plan for wound washout and revision, timing TBD next week      Lois Mijares MD

## 2025-02-22 NOTE — NURSING NOTE
2145 - Pt. Up to the floor from the ED.  Pt. Transferred to bed with one assist.  Pt. Denies pain.  Charge nurse, performed admission questioning.    Assessment performed.  Pt.repositions self in bed.  He is also getting tangled in the IV line.  Pt. Is alert to person, time and some situation.  He is disoriented to place at this time and believes he is a another facility.  He repeatedly refers to calling his wife.  Dressing changed to midline back.  Serosaginous drainage noted.  Wound is not approximated. Picture upload to media.  Stage 2 also noted at R buttock.  And blanchable redness noted at L elbow.  Pt. Denies pain.   External catheter placed.  Awaiting collecting a urine sample per order.      EOS note:  Pt. Rested intermittently during the night and wakes disoriented on where he is.  He did express still having visual hallucinations although he did not describe them in detail.  IV antibiotics given per order.  Redirected multiple times throughout this shfit. Pt. Incontinent of 2 soft BM's.  Pt. Unable to stand at bedside.  PT-OT consulted.  Falls precautions in place with alarm on/audible.

## 2025-02-22 NOTE — CARE PLAN
The patient's goals for the shift include  Maintain safety throughout this shift.     The clinical goals for the shift include Patient would like to be free of any acute S/S of infection r/t back surgery      Problem: Pain - Adult  Goal: Verbalizes/displays adequate comfort level or baseline comfort level  Outcome: Progressing     Problem: Safety - Adult  Goal: Free from fall injury  Outcome: Met     Problem: Discharge Planning  Goal: Discharge to home or other facility with appropriate resources  Outcome: Progressing     Problem: Chronic Conditions and Co-morbidities  Goal: Patient's chronic conditions and co-morbidity symptoms are monitored and maintained or improved  Outcome: Progressing     Problem: Nutrition  Goal: Nutrient intake appropriate for maintaining nutritional needs  Outcome: Progressing     Problem: Skin  Goal: Decreased wound size/increased tissue granulation at next dressing change  Outcome: Progressing  Goal: Participates in plan/prevention/treatment measures  Outcome: Met  Goal: Prevent/manage excess moisture  Outcome: Met  Goal: Prevent/minimize sheer/friction injuries  Outcome: Met  Goal: Promote/optimize nutrition  Outcome: Progressing  Goal: Promote skin healing  Outcome: Progressing

## 2025-02-23 LAB
ALBUMIN SERPL BCP-MCNC: 2.9 G/DL (ref 3.4–5)
ALP SERPL-CCNC: 80 U/L (ref 33–136)
ALT SERPL W P-5'-P-CCNC: 54 U/L (ref 10–52)
ANION GAP SERPL CALC-SCNC: 10 MMOL/L (ref 10–20)
AST SERPL W P-5'-P-CCNC: 21 U/L (ref 9–39)
BACTERIA UR CULT: NORMAL
BILIRUB SERPL-MCNC: 0.5 MG/DL (ref 0–1.2)
BUN SERPL-MCNC: 12 MG/DL (ref 6–23)
CALCIUM SERPL-MCNC: 8.4 MG/DL (ref 8.6–10.3)
CHLORIDE SERPL-SCNC: 107 MMOL/L (ref 98–107)
CO2 SERPL-SCNC: 27 MMOL/L (ref 21–32)
CREAT SERPL-MCNC: 0.64 MG/DL (ref 0.5–1.3)
EGFRCR SERPLBLD CKD-EPI 2021: >90 ML/MIN/1.73M*2
ERYTHROCYTE [DISTWIDTH] IN BLOOD BY AUTOMATED COUNT: 17 % (ref 11.5–14.5)
GLUCOSE SERPL-MCNC: 117 MG/DL (ref 74–99)
HCT VFR BLD AUTO: 30.3 % (ref 41–52)
HGB BLD-MCNC: 9.7 G/DL (ref 13.5–17.5)
MCH RBC QN AUTO: 31.5 PG (ref 26–34)
MCHC RBC AUTO-ENTMCNC: 32 G/DL (ref 32–36)
MCV RBC AUTO: 98 FL (ref 80–100)
NRBC BLD-RTO: 0 /100 WBCS (ref 0–0)
PLATELET # BLD AUTO: 236 X10*3/UL (ref 150–450)
POTASSIUM SERPL-SCNC: 3.8 MMOL/L (ref 3.5–5.3)
PROT SERPL-MCNC: 5.4 G/DL (ref 6.4–8.2)
RBC # BLD AUTO: 3.08 X10*6/UL (ref 4.5–5.9)
SODIUM SERPL-SCNC: 140 MMOL/L (ref 136–145)
VANCOMYCIN TROUGH SERPL-MCNC: 23.5 UG/ML (ref 5–20)
WBC # BLD AUTO: 7 X10*3/UL (ref 4.4–11.3)

## 2025-02-23 PROCEDURE — 2500000004 HC RX 250 GENERAL PHARMACY W/ HCPCS (ALT 636 FOR OP/ED): Performed by: PHYSICIAN ASSISTANT

## 2025-02-23 PROCEDURE — 80202 ASSAY OF VANCOMYCIN: CPT

## 2025-02-23 PROCEDURE — 1100000001 HC PRIVATE ROOM DAILY

## 2025-02-23 PROCEDURE — 84075 ASSAY ALKALINE PHOSPHATASE: CPT | Performed by: PHYSICIAN ASSISTANT

## 2025-02-23 PROCEDURE — 2500000002 HC RX 250 W HCPCS SELF ADMINISTERED DRUGS (ALT 637 FOR MEDICARE OP, ALT 636 FOR OP/ED): Performed by: NURSE PRACTITIONER

## 2025-02-23 PROCEDURE — 2500000004 HC RX 250 GENERAL PHARMACY W/ HCPCS (ALT 636 FOR OP/ED)

## 2025-02-23 PROCEDURE — 2500000001 HC RX 250 WO HCPCS SELF ADMINISTERED DRUGS (ALT 637 FOR MEDICARE OP): Performed by: PHYSICIAN ASSISTANT

## 2025-02-23 PROCEDURE — 36415 COLL VENOUS BLD VENIPUNCTURE: CPT | Performed by: PHYSICIAN ASSISTANT

## 2025-02-23 PROCEDURE — 85027 COMPLETE CBC AUTOMATED: CPT | Performed by: PHYSICIAN ASSISTANT

## 2025-02-23 RX ORDER — POTASSIUM CHLORIDE 20 MEQ/1
20 TABLET, EXTENDED RELEASE ORAL ONCE
Status: COMPLETED | OUTPATIENT
Start: 2025-02-23 | End: 2025-02-23

## 2025-02-23 RX ORDER — VANCOMYCIN HYDROCHLORIDE 1 G/200ML
1000 INJECTION, SOLUTION INTRAVENOUS EVERY 12 HOURS
Status: DISCONTINUED | OUTPATIENT
Start: 2025-02-23 | End: 2025-02-24

## 2025-02-23 RX ADMIN — POTASSIUM CHLORIDE 20 MEQ: 1500 TABLET, EXTENDED RELEASE ORAL at 12:25

## 2025-02-23 RX ADMIN — VANCOMYCIN HYDROCHLORIDE 1250 MG: 1.25 INJECTION, SOLUTION INTRAVITREAL at 04:10

## 2025-02-23 RX ADMIN — METOPROLOL SUCCINATE 25 MG: 25 TABLET, EXTENDED RELEASE ORAL at 09:24

## 2025-02-23 RX ADMIN — VANCOMYCIN HYDROCHLORIDE 1000 MG: 1 INJECTION, SOLUTION INTRAVENOUS at 14:57

## 2025-02-23 RX ADMIN — ENOXAPARIN SODIUM 40 MG: 100 INJECTION SUBCUTANEOUS at 17:45

## 2025-02-23 RX ADMIN — PIPERACILLIN SODIUM AND TAZOBACTAM SODIUM 3.38 G: 3; .375 INJECTION, SOLUTION INTRAVENOUS at 09:24

## 2025-02-23 RX ADMIN — PIPERACILLIN SODIUM AND TAZOBACTAM SODIUM 3.38 G: 3; .375 INJECTION, SOLUTION INTRAVENOUS at 00:22

## 2025-02-23 RX ADMIN — PIPERACILLIN SODIUM AND TAZOBACTAM SODIUM 3.38 G: 3; .375 INJECTION, SOLUTION INTRAVENOUS at 17:43

## 2025-02-23 ASSESSMENT — PAIN SCALES - GENERAL
PAINLEVEL_OUTOF10: 1
PAINLEVEL_OUTOF10: 0 - NO PAIN

## 2025-02-23 ASSESSMENT — COGNITIVE AND FUNCTIONAL STATUS - GENERAL
DRESSING REGULAR UPPER BODY CLOTHING: A LOT
MOVING FROM LYING ON BACK TO SITTING ON SIDE OF FLAT BED WITH BEDRAILS: A LITTLE
HELP NEEDED FOR BATHING: A LOT
TURNING FROM BACK TO SIDE WHILE IN FLAT BAD: A LITTLE
DAILY ACTIVITIY SCORE: 14
DRESSING REGULAR LOWER BODY CLOTHING: A LITTLE
MOBILITY SCORE: 10
CLIMB 3 TO 5 STEPS WITH RAILING: TOTAL
MOVING FROM LYING ON BACK TO SITTING ON SIDE OF FLAT BED WITH BEDRAILS: A LITTLE
CLIMB 3 TO 5 STEPS WITH RAILING: TOTAL
HELP NEEDED FOR BATHING: A LOT
STANDING UP FROM CHAIR USING ARMS: TOTAL
EATING MEALS: A LITTLE
TURNING FROM BACK TO SIDE WHILE IN FLAT BAD: A LITTLE
TOILETING: A LOT
WALKING IN HOSPITAL ROOM: TOTAL
DRESSING REGULAR UPPER BODY CLOTHING: A LOT
MOVING TO AND FROM BED TO CHAIR: TOTAL
EATING MEALS: A LITTLE
WALKING IN HOSPITAL ROOM: TOTAL
PERSONAL GROOMING: A LOT
MOBILITY SCORE: 10
PERSONAL GROOMING: A LOT
DRESSING REGULAR LOWER BODY CLOTHING: A LITTLE
TOILETING: A LOT
MOVING TO AND FROM BED TO CHAIR: TOTAL
STANDING UP FROM CHAIR USING ARMS: TOTAL
DAILY ACTIVITIY SCORE: 14

## 2025-02-23 ASSESSMENT — PAIN - FUNCTIONAL ASSESSMENT
PAIN_FUNCTIONAL_ASSESSMENT: 0-10

## 2025-02-23 NOTE — CARE PLAN
The patient's goals for the shift include neurosurgery consult and wound repair      The clinical goals for the shift include see poc    Over the shift, the patient did not make progress toward the following goals. Barriers to progression include forgetfulness, weakness. Recommendations to address these barriers include wound care and physical therapy.

## 2025-02-23 NOTE — PROGRESS NOTES
Vancomycin Dosing by Pharmacy- FOLLOW UP    Sebas Soto is a 84 y.o. year old male who Pharmacy has been consulted for vancomycin dosing for cellulitis, skin and soft tissue. Based on the patient's indication and renal status this patient is being dosed based on a goal AUC of 400-600.     Renal function is currently stable.    Current vancomycin dose: 1250 mg given every 12 hours    Most recent random level: 23.5 mcg/mL.  Predicted     Visit Vitals  /64 (BP Location: Left arm, Patient Position: Lying)   Pulse 71   Temp 35.6 °C (96.1 °F) (Temporal)   Resp 20        Lab Results   Component Value Date    CREATININE 0.64 2025    CREATININE 0.60 2025    CREATININE 0.62 2025    CREATININE 0.59 2025        Patient weight is as follows:   Vitals:    25 2140   Weight: 83.6 kg (184 lb 4.9 oz)       Cultures:  No results found for the encounter in last 14 days.       I/O last 3 completed shifts:  In: 1100 (13.2 mL/kg) [P.O.:100; IV Piggyback:1000]  Out: 1150 (13.8 mL/kg) [Urine:1150 (0.4 mL/kg/hr)]  Weight: 83.6 kg   I/O during current shift:  No intake/output data recorded.    Temp (24hrs), Av.3 °C (97.3 °F), Min:35.6 °C (96.1 °F), Max:36.6 °C (97.9 °F)      Assessment/Plan    Above goal AUC. Orders placed for new vancomcyin regimen of 1000 mg every 24 hours to begin at 1500.    This dosing regimen is predicted by [email or web address] to result in the following pharmacokinetic parameters:  Loading dose: N/A  Regimen: 1000 mg IV every 12 hours.  Start time: 16:10 on 2025  Exposure target: AUC24 (range)400-600 mg/L.hr   XCW90-70: 491 mg/L.hr  AUC24,ss: 488 mg/L.hr  Probability of AUC24 > 400: 84 %  Ctrough,ss: 15.5 mg/L  Probability of Ctrough,ss > 20: 21 %    The next level will be obtained on  at 0900. May be obtained sooner if clinically indicated.   Will continue to monitor renal function daily while on vancomycin and order serum creatinine at least every 48 hours if not  already ordered.  Follow for continued vancomycin needs, clinical response, and signs/symptoms of toxicity.       Man Espinosa, PharmD

## 2025-02-23 NOTE — PROGRESS NOTES
"Sebas Soto is a 84 y.o. male on day 2 of admission presenting with Postoperative infection, unspecified type, initial encounter.    Subjective   Patient doing well this AM, some hallucinations and confusion overnight. Patient denies pain this AM.       Objective     Alert, appropriate, Ox3  RUE D4+ B/T5 HG5 IO4  LUE D1 (chronic) B/T 5 HG5 IO4  BLE HF4+ KE4+ DF/PF 5  Back incision with areas of dehiscence with serous drainage        Last Recorded Vitals  Blood pressure 109/63, pulse 82, temperature 36.5 °C (97.7 °F), temperature source Temporal, resp. rate 18, height 1.727 m (5' 8\"), weight 83.6 kg (184 lb 4.9 oz), SpO2 94%.  Intake/Output last 3 Shifts:  I/O last 3 completed shifts:  In: 650 (7.8 mL/kg) [IV Piggyback:650]  Out: 650 (7.8 mL/kg) [Urine:650 (0.2 mL/kg/hr)]  Weight: 83.6 kg       Assessment/Plan   Assessment & Plan  Postoperative infection, unspecified type, initial encounter    Sebas Soto is a 84 y.o. male with a history of L2-5 laminectomy with L4-5 TLIF with Dr. Qureshi on 1/31/25 presenting with wound dehiscence and incisional drainage, also found to have UTI. Physical examination and MRI overall reassuring. CT L spine reviewed with no hardware complication, no deep dehiscence seen.    Continue broad spectrum antibiotics for postoperative infection and UTI  Follow-up superficial wound culture results  Will need ID comanagement for full antibiotic course pending culture  Continue dry dressing changes to lumbar spine incision TID  Definitive plan for lumbar spine wound to be made this week pending plastics surgery eval    Wife updated on neurosurgical plan of care this AM      Lois Mijares MD      "

## 2025-02-23 NOTE — NURSING NOTE
"EOS note: No acute changes this shift.  VSS.  Pt. Rested well tonight and was not restless as he was on admission 2/22.   He mentioned one particular hallucination where he expressed waking and seeing a \"hole in the floor\" where \"it goes down 5feet\".  No other hallucinations verbalized this shift.   Pt. Did attempt to sit at bedside without assistance once and needed redirected and assisted back to bed with fall precautions.  Pt. Denies pain.  Dressing to wound at mid back was saturated on assessment and changed after cleansed with NS.  Pt. Frequently asking questions about the plan of care and when we expect for him to be discharged.  Per neurosurgery, Intervention will take place after plastics eval case.  Continue dry dressing and changed PRN.    "

## 2025-02-23 NOTE — PROGRESS NOTES
Sebas Hardinashley  02/23/25  [id number]  Roya Simeon MD  This is a patient with a past medical history as detailed below admitted to   ED with chief complaint of postoperative infection, patient had a L2 L5 laminectomy with TLIF of L4-L5 with Dr. Qureshi on 1/31/25 was discharged earlier this month to a rehab facility, his wife states that used to be called [facility] but now is renamed [facility].  She states that a nurse there noticed that there was some dehiscence of the wound about a week ago, this was the first time that they realized something might be wrong.  Patient has not been progressing as normal, has not yet ambulated, they are still having to use a Julia lift per neurosurgery notes.  He states he has been working on his physical therapy.  When he was seen today for his first appointment since the wound dehisced, Colette Reyes NP became concerned for possible postoperative infection.  Patient states that he has baseline incontinence from previous prostate cancer, but states that that is unchanged, no new saddle paresthesias, he does endorse some weakness mostly in his left leg but that is from previous to surgery.     Patient evaluated by neurosurgery recommended plastic surgery consultation denies chest pain shortness of breath or abdominal pain no nausea no vomiting MRI was ordered    Assessment and plan   1-postop infection Neurosurgery is consulted patient is status post  L2-L5 laminectomies & L4-5 TLIF at [facility] by Dr. Philippe Qureshi on 1/31/25    Tissue culture pending  Agree with continued IV antibiotics    CT L spine to evaluate hardware (ordered today by neurosurgery)  MRI done  Continue vancomycin and Zosyn  Wound and blood cultures ordered  Neurosurgery recommended plastic surgery  MRI of the spine  2-  anemia follow-up CBC  3-elevated LFTs currently has no abdominal pain follow-up on the CMP if worse consider ultrasound right upper quadrant trending down  4-abnormal urine  analysis could be urinary tract infection obtain culture antibiotics follow-up culture and sensitivity of the urine  Discussed with the  consultants.      Review of other systems negative other than HPI  Past medical history    Epic& consultants notes reviewed  Most recent images Reviewed  Last EKG/Rhythm reviewed      Vital signs has been reviewed     SKIN:  No rashes .   ENT mucosa,  Normal/nose normal   NECK: no jugulovenous distention  NO lymphadenopathy   LUNGS:No wheezing,no ronchi  no rales.  CARDIAC:  S1 and S2  ABDOMEN: Abdomen soft, non-tender.    EXTREMITIES: Extremities normal.   NEURO: non focal    PULSES: + pedal / radial   No edema   SURGICAL INCISION: erythema. Areas of slough. There are open areas of the incision distally. Purulent and malodorous drainage noted from the incision. (Picture is uploaded in media)  He is in a wheelchair.                   Past Medical History  He has a past medical history of Abnormal ECG, Arthritis, Bilateral lower extremity edema, Diverticulosis, Hyperlipidemia, Neurogenic claudication, Personal history of other diseases of the digestive system, Personal history of other infectious and parasitic diseases, Prostate cancer (Multi), Short-term memory loss, and Spondylolisthesis.    Surgical History  He has a past surgical history that includes Hernia repair (07/30/2018); Colonoscopy (07/30/2018); Tonsillectomy (07/30/2018); and Neck surgery (2007).     Social History  He reports that he has quit smoking. His smoking use included cigarettes. He has never been exposed to tobacco smoke. He has never used smokeless tobacco. He reports that he does not drink alcohol and does not use drugs.    Family History  Family History   Problem Relation Name Age of Onset    Alzheimer's disease Mother      Anemia Mother      Breast cancer Sister      Stroke Sister          Allergies  Patient has no known allergies.          Last Recorded Vitals  BP (!) 101/46 (BP Location: Right arm,  Patient Position: Lying)   Pulse 75   Temp 36 °C (96.8 °F) (Temporal)   Resp 16   Wt 83.6 kg (184 lb 4.9 oz)   SpO2 97%          Roya Simeon MD

## 2025-02-23 NOTE — H&P
Sebas Soto  [birth year]  [id number]  Roya Simeon MD  This is a patient with a past medical history as detailed below admitted to   ED with chief complaint of postoperative infection, patient had a L2 L5 laminectomy with TLIF of L4-L5 with Dr. Qureshi on 1/31/25 was discharged earlier this month to a rehab facility, his wife states that [facility] to be called [facility] but [facility] is [facility].  She states that a nurse there noticed that there was some dehiscence of the wound about a week ago, this was the first time that they realized something might be wrong.  Patient has not been progressing as normal, has not yet ambulated, they are still having to use a Julia lift per neurosurgery notes.  He states he has been working on his physical therapy.  When he was seen today for his first appointment since the wound dehisced, Colette Reyes NP became concerned for possible postoperative infection.  Patient states that he has baseline incontinence from previous prostate cancer, but states that that is unchanged, no new saddle paresthesias, he does endorse some weakness mostly in his left leg but that is from previous to surgery.  Denies any numbness or tingling.  He does endorse that he has had chills at times but no documented fevers.  Patient denies chest pain abdominal pain or shortness of breath      Assessment and plan   1-postop infection Neurosurgery is consulted  CT lumbar spine  Wound care is consulted  Continue vancomycin and Zosyn  Wound and blood cultures ordered  PT and OT eval & treatment for generalized weakness  Postop pain: prn tylenol  Continue home medication regimen as appropriate   2-  anemia follow-up CBC  3-elevated LFTs currently has no abdominal pain follow-up on the CMP if worse consider ultrasound right upper quadrant  4-abnormal urine analysis could be urinary tract infection obtain culture antibiotics  Discussed with the ED /consultants.      Review of other systems negative  other than HPI  Past medical history    Epic& consultants notes reviewed  Most recent images Reviewed  Last EKG/Rhythm reviewed      Vital signs has been reviewed     SKIN:  No rashes .   ENT mucosa,  Normal/nose normal   NECK: no jugulovenous distention  NO lymphadenopathy   LUNGS:No wheezing,no ronchi  no rales.  CARDIAC:  S1 and S2  ABDOMEN: Abdomen soft, non-tender.    EXTREMITIES: Extremities normal.   NEURO: non focal    PULSES: + pedal / radial   No edema  No goiter   No carotid bruits.                   Past Medical History  He has a past medical history of Abnormal ECG, Arthritis, Bilateral lower extremity edema, Diverticulosis, Hyperlipidemia, Neurogenic claudication, Personal history of other diseases of the digestive system, Personal history of other infectious and parasitic diseases, Prostate cancer (Multi), Short-term memory loss, and Spondylolisthesis.    Surgical History  He has a past surgical history that includes Hernia repair (07/30/2018); Colonoscopy (07/30/2018); Tonsillectomy (07/30/2018); and Neck surgery (2007).     Social History  He reports that he has quit smoking. His smoking use included cigarettes. He has never been exposed to tobacco smoke. He has never used smokeless tobacco. He reports that he does not drink alcohol and does not use drugs.    Family History  Family History   Problem Relation Name Age of Onset    Alzheimer's disease Mother      Anemia Mother      Breast cancer Sister      Stroke Sister          Allergies  Patient has no known allergies.          Last Recorded Vitals  /77 (BP Location: Left arm, Patient Position: Lying)   Pulse 69   Temp 36.5 °C (97.7 °F) (Temporal)   Resp 18   Wt 83.6 kg (184 lb 4.9 oz)   SpO2 100%          Roya Simeon MD

## 2025-02-23 NOTE — PROGRESS NOTES
Sebas Soto  [birth year]  [id number]  Roya Simeon MD  This is a patient with a past medical history as detailed below admitted to   ED with chief complaint of postoperative infection, patient had a L2 L5 laminectomy with TLIF of L4-L5 with Dr. Qureshi on 1/31/25 was discharged earlier this month to a rehab facility, his wife states that [facility] but [facility] is [facility].  She states that a nurse there noticed that there was some dehiscence of the wound about a week ago, this was the first time that they realized something might be wrong.  Patient has not been progressing as normal, has not yet ambulated, they are still having to use a Julia lift per neurosurgery notes.  He states he has been working on his physical therapy.  When he was seen today for his first appointment since the wound dehisced, Colette Reyes NP became concerned for possible postoperative infection.  Patient states that he has baseline incontinence from previous prostate cancer, but states that that is unchanged, no new saddle paresthesias, he does endorse some weakness mostly in his left leg but that is from previous to surgery.     Patient this morning states he is feeling better after the antibiotics. He denies significant or worsening back pain, denies new weakness, numbness, fevers, chills.  No chest pain abdominal pain or shortness of breath    Assessment and plan   1-postop infection Neurosurgery is consulted   Tissue culture pending  Agree with continued IV antibiotics    CT L spine to evaluate hardware (ordered today by neurosurgery)  MRI done  Continue vancomycin and Zosyn  Wound and blood cultures ordered  PT and OT eval & treatment for generalized weakness  Postop pain: prn tylenol  Continue home medication regimen as appropriate   2-  anemia follow-up CBC  3-elevated LFTs currently has no abdominal pain follow-up on the CMP if worse consider ultrasound right upper quadrant trending  down  4-abnormal urine analysis could be urinary tract infection obtain culture antibiotics  Discussed with the  consultants.      Review of other systems negative other than HPI  Past medical history    Epic& consultants notes reviewed  Most recent images Reviewed  Last EKG/Rhythm reviewed      Vital signs has been reviewed     SKIN:  No rashes .   ENT mucosa,  Normal/nose normal   NECK: no jugulovenous distention  NO lymphadenopathy   LUNGS:No wheezing,no ronchi  no rales.  CARDIAC:  S1 and S2  ABDOMEN: Abdomen soft, non-tender.    EXTREMITIES: Extremities normal.   NEURO: non focal    PULSES: + pedal / radial   No edema  No goiter   No carotid bruits.                   Past Medical History  He has a past medical history of Abnormal ECG, Arthritis, Bilateral lower extremity edema, Diverticulosis, Hyperlipidemia, Neurogenic claudication, Personal history of other diseases of the digestive system, Personal history of other infectious and parasitic diseases, Prostate cancer (Multi), Short-term memory loss, and Spondylolisthesis.    Surgical History  He has a past surgical history that includes Hernia repair (07/30/2018); Colonoscopy (07/30/2018); Tonsillectomy (07/30/2018); and Neck surgery (2007).     Social History  He reports that he has quit smoking. His smoking use included cigarettes. He has never been exposed to tobacco smoke. He has never used smokeless tobacco. He reports that he does not drink alcohol and does not use drugs.    Family History  Family History   Problem Relation Name Age of Onset    Alzheimer's disease Mother      Anemia Mother      Breast cancer Sister      Stroke Sister          Allergies  Patient has no known allergies.          Last Recorded Vitals  /77 (BP Location: Left arm, Patient Position: Lying)   Pulse 69   Temp 36.5 °C (97.7 °F) (Temporal)   Resp 18   Wt 83.6 kg (184 lb 4.9 oz)   SpO2 100%          Roya Simeon MD

## 2025-02-24 LAB
ALBUMIN SERPL BCP-MCNC: 3 G/DL (ref 3.4–5)
ALP SERPL-CCNC: 77 U/L (ref 33–136)
ALT SERPL W P-5'-P-CCNC: 46 U/L (ref 10–52)
ANION GAP SERPL CALC-SCNC: 10 MMOL/L (ref 10–20)
AST SERPL W P-5'-P-CCNC: 21 U/L (ref 9–39)
B-LACTAMASE ORGANISM ISLT: POSITIVE
BACTERIA SPEC CULT: ABNORMAL
BACTERIA SPEC CULT: ABNORMAL
BILIRUB SERPL-MCNC: 0.6 MG/DL (ref 0–1.2)
BUN SERPL-MCNC: 11 MG/DL (ref 6–23)
CALCIUM SERPL-MCNC: 8.4 MG/DL (ref 8.6–10.3)
CHLORIDE SERPL-SCNC: 105 MMOL/L (ref 98–107)
CO2 SERPL-SCNC: 27 MMOL/L (ref 21–32)
CREAT SERPL-MCNC: 0.68 MG/DL (ref 0.5–1.3)
EGFRCR SERPLBLD CKD-EPI 2021: >90 ML/MIN/1.73M*2
ERYTHROCYTE [DISTWIDTH] IN BLOOD BY AUTOMATED COUNT: 17.1 % (ref 11.5–14.5)
GLUCOSE SERPL-MCNC: 106 MG/DL (ref 74–99)
GRAM STN SPEC: ABNORMAL
GRAM STN SPEC: ABNORMAL
HCT VFR BLD AUTO: 32.2 % (ref 41–52)
HGB BLD-MCNC: 10.1 G/DL (ref 13.5–17.5)
MCH RBC QN AUTO: 30.9 PG (ref 26–34)
MCHC RBC AUTO-ENTMCNC: 31.4 G/DL (ref 32–36)
MCV RBC AUTO: 99 FL (ref 80–100)
NRBC BLD-RTO: 0 /100 WBCS (ref 0–0)
PLATELET # BLD AUTO: 251 X10*3/UL (ref 150–450)
POTASSIUM SERPL-SCNC: 3.8 MMOL/L (ref 3.5–5.3)
PROT SERPL-MCNC: 5.4 G/DL (ref 6.4–8.2)
RBC # BLD AUTO: 3.27 X10*6/UL (ref 4.5–5.9)
SODIUM SERPL-SCNC: 138 MMOL/L (ref 136–145)
WBC # BLD AUTO: 6.9 X10*3/UL (ref 4.4–11.3)

## 2025-02-24 PROCEDURE — 97161 PT EVAL LOW COMPLEX 20 MIN: CPT | Mod: GP

## 2025-02-24 PROCEDURE — 97535 SELF CARE MNGMENT TRAINING: CPT | Mod: GO

## 2025-02-24 PROCEDURE — 36415 COLL VENOUS BLD VENIPUNCTURE: CPT | Performed by: PHYSICIAN ASSISTANT

## 2025-02-24 PROCEDURE — 80053 COMPREHEN METABOLIC PANEL: CPT | Performed by: PHYSICIAN ASSISTANT

## 2025-02-24 PROCEDURE — 2500000001 HC RX 250 WO HCPCS SELF ADMINISTERED DRUGS (ALT 637 FOR MEDICARE OP): Performed by: NURSE PRACTITIONER

## 2025-02-24 PROCEDURE — 85027 COMPLETE CBC AUTOMATED: CPT | Performed by: PHYSICIAN ASSISTANT

## 2025-02-24 PROCEDURE — 2500000004 HC RX 250 GENERAL PHARMACY W/ HCPCS (ALT 636 FOR OP/ED): Performed by: INTERNAL MEDICINE

## 2025-02-24 PROCEDURE — 2500000002 HC RX 250 W HCPCS SELF ADMINISTERED DRUGS (ALT 637 FOR MEDICARE OP, ALT 636 FOR OP/ED): Performed by: NURSE PRACTITIONER

## 2025-02-24 PROCEDURE — 2500000001 HC RX 250 WO HCPCS SELF ADMINISTERED DRUGS (ALT 637 FOR MEDICARE OP): Performed by: PHYSICIAN ASSISTANT

## 2025-02-24 PROCEDURE — 2500000004 HC RX 250 GENERAL PHARMACY W/ HCPCS (ALT 636 FOR OP/ED): Performed by: PHYSICIAN ASSISTANT

## 2025-02-24 PROCEDURE — 2500000004 HC RX 250 GENERAL PHARMACY W/ HCPCS (ALT 636 FOR OP/ED)

## 2025-02-24 PROCEDURE — 97165 OT EVAL LOW COMPLEX 30 MIN: CPT | Mod: GO

## 2025-02-24 PROCEDURE — 1100000001 HC PRIVATE ROOM DAILY

## 2025-02-24 PROCEDURE — 97530 THERAPEUTIC ACTIVITIES: CPT | Mod: GP

## 2025-02-24 RX ORDER — POTASSIUM CHLORIDE 20 MEQ/1
20 TABLET, EXTENDED RELEASE ORAL ONCE
Status: COMPLETED | OUTPATIENT
Start: 2025-02-24 | End: 2025-02-24

## 2025-02-24 RX ADMIN — PIPERACILLIN SODIUM AND TAZOBACTAM SODIUM 3.38 G: 3; .375 INJECTION, SOLUTION INTRAVENOUS at 01:35

## 2025-02-24 RX ADMIN — POLYETHYLENE GLYCOL 3350 17 G: 17 POWDER, FOR SOLUTION ORAL at 09:09

## 2025-02-24 RX ADMIN — AMPICILLIN SODIUM AND SULBACTAM SODIUM 3 G: 2; 1 INJECTION, POWDER, FOR SOLUTION INTRAMUSCULAR; INTRAVENOUS at 18:21

## 2025-02-24 RX ADMIN — ATORVASTATIN CALCIUM 10 MG: 10 TABLET, FILM COATED ORAL at 20:42

## 2025-02-24 RX ADMIN — METOPROLOL SUCCINATE 25 MG: 25 TABLET, EXTENDED RELEASE ORAL at 09:09

## 2025-02-24 RX ADMIN — VANCOMYCIN HYDROCHLORIDE 1000 MG: 1 INJECTION, SOLUTION INTRAVENOUS at 03:07

## 2025-02-24 RX ADMIN — PIPERACILLIN SODIUM AND TAZOBACTAM SODIUM 3.38 G: 3; .375 INJECTION, SOLUTION INTRAVENOUS at 09:10

## 2025-02-24 RX ADMIN — ENOXAPARIN SODIUM 40 MG: 100 INJECTION SUBCUTANEOUS at 18:02

## 2025-02-24 RX ADMIN — POTASSIUM CHLORIDE 20 MEQ: 1500 TABLET, EXTENDED RELEASE ORAL at 09:09

## 2025-02-24 RX ADMIN — VANCOMYCIN HYDROCHLORIDE 1000 MG: 1 INJECTION, SOLUTION INTRAVENOUS at 15:28

## 2025-02-24 ASSESSMENT — PAIN SCALES - PAIN ASSESSMENT IN ADVANCED DEMENTIA (PAINAD)
FACIALEXPRESSION: SMILING OR INEXPRESSIVE
CONSOLABILITY: NO NEED TO CONSOLE
BODYLANGUAGE: RELAXED
TOTALSCORE: 0
BREATHING: NORMAL

## 2025-02-24 ASSESSMENT — ACTIVITIES OF DAILY LIVING (ADL)
ADL_ASSISTANCE: NEEDS ASSISTANCE
HOME_MANAGEMENT_TIME_ENTRY: 11

## 2025-02-24 ASSESSMENT — PAIN SCALES - GENERAL
PAINLEVEL_OUTOF10: 0 - NO PAIN

## 2025-02-24 ASSESSMENT — COGNITIVE AND FUNCTIONAL STATUS - GENERAL
DRESSING REGULAR LOWER BODY CLOTHING: A LOT
DRESSING REGULAR LOWER BODY CLOTHING: TOTAL
EATING MEALS: A LITTLE
TOILETING: A LOT
DRESSING REGULAR UPPER BODY CLOTHING: A LOT
PERSONAL GROOMING: A LITTLE
MOBILITY SCORE: 12
TOILETING: TOTAL
WALKING IN HOSPITAL ROOM: A LOT
HELP NEEDED FOR BATHING: A LOT
DAILY ACTIVITIY SCORE: 14
TURNING FROM BACK TO SIDE WHILE IN FLAT BAD: A LITTLE
CLIMB 3 TO 5 STEPS WITH RAILING: TOTAL
PERSONAL GROOMING: A LOT
MOVING TO AND FROM BED TO CHAIR: A LOT
MOVING TO AND FROM BED TO CHAIR: A LOT
CLIMB 3 TO 5 STEPS WITH RAILING: A LOT
DRESSING REGULAR UPPER BODY CLOTHING: A LITTLE
TURNING FROM BACK TO SIDE WHILE IN FLAT BAD: A LITTLE
DAILY ACTIVITIY SCORE: 12
WALKING IN HOSPITAL ROOM: TOTAL
HELP NEEDED FOR BATHING: TOTAL
STANDING UP FROM CHAIR USING ARMS: A LOT
MOBILITY SCORE: 15
STANDING UP FROM CHAIR USING ARMS: A LOT
MOVING FROM LYING ON BACK TO SITTING ON SIDE OF FLAT BED WITH BEDRAILS: A LITTLE

## 2025-02-24 ASSESSMENT — PAIN - FUNCTIONAL ASSESSMENT
PAIN_FUNCTIONAL_ASSESSMENT: 0-10

## 2025-02-24 NOTE — PROGRESS NOTES
Occupational Therapy    Occupational Therapy    Evaluation    Patient Name: Sebas Soto  MRN: [id number]  Today's Date: 2/24/2025  Time Calculation  Start Time: 0947  Stop Time: 1013  Time Calculation (min): 26 min  3017/3017-A    Assessment  IP OT Assessment  OT Assessment:  (Pt. presents with decreased balance and endurance impacting pt. ability to complete ADLs and mobility independently)  Prognosis: Fair  Barriers to Discharge Home: Caregiver assistance, Physical needs  Caregiver Assistance: Caregiver assistance needed per identified barriers - however, level of patient's required assistance exceeds assistance available at home  Physical Needs: 24hr mobility assistance needed, 24hr ADL assistance needed, High falls risk due to function or environment  Evaluation/Treatment Tolerance: Patient tolerated treatment well  End of Session Communication: Bedside nurse  End of Session Patient Position: Bed, 3 rail up, Alarm on    Plan:  Treatment Interventions: ADL retraining, Functional transfer training, Endurance training  OT Frequency: Daily  OT Discharge Recommendations: High intensity level of continued care  Equipment Recommended upon Discharge: Wheeled walker  OT Recommended Transfer Status: Assist of 2  OT - OK to Discharge: Yes (Next level of care when cleared by medical team)    Subjective Per EMR:   This is a patient with a past medical history as detailed below admitted to   ED with chief complaint of postoperative infection, patient had a L2 L5 laminectomy with TLIF of L4-L5 with Dr. Qureshi on 1/31/25 was discharged earlier this month to a rehab facility, his wife states that used to be called [facility] but [facility].  She states that a nurse there noticed that there was some dehiscence of the wound about a week ago, this was the first time that they realized something might be wrong.  Patient has not been progressing as normal, has not yet ambulated, they are still having to  use a Julia lift per neurosurgery notes.  He states he has been working on his physical therapy.  When he was seen today for his first appointment since the wound dehisced, Colette Reyes NP became concerned for possible postoperative infection.  Patient states that he has baseline incontinence from previous prostate cancer, but states that that is unchanged, no new saddle paresthesias, he does endorse some weakness mostly in his left leg but that is from previous to surgery.  Denies any numbness or tingling.  He does endorse that he has had chills at times but no documented fevers.  Patient denies chest pain abdominal pain or shortness of breath   Current Problem:  1. Postoperative infection, unspecified type, initial encounter        2. Constipation, unspecified constipation type        3. Pneumatosis of intestines            General:  General  Reason for Referral: ADLs, discharge planning  Referred By: Dr. Simeon  Past Medical History Relevant to Rehab:  (L2-5 laminectomy with L4-5 TLIF with Dr. Qureshi on 1/31/25)  Family/Caregiver Present: No  Co-Treatment: PT  Co-Treatment Reason: Safety  Prior to Session Communication: Bedside nurse (PA)  Patient Position Received: Bed, 3 rail up, Alarm on    Precautions:  Medical Precautions: Fall precautions  Post-Surgical Precautions: Spinal precautions      Pain:  Pain Assessment  Pain Assessment: 0-10  0-10 (Numeric) Pain Score: 0 - No pain    Objective     Cognition:  Overall Cognitive Status: Within Functional Limits  Orientation Level: Oriented X4             Home Living:  Home Living Comments:  (Pt. admit from SNF following.)     Prior Function:  Level of [place]: Independent with ADLs and functional transfers  ADL Assistance: Needs assistance  Homemaking Assistance: Needs assistance  Ambulatory Assistance: Needs assistance        ADL:  Grooming Assistance: Stand by  LE Dressing Assistance: Total  Toileting Assistance with Device: Total  Toileting Deficit:  Incontinent    Activity Tolerance:  Endurance: Tolerates 10 - 20 min exercise with multiple rests    Bed Mobility/Transfers:   Bed Mobility  Bed Mobility:  (supine to sit min assist; sit to supine mod assist x 2; scoots at EOB CGA)  Transfers  Transfer:  (sit<>stand mod assist x 2 with ww and gait belt)  Returned to supine due to needing to clean pt. Up secondary to BM      Sitting Balance:  Static Sitting Balance  Static Sitting-Balance Support: Bilateral upper extremity supported  Static Sitting-Level of Assistance: Close supervision    Standing Balance:  Static Standing Balance  Static Standing-Balance Support: Bilateral upper extremity supported  Static Standing-Level of Assistance: Moderate assistance      Sensation:  Sensation Comment: Numbness to left side        Hand Function:  Hand Function  Gross Grasp: Functional  Coordination: Functional    Extremities: RUE   RUE : Within Functional Limits and LUE   LUE:  (shoulder flexion ~30 degrees)    Outcome Measures: [facility] Daily Activity  Putting on and taking off regular lower body clothing: Total  Bathing (including washing, rinsing, drying): Total  Putting on and taking off regular upper body clothing: A little  Toileting, which includes using toilet, bedpan or urinal: Total  Taking care of personal grooming such as brushing teeth: A little  Eating Meals: A little  Daily Activity - Total Score: 12                       EDUCATION:  Education  Individual(s) Educated: Patient  Education Provided: Fall precautons, Risk and benefits of OT discussed with patient or other, POC discussed and agreed upon  Patient Response to Education: Patient/Caregiver Verbalized Understanding of Information      Goals:   Encounter Problems       Encounter Problems (Active)       Dressings Lower Extremities       STG - Patient to complete lower body dressing min assist       Start:  02/24/25    Expected End:  03/10/25               Functional Balance       LTG - Patient will maintain  standing and sitting balance to allow for completion of daily activities       Start:  02/24/25    Expected End:  03/10/25               Grooming       STG - Patient completes grooming SUP       Start:  02/24/25    Expected End:  03/10/25               OT Transfers       STG - Patient will perform bed mobility SUP       Start:  02/24/25    Expected End:  03/10/25            STG - Patient will perform toilet transfer mod assist       Start:  02/24/25    Expected End:  03/10/25            Pt. will follow spine precautions with transfers and ADLs       Start:  02/24/25    Expected End:  03/10/25

## 2025-02-24 NOTE — PROGRESS NOTES
"Sebas Soto is a 84 y.o. male on day 3 of admission presenting with Postoperative infection, unspecified type, initial encounter.    Subjective   Patient doing well this AM, hallucinations improved overnight. Patient denies pain this AM.  Continues with lower muscle strength as prior with left leg less strength than right         Objective     Alert, appropriate, Ox3  RUE D4+ B/T5 HG5 IO4  LUE D1 (chronic) B/T 5 HG5 IO4  BLE HF4+ KE4+ DF/PF 5  Back incision with areas of dehiscence with serous drainage, significant shadowing on dressing.       Last Recorded Vitals  Blood pressure 116/75, pulse 68, temperature 36.4 °C (97.5 °F), temperature source Temporal, resp. rate 18, height 1.727 m (5' 8\"), weight 83.6 kg (184 lb 4.9 oz), SpO2 93%.  Intake/Output last 3 Shifts:  I/O last 3 completed shifts:  In: 1190 (14.2 mL/kg) [P.O.:340; IV Piggyback:850]  Out: 2250 (26.9 mL/kg) [Urine:2250 (0.7 mL/kg/hr)]  Weight: 83.6 kg       Assessment/Plan   Assessment & Plan  Postoperative infection, unspecified type, initial encounter  No significant change to plan  Sebas Soto is a 84 y.o. male with a history of L2-5 laminectomy with L4-5 TLIF with Dr. Qureshi on 1/31/25 presenting with wound dehiscence and incisional drainage, also found to have UTI. Physical examination and MRI overall reassuring. CT L spine reviewed with no hardware complication, no deep dehiscence seen.    ID on consult, awaiting recs  Continue broad spectrum antibiotics for postoperative infection and UTI  Follow-up superficial wound culture results    Continue dry dressing changes to lumbar spine incision TID  Definitive plan for lumbar spine wound to be made this week pending plastics surgery issacal        Beau Agarwal PA-C      "

## 2025-02-24 NOTE — CARE PLAN
The patient's goals for the shift include Pain controlled     The clinical goals for the shift include Pt will remain HDS throughout the shift

## 2025-02-24 NOTE — PROGRESS NOTES
Vancomycin Dosing by Pharmacy- Cessation of Therapy    Consult to pharmacy for vancomycin dosing has been discontinued by the prescriber, pharmacy will sign off at this time.    Please call pharmacy if there are further questions or re-enter a consult if vancomycin is resumed.     Audra Galeana, PharmD

## 2025-02-24 NOTE — PROGRESS NOTES
02/24/25 1117   Discharge Planning   Living Arrangements Other (Comment)   Support Systems Spouse/significant other;Children   Type of Residence Skilled nursing facility   Type of Post Acute Facility Services Skilled nursing   Expected Discharge Disposition SNF  ([facility])   Does the patient need discharge transport arranged? Yes   RoundTrip coordination needed? Yes   Has discharge transport been arranged? No   Patient Choice   Provider Choice list and CMS website (https://medicare.gov/care-compare#search) for post-acute Quality and Resource Measure Data were provided and reviewed with: Family   Patient / Family choosing to utilize agency / facility established prior to hospitalization No   Intensity of Service   Intensity of Service >30 min     Spoke with patient's wife, Chrissie, on phone at length. She advised patient was at [facility] rehab prior to admission but they do not want to return there. She was not happy with the care. She would like referral to [facility] and she is aware new precert will be needed. She advised he was under appeal process prior to admission. She is not sure yet if he will need additional surgery. She is aware referral will be send and she was thankful for the call.   Asked [facility] to send referral.     1623  The e advised they are out of network and have waiting list. Therapy recommending [facility]. Not clear yet if patient needs further surgery. Will follow and discuss options with wife. She did advise he was denied by [facility] in the past because he could not tolerate 3 hours of therapy.

## 2025-02-24 NOTE — CARE PLAN
Problem: Pain - Adult  Goal: Verbalizes/displays adequate comfort level or baseline comfort level  Outcome: Progressing     Problem: Discharge Planning  Goal: Discharge to home or other facility with appropriate resources  Outcome: Progressing     Problem: Chronic Conditions and Co-morbidities  Goal: Patient's chronic conditions and co-morbidity symptoms are monitored and maintained or improved  Outcome: Progressing     Problem: Nutrition  Goal: Nutrient intake appropriate for maintaining nutritional needs  Outcome: Progressing     Problem: Skin  Goal: Decreased wound size/increased tissue granulation at next dressing change  Outcome: Progressing  Flowsheets (Taken 2/24/2025 0524)  Decreased wound size/increased tissue granulation at next dressing change: Promote sleep for wound healing  Goal: Promote/optimize nutrition  Outcome: Progressing  Goal: Promote skin healing  Outcome: Progressing       The clinical goals for the shift include Pt will remain HDS throughout the shift

## 2025-02-24 NOTE — CONSULTS
Consults  Reason for Consult:  Evaluation and management of spine surgical site infection    Patient is seen at the request of Brenda Varela    Subjective   History of Present Illness:  Sebas Soto is a 84 y.o. male who presented on 2/21/2025 with a surgical site ulcer.  He underwent an L2-5 laminectomy with L4-5 TLIF on 1/31/25.  He was then noted to have wound dehiscence and incisional drainage so was sent to the emergency department.  On arrival he was placed on vancomycin and Zosyn.  A wound culture was collected and blood cultures were collected.  He went for CT scan of the spine and an MRI of the spine.  He has been receiving local wound care to the area.  He has been tolerating the antibiotics.  He denies any fevers, nausea, vomiting, or diarrhea    Past Medical History:   has a past medical history of Abnormal ECG, Arthritis, Bilateral lower extremity edema, Diverticulosis, Hyperlipidemia, Neurogenic claudication, Personal history of other diseases of the digestive system, Personal history of other infectious and parasitic diseases, Prostate cancer (Multi), Short-term memory loss, and Spondylolisthesis.    has a past surgical history that includes Hernia repair (07/30/2018); Colonoscopy (07/30/2018); Tonsillectomy (07/30/2018); and Neck surgery (2007).     Social History:   reports that he has quit smoking. His smoking use included cigarettes. He has never been exposed to tobacco smoke. He has never used smokeless tobacco. He reports that he does not drink alcohol and does not use drugs.     He comes in the hospital from an ECF    Family History:  No sick contacts    Review of Systems:  Drainage from his spine surgical site    Allergies:  Patient has no known allergies.      Objective   Current Facility-Administered Medications   Medication Dose Route Frequency Provider Last Rate Last Admin    atorvastatin (Lipitor) tablet 10 mg  10 mg oral Daily Brenda Varela, APRN-CNP        enoxaparin (Lovenox)  syringe 40 mg  40 mg subcutaneous q24h Jack Ambriz PA-C   40 mg at 02/23/25 1745    metoprolol succinate XL (Toprol-XL) 24 hr tablet 25 mg  25 mg oral Daily Jack Ambriz PA-C   25 mg at 02/24/25 0909    piperacillin-tazobactam (Zosyn) 3.375 g in dextrose (iso) IV 50 mL  3.375 g intravenous q8h Jack Ambriz PA-C   Stopped at 02/24/25 1250    polyethylene glycol (Glycolax, Miralax) packet 17 g  17 g oral Daily Jack Ambriz PA-C   17 g at 02/24/25 0909    traMADol (Ultram) tablet 25 mg  25 mg oral q4h PRN Jack Ambriz PA-C        vancomycin (Vancocin) 1,000 mg in dextrose 5%  mL  1,000 mg intravenous q12h Man Espinosa PharmD 200 mL/hr at 02/24/25 1528 1,000 mg at 02/24/25 1528    vancomycin (Vancocin) pharmacy to dose - pharmacy monitoring   miscellaneous Daily PRN Jack Ambriz PA-C           Physical Exam:  /76 (BP Location: Left arm, Patient Position: Lying)   Pulse 65   Temp 36.5 °C (97.7 °F) (Temporal)   Resp 20   Wt 83.6 kg (184 lb 4.9 oz)   SpO2 99%    General: no acute distress, lying in bed  HEENT: pink pharynx  CVS: RRR  Resp: decreased breath sounds in bases  ABD: soft, NT, ND  EXT: no edema  Skin: His incision site has a couple of areas of dehiscence with deep ulceration and surrounding macerated tissue; there is yellow drainage on the overlying dressing    Relevant Results:    Labs:  Results from last 72 hours   Lab Units 02/24/25  0624 02/23/25  0614 02/22/25  0555   SODIUM mmol/L 138 140 138   POTASSIUM mmol/L 3.8 3.8 3.7   CHLORIDE mmol/L 105 107 106   BUN mg/dL 11 12 15   CREATININE mg/dL 0.68 0.64 0.60     Results from last 72 hours   Lab Units 02/24/25  0624 02/23/25  0614 02/22/25  0555   WBC AUTO x10*3/uL 6.9 7.0 7.8   HEMOGLOBIN g/dL 10.1* 9.7* 10.1*   HEMATOCRIT % 32.2* 30.3* 32.5*   PLATELETS AUTO x10*3/uL 251 236 276     2/22/2025 urinalysis shows no nitrite and 500 leuk esterase    Microbiology data: I have personally and  independently reviewed and intrepreted the lab results  2/21/2025 wound culture shows mixed growth  2/21/2025 blood culture show no growth  2/22/2025 urine culture shows no growth    Imaging data: I have personally and independently reviewed and interpreted the imaging studies  2/21/2025 MR spine shows no acute issues  2/21/2025 CT spine shows no acute issues       Assessment/Plan     MY IMPRESSION & RECOMMENDATIONS:  Spine surgical site dehiscence with ulceration and concern for infection.  His wound culture shows mixed growth.  I would recommend that we consolidate the antibiotics to Unasyn.  He is being followed by neurosurgery.  I will adjust the management based on further surgical plans.    -Can change antibiotics to Unasyn  -Await further wound culture results    Other issues:  #Cervical spondylosis s/p cervical spine surgery with peripheral polyneuropathy  #Hyperlipidemia  #Osteoarthritis  #Prostate cancer status post external beam radiation    I have personally and independently reviewed and interpreted the laboratory tests, imaging studies, and the documentation from other healthcare providers.  I am monitoring for side effects from Unasyn which can include rash, diarrhea, and nausea.  His prognosis is uncertain since his spine surgical site is healing as expected.       Jericho Manuel MD

## 2025-02-24 NOTE — CONSULTS
Wound Care Consult     Visit Date: 2/23/2025      Patient Name: Sebas Soto         MRN: [id number]           YOB: 1940     Reason for Consult: Dehisced, infected back wound        Wound History: Present on admission     Pertinent Labs:   Albumin   Date Value Ref Range Status   02/23/2025 2.9 (L) 3.4 - 5.0 g/dL Final       Wound Assessment:  Wound 01/31/25 Incision Back Lower;Medial (Active)   Site Assessment Dehisced 02/23/25 0800   Margarita-Wound Assessment Macerated 02/22/25 2023   Closure None 02/23/25 0800   Treatments Cleansed 02/23/25 0800   Sutures/Staple Line Non approximated 02/23/25 0800   Drainage Description Serosanguineous 02/23/25 0800   Drainage Amount Large 02/23/25 0800   Dressing Packed;Foam 02/23/25 1700   Dressing Changed Changed 02/23/25 1700   Dressing Status Dry;Clean;Removed 02/22/25 1046       Wound Team Summary Assessment: Wound approx7.0 x 1.0 x 1.3 cm, edges mascerated. Diificult to tell how much drainage because ABD saturated with urine.     Wound Team Plan: Cleanse wound with soap and water, pack with Nu Gauze, secure with Mepilex every other day.     Johanna Leos RN  2/23/2025  8:16 PM

## 2025-02-24 NOTE — PROGRESS NOTES
Spiritual Care Visit  Spiritual Care Request    Reason for Visit:  Routine Visit: Introduction  Continue Visiting: Yes     Request Received From:       Focus of Care:  Visited With: Patient and family together         Refer to :          Spiritual Care Assessment    Spiritual Assessment:                      Care Provided:  Intended Effects: Establish rapport and connectedness, Velia affirmation, Build relationship of care and support, Demonstrate caring and concern    Sense of Community and or Oriental orthodox Affiliation:  Lutheran         Addressed Needs/Concerns and/or Afua Through:  Oriental orthodox Encounters  Oriental orthodox Needs: Prayer  Sacramental Encounters  Communion: Ask the patient  Communion Given Indicator: No  Sacrament of Sick-Anointing: Anointed, Patient requested anointing    Outcome:  Outcome of Spiritual Care Visit: Affirmation, [place], Peace/gratitude     Advance Directives:         Spiritual Care Annotation    Annotation:  His wife Chrissie was present.

## 2025-02-24 NOTE — PROGRESS NOTES
Physical Therapy    Physical Therapy Evaluation & Treatment    Patient Name: Sebas Soto  MRN: [id number]  Today's Date: 2/24/2025   Time Calculation  Start Time: 0946  Stop Time: 1013  Time Calculation (min): 27 min  3017/3017-A    Assessment/Plan   PT Assessment  PT Assessment Results: Decreased strength, Decreased range of motion, Decreased endurance, Impaired balance, Decreased mobility, Decreased safety awareness, Orthopedic restrictions  Rehab Prognosis: Good  Evaluation/Treatment Tolerance: Patient limited by fatigue  Medical Staff Made Aware: Yes  End of Session Communication: Bedside nurse  Assessment Comment: Pt is a 83 y/o male admitted for postoperative infection after undergoing L2-L5 laminectomy with TLIF of L4-L5 with Dr. Qureshi on 1/31/25. Pt presents with decreased strength, endurance and balance. Pt able to tolerate bed mobility and transfers this date. He is functioning below baseline level of function and will benefit from skilled therapy during stay to improve overall functional mobility and safety awareness. Upon discharge pt will benefit from high intensity therapy for continued improvement in functional mobility.     End of Session Patient Position: Bed, 3 rail up, Alarm on (call light within reach)  IP OR SWING BED PT PLAN  Inpatient or Swing Bed: Inpatient  PT Plan  Treatment/Interventions: Bed mobility, Transfer training, Gait training, Stair training, Balance training, Neuromuscular re-education, Endurance training, Strengthening, Range of motion, Therapeutic exercise, Therapeutic activity, Home exercise program, Positioning, Postural re-education, Wheelchair management  PT Plan: Ongoing PT  PT Frequency: Daily  PT Discharge Recommendations: High intensity level of continued care  Equipment Recommended upon Discharge: Wheeled walker  PT Recommended Transfer Status: Assist x2, Assistive device (mod A x2)  PT - OK to Discharge: Yes (Once medically cleared to next level of  care)    Current Problem:  Patient Active Problem List   Diagnosis    Other melanin hyperpigmentation    Other hypertrophic disorders of the skin    Other benign neoplasm of skin, unspecified    Hemangioma of skin and subcutaneous tissue    Melanocytic nevi of trunk    Melanocytic nevi of left upper limb, including shoulder    Melanocytic nevi of left lower limb, including hip    Right inguinal hernia    Incarcerated right inguinal hernia    Pigmented purpuric dermatosis    Other seborrheic keratosis    Actinic keratosis    Mixed conductive and sensorineural hearing loss of right ear with restricted hearing of left ear    Cervical spondylosis with myelopathy    Neurogenic claudication due to lumbar spinal stenosis    Klippel-Feil anomaly, myopathy, and facial dysmorphism syndrome (Multi)    Spondylolisthesis of lumbar region    Myelomalacia of cervical cord    Spondylolisthesis, lumbar region    Lumbar stenosis with neurogenic claudication    Postoperative infection, unspecified type, initial encounter     Subjective     General Visit Information:  General  Reason for Referral: P/w wound dehiscence and incisional drainage. Pt underwent L2-L5 laminectomy with TLIF of L4-L5 with Dr. Qureshi on 1/31/25. Pt also found to have UTI. Pt admitted for post-operative infection. Per ED report, pt has been very weak postoperatively requiring Julia lift to get out of bed. On exam, patient with generalized weakness, unable to pick legs off bed, left arm weaker than right; pt states limited ROM of neck which is ongoing since childhood.  Referred By: Dr. Simeon  Past Medical History Relevant to Rehab: HLD, prostate cancer, cervical spondylosis, macular degeneration  Family/Caregiver Present: No  Co-Treatment: OT  Co-Treatment Reason: To maximize pt safety with functional mobility and discharge planning  Prior to Session Communication: Bedside nurse  Patient Position Received: Bed, 3 rail up, Alarm on  Preferred Learning Style:  verbal  General Comment: Pt very pleasant and agreeable to work with therapy.    Home Living/PLOF:  Home Living  Home Living Comments: Pt admitted from [facility]. Pt reports having 4 therapy sessions although unable to tolerate ambulating. Pt reports he was liz lifted into w/c. Pt receiving assist with ADLs and IADLs. Prior to sx, pt lived at home with his wife. Mod I with FWW short distances and w/c long distances.    Precautions:  Precautions  Medical Precautions: Fall precautions, Spinal precautions  Post-Surgical Precautions: Spinal precautions  Precautions Comment: Pt educated on spine precautions throughout session and demos compliance with reinforcement.    Objective     Pain:  Pain Assessment  Pain Assessment: 0-10  0-10 (Numeric) Pain Score: 0 - No pain    Cognition:  Cognition  Overall Cognitive Status: Within Functional Limits  Orientation Level: Oriented X4    General Assessments:      Activity Tolerance  Endurance: Tolerates 10 - 20 min exercise with multiple rests    Sensation  Sensation Comment: Pt reports baseline n/t in L leg/arm.     Postural Control  Trunk Control: Slightly retropulsive and demos left lateral leaning upon standing requiring mod A and VCs to right trunk to midline  Righting Reactions: Delayed/impaired, requires assist to right trunk to midline during standing    Static Sitting Balance  Static Sitting-Balance Support: Bilateral upper extremity supported, Feet supported  Static Sitting-Level of Assistance: Contact guard  Static Standing Balance  Static Standing-Balance Support: Bilateral upper extremity supported  Static Standing-Level of Assistance: Moderate assistance (x2)  Dynamic Standing Balance  Dynamic Standing-Balance Support: Bilateral upper extremity supported  Dynamic Standing-Level of Assistance: Moderate assistance (x2)    Functional Assessments:     Bed Mobility  Bed Mobility: Yes  Bed Mobility 1  Bed Mobility 1: Supine to sitting, Log roll,  Scooting  Level of Assistance 1: Minimum assistance, Maximum verbal cues, Maximum tactile cues  Bed Mobility Comments 1: HOB slightly elevated and use of bed HR. Pt educated on proper log roll technique and demos compliance. Required assist with trunk. Verbal/tactile cues for proper hand placement, body mechanics and log roll technique.  Bed Mobility 2  Bed Mobility  2: Scooting  Level of Assistance 2: Contact guard  Bed Mobility Comments 2: Pt seated EOB and able to use UE/LE to scoot glutes towards EOB with multiple trials and rest breaks between scoots  Bed Mobility 3  Bed Mobility 3: Rolling right, Rolling left  Level of Assistance 3: Moderate assistance, Moderate verbal cues  Bed Mobility Comments 3: Multiple trials of rolling R/L with use of bed HR and draw sheets. Increased time for kasandra care as pt had episode of bowel incontinence  Bed Mobility 4  Bed Mobility 4: Scooting  Level of Assistance 4: Dependent, +2  Bed Mobility Comments 4: Boost in supine towards HOB with use of draw sheets  Bed Mobility 5  Bed Mobility 5: Sitting to supine, Log roll  Level of Assistance 5: Moderate assistance, Moderate verbal cues, +2  Bed Mobility Comments 5: HOB flat and required assist with trunk and LEs. VCs for proper log roll technique.    Transfers  Transfer: Yes  Transfer 1  Transfer From 1: Bed to, Stand to  Transfer to 1: Stand, Bed  Technique 1: Sit to stand, Stand to sit  Transfer Device 1: Walker, Gait belt (Gait belt placed high on chest to avoid incision area)  Transfer Level of Assistance 1: Moderate assistance, Moderate verbal cues, +2  Trials/Comments 1: Bed elevated. VCs for proper hand placement and body mechanics. Pt demos left lateral leaning upon standing and slightly retropulsive     Extremity/Trunk Assessments:  RUE   RUE : Within Functional Limits  LUE   LUE:  (Shoulder flex AROM significantly limited, achieved ~30 degrees of shoulder flex. Strength WFL based on functional mobility.)  RLE   RLE : Within  Functional Limits  LLE   LLE :  (Limited hip flex AROM. Strength WFL based on functional mobility.)    Treatments:  Pt able to tolerate the following activities during today's treatment session. Pt instructed/educated throughout the session on safety awareness, body mechanics and proper hand placement. Focused on balance in sitting/standing and education on mobility while maintaining spine precautions. Increased time during session for kasandra care activities.     Bed Mobility  Bed Mobility: Yes  Bed Mobility 1  Bed Mobility 1: Supine to sitting, Log roll, Scooting  Level of Assistance 1: Minimum assistance, Maximum verbal cues, Maximum tactile cues  Bed Mobility Comments 1: HOB slightly elevated and use of bed HR. Pt educated on proper log roll technique and demos compliance. Required assist with trunk. Verbal/tactile cues for proper hand placement, body mechanics and log roll technique.  Bed Mobility 2  Bed Mobility  2: Scooting  Level of Assistance 2: Contact guard  Bed Mobility Comments 2: Pt seated EOB and able to use UE/LE to scoot glutes towards EOB with multiple trials and rest breaks between scoots  Bed Mobility 3  Bed Mobility 3: Rolling right, Rolling left  Level of Assistance 3: Moderate assistance, Moderate verbal cues  Bed Mobility Comments 3: Multiple trials of rolling R/L with use of bed HR and draw sheets. Increased time for kasandra care as pt had episode of bowel incontinence  Bed Mobility 4  Bed Mobility 4: Scooting  Level of Assistance 4: Dependent, +2  Bed Mobility Comments 4: Boost in supine towards HOB with use of draw sheets  Bed Mobility 5  Bed Mobility 5: Sitting to supine, Log roll  Level of Assistance 5: Moderate assistance, Moderate verbal cues, +2  Bed Mobility Comments 5: HOB flat and required assist with trunk and LEs. VCs for proper log roll technique.     Transfers  Transfer: Yes  Transfer 1  Transfer From 1: Bed to, Stand to  Transfer to 1: Stand, Bed  Technique 1: Sit to stand, Stand to  sit  Transfer Device 1: Walker, Gait belt (Gait belt placed high on chest to avoid incision area)  Transfer Level of Assistance 1: Moderate assistance, Moderate verbal cues, +2  Trials/Comments 1: Bed elevated. VCs for proper hand placement and body mechanics. Pt demos left lateral leaning upon standing and slightly retropulsive     Outcome Measures:  [facility] Basic Mobility  Turning from your back to your side while in a flat bed without using bedrails: A little  Moving from lying on your back to sitting on the side of a flat bed without using bedrails: A little  Moving to and from bed to chair (including a wheelchair): A lot  Standing up from a chair using your arms (e.g. wheelchair or bedside chair): A lot  To walk in hospital room: Total  Climbing 3-5 steps with railing: Total  Basic Mobility - Total Score: 12     Goals:  Encounter Problems       Encounter Problems (Active)       Balance       Pt will demonstrate static/dynamic standing balance for >/= 3 min min A x2 without evidence of instability or LOB with functional mobility tasks.         Start:  02/24/25    Expected End:  03/10/25               Mobility       Pt will be able to ambulate >/= 4 steps (forward, backward, lateral) with FWW min A x2 with good safety awareness.        Start:  02/24/25    Expected End:  03/10/25            Pt will complete supine, seated and standing exercises to maintain/improve overall strength with minimal verbal cues.         Start:  02/24/25    Expected End:  03/10/25               PT Transfers       Pt will be able to complete all bed mobility tasks SBA with use of bed HR and log roll technique.        Start:  02/24/25    Expected End:  03/10/25            Pt will be able to complete all transfers with FWW min A x2 demonstrating good safety awareness and proper body mechanics and stability.       Start:  02/24/25    Expected End:  03/10/25                 Education Documentation  Body Mechanics, taught by Tessa Weiss  PT at 2/24/2025 11:02 AM.  Learner: Patient  Readiness: Acceptance  Method: Explanation  Response: Verbalizes Understanding, Demonstrated Understanding, Needs Reinforcement    Home Exercise Program, taught by Tessa Weiss PT at 2/24/2025 11:02 AM.  Learner: Patient  Readiness: Acceptance  Method: Explanation  Response: Verbalizes Understanding, Demonstrated Understanding, Needs Reinforcement    Precautions, taught by Tessa Weiss PT at 2/24/2025 11:02 AM.  Learner: Patient  Readiness: Acceptance  Method: Explanation  Response: Verbalizes Understanding, Demonstrated Understanding, Needs Reinforcement    Mobility Training, taught by Tessa Weiss PT at 2/24/2025 11:02 AM.  Learner: Patient  Readiness: Acceptance  Method: Explanation  Response: Verbalizes Understanding, Demonstrated Understanding, Needs Reinforcement    Education Comments  No comments found.

## 2025-02-25 LAB
ALBUMIN SERPL BCP-MCNC: 3.2 G/DL (ref 3.4–5)
ALP SERPL-CCNC: 83 U/L (ref 33–136)
ALT SERPL W P-5'-P-CCNC: 43 U/L (ref 10–52)
ANION GAP SERPL CALC-SCNC: 11 MMOL/L (ref 10–20)
AST SERPL W P-5'-P-CCNC: 26 U/L (ref 9–39)
BILIRUB SERPL-MCNC: 0.5 MG/DL (ref 0–1.2)
BUN SERPL-MCNC: 10 MG/DL (ref 6–23)
CALCIUM SERPL-MCNC: 8.4 MG/DL (ref 8.6–10.3)
CHLORIDE SERPL-SCNC: 106 MMOL/L (ref 98–107)
CO2 SERPL-SCNC: 26 MMOL/L (ref 21–32)
CREAT SERPL-MCNC: 0.52 MG/DL (ref 0.5–1.3)
EGFRCR SERPLBLD CKD-EPI 2021: >90 ML/MIN/1.73M*2
ERYTHROCYTE [DISTWIDTH] IN BLOOD BY AUTOMATED COUNT: 17.2 % (ref 11.5–14.5)
GLUCOSE SERPL-MCNC: 99 MG/DL (ref 74–99)
HCT VFR BLD AUTO: 31.7 % (ref 41–52)
HGB BLD-MCNC: 10 G/DL (ref 13.5–17.5)
MCH RBC QN AUTO: 31.1 PG (ref 26–34)
MCHC RBC AUTO-ENTMCNC: 31.5 G/DL (ref 32–36)
MCV RBC AUTO: 98 FL (ref 80–100)
NRBC BLD-RTO: 0 /100 WBCS (ref 0–0)
PLATELET # BLD AUTO: 254 X10*3/UL (ref 150–450)
POTASSIUM SERPL-SCNC: 3.8 MMOL/L (ref 3.5–5.3)
PROT SERPL-MCNC: 5.7 G/DL (ref 6.4–8.2)
RBC # BLD AUTO: 3.22 X10*6/UL (ref 4.5–5.9)
SODIUM SERPL-SCNC: 139 MMOL/L (ref 136–145)
WBC # BLD AUTO: 7.1 X10*3/UL (ref 4.4–11.3)

## 2025-02-25 PROCEDURE — 2500000001 HC RX 250 WO HCPCS SELF ADMINISTERED DRUGS (ALT 637 FOR MEDICARE OP): Performed by: NURSE PRACTITIONER

## 2025-02-25 PROCEDURE — 97530 THERAPEUTIC ACTIVITIES: CPT | Mod: GP,CQ

## 2025-02-25 PROCEDURE — 80053 COMPREHEN METABOLIC PANEL: CPT | Performed by: PHYSICIAN ASSISTANT

## 2025-02-25 PROCEDURE — 85027 COMPLETE CBC AUTOMATED: CPT | Performed by: PHYSICIAN ASSISTANT

## 2025-02-25 PROCEDURE — 2500000004 HC RX 250 GENERAL PHARMACY W/ HCPCS (ALT 636 FOR OP/ED): Performed by: PHYSICIAN ASSISTANT

## 2025-02-25 PROCEDURE — 36415 COLL VENOUS BLD VENIPUNCTURE: CPT | Performed by: PHYSICIAN ASSISTANT

## 2025-02-25 PROCEDURE — 2500000001 HC RX 250 WO HCPCS SELF ADMINISTERED DRUGS (ALT 637 FOR MEDICARE OP): Performed by: PHYSICIAN ASSISTANT

## 2025-02-25 PROCEDURE — 1100000001 HC PRIVATE ROOM DAILY

## 2025-02-25 PROCEDURE — 97535 SELF CARE MNGMENT TRAINING: CPT | Mod: GO,CO

## 2025-02-25 PROCEDURE — 2500000004 HC RX 250 GENERAL PHARMACY W/ HCPCS (ALT 636 FOR OP/ED): Performed by: INTERNAL MEDICINE

## 2025-02-25 PROCEDURE — 97116 GAIT TRAINING THERAPY: CPT | Mod: GP,CQ

## 2025-02-25 RX ADMIN — ATORVASTATIN CALCIUM 10 MG: 10 TABLET, FILM COATED ORAL at 20:44

## 2025-02-25 RX ADMIN — AMPICILLIN SODIUM AND SULBACTAM SODIUM 3 G: 2; 1 INJECTION, POWDER, FOR SOLUTION INTRAMUSCULAR; INTRAVENOUS at 00:52

## 2025-02-25 RX ADMIN — AMPICILLIN SODIUM AND SULBACTAM SODIUM 3 G: 2; 1 INJECTION, POWDER, FOR SOLUTION INTRAMUSCULAR; INTRAVENOUS at 18:09

## 2025-02-25 RX ADMIN — AMPICILLIN SODIUM AND SULBACTAM SODIUM 3 G: 2; 1 INJECTION, POWDER, FOR SOLUTION INTRAMUSCULAR; INTRAVENOUS at 05:52

## 2025-02-25 RX ADMIN — METOPROLOL SUCCINATE 25 MG: 25 TABLET, EXTENDED RELEASE ORAL at 08:23

## 2025-02-25 RX ADMIN — ENOXAPARIN SODIUM 40 MG: 100 INJECTION SUBCUTANEOUS at 16:50

## 2025-02-25 RX ADMIN — POLYETHYLENE GLYCOL 3350 17 G: 17 POWDER, FOR SOLUTION ORAL at 08:23

## 2025-02-25 ASSESSMENT — COGNITIVE AND FUNCTIONAL STATUS - GENERAL
MOVING TO AND FROM BED TO CHAIR: A LOT
PERSONAL GROOMING: A LOT
MOVING FROM LYING ON BACK TO SITTING ON SIDE OF FLAT BED WITH BEDRAILS: A LOT
HELP NEEDED FOR BATHING: A LOT
DRESSING REGULAR LOWER BODY CLOTHING: A LOT
MOBILITY SCORE: 15
TOILETING: TOTAL
EATING MEALS: A LITTLE
DRESSING REGULAR LOWER BODY CLOTHING: TOTAL
TURNING FROM BACK TO SIDE WHILE IN FLAT BAD: A LITTLE
TURNING FROM BACK TO SIDE WHILE IN FLAT BAD: A LOT
DAILY ACTIVITIY SCORE: 14
DRESSING REGULAR UPPER BODY CLOTHING: A LOT
PERSONAL GROOMING: A LITTLE
DAILY ACTIVITIY SCORE: 12
HELP NEEDED FOR BATHING: A LOT
DRESSING REGULAR UPPER BODY CLOTHING: A LOT
CLIMB 3 TO 5 STEPS WITH RAILING: TOTAL
TOILETING: A LOT
WALKING IN HOSPITAL ROOM: A LOT
STANDING UP FROM CHAIR USING ARMS: A LOT
MOVING TO AND FROM BED TO CHAIR: A LOT
CLIMB 3 TO 5 STEPS WITH RAILING: A LOT
WALKING IN HOSPITAL ROOM: TOTAL
MOBILITY SCORE: 10
STANDING UP FROM CHAIR USING ARMS: A LOT

## 2025-02-25 ASSESSMENT — PAIN - FUNCTIONAL ASSESSMENT
PAIN_FUNCTIONAL_ASSESSMENT: 0-10
PAIN_FUNCTIONAL_ASSESSMENT: 0-10

## 2025-02-25 ASSESSMENT — PAIN SCALES - GENERAL
PAINLEVEL_OUTOF10: 0 - NO PAIN
PAINLEVEL_OUTOF10: 0 - NO PAIN

## 2025-02-25 ASSESSMENT — ACTIVITIES OF DAILY LIVING (ADL): HOME_MANAGEMENT_TIME_ENTRY: 42

## 2025-02-25 NOTE — PROGRESS NOTES
Occupational Therapy    OT Treatment    Patient Name: Sebas Soto  MRN: [id number]  Today's Date: 2/25/2025  Time Calculation  Start Time: 1105  Stop Time: 1147  Time Calculation (min): 42 min       3017/3017-A    Assessment:  End of Session Communication: Bedside nurse  End of Session Patient Position: Up in chair, Alarm on       Plan:  OT Frequency: Daily  OT Discharge Recommendations: High intensity level of continued care     Subjective        02/25/25 1105   OT Last Visit   OT Received On 02/25/25   General   Prior to Session Communication Bedside nurse   Patient Position Received Bed, 3 rail up;Alarm on   Preferred Learning Style verbal;visual   General Comment Pt very pleasant and agreeable to work with therapy, increased time in room for multiple BMs throughout session, RN made aware.   Precautions   Medical Precautions Fall precautions   Post-Surgical Precautions Spinal precautions   Cognition   Overall Cognitive Status WFL   Orientation Level Oriented X4   Toileting   Toileting Level of Assistance Dependent   Where Assessed Bedside commode   Bed Mobility   Bed Mobility Yes   Bed Mobility 1   Bed Mobility 1 Supine to sitting   Level of Assistance 1 Moderate assistance;+2   Bed Mobility Comments 1 cues for proper log roll technique   Transfers   Transfer Yes   Transfer 1   Transfer From 1 Sit to   Transfer to 1 Stand   Technique 1 Sit to stand;Stand to sit   Transfer Device 1 Walker;Gait belt   Transfer Level of Assistance 1 Moderate assistance;+2;Moderate verbal cues;Moderate tactile cues   Trials/Comments 1 Pt performed sit<>stand from bed Mod Ax2, isaiah ryder brought in and multiple sit<>stands performed, transfered from bed>BSC>chair.   Toilet Transfers   Toilet Transfer From Bed   Toilet Transfer Type To   Toilet Transfer to Standard bedside commode   Toilet Transfer Technique   (isaiah ryder with Mod Ax2)   IP OT Assessment   End of Session Communication Bedside nurse   End of Session Patient Position  Up in chair;Alarm on   Inpatient Plan   OT Frequency Daily   OT Discharge Recommendations High intensity level of continued care       Outcome Measures:[facility] Daily Activity  Putting on and taking off regular lower body clothing: Total  Bathing (including washing, rinsing, drying): A lot  Putting on and taking off regular upper body clothing: A lot  Toileting, which includes using toilet, bedpan or urinal: Total  Taking care of personal grooming such as brushing teeth: A little  Eating Meals: A little  Daily Activity - Total Score: 12  Education Documentation  Body Mechanics, taught by LEBRON Willams at 2/25/2025  1:47 PM.  Learner: Patient  Readiness: Acceptance  Method: Explanation, Demonstration  Response: Verbalizes Understanding, Demonstrated Understanding, Needs Reinforcement    Precautions, taught by LEBRON Willams at 2/25/2025  1:47 PM.  Learner: Patient  Readiness: Acceptance  Method: Explanation, Demonstration  Response: Verbalizes Understanding, Demonstrated Understanding, Needs Reinforcement    ADL Training, taught by LEBRON Willams at 2/25/2025  1:47 PM.  Learner: Patient  Readiness: Acceptance  Method: Explanation, Demonstration  Response: Verbalizes Understanding, Demonstrated Understanding, Needs Reinforcement    Education Comments  No comments found.            Goals:  Encounter Problems       Encounter Problems (Active)       Dressings Lower Extremities       STG - Patient to complete lower body dressing min assist (Progressing)       Start:  02/24/25    Expected End:  03/10/25               Functional Balance       LTG - Patient will maintain standing and sitting balance to allow for completion of daily activities (Progressing)       Start:  02/24/25    Expected End:  03/10/25               Grooming       STG - Patient completes grooming SUP (Progressing)       Start:  02/24/25    Expected End:  03/10/25               OT Transfers       STG - Patient will  perform bed mobility SUP (Progressing)       Start:  02/24/25    Expected End:  03/10/25            STG - Patient will perform toilet transfer mod assist (Progressing)       Start:  02/24/25    Expected End:  03/10/25            Pt. will follow spine precautions with transfers and ADLs (Progressing)       Start:  02/24/25    Expected End:  03/10/25

## 2025-02-25 NOTE — ASSESSMENT & PLAN NOTE
After discussion with Dr. Qureshi would like to avoid surgical intervention and continue with wound care via nursing and infectious disease recommendations

## 2025-02-25 NOTE — CARE PLAN
EOS Note Pt has remained HDS throughout shift. Pt remains on room air, AO x 4 with some hallucinations, believes people are in his room. He knows his , place and name. He knows he's at the hospital but believes it is at a shopping center. Pt is pleasant and calm and redirectable. Pt's Unasyn has been re-timed due to not being available from pharmacy. Pt is resting comfortably in his chair, alarms on and call light within reach.      The patient's goals for the shift include      The clinical goals for the shift include see plan of care

## 2025-02-25 NOTE — PROGRESS NOTES
Sebas Soto  02/24/25  [id number]  Roya Simeon MD  This is a patient with a past medical history as detailed below admitted to   ED with chief complaint of postoperative infection, patient had a L2 L5 laminectomy with TLIF of L4-L5 with Dr. Qureshi on 1/31/25 was discharged earlier this month to a rehab facility, his wife states [facility].  She states that a nurse there noticed that there was some dehiscence of the wound about a week ago, this was the first time that they realized something might be wrong.  Patient has not been progressing as normal, has not yet ambulated, they are still having to use a Julia lift per neurosurgery notes.  He states he has been working on his physical therapy.  When he was seen today for his first appointment since the wound dehisced, Colette Reyes NP became concerned for possible postoperative infection.  Patient states that he has baseline incontinence from previous prostate cancer, but states that that is unchanged, no new saddle paresthesias, he does endorse some weakness mostly in his left leg but that is from previous to surgery.     Patient evaluated by neurosurgery .  Seen and examined today denies chest pain shortness of breath or abdominal pain back pain is tolerable no fever or chills denies numbness or tingling    Assessment and plan   1-postop infection Neurosurgery is consulted patient is status post  L2-L5 laminectomies & L4-5 TLIF at [facility] by Dr. Philippe Qureshi on 1/31/25     IV antibiotics   CT L spine reviewed with no hardware complication, no deep dehiscence seen.   MRI done  Continue vancomycin and Zosyn  Wound and blood cultures ordered  Neurosurgery recommended plastic surgery   Continue dry dressing changes to lumbar spine incision TID  Definitive plan for lumbar spine wound to be made this week pending plastics surgery eval  2-  anemia follow-up CBC stable  3-elevated LFTs currently has  no abdominal pain follow-up on the CMP if worse consider ultrasound right upper quadrant trending down  4-abnormal urine analysis could be urinary tract infection obtain culture antibiotics follow-up culture and sensitivity of the urine  Discussed with the  consultants.      Review of other systems negative other than HPI  Past medical history    Epic& consultants notes reviewed  Most recent images Reviewed  Last EKG/Rhythm reviewed      Vital signs has been reviewed     SKIN:  No rashes .   ENT mucosa,  Normal/nose normal   NECK: no jugulovenous distention  NO lymphadenopathy   LUNGS:No wheezing,no ronchi  no rales.  CARDIAC:  S1 and S2  ABDOMEN: Abdomen soft, non-tender.    EXTREMITIES: Extremities normal.   NEURO: non focal    PULSES: + pedal / radial   No edema   SURGICAL INCISION: erythema. Areas of slough. There are open areas of the incision distally. Purulent and malodorous drainage noted from the incision. (Picture is uploaded in media)  He is in a wheelchair.                   Past Medical History  He has a past medical history of Abnormal ECG, Arthritis, Bilateral lower extremity edema, Diverticulosis, Hyperlipidemia, Neurogenic claudication, Personal history of other diseases of the digestive system, Personal history of other infectious and parasitic diseases, Prostate cancer (Multi), Short-term memory loss, and Spondylolisthesis.    Surgical History  He has a past surgical history that includes Hernia repair (07/30/2018); Colonoscopy (07/30/2018); Tonsillectomy (07/30/2018); and Neck surgery (2007).     Social History  He reports that he has quit smoking. His smoking use included cigarettes. He has never been exposed to tobacco smoke. He has never used smokeless tobacco. He reports that he does not drink alcohol and does not use drugs.    Family History  Family History   Problem Relation Name Age of Onset    Alzheimer's disease Mother      Anemia Mother      Breast cancer Sister      Stroke Sister           Allergies  Patient has no known allergies.          Last Recorded Vitals  /77 (BP Location: Left arm, Patient Position: Lying)   Pulse 78   Temp 36.3 °C (97.3 °F) (Temporal)   Resp 18   Wt 83.6 kg (184 lb 4.9 oz)   SpO2 96%          Roya Simeon MD

## 2025-02-25 NOTE — PROGRESS NOTES
Physical Therapy    Physical Therapy Treatment    Patient Name: Sebas Soto  MRN: [id number]  Today's Date: 2/25/2025  Time Calculation  Start Time: 1100  Stop Time: 1140  Time Calculation (min): 40 min     3017/3017-A       02/25/25 1100   PT  Visit   PT Received On 02/25/25   Response to Previous Treatment Patient with no complaints from previous session.   General   Referred By Dr. Simeon   Past Medical History Relevant to Rehab HLD, prostate cancer, cervical spondylosis, macular degeneration   Prior to Session Communication Bedside nurse   Patient Position Received Bed, 3 rail up;Alarm on   Preferred Learning Style verbal;visual   Precautions   Medical Precautions Fall precautions   Post-Surgical Precautions Spinal precautions   Pain Assessment   Pain Assessment 0-10   0-10 (Numeric) Pain Score 0 - No pain   Cognition   Overall Cognitive Status WFL   Orientation Level Oriented X4   Coordination   Movements are Fluid and Coordinated No   Postural Control   Postural Control Impaired   Righting Reactions fair   Protective Responses fair   Posture Comment assist to sit upright   Static Sitting Balance   Static Sitting-Balance Support Bilateral upper extremity supported   Static Sitting-Level of Assistance Minimum assistance;Moderate assistance   Static Standing Balance   Static Standing-Balance Support Bilateral upper extremity supported   Static Standing-Level of Assistance Moderate assistance;Maximum assistance   Static Standing-Comment/Number of Minutes with walker and sera stedy multiple times.  varied assist, more needed as patient fatigued from several STS trials as patient was incontinent of bowels   Dynamic Standing Balance   Dynamic Standing-Balance Support Bilateral upper extremity supported   Dynamic Standing-Level of Assistance Minimum assistance;Moderate assistance   Dynamic Standing-Balance Forward lean;Lateral lean  (weight shifting)   Therapeutic Activity   Therapeutic Activity Performed Yes    Therapeutic Activity 1 several STS from semi seated position in sera stedy.  x4 trials, varied assist from min to mod x2 with heavy left lean   Bed Mobility   Bed Mobility Yes   Bed Mobility 1   Bed Mobility 1 Supine to sitting   Level of Assistance 1 Moderate assistance;+2   Bed Mobility Comments 1 educated on and performed LOG roll with limited sucess.  patient rolled to side well though continued to try to roll all the way prone needing max VC/TC to correct.   Transfers   Transfer Yes   Transfer 1   Transfer From 1 Bed to   Transfer to 1 Stand   Technique 1 Sit to stand;Stand to sit   Transfer Device 1 Walker;Gait belt   Transfer Level of Assistance 1 Moderate assistance;+2;Moderate verbal cues;Moderate tactile cues   Trials/Comments 1 x2 trials, unable ot maintain for more than a few seconds   Transfers 2   Transfer From 2 Bed to   Transfer to 2 Stand   Technique 2 Sit to stand;Stand to sit   Transfer Device 2 Mercedes Stedy;Gait belt   Transfer Level of Assistance 2 Moderate assistance;Moderate verbal cues;+2   Trials/Comments 2 several trials to allow for rest breaks.   Transfers 3   Transfer From 3 Bed to   Transfer to 3 Commode-standard   Technique 3 Sit to stand;Stand to sit   Transfer Device 3 Mercedes Stedy;Gait belt   Transfer Level of Assistance 3 Moderate assistance;Moderate verbal cues;Moderate tactile cues;+2   Transfers 4   Transfer From 4 Commode-standard to   Transfer to 4 Chair with arms   Technique 4 Stand to sit   Transfer Device 4 Mercedes Stedy;Gait belt   Transfer Level of Assistance 4 Moderate assistance;Moderate verbal cues;Moderate tactile cues;+2   Activity Tolerance   Endurance Tolerates 10 - 20 min exercise with multiple rests   Early Mobility/Exercise Safety Screen Proceed with mobilization - No exclusion criteria met   PT Assessment   PT Assessment Results Decreased strength;Decreased endurance;Impaired balance;Decreased mobility   Rehab Prognosis Good   Barriers to Discharge Home Physical  needs   Physical Needs Ambulating household distances limited by function/safety;High falls risk due to function or environment   Evaluation/Treatment Tolerance Patient limited by fatigue   End of Session Communication Bedside nurse   End of Session Patient Position Up in chair;Alarm on   Outpatient Education   Individual(s) Educated Patient   Education Provided Fall Risk;Body Mechanics;POC;Posture   Risk and Benefits Discussed with Patient/Caregiver/Other yes   Patient/Caregiver Demonstrated Understanding yes   Plan of Care Discussed and Agreed Upon yes   Patient Response to Education Patient/Caregiver Verbalized Understanding of Information   PT Plan   Inpatient/Swing Bed or Outpatient Inpatient   PT Plan   Treatment/Interventions Bed mobility;Transfer training;Balance training;Therapeutic activity;Positioning   PT Plan Ongoing PT   PT Frequency Daily   PT Discharge Recommendations High intensity level of continued care   Equipment Recommended upon Discharge Wheeled walker   PT Recommended Transfer Status Assist x2;Assistive device         Outcome Measures:  [facility] Basic Mobility  Turning from your back to your side while in a flat bed without using bedrails: A lot  Moving from lying on your back to sitting on the side of a flat bed without using bedrails: A lot  Moving to and from bed to chair (including a wheelchair): A lot  Standing up from a chair using your arms (e.g. wheelchair or bedside chair): A lot  To walk in hospital room: Total  Climbing 3-5 steps with railing: Total  Basic Mobility - Total Score: 10          EDUCATION:  Outpatient Education  Individual(s) Educated: Patient  Education Provided: Fall Risk, Body Mechanics, POC, Posture  Risk and Benefits Discussed with Patient/Caregiver/Other: yes  Patient/Caregiver Demonstrated Understanding: yes  Plan of Care Discussed and Agreed Upon: yes  Patient Response to Education: Patient/Caregiver Verbalized Understanding of Information      GOALS:  Encounter  Problems       Encounter Problems (Active)       Balance       Pt will demonstrate static/dynamic standing balance for >/= 3 min min A x2 without evidence of instability or LOB with functional mobility tasks.         Start:  02/24/25    Expected End:  03/10/25               Mobility       Pt will be able to ambulate >/= 4 steps (forward, backward, lateral) with FWW min A x2 with good safety awareness.        Start:  02/24/25    Expected End:  03/10/25            Pt will complete supine, seated and standing exercises to maintain/improve overall strength with minimal verbal cues.         Start:  02/24/25    Expected End:  03/10/25               PT Transfers       Pt will be able to complete all bed mobility tasks SBA with use of bed HR and log roll technique.        Start:  02/24/25    Expected End:  03/10/25            Pt will be able to complete all transfers with FWW min A x2 demonstrating good safety awareness and proper body mechanics and stability.       Start:  02/24/25    Expected End:  03/10/25               Pain - Adult

## 2025-02-25 NOTE — DOCUMENTATION CLARIFICATION NOTE
"    PATIENT:               RALF BENITES  ACCT #:                  [id number]  MRN:                       [id number]  :                       1940  ADMIT DATE:       2025 2:22 PM  DISCH DATE:  RESPONDING PROVIDER #:        [id number]          PROVIDER RESPONSE TEXT:    Metabolic Encephalopathy 2/ UTI/Post-op wound infection    CDI QUERY TEXT:    Clarification    Instruction:    Based on your assessment of the patient and the clinical information, please provide the requested documentation by clicking on the appropriate radio button and enter any additional information if prompted.    Question: Please further clarify the type of Encephalopathy as    When answering this query, please exercise your independent professional judgment. The fact that a question is being asked, does not imply that any particular answer is desired or expected.    The patient's clinical indicators include:  Clinical Information:  84M presents for post-op infection    Clinical Indicators:  - NN,  0640: Rested intermittently during the night and wakes disoriented on where he is. He did express still having visual hallucinations although he did not describe them in detail. IV antibiotics given per order. Redirected multiple times throughout this shfit.  - NS, , Defta: \"His course was complicated by hallucinations and encephalopathy...presenting with wound dehiscence and incisional drainage, also found to have UTI.\"  - NN,  0615: He mentioned one particular hallucination where he expressed waking and seeing a \"hole in the floor\" where \"it goes down 5feet\".  No other hallucinations verbalized this shift.  - NS, , Defta: \"some hallucinations and confusion overnight...Continue broad spectrum antibiotics for postoperative infection and UTI\"  - NS, , Milks: \"hallucinations improved overnight.\"    Treatment:  Zosyn IV (-present), Vanco IV (-present), LR IVF bolus (), WC, UC    Risk Factors:  Post-op wound " infection,  Options provided:  -- Metabolic Encephalopathy 2/2 UTI/Post-op wound infection  -- Other - I will add my own diagnosis  -- Refer to Clinical Documentation Reviewer    Query created by: Keshav Marquez on 2/24/2025 3:31 PM      Electronically signed by:  TAVO BERGER MD 2/24/2025 7:55 PM

## 2025-02-25 NOTE — PROGRESS NOTES
"Sebas Soto is a 84 y.o. male on day 4 of admission presenting with Postoperative infection, unspecified type, initial encounter.    Subjective   Patient doing well again this AM. Patient denies pain this AM, none in lower back or radiating into legs.  Continues with lower muscle strength as prior with left leg less strength than right.          Objective     Alert, appropriate, Ox3  RUE D4+ B/T5 HG5 IO4  LUE D1 (chronic) B/T 5 HG5 IO4  BLE HF4+ KE4+ DF/PF 5  Back incision with areas of dehiscence with serous drainage, superior and inferior incisions reviewed, picture sent to Dr. Qureshi and repacked with Nu Gauze and covered       Last Recorded Vitals  Blood pressure 125/75, pulse 78, temperature 35.5 °C (95.9 °F), temperature source Temporal, resp. rate 18, height 1.727 m (5' 8\"), weight 83.6 kg (184 lb 4.9 oz), SpO2 96%.  Intake/Output last 3 Shifts:  I/O last 3 completed shifts:  In: 1110 (13.3 mL/kg) [P.O.:460; IV Piggyback:650]  Out: 1350 (16.1 mL/kg) [Urine:1350 (0.4 mL/kg/hr)]  Weight: 83.6 kg       Assessment/Plan   Assessment & Plan  Postoperative infection, unspecified type, initial encounter  After discussion with Dr. Qureshi would like to avoid surgical intervention and continue with wound care via nursing and infectious disease recommendations    CT L spine reviewed with no hardware complication, no deep dehiscence seen.    ID on consult, awaiting recs  Continue broad spectrum antibiotics for postoperative infection and UTI  Follow-up superficial wound culture results    Continue dry dressing changes as per wound nurse recommendations      Beau Agarwal PA-C      "

## 2025-02-25 NOTE — CARE PLAN
Problem: Pain - Adult  Goal: Verbalizes/displays adequate comfort level or baseline comfort level  Outcome: Progressing     Problem: Discharge Planning  Goal: Discharge to home or other facility with appropriate resources  Outcome: Progressing     Problem: Chronic Conditions and Co-morbidities  Goal: Patient's chronic conditions and co-morbidity symptoms are monitored and maintained or improved  Outcome: Progressing     Problem: Nutrition  Goal: Nutrient intake appropriate for maintaining nutritional needs  Outcome: Progressing     Problem: Skin  Goal: Decreased wound size/increased tissue granulation at next dressing change  Outcome: Progressing  Goal: Promote/optimize nutrition  Outcome: Progressing  Goal: Promote skin healing  Outcome: Progressing  Flowsheets (Taken 2/24/2025 2313)  Promote skin healing: Assess skin/pad under line(s)/device(s)     Problem: Fall/Injury  Goal: Not fall by end of shift  Outcome: Progressing  Goal: Be free from injury by end of the shift  Outcome: Progressing  Goal: Verbalize understanding of personal risk factors for fall in the hospital  Outcome: Progressing  Goal: Verbalize understanding of risk factor reduction measures to prevent injury from fall in the home  Outcome: Progressing  Goal: Use assistive devices by end of the shift  Outcome: Progressing  Goal: Pace activities to prevent fatigue by end of the shift  Outcome: Progressing     Problem: Infection related to problem list condition  Goal: Infection will resolve through treatment  Outcome: Progressing     Problem: Dressings Lower Extremities  Goal: STG - Patient to complete lower body dressing min assist  Outcome: Progressing     Problem: Grooming  Goal: STG - Patient completes grooming SUP  Outcome: Progressing   The patient's goals for the shift include      The clinical goals for the shift include Patient will remain safe this shift.

## 2025-02-25 NOTE — PROGRESS NOTES
Placed call to patient's wife, Chrissie.  Updated her that the [facility] is not in network and they have no beds. She is giong to have her daughter in law (works there) talk to them. Also discussed therapy recommendation for [place]. She is agreeable to [facility] reviewing to see if they can accept.   Emailed list of skilled facilities to [email or web address] per Chrissie's request.   Asked dsc to send referral to  [facility]

## 2025-02-26 LAB
ALBUMIN SERPL BCP-MCNC: 3.2 G/DL (ref 3.4–5)
ALP SERPL-CCNC: 82 U/L (ref 33–136)
ALT SERPL W P-5'-P-CCNC: 41 U/L (ref 10–52)
ANION GAP SERPL CALC-SCNC: 13 MMOL/L (ref 10–20)
AST SERPL W P-5'-P-CCNC: 38 U/L (ref 9–39)
BACTERIA BLD CULT: NORMAL
BACTERIA BLD CULT: NORMAL
BILIRUB SERPL-MCNC: 0.5 MG/DL (ref 0–1.2)
BUN SERPL-MCNC: 15 MG/DL (ref 6–23)
CALCIUM SERPL-MCNC: 8.4 MG/DL (ref 8.6–10.3)
CHLORIDE SERPL-SCNC: 104 MMOL/L (ref 98–107)
CO2 SERPL-SCNC: 24 MMOL/L (ref 21–32)
CREAT SERPL-MCNC: 0.48 MG/DL (ref 0.5–1.3)
EGFRCR SERPLBLD CKD-EPI 2021: >90 ML/MIN/1.73M*2
ERYTHROCYTE [DISTWIDTH] IN BLOOD BY AUTOMATED COUNT: 17.6 % (ref 11.5–14.5)
GLUCOSE SERPL-MCNC: 89 MG/DL (ref 74–99)
HCT VFR BLD AUTO: 35.3 % (ref 41–52)
HGB BLD-MCNC: 10.9 G/DL (ref 13.5–17.5)
MCH RBC QN AUTO: 31.2 PG (ref 26–34)
MCHC RBC AUTO-ENTMCNC: 30.9 G/DL (ref 32–36)
MCV RBC AUTO: 101 FL (ref 80–100)
NRBC BLD-RTO: 0 /100 WBCS (ref 0–0)
PLATELET # BLD AUTO: 209 X10*3/UL (ref 150–450)
POTASSIUM SERPL-SCNC: 3.8 MMOL/L (ref 3.5–5.3)
PROT SERPL-MCNC: 5.7 G/DL (ref 6.4–8.2)
RBC # BLD AUTO: 3.49 X10*6/UL (ref 4.5–5.9)
SODIUM SERPL-SCNC: 137 MMOL/L (ref 136–145)
WBC # BLD AUTO: 6.6 X10*3/UL (ref 4.4–11.3)

## 2025-02-26 PROCEDURE — 2500000004 HC RX 250 GENERAL PHARMACY W/ HCPCS (ALT 636 FOR OP/ED): Performed by: INTERNAL MEDICINE

## 2025-02-26 PROCEDURE — 2500000001 HC RX 250 WO HCPCS SELF ADMINISTERED DRUGS (ALT 637 FOR MEDICARE OP): Performed by: NURSE PRACTITIONER

## 2025-02-26 PROCEDURE — 97530 THERAPEUTIC ACTIVITIES: CPT | Mod: GP,CQ

## 2025-02-26 PROCEDURE — 84075 ASSAY ALKALINE PHOSPHATASE: CPT | Performed by: PHYSICIAN ASSISTANT

## 2025-02-26 PROCEDURE — 1100000001 HC PRIVATE ROOM DAILY

## 2025-02-26 PROCEDURE — 2500000004 HC RX 250 GENERAL PHARMACY W/ HCPCS (ALT 636 FOR OP/ED): Performed by: PHYSICIAN ASSISTANT

## 2025-02-26 PROCEDURE — 97535 SELF CARE MNGMENT TRAINING: CPT | Mod: GO,CO

## 2025-02-26 PROCEDURE — 97110 THERAPEUTIC EXERCISES: CPT | Mod: GP,CQ

## 2025-02-26 PROCEDURE — 36415 COLL VENOUS BLD VENIPUNCTURE: CPT | Performed by: PHYSICIAN ASSISTANT

## 2025-02-26 PROCEDURE — 85027 COMPLETE CBC AUTOMATED: CPT | Performed by: PHYSICIAN ASSISTANT

## 2025-02-26 PROCEDURE — 2500000002 HC RX 250 W HCPCS SELF ADMINISTERED DRUGS (ALT 637 FOR MEDICARE OP, ALT 636 FOR OP/ED): Performed by: NURSE PRACTITIONER

## 2025-02-26 PROCEDURE — 2500000001 HC RX 250 WO HCPCS SELF ADMINISTERED DRUGS (ALT 637 FOR MEDICARE OP): Performed by: PHYSICIAN ASSISTANT

## 2025-02-26 RX ORDER — POTASSIUM CHLORIDE 20 MEQ/1
20 TABLET, EXTENDED RELEASE ORAL ONCE
Status: COMPLETED | OUTPATIENT
Start: 2025-02-26 | End: 2025-02-26

## 2025-02-26 RX ADMIN — METOPROLOL SUCCINATE 25 MG: 25 TABLET, EXTENDED RELEASE ORAL at 08:58

## 2025-02-26 RX ADMIN — ENOXAPARIN SODIUM 40 MG: 100 INJECTION SUBCUTANEOUS at 17:32

## 2025-02-26 RX ADMIN — AMPICILLIN SODIUM AND SULBACTAM SODIUM 3 G: 2; 1 INJECTION, POWDER, FOR SOLUTION INTRAMUSCULAR; INTRAVENOUS at 12:12

## 2025-02-26 RX ADMIN — AMPICILLIN SODIUM AND SULBACTAM SODIUM 3 G: 2; 1 INJECTION, POWDER, FOR SOLUTION INTRAMUSCULAR; INTRAVENOUS at 06:44

## 2025-02-26 RX ADMIN — POTASSIUM CHLORIDE 20 MEQ: 1500 TABLET, EXTENDED RELEASE ORAL at 09:01

## 2025-02-26 RX ADMIN — ATORVASTATIN CALCIUM 10 MG: 10 TABLET, FILM COATED ORAL at 21:41

## 2025-02-26 RX ADMIN — POLYETHYLENE GLYCOL 3350 17 G: 17 POWDER, FOR SOLUTION ORAL at 08:58

## 2025-02-26 RX ADMIN — AMPICILLIN SODIUM AND SULBACTAM SODIUM 3 G: 2; 1 INJECTION, POWDER, FOR SOLUTION INTRAMUSCULAR; INTRAVENOUS at 17:32

## 2025-02-26 RX ADMIN — AMPICILLIN SODIUM AND SULBACTAM SODIUM 3 G: 2; 1 INJECTION, POWDER, FOR SOLUTION INTRAMUSCULAR; INTRAVENOUS at 00:49

## 2025-02-26 ASSESSMENT — COGNITIVE AND FUNCTIONAL STATUS - GENERAL
DRESSING REGULAR LOWER BODY CLOTHING: A LOT
MOVING TO AND FROM BED TO CHAIR: A LOT
WALKING IN HOSPITAL ROOM: A LOT
MOBILITY SCORE: 15
EATING MEALS: A LITTLE
DRESSING REGULAR LOWER BODY CLOTHING: TOTAL
TURNING FROM BACK TO SIDE WHILE IN FLAT BAD: A LITTLE
STANDING UP FROM CHAIR USING ARMS: A LOT
CLIMB 3 TO 5 STEPS WITH RAILING: A LOT
DAILY ACTIVITIY SCORE: 15
PERSONAL GROOMING: A LITTLE
MOBILITY SCORE: 10
DAILY ACTIVITIY SCORE: 14
MOVING TO AND FROM BED TO CHAIR: A LOT
TOILETING: A LOT
MOVING FROM LYING ON BACK TO SITTING ON SIDE OF FLAT BED WITH BEDRAILS: A LITTLE
MOVING FROM LYING ON BACK TO SITTING ON SIDE OF FLAT BED WITH BEDRAILS: A LITTLE
STANDING UP FROM CHAIR USING ARMS: A LOT
STANDING UP FROM CHAIR USING ARMS: A LOT
PERSONAL GROOMING: A LITTLE
HELP NEEDED FOR BATHING: A LOT
DAILY ACTIVITIY SCORE: 15
MOVING FROM LYING ON BACK TO SITTING ON SIDE OF FLAT BED WITH BEDRAILS: A LOT
CLIMB 3 TO 5 STEPS WITH RAILING: A LOT
CLIMB 3 TO 5 STEPS WITH RAILING: TOTAL
TURNING FROM BACK TO SIDE WHILE IN FLAT BAD: A LOT
MOBILITY SCORE: 14
HELP NEEDED FOR BATHING: A LOT
DRESSING REGULAR UPPER BODY CLOTHING: A LITTLE
MOBILITY SCORE: 14
MOVING TO AND FROM BED TO CHAIR: A LOT
DRESSING REGULAR UPPER BODY CLOTHING: A LITTLE
TURNING FROM BACK TO SIDE WHILE IN FLAT BAD: A LITTLE
WALKING IN HOSPITAL ROOM: TOTAL
STANDING UP FROM CHAIR USING ARMS: A LOT
HELP NEEDED FOR BATHING: A LOT
DAILY ACTIVITIY SCORE: 12
WALKING IN HOSPITAL ROOM: A LOT
TOILETING: A LOT
EATING MEALS: A LITTLE
HELP NEEDED FOR BATHING: A LOT
PERSONAL GROOMING: A LOT
DRESSING REGULAR UPPER BODY CLOTHING: A LOT
TOILETING: A LOT
DRESSING REGULAR LOWER BODY CLOTHING: A LOT
EATING MEALS: A LITTLE
PERSONAL GROOMING: A LITTLE
MOVING TO AND FROM BED TO CHAIR: A LOT
TOILETING: TOTAL
WALKING IN HOSPITAL ROOM: A LOT
DRESSING REGULAR LOWER BODY CLOTHING: A LOT
CLIMB 3 TO 5 STEPS WITH RAILING: A LOT
DRESSING REGULAR UPPER BODY CLOTHING: A LOT
TURNING FROM BACK TO SIDE WHILE IN FLAT BAD: A LITTLE

## 2025-02-26 ASSESSMENT — PAIN SCALES - GENERAL
PAINLEVEL_OUTOF10: 0 - NO PAIN

## 2025-02-26 ASSESSMENT — ACTIVITIES OF DAILY LIVING (ADL)
BATHING_LEVEL_OF_ASSISTANCE: MAXIMUM ASSISTANCE
HOME_MANAGEMENT_TIME_ENTRY: 56

## 2025-02-26 ASSESSMENT — PAIN - FUNCTIONAL ASSESSMENT
PAIN_FUNCTIONAL_ASSESSMENT: 0-10
PAIN_FUNCTIONAL_ASSESSMENT: 0-10

## 2025-02-26 NOTE — CARE PLAN
The patient's goals for the shift include   Problem: Pain - Adult  Goal: Verbalizes/displays adequate comfort level or baseline comfort level  Outcome: Progressing     Problem: Chronic Conditions and Co-morbidities  Goal: Patient's chronic conditions and co-morbidity symptoms are monitored and maintained or improved  Outcome: Progressing     Problem: Skin  Goal: Decreased wound size/increased tissue granulation at next dressing change  Outcome: Progressing     Problem: Fall/Injury  Goal: Not fall by end of shift  Outcome: Progressing     Problem: Fall/Injury  Goal: Pace activities to prevent fatigue by end of the shift  Outcome: Progressing        The clinical goals for the shift include see plan of care  .

## 2025-02-26 NOTE — CARE PLAN
Problem: Pain - Adult  Goal: Verbalizes/displays adequate comfort level or baseline comfort level  Outcome: Progressing     Problem: Discharge Planning  Goal: Discharge to home or other facility with appropriate resources  Outcome: Progressing     Problem: Chronic Conditions and Co-morbidities  Goal: Patient's chronic conditions and co-morbidity symptoms are monitored and maintained or improved  Outcome: Progressing     Problem: Nutrition  Goal: Nutrient intake appropriate for maintaining nutritional needs  Outcome: Progressing     Problem: Skin  Goal: Decreased wound size/increased tissue granulation at next dressing change  2/26/2025 1759 by Sierra Oviedo RN  Outcome: Progressing  2/26/2025 1103 by Sierra Oviedo RN  Flowsheets (Taken 2/26/2025 1103)  Decreased wound size/increased tissue granulation at next dressing change: Protective dressings over bony prominences  Goal: Promote/optimize nutrition  Outcome: Progressing  Goal: Promote skin healing  2/26/2025 1759 by Sierra Oviedo RN  Outcome: Progressing  2/26/2025 1103 by Sierra Oviedo RN  Flowsheets (Taken 2/26/2025 1103)  Promote skin healing: Turn/reposition every 2 hours/use positioning/transfer devices     Problem: Fall/Injury  Goal: Not fall by end of shift  Outcome: Progressing  Goal: Be free from injury by end of the shift  Outcome: Progressing  Goal: Verbalize understanding of personal risk factors for fall in the hospital  Outcome: Progressing  Goal: Verbalize understanding of risk factor reduction measures to prevent injury from fall in the home  Outcome: Progressing  Goal: Use assistive devices by end of the shift  Outcome: Progressing  Goal: Pace activities to prevent fatigue by end of the shift  Outcome: Progressing     Problem: Infection related to problem list condition  Goal: Infection will resolve through treatment  Outcome: Progressing     Problem: Dressings Lower Extremities  Goal: STG - Patient to complete lower body  dressing min assist  Outcome: Progressing     Problem: Grooming  Goal: STG - Patient completes grooming SUP  Outcome: Progressing     0830- Beau RICHARD at bedside.     EOS- No acute changes throughout the shift. Patient denies any pain or shortness of breath. Patient up to chair this afternoon and evening using the sera steady. IV antibiotics continued. Wife at bedside this afternoon and evening. Safety maintained and call light within reach.

## 2025-02-26 NOTE — PROGRESS NOTES
Physical Therapy    Physical Therapy Treatment    Patient Name: Sebas Soto  MRN: [id number]  Today's Date: 2/26/2025  Time Calculation  Start Time: 1045  Stop Time: 1125  Time Calculation (min): 40 min     3017/3017-A       02/26/25 1045   PT  Visit   PT Received On 02/26/25   Response to Previous Treatment Patient with no complaints from previous session.   General   Referred By Dr. Simeon   Past Medical History Relevant to Rehab HLD, prostate cancer, cervical spondylosis, macular degeneration   Family/Caregiver Present No   Prior to Session Communication Bedside nurse   Patient Position Received Bed, 3 rail up;Alarm on   Preferred Learning Style verbal;visual   General Comment Pt pleasant and agreeable to PT   Precautions   Medical Precautions Fall precautions   Post-Surgical Precautions Spinal precautions   Precautions Comment Pt educated on spine precautions throughout session and demos compliance with reinforcement.   Pain Assessment   Pain Assessment 0-10   0-10 (Numeric) Pain Score 0 - No pain   Cognition   Overall Cognitive Status WFL   Orientation Level Oriented X4   Processing Speed WFL   Coordination   Movements are Fluid and Coordinated Yes   Postural Control   Postural Control Impaired   Static Sitting Balance   Static Sitting-Balance Support Bilateral upper extremity supported   Static Sitting-Level of Assistance Close supervision   Static Sitting-Comment/Number of Minutes x3 minutes   Therapeutic Exercise   Therapeutic Exercise Performed Yes  (R leg appears to be stronger than L with seated LE exercises)   Therapeutic Exercise Activity 1 PF/DF x15   Therapeutic Exercise Activity 2 LAQ x15   Therapeutic Exercise Activity 3 Marches x15   Therapeutic Exercise Activity 4 Pillow squeeze x15   Therapeutic Exercise Activity 5 Resisted hip ABD x15   Bed Mobility   Bed Mobility Yes   Bed Mobility 1   Bed Mobility 1 Log roll   Level of Assistance 1 +2;Minimum assistance   Bed Mobility Comments 1 Pt  demonstrates improved indepence with log roll to L side   Transfers   Transfer Yes   Transfer 1   Transfer From 1 Bed to   Transfer to 1 Stand   Technique 1 Sit to stand   Transfer Device 1 Mercedes Stedy   Transfer Level of Assistance 1 +2;+1 to manage equipment   Trials/Comments 1 x1   Transfers 2   Transfer From 2 Stand to   Transfer to 2 Commode-standard   Technique 2 Stand to sit;Sit to stand   Transfer Device 2 Mercedes Stedy   Transfer Level of Assistance 2 Moderate assistance;+2;+1 to manage equipment   Trials/Comments 2 x1   Transfers 3   Transfer From 3 Stand to   Transfer to 3 Chair with arms   Technique 3 Stand to sit   Transfer Device 3 Mercedes Stedy   Transfer Level of Assistance 3 Moderate assistance;+2;+1 to manage equipment   Trials/Comments 3 x1   Activity Tolerance   Endurance Tolerates 30 min exercise with multiple rests   Early Mobility/Exercise Safety Screen Proceed with mobilization - No exclusion criteria met   PT Assessment   PT Assessment Results Decreased strength;Decreased endurance;Impaired balance;Decreased mobility   Rehab Prognosis Good   Barriers to Discharge Home Physical needs   Physical Needs Ambulating household distances limited by function/safety;High falls risk due to function or environment   Evaluation/Treatment Tolerance Patient limited by fatigue   Strengths Ability to acquire knowledge   End of Session Communication Bedside nurse   Assessment Comment Pt still requires moderate assistance and sera stedy with transfers due to weakness and instability. Pt is making progress toward bed mobility goal. Pt will continue to benefit from PT interventions to improve overall strength, endurance, and stability with functional activities.   End of Session Patient Position Up in chair;Alarm on   Outpatient Education   Individual(s) Educated Patient   Education Provided Fall Risk;Body Mechanics;POC;Posture   Risk and Benefits Discussed with Patient/Caregiver/Other yes   Patient/Caregiver  Demonstrated Understanding yes   Plan of Care Discussed and Agreed Upon yes   Patient Response to Education Patient/Caregiver Verbalized Understanding of Information   PT Plan   Inpatient/Swing Bed or Outpatient Inpatient   PT Plan   Treatment/Interventions Bed mobility;Transfer training;Therapeutic exercise   PT Plan Ongoing PT   PT Frequency Daily   PT Discharge Recommendations High intensity level of continued care   Equipment Recommended upon Discharge Wheeled walker   PT Recommended Transfer Status Assist x2         Outcome Measures:  [facility] Basic Mobility  Turning from your back to your side while in a flat bed without using bedrails: A lot  Moving from lying on your back to sitting on the side of a flat bed without using bedrails: A lot  Moving to and from bed to chair (including a wheelchair): A lot  Standing up from a chair using your arms (e.g. wheelchair or bedside chair): A lot  To walk in hospital room: Total  Climbing 3-5 steps with railing: Total  Basic Mobility - Total Score: 10        EDUCATION:  Outpatient Education  Individual(s) Educated: Patient  Education Provided: Fall Risk, Body Mechanics, POC, Posture  Risk and Benefits Discussed with Patient/Caregiver/Other: yes  Patient/Caregiver Demonstrated Understanding: yes  Plan of Care Discussed and Agreed Upon: yes  Patient Response to Education: Patient/Caregiver Verbalized Understanding of Information      GOALS:  Encounter Problems       Encounter Problems (Active)       Balance       Pt will demonstrate static/dynamic standing balance for >/= 3 min min A x2 without evidence of instability or LOB with functional mobility tasks.         Start:  02/24/25    Expected End:  03/10/25               Mobility       Pt will be able to ambulate >/= 4 steps (forward, backward, lateral) with FWW min A x2 with good safety awareness.        Start:  02/24/25    Expected End:  03/10/25            Pt will complete supine, seated and standing exercises to  maintain/improve overall strength with minimal verbal cues.         Start:  02/24/25    Expected End:  03/10/25               PT Transfers       Pt will be able to complete all bed mobility tasks SBA with use of bed HR and log roll technique.        Start:  02/24/25    Expected End:  03/10/25            Pt will be able to complete all transfers with FWW min A x2 demonstrating good safety awareness and proper body mechanics and stability.       Start:  02/24/25    Expected End:  03/10/25               Pain - Adult            I personally have reviewed data entered by the student into the flowsheet and agree with this treatment session of this patient.    Piotr Thrasher, PTA

## 2025-02-26 NOTE — PROGRESS NOTES
Sebas Soto  02/25/25  [id number]  Roya Simeon MD      This is a patient with a past medical history as detailed below admitted to   ED with chief complaint of postoperative infection, patient had a L2 L5 laminectomy with TLIF of L4-L5 with Dr. Qureshi on 1/31/25 was discharged earlier this month to a rehab facility, his wife states [facility]  She states that a nurse there noticed that there was some dehiscence of the wound about a week ago, this was the first time that they realized something might be wrong.  Patient has not been progressing as normal, has not yet ambulated, they are still having to use a Julia lift per neurosurgery notes.  He states he has been working on his physical therapy.  When he was seen today for his first appointment since the wound dehisced, Colette Reyes NP became concerned for possible postoperative infection.  Patient states that he has baseline incontinence from previous prostate cancer, but states that that is unchanged, no new saddle paresthesias, he does endorse some weakness mostly in his left leg but that is from previous to surgery.     Patient evaluated by neurosurgery .  Seen and examined today denies chest pain shortness of breath or abdominal pain patient denies back pain evaluated by neurosurgery and infectious disease he is tired    Assessment and plan   1-postop infection Neurosurgery is consulted patient is status post  L2-L5 laminectomies & L4-5 TLIF at [facility] by Dr. Philippe Qureshi on 1/31/25     IV antibiotics   CT L spine reviewed with no hardware complication, no deep dehiscence seen.   MRI done  Continue vancomycin and Zosyn  Wound and blood cultures follow-up     would like to avoid surgical intervention and continue with wound care via nursing and infectious disease recommendations   CT L spine reviewed with no hardware complication, no deep dehiscence seen.  2-  anemia follow-up CBC  stable   3-elevated LFTs currently has no abdominal pain follow-up on the [facility]    4-debility PT OT down the road  Discussed with the  consultants.      Review of other systems negative other than HPI  Past medical history    Epic& consultants notes reviewed  Most recent images Reviewed  Last EKG/Rhythm reviewed      Vital signs has been reviewed     SKIN:  No rashes .   ENT mucosa,  Normal/nose normal   NECK: no jugulovenous distention  NO lymphadenopathy   LUNGS:No wheezing,no ronchi  no rales.  CARDIAC:  S1 and S2  ABDOMEN: Abdomen soft, non-tender.    EXTREMITIES: Extremities normal.   NEURO: non focal    PULSES: + pedal / radial   No edema   SURGICAL INCISION: erythema. Areas of slough. There are open areas of the incision distally. Purulent and malodorous drainage noted from the incision. (Picture is uploaded in media)  He is in a wheelchair.                   Past Medical History  He has a past medical history of Abnormal ECG, Arthritis, Bilateral lower extremity edema, Diverticulosis, Hyperlipidemia, Neurogenic claudication, Personal history of other diseases of the digestive system, Personal history of other infectious and parasitic diseases, Prostate cancer (Multi), Short-term memory loss, and Spondylolisthesis.    Surgical History  He has a past surgical history that includes Hernia repair (07/30/2018); Colonoscopy (07/30/2018); Tonsillectomy (07/30/2018); and Neck surgery (2007).     Social History  He reports that he has quit smoking. His smoking use included cigarettes. He has never been exposed to tobacco smoke. He has never used smokeless tobacco. He reports that he does not drink alcohol and does not use drugs.    Family History  Family History   Problem Relation Name Age of Onset    Alzheimer's disease Mother      Anemia Mother      Breast cancer Sister      Stroke Sister          Allergies  Patient has no known allergies.          Last Recorded Vitals  BP 95/66 (BP Location: Right arm, Patient  Position: Sitting)   Pulse 85   Temp 36.4 °C (97.5 °F) (Temporal)   Resp 18   Wt 83.6 kg (184 lb 4.9 oz)   SpO2 96%          Roya Simeon MD

## 2025-02-26 NOTE — PROGRESS NOTES
Occupational Therapy    Occupational Therapy    OT Treatment    Patient Name: Sebas Soto  MRN: [id number]  Today's Date: 2/26/2025  Time Calculation  Start Time: 1046  Stop Time: 1142  Time Calculation (min): 56 min       3017/3017-A    Assessment:  End of Session Communication: Bedside nurse  End of Session Patient Position: Up in chair, Alarm on       Plan:  OT Frequency: Daily  OT Discharge Recommendations: High intensity level of continued care     Subjective      02/26/25 1046   OT Last Visit   OT Received On 02/26/25   General   Prior to Session Communication Bedside nurse   Patient Position Received Bed, 3 rail up;Alarm on   Preferred Learning Style verbal;visual   General Comment Pt pleasant and cooperative, increaed time in room for toileting and bathing.   Precautions   Post-Surgical Precautions Spinal precautions   Cognition   Overall Cognitive Status WFL   Orientation Level Oriented X4   Grooming   Grooming Level of Assistance Minimum assistance   Grooming Where Assessed Chair   Grooming Comments shower cap, oral care, face wash   LE Bathing   LE Bathing Level of Assistance Maximum assistance   LE Bathing Where Assessed   (chair)   UE Dressing   UE Dressing Level of Assistance Minimum assistance   UE Dressing Where Assessed Chair   Toileting   Toileting Level of Assistance Dependent   Where Assessed Bedside commode   Bed Mobility 1   Bed Mobility 1 Supine to sitting   Level of Assistance 1 Minimum assistance;+2;Moderate tactile cues;Moderate verbal cues   Bed Mobility Comments 1 cues for log roll   Transfers   Transfer Yes   Transfer 1   Transfer From 1 Sit to   Transfer to 1 Stand   Technique 1 Sit to stand;Stand to sit   Transfer Device 1 Isaiah Anamika   Transfer Level of Assistance 1 Moderate assistance;+2;Moderate tactile cues;Moderate verbal cues   Trials/Comments 1 multiple sit<>stands during transfer with isaiah hardy Mod Ax2   Toilet Transfers   Toilet Transfer From Bed   Toilet Transfer Type To    Toilet Transfer to Standard bedside commode   Toilet Transfer Technique   (avery Mod Ax2)   IP OT Assessment   End of Session Communication Bedside nurse   End of Session Patient Position Up in chair;Alarm on   Inpatient Plan   OT Frequency Daily   OT Discharge Recommendations High intensity level of continued care         Outcome Measures:[facility] Daily Activity  Putting on and taking off regular lower body clothing: Total  Bathing (including washing, rinsing, drying): A lot  Putting on and taking off regular upper body clothing: A lot  Toileting, which includes using toilet, bedpan or urinal: Total  Taking care of personal grooming such as brushing teeth: A little  Eating Meals: A little  Daily Activity - Total Score: 12  Education Documentation  Body Mechanics, taught by LEBRON Willams at 2/26/2025  3:47 PM.  Learner: Patient  Readiness: Acceptance  Method: Explanation, Demonstration  Response: Verbalizes Understanding, Demonstrated Understanding, Needs Reinforcement    Precautions, taught by LEBRON Willams at 2/26/2025  3:47 PM.  Learner: Patient  Readiness: Acceptance  Method: Explanation, Demonstration  Response: Verbalizes Understanding, Demonstrated Understanding, Needs Reinforcement    ADL Training, taught by LEBRON Willams at 2/26/2025  3:47 PM.  Learner: Patient  Readiness: Acceptance  Method: Explanation, Demonstration  Response: Verbalizes Understanding, Demonstrated Understanding, Needs Reinforcement    Education Comments  No comments found.            Goals:  Encounter Problems       Encounter Problems (Active)       Dressings Lower Extremities       STG - Patient to complete lower body dressing min assist (Progressing)       Start:  02/24/25    Expected End:  03/10/25               Functional Balance       LTG - Patient will maintain standing and sitting balance to allow for completion of daily activities (Progressing)       Start:  02/24/25    Expected End:   03/10/25               Grooming       STG - Patient completes grooming SUP (Progressing)       Start:  02/24/25    Expected End:  03/10/25               OT Transfers       STG - Patient will perform bed mobility SUP (Progressing)       Start:  02/24/25    Expected End:  03/10/25            STG - Patient will perform toilet transfer mod assist (Progressing)       Start:  02/24/25    Expected End:  03/10/25            Pt. will follow spine precautions with transfers and ADLs (Progressing)       Start:  02/24/25    Expected End:  03/10/25

## 2025-02-26 NOTE — PROGRESS NOTES
"Sebas Soto is a 84 y.o. male on day 5 of admission presenting with Postoperative infection, unspecified type, initial encounter.    Subjective   Visit early this am, continues doing well again this AM. No lower back or leg pain. No change in ability, continues with lower muscle strength as prior with left leg less strength than right.          Objective     Alert, appropriate, Ox3  RUE D4+ B/T5 HG5 IO4  LUE D1 (chronic) B/T 5 HG5 IO4  BLE HF4+ KE4+ DF/PF 5  Back incision with areas of dehiscence with serous drainage, superior and inferior incisions reviewed.  Micro with rare polymorphonuclear leukocytes, abundant mixed gram + and gram - bacteria       Last Recorded Vitals  Blood pressure 108/75, pulse 79, temperature 36.3 °C (97.3 °F), temperature source Temporal, resp. rate 16, height 1.727 m (5' 8\"), weight 83.6 kg (184 lb 4.9 oz), SpO2 97%.  Intake/Output last 3 Shifts:  I/O last 3 completed shifts:  In: 840 (10 mL/kg) [P.O.:840]  Out: 1400 (16.7 mL/kg) [Urine:1400 (0.5 mL/kg/hr)]  Weight: 83.6 kg       Assessment/Plan   Assessment & Plan  Postoperative infection, unspecified type, initial encounter    After discussion with Dr. Qureshi would like to avoid surgical intervention and continue with wound care via nursing and infectious disease recommendations    CT L spine reviewed with no hardware complication, no deep dehiscence seen.    ID on consult,  recs changed abx to Unasyn    Continue dry dressing changes as per wound nurse recommendations      Beau Agarwal PA-C      "

## 2025-02-26 NOTE — NURSING NOTE
Patient alert to person this shift.  Halucinatng most of shift while awake.  Receiving Unasyn iv. Patient sat in chair and was assisted back to bed with 2 person assist and gate belt.  Bed alarm on and safety maintained.

## 2025-02-26 NOTE — PROGRESS NOTES
02/26/25 1154   Discharge Planning   Home or Post Acute Services Post acute facilities (Rehab/SNF/etc)   Type of Post Acute Facility Services Skilled nursing   Expected Discharge Disposition SNF     Spoke with pt's wife. Notified her that [facility] did not accept and felt pt was more SNF appropriate. She provided additional SNF options. [facility], [facility] and [facility]. Referral were sent. Will update wife bed availability is determined, so she can decide on facility of choice. SW to follow.     2:52 Met with pt and his wife. Reviewed available facilities. [facility]. Facility updated and pre-cert was started.

## 2025-02-27 LAB
ALBUMIN SERPL BCP-MCNC: 3 G/DL (ref 3.4–5)
ALP SERPL-CCNC: 75 U/L (ref 33–136)
ALT SERPL W P-5'-P-CCNC: 40 U/L (ref 10–52)
ANION GAP SERPL CALC-SCNC: 12 MMOL/L (ref 10–20)
AST SERPL W P-5'-P-CCNC: 27 U/L (ref 9–39)
BILIRUB SERPL-MCNC: 0.4 MG/DL (ref 0–1.2)
BUN SERPL-MCNC: 9 MG/DL (ref 6–23)
CALCIUM SERPL-MCNC: 8.4 MG/DL (ref 8.6–10.3)
CHLORIDE SERPL-SCNC: 106 MMOL/L (ref 98–107)
CO2 SERPL-SCNC: 26 MMOL/L (ref 21–32)
CREAT SERPL-MCNC: 0.45 MG/DL (ref 0.5–1.3)
EGFRCR SERPLBLD CKD-EPI 2021: >90 ML/MIN/1.73M*2
ERYTHROCYTE [DISTWIDTH] IN BLOOD BY AUTOMATED COUNT: 17.4 % (ref 11.5–14.5)
GLUCOSE SERPL-MCNC: 94 MG/DL (ref 74–99)
HCT VFR BLD AUTO: 32.9 % (ref 41–52)
HGB BLD-MCNC: 10.2 G/DL (ref 13.5–17.5)
MCH RBC QN AUTO: 30.9 PG (ref 26–34)
MCHC RBC AUTO-ENTMCNC: 31 G/DL (ref 32–36)
MCV RBC AUTO: 100 FL (ref 80–100)
NRBC BLD-RTO: 0 /100 WBCS (ref 0–0)
PLATELET # BLD AUTO: 226 X10*3/UL (ref 150–450)
POTASSIUM SERPL-SCNC: 3.9 MMOL/L (ref 3.5–5.3)
PROT SERPL-MCNC: 5.5 G/DL (ref 6.4–8.2)
RBC # BLD AUTO: 3.3 X10*6/UL (ref 4.5–5.9)
SODIUM SERPL-SCNC: 140 MMOL/L (ref 136–145)
WBC # BLD AUTO: 6.6 X10*3/UL (ref 4.4–11.3)

## 2025-02-27 PROCEDURE — 2500000004 HC RX 250 GENERAL PHARMACY W/ HCPCS (ALT 636 FOR OP/ED): Performed by: PHYSICIAN ASSISTANT

## 2025-02-27 PROCEDURE — 2500000004 HC RX 250 GENERAL PHARMACY W/ HCPCS (ALT 636 FOR OP/ED): Performed by: INTERNAL MEDICINE

## 2025-02-27 PROCEDURE — 1100000001 HC PRIVATE ROOM DAILY

## 2025-02-27 PROCEDURE — 97535 SELF CARE MNGMENT TRAINING: CPT | Mod: GO,CO

## 2025-02-27 PROCEDURE — 97530 THERAPEUTIC ACTIVITIES: CPT | Mod: GP,CQ

## 2025-02-27 PROCEDURE — 97110 THERAPEUTIC EXERCISES: CPT | Mod: GP,CQ

## 2025-02-27 PROCEDURE — 36415 COLL VENOUS BLD VENIPUNCTURE: CPT | Performed by: PHYSICIAN ASSISTANT

## 2025-02-27 PROCEDURE — 85027 COMPLETE CBC AUTOMATED: CPT | Performed by: PHYSICIAN ASSISTANT

## 2025-02-27 PROCEDURE — 84075 ASSAY ALKALINE PHOSPHATASE: CPT | Performed by: PHYSICIAN ASSISTANT

## 2025-02-27 PROCEDURE — 2500000001 HC RX 250 WO HCPCS SELF ADMINISTERED DRUGS (ALT 637 FOR MEDICARE OP): Performed by: NURSE PRACTITIONER

## 2025-02-27 RX ADMIN — ENOXAPARIN SODIUM 40 MG: 100 INJECTION SUBCUTANEOUS at 17:33

## 2025-02-27 RX ADMIN — POLYETHYLENE GLYCOL 3350 17 G: 17 POWDER, FOR SOLUTION ORAL at 09:14

## 2025-02-27 RX ADMIN — AMPICILLIN SODIUM AND SULBACTAM SODIUM 3 G: 2; 1 INJECTION, POWDER, FOR SOLUTION INTRAMUSCULAR; INTRAVENOUS at 05:30

## 2025-02-27 RX ADMIN — AMPICILLIN SODIUM AND SULBACTAM SODIUM 3 G: 2; 1 INJECTION, POWDER, FOR SOLUTION INTRAMUSCULAR; INTRAVENOUS at 01:17

## 2025-02-27 RX ADMIN — AMPICILLIN SODIUM AND SULBACTAM SODIUM 3 G: 2; 1 INJECTION, POWDER, FOR SOLUTION INTRAMUSCULAR; INTRAVENOUS at 23:34

## 2025-02-27 RX ADMIN — AMPICILLIN SODIUM AND SULBACTAM SODIUM 3 G: 2; 1 INJECTION, POWDER, FOR SOLUTION INTRAMUSCULAR; INTRAVENOUS at 17:32

## 2025-02-27 RX ADMIN — AMPICILLIN SODIUM AND SULBACTAM SODIUM 3 G: 2; 1 INJECTION, POWDER, FOR SOLUTION INTRAMUSCULAR; INTRAVENOUS at 12:40

## 2025-02-27 RX ADMIN — ATORVASTATIN CALCIUM 10 MG: 10 TABLET, FILM COATED ORAL at 21:02

## 2025-02-27 ASSESSMENT — COGNITIVE AND FUNCTIONAL STATUS - GENERAL
HELP NEEDED FOR BATHING: A LOT
MOBILITY SCORE: 11
HELP NEEDED FOR BATHING: A LOT
PERSONAL GROOMING: A LITTLE
MOVING FROM LYING ON BACK TO SITTING ON SIDE OF FLAT BED WITH BEDRAILS: A LITTLE
TURNING FROM BACK TO SIDE WHILE IN FLAT BAD: A LOT
MOVING TO AND FROM BED TO CHAIR: A LOT
MOVING TO AND FROM BED TO CHAIR: A LOT
DAILY ACTIVITIY SCORE: 15
DRESSING REGULAR LOWER BODY CLOTHING: A LOT
MOBILITY SCORE: 14
EATING MEALS: A LITTLE
TOILETING: A LOT
TURNING FROM BACK TO SIDE WHILE IN FLAT BAD: A LITTLE
WALKING IN HOSPITAL ROOM: A LOT
DRESSING REGULAR UPPER BODY CLOTHING: A LITTLE
DRESSING REGULAR UPPER BODY CLOTHING: A LITTLE
DAILY ACTIVITIY SCORE: 15
MOVING FROM LYING ON BACK TO SITTING ON SIDE OF FLAT BED WITH BEDRAILS: A LITTLE
DRESSING REGULAR LOWER BODY CLOTHING: A LOT
STANDING UP FROM CHAIR USING ARMS: A LOT
DRESSING REGULAR UPPER BODY CLOTHING: A LOT
TURNING FROM BACK TO SIDE WHILE IN FLAT BAD: A LITTLE
CLIMB 3 TO 5 STEPS WITH RAILING: A LOT
STANDING UP FROM CHAIR USING ARMS: A LOT
MOVING TO AND FROM BED TO CHAIR: A LOT
EATING MEALS: A LITTLE
CLIMB 3 TO 5 STEPS WITH RAILING: TOTAL
STANDING UP FROM CHAIR USING ARMS: A LOT
WALKING IN HOSPITAL ROOM: TOTAL
CLIMB 3 TO 5 STEPS WITH RAILING: A LOT
WALKING IN HOSPITAL ROOM: A LOT
DAILY ACTIVITIY SCORE: 12
MOVING FROM LYING ON BACK TO SITTING ON SIDE OF FLAT BED WITH BEDRAILS: A LITTLE
MOBILITY SCORE: 14
EATING MEALS: A LITTLE
PERSONAL GROOMING: A LITTLE
TOILETING: A LOT
TOILETING: TOTAL
HELP NEEDED FOR BATHING: A LOT
PERSONAL GROOMING: A LITTLE
DRESSING REGULAR LOWER BODY CLOTHING: TOTAL

## 2025-02-27 ASSESSMENT — ACTIVITIES OF DAILY LIVING (ADL)
BATHING_LEVEL_OF_ASSISTANCE: MAXIMUM ASSISTANCE
HOME_MANAGEMENT_TIME_ENTRY: 43

## 2025-02-27 ASSESSMENT — PAIN - FUNCTIONAL ASSESSMENT
PAIN_FUNCTIONAL_ASSESSMENT: 0-10

## 2025-02-27 ASSESSMENT — PAIN SCALES - GENERAL
PAINLEVEL_OUTOF10: 0 - NO PAIN

## 2025-02-27 NOTE — PROGRESS NOTES
Sebas Soto  02/26/25  [id number]  Roya Simeon MD  This is a patient with a past medical history as detailed below admitted to   ED with chief complaint of postoperative infection, patient had a L2 L5 laminectomy with TLIF of L4-L5 with Dr. Qureshi on 1/31/25 was discharged earlier this month to a rehab facility, his wife states [facility].  She states that a nurse there noticed that there was some dehiscence of the wound about a week ago, this was the first time that they realized something might be wrong.  Patient has not been progressing as normal, has not yet ambulated, they are still having to use a Julia lift per neurosurgery notes.  He states he has been working on his physical therapy.  When he was seen today for his first appointment since the wound dehisced, Colette Reyes NP became concerned for possible postoperative infection.  Patient states that he has baseline incontinence from previous prostate cancer, but states that that is unchanged, no new saddle paresthesias, he does endorse some weakness mostly in his left leg but that is from previous to surgery.     Patient evaluated by neurosurgery .  No surgical intervention  Seen and examined today denies chest pain shortness of breath or abdominal pain back pain is tolerable no fever or chills denies numbness or tingling    Assessment and plan   1-postop infection Neurosurgery is consulted patient is status post  L2-L5 laminectomies & L4-5 TLIF at [facility] by Dr. Philippe Qureshi on 1/31/25     IV antibiotics   CT L spine reviewed with no hardware complication, no deep dehiscence seen.   MRI    Intravenous antibiotics  Wound and blood cultures reviewed  PT OT  2-  anemia follow-up CBC stable no bleeding  3-elevated LFTs currently has no abdominal pain follow-up on the [facility] if worse consider ultrasound right upper quadrant trending down     Discussed with the  consultants.      Review of  other systems negative other than HPI  Past medical history    Epic& consultants notes reviewed  Most recent images Reviewed  Last EKG/Rhythm reviewed      Vital signs has been reviewed     SKIN:  No rashes .   ENT mucosa,  Normal/nose normal   NECK: no jugulovenous distention  NO lymphadenopathy   LUNGS:No wheezing,no ronchi  no rales.  CARDIAC:  S1 and S2  ABDOMEN: Abdomen soft, non-tender.    EXTREMITIES: Extremities normal.   NEURO: non focal    PULSES: + pedal / radial   No edema                      Past Medical History  He has a past medical history of Abnormal ECG, Arthritis, Bilateral lower extremity edema, Diverticulosis, Hyperlipidemia, Neurogenic claudication, Personal history of other diseases of the digestive system, Personal history of other infectious and parasitic diseases, Prostate cancer (Multi), Short-term memory loss, and Spondylolisthesis.    Surgical History  He has a past surgical history that includes Hernia repair (07/30/2018); Colonoscopy (07/30/2018); Tonsillectomy (07/30/2018); and Neck surgery (2007).     Social History  He reports that he has quit smoking. His smoking use included cigarettes. He has never been exposed to tobacco smoke. He has never used smokeless tobacco. He reports that he does not drink alcohol and does not use drugs.    Family History  Family History   Problem Relation Name Age of Onset    Alzheimer's disease Mother      Anemia Mother      Breast cancer Sister      Stroke Sister          Allergies  Patient has no known allergies.          Last Recorded Vitals  BP (!) 136/92 (BP Location: Right arm, Patient Position: Lying)   Pulse 74   Temp 36.1 °C (97 °F) (Temporal)   Resp 22   Wt 83.6 kg (184 lb 4.9 oz)   SpO2 98%          Roya Simeon MD

## 2025-02-27 NOTE — ASSESSMENT & PLAN NOTE
No plans for Neurosurgical intervention. Continue with wound care via nursing and infectious disease recommendations.

## 2025-02-27 NOTE — PROGRESS NOTES
"Sebas Soto is a 84 y.o. male on day 6 of admission presenting with Postoperative infection, unspecified type, initial encounter.    Subjective   Resting in bed. Reports pain controlled. Feels he is improving.          Objective     Alert, oriented to person, place and year. Forgetful. Speech clear.   BLE HF4+ KE4+ DF/PF 5  Back incision with areas of dehiscence with serous drainage, superior and inferior incisions reviewed.  Micro with rare polymorphonuclear leukocytes, abundant mixed gram + and gram - bacteria       Last Recorded Vitals  Blood pressure 107/78, pulse 75, temperature 35.7 °C (96.3 °F), temperature source Temporal, resp. rate 18, height 1.727 m (5' 8\"), weight 83.6 kg (184 lb 4.9 oz), SpO2 99%.  Intake/Output last 3 Shifts:  I/O last 3 completed shifts:  In: 800 (9.6 mL/kg) [P.O.:600; IV Piggyback:200]  Out: 1750 (20.9 mL/kg) [Urine:1750 (0.6 mL/kg/hr)]  Weight: 83.6 kg       Assessment/Plan   Assessment & Plan  Postoperative infection, unspecified type, initial encounter    No plans for Neurosurgical intervention. Continue with wound care via nursing and infectious disease recommendations.     CT L spine with no hardware complication, no deep dehiscence seen.    ID following,  remains on Unasyn    Continue dressing changes as per wound nurse recommendations        Colette Zhang APRN-[place] [address] [address] [place] [address]  [address]  [place] [address]     Phone: [phone]  Fax: [phone]             "

## 2025-02-27 NOTE — CARE PLAN
The patient's goals for the shift include      The clinical goals for the shift include See plan of care      Problem: Pain - Adult  Goal: Verbalizes/displays adequate comfort level or baseline comfort level  Outcome: Progressing     Problem: Discharge Planning  Goal: Discharge to home or other facility with appropriate resources  Outcome: Progressing     Problem: Chronic Conditions and Co-morbidities  Goal: Patient's chronic conditions and co-morbidity symptoms are monitored and maintained or improved  Outcome: Progressing     Problem: Nutrition  Goal: Nutrient intake appropriate for maintaining nutritional needs  Outcome: Progressing     Problem: Skin  Goal: Decreased wound size/increased tissue granulation at next dressing change  Outcome: Progressing  Flowsheets (Taken 2/26/2025 2137)  Decreased wound size/increased tissue granulation at next dressing change: Promote sleep for wound healing  Goal: Promote/optimize nutrition  Outcome: Progressing  Flowsheets (Taken 2/26/2025 2137)  Promote/optimize nutrition: Monitor/record intake including meals  Goal: Promote skin healing  Outcome: Progressing  Flowsheets (Taken 2/26/2025 2137)  Promote skin healing:   Assess skin/pad under line(s)/device(s)   Turn/reposition every 2 hours/use positioning/transfer devices   Protective dressings over bony prominences     Problem: Fall/Injury  Goal: Not fall by end of shift  Outcome: Progressing  Goal: Be free from injury by end of the shift  Outcome: Progressing  Goal: Verbalize understanding of personal risk factors for fall in the hospital  Outcome: Progressing  Goal: Verbalize understanding of risk factor reduction measures to prevent injury from fall in the home  Outcome: Progressing  Goal: Use assistive devices by end of the shift  Outcome: Progressing  Goal: Pace activities to prevent fatigue by end of the shift  Outcome: Progressing     Problem: Infection related to problem list condition  Goal: Infection will resolve  through treatment  Outcome: Progressing     Problem: Dressings Lower Extremities  Goal: STG - Patient to complete lower body dressing min assist  Outcome: Progressing     Problem: Grooming  Goal: STG - Patient completes grooming SUP  Outcome: Progressing

## 2025-02-27 NOTE — PROGRESS NOTES
For follow-up of:  Spine surgical site ulcer    Subjective   Interval History:  He is sleeping       Objective     Current Facility-Administered Medications   Medication Dose Route Frequency Provider Last Rate Last Admin    ampicillin-sulbactam (Unasyn) 3 g in sodium chloride 0.9%  mL  3 g intravenous q6h Jericho Manuel MD   Stopped at 02/26/25 1839    atorvastatin (Lipitor) tablet 10 mg  10 mg oral Daily SHELLY Beckham-CNP   10 mg at 02/26/25 2141    enoxaparin (Lovenox) syringe 40 mg  40 mg subcutaneous q24h Jack Ambriz PA-C   40 mg at 02/26/25 1732    metoprolol succinate XL (Toprol-XL) 24 hr tablet 25 mg  25 mg oral Daily Jack Ambriz PA-C   25 mg at 02/26/25 0858    polyethylene glycol (Glycolax, Miralax) packet 17 g  17 g oral Daily Jack Ambriz PA-C   17 g at 02/26/25 0858    traMADol (Ultram) tablet 25 mg  25 mg oral q4h PRN Jack Ambriz PA-C            Last Recorded Vitals  BP (!) 136/92 (BP Location: Right arm, Patient Position: Lying)   Pulse 74   Temp 36.1 °C (97 °F) (Temporal)   Resp 22   Wt 83.6 kg (184 lb 4.9 oz)   SpO2 98%   General: no acute distress, lying in bed, sleeping  HEENT: pharynx not examined  CVS: RRR  Resp: decreased breath sounds in bases  ABD: soft, NT, ND  EXT: no edema  Skin: surgical site covered     Relevant Results:    Labs:   Results from last 72 hours   Lab Units 02/26/25  0623 02/25/25  0720 02/24/25  0624   SODIUM mmol/L 137 139 138   POTASSIUM mmol/L 3.8 3.8 3.8   CHLORIDE mmol/L 104 106 105   BUN mg/dL 15 10 11   CREATININE mg/dL 0.48* 0.52 0.68     Results from last 72 hours   Lab Units 02/26/25  0623 02/25/25  0720 02/24/25  0624   WBC AUTO x10*3/uL 6.6 7.1 6.9   HEMOGLOBIN g/dL 10.9* 10.0* 10.1*   HEMATOCRIT % 35.3* 31.7* 32.2*   PLATELETS AUTO x10*3/uL 209 254 251       Microbiology data: I have personally and independently reviewed and intrepreted the lab results  2/21/2025 wound culture shows mixed growth  2/21/2025 blood  culture show no growth  2/22/2025 urine culture shows no growth    Imaging data: I have personally and independently reviewed and interpreted the imaging studies  2/21/2025 MR spine shows no acute issues  2/21/2025 CT spine shows no acute issues    Assessment/Plan     MY IMPRESSION & RECOMMENDATIONS:  Spine surgical site dehiscence with deep ulceration, being treated with Iv antibiotics.  His wound culture shows mixed growth.  Neurosurgery apparently plans no intervention.       -Continue unasyn for now     Other issues:  #Cervical spondylosis s/p cervical spine surgery with peripheral polyneuropathy  #Hyperlipidemia  #Osteoarthritis  #Prostate cancer status post external beam radiation     I have personally and independently reviewed and interpreted the laboratory tests, imaging studies, and the documentation from other healthcare providers.  I am monitoring for side effects from Unasyn as outlined in a previous note.  His prognosis remains uncertain since his spine surgical site has not healed as expected.       Jericho Manuel MD

## 2025-02-27 NOTE — PROGRESS NOTES
Occupational Therapy    OT Treatment    Patient Name: Sebas Soto  MRN: [id number]  Today's Date: 2/27/2025  Time Calculation  Start Time: 1116  Stop Time: 1159  Time Calculation (min): 43 min       3017/3017-A    Assessment:  End of Session Communication: Bedside nurse  End of Session Patient Position: Up in chair, Alarm on       Plan:  OT Frequency: Daily  OT Discharge Recommendations: High intensity level of continued care     Subjective      02/27/25 1116   OT Last Visit   OT Received On 02/27/25   General   Prior to Session Communication Bedside nurse   Patient Position Received Alarm on;Up in chair   Preferred Learning Style verbal;visual   General Comment Pt very pleasant and cooperative, RN cleared for therapy.   Pain Assessment   Pain Assessment 0-10   0-10 (Numeric) Pain Score 0 - No pain   Cognition   Overall Cognitive Status WFL   Orientation Level Oriented X4   Grooming   Grooming Level of Assistance Minimum assistance   Grooming Where Assessed Chair   Grooming Comments Min A for shower cap and to dry hair, completed oral care and face wash.   LE Bathing   LE Bathing Level of Assistance Maximum assistance   LE Bathing Where Assessed   (chair)   LE Bathing Comments educated on long sponge   Toileting   Toileting Level of Assistance Dependent   Where Assessed   (small amount of stool on pad and changed, Dep A for thorough posterior kasandra care)   Transfers   Transfer Yes   Transfer 1   Transfer From 1 Sit to   Transfer to 1 Stand   Technique 1 Sit to stand;Stand to sit   Transfer Device 1 Gait belt   Transfer Level of Assistance 1 Moderate assistance;+2   Trials/Comments 1 sit<>stands from chair during ADL completion   IP OT Assessment   End of Session Communication Bedside nurse   End of Session Patient Position Up in chair;Alarm on   Inpatient Plan   OT Frequency Daily   OT Discharge Recommendations High intensity level of continued care         Outcome Measures:[facility] Daily Activity  Putting on and  taking off regular lower body clothing: Total  Bathing (including washing, rinsing, drying): A lot  Putting on and taking off regular upper body clothing: A lot  Toileting, which includes using toilet, bedpan or urinal: Total  Taking care of personal grooming such as brushing teeth: A little  Eating Meals: A little  Daily Activity - Total Score: 12  Education Documentation  Body Mechanics, taught by LEBRON Willams at 2/27/2025  3:20 PM.  Learner: Patient  Readiness: Acceptance  Method: Explanation, Demonstration  Response: Verbalizes Understanding, Demonstrated Understanding, Needs Reinforcement    Precautions, taught by LEBRON Willams at 2/27/2025  3:20 PM.  Learner: Patient  Readiness: Acceptance  Method: Explanation, Demonstration  Response: Verbalizes Understanding, Demonstrated Understanding, Needs Reinforcement    ADL Training, taught by LEBRON Willams at 2/27/2025  3:20 PM.  Learner: Patient  Readiness: Acceptance  Method: Explanation, Demonstration  Response: Verbalizes Understanding, Demonstrated Understanding, Needs Reinforcement        Education Comments  No comments found.            Goals:  Encounter Problems       Encounter Problems (Active)       Dressings Lower Extremities       STG - Patient to complete lower body dressing min assist (Progressing)       Start:  02/24/25    Expected End:  03/10/25               Functional Balance       LTG - Patient will maintain standing and sitting balance to allow for completion of daily activities (Progressing)       Start:  02/24/25    Expected End:  03/10/25               Grooming       STG - Patient completes grooming SUP (Progressing)       Start:  02/24/25    Expected End:  03/10/25               OT Transfers       STG - Patient will perform bed mobility SUP (Progressing)       Start:  02/24/25    Expected End:  03/10/25            STG - Patient will perform toilet transfer mod assist (Progressing)       Start:   02/24/25    Expected End:  03/10/25            Pt. will follow spine precautions with transfers and ADLs (Progressing)       Start:  02/24/25    Expected End:  03/10/25

## 2025-02-27 NOTE — PROGRESS NOTES
INPATIENT PROGRESS NOTES    PATIENT NAME: Sebas Soto    MRN: [id number]  SERVICE DATE:  2/27/2025   SERVICE TIME:  12:03 PM    SIGNATURE: Lennie Sanches MD      ASSESSMENT :   -Lumbar spine surgical wound infection and dehiscence  -Superficial wound culture grew mixed bacteria  -Negative blood culture  -MRI lumbar spine showed postoperative changes small amount of fluid within the laminectomy site  -Status post L2-5 laminectomy on 1/31/2025     #Hyperlipidemia  #Osteoarthritis  #Prostate cancer status post external beam radiation    PLAN:  -Currently on Unasyn  -Closely monitor for antibiotics side effects including rash, Diarrhea/CDI, thrombocytopenia, AMAIRANI, etc.        SUBJECTIVE  Afebrile  No events overnight       OBJECTIVE  PHYSICAL EXAM:   Patient Vitals for the past 24 hrs:   BP Temp Temp src Pulse Resp SpO2   02/27/25 0913 96/65 -- -- -- -- --   02/27/25 0800 107/78 35.7 °C (96.3 °F) Temporal 75 18 99 %   02/27/25 0400 102/63 35.6 °C (96.1 °F) Temporal 81 16 96 %   02/27/25 0000 96/61 35.5 °C (95.9 °F) Temporal 78 20 96 %   02/26/25 2000 (!) 136/92 36.1 °C (97 °F) Temporal 74 22 98 %   02/26/25 1600 101/59 36.6 °C (97.9 °F) -- 73 18 98 %         Surgical wound picture on admission                  Labs:  Lab Results   Component Value Date    WBC 6.6 02/27/2025    HGB 10.2 (L) 02/27/2025    HCT 32.9 (L) 02/27/2025     02/27/2025     02/27/2025     Lab Results   Component Value Date    GLUCOSE 94 02/27/2025    CALCIUM 8.4 (L) 02/27/2025     02/27/2025    K 3.9 02/27/2025    CO2 26 02/27/2025     02/27/2025    BUN 9 02/27/2025    CREATININE 0.45 (L) 02/27/2025   ESR: --  Lab Results   Component Value Date    SEDRATE 30 (H) 02/21/2025     Lab Results   Component Value Date    CRP 4.88 (H) 02/21/2025     Lab Results   Component Value Date    ALT 40 02/27/2025    AST 27 02/27/2025    ALKPHOS 75 02/27/2025    BILITOT 0.4 02/27/2025         DATA:   Diagnostic tests reviewed for today's  "visit:    Labs this admission reviewed  Imagings this admission reviewed  Cultures: Reviewed        Lennie Sanches MD.   Infectious Disease Attending            This note was partially created using voice recognition software and is inherently subject to errors including those of syntax and \"sound-alike\" substitutions which may escape proofreading. In such instances, original meaning may be extrapolated by contextual derivation       "

## 2025-02-27 NOTE — PROGRESS NOTES
02/27/25 1411   Discharge Planning   Home or Post Acute Services Post acute facilities (Rehab/SNF/etc)   Type of Post Acute Facility Services Skilled nursing   Expected Discharge Disposition SNF  ([facility])     Pre-cert received for SNF placement. Will assist with the transfer to SNF when pt is cleared for discharge.

## 2025-02-27 NOTE — CARE PLAN
Problem: Pain - Adult  Goal: Verbalizes/displays adequate comfort level or baseline comfort level  Outcome: Progressing     Problem: Discharge Planning  Goal: Discharge to home or other facility with appropriate resources  Outcome: Progressing     Problem: Chronic Conditions and Co-morbidities  Goal: Patient's chronic conditions and co-morbidity symptoms are monitored and maintained or improved  Outcome: Progressing     Problem: Nutrition  Goal: Nutrient intake appropriate for maintaining nutritional needs  Outcome: Progressing     Problem: Skin  Goal: Decreased wound size/increased tissue granulation at next dressing change  Outcome: Progressing  Flowsheets (Taken 2/27/2025 1119)  Decreased wound size/increased tissue granulation at next dressing change: Promote sleep for wound healing     Problem: Fall/Injury  Goal: Not fall by end of shift  Outcome: Progressing     Problem: Fall/Injury  Goal: Be free from injury by end of the shift  Outcome: Progressing   The patient's goals for the shift include      The clinical goals for the shift include See plan of care  EOS: Pt up to chair with serasteady with PT this am and return to bed after lunch. Pt having some hallucinations during day, team notified, no orders given. Resting in bed with alarm on and call light within reach.

## 2025-02-27 NOTE — PROGRESS NOTES
Physical Therapy    Physical Therapy Treatment    Patient Name: Sebas Soot  MRN: [id number]  Today's Date: 2/27/2025  Time Calculation  Start Time: 1000  Stop Time: 1040  Time Calculation (min): 40 min     3017/3017-A       02/27/25 1000   PT  Visit   PT Received On 02/27/25   Response to Previous Treatment Patient with no complaints from previous session.   General   Referred By Dr. Simeon   Past Medical History Relevant to Rehab HLD, prostate cancer, cervical spondylosis, macular degeneration   Family/Caregiver Present No   Prior to Session Communication Bedside nurse   Patient Position Received Bed, 3 rail up;Alarm on   Preferred Learning Style verbal;visual   General Comment Pt pleasant and agreeable to PT   Precautions   Medical Precautions Fall precautions   Post-Surgical Precautions Spinal precautions   Precautions Comment Pt educated on spine precautions throughout session and demos compliance with reinforcement.   Pain Assessment   Pain Assessment 0-10   0-10 (Numeric) Pain Score 0 - No pain   Cognition   Orientation Level Oriented X4   Coordination   Movements are Fluid and Coordinated Yes   Postural Control   Postural Control Impaired   Static Sitting Balance   Static Sitting-Balance Support Bilateral upper extremity supported   Static Sitting-Level of Assistance Close supervision   Static Sitting-Comment/Number of Minutes x5  minutes  (Pt displays retro lean in static standing, is able to correct with verbal cueing)   Therapeutic Exercise   Therapeutic Exercise Performed Yes   Therapeutic Exercise Activity 1 PF/DF x15   Therapeutic Exercise Activity 2 LAQ x15   Therapeutic Exercise Activity 3 Marches x15   Therapeutic Exercise Activity 4 Pillow squeeze x15   Therapeutic Exercise Activity 5 Resisted hip ABD x15   Bed Mobility   Bed Mobility Yes   Bed Mobility 1   Bed Mobility 1 Log roll   Level of Assistance 1 Minimum assistance   Bed Mobility Comments 1 Pt has shown significant improvement and  independence with log roll to L side   Transfers   Transfer Yes   Transfer 1   Transfer From 1 Bed to   Transfer to 1 Stand   Technique 1 Sit to stand   Transfer Device 1 Mercedes Stedy   Transfer Level of Assistance 1 Moderate assistance;+1 to manage equipment   Trials/Comments 1 x1   Transfers 2   Transfer From 2 Stand to   Transfer to 2 Commode-standard   Technique 2 Stand to sit;Sit to stand   Transfer Device 2 Mercedes Stedy   Transfer Level of Assistance 2 Moderate assistance;+1 to manage equipment   Trials/Comments 2 x2   Transfers 3   Transfer From 3 Stand to   Transfer to 3 Chair with arms   Technique 3 Stand to sit   Transfer Device 3 Mercedes Stedy   Transfer Level of Assistance 3 +1 to manage equipment;Minimum assistance   Trials/Comments 3 x1   Activity Tolerance   Endurance Tolerates 30 min exercise with multiple rests   Early Mobility/Exercise Safety Screen Proceed with mobilization - No exclusion criteria met   PT Assessment   PT Assessment Results Decreased strength;Decreased endurance;Impaired balance;Decreased mobility   Rehab Prognosis Good   Barriers to Discharge Home Physical needs   Physical Needs Ambulating household distances limited by function/safety;High falls risk due to function or environment   Evaluation/Treatment Tolerance Patient tolerated treatment well   Strengths Ability to acquire knowledge;Attitude of self;Support of Caregivers   End of Session Communication Bedside nurse   Assessment Comment Pt demonstrates significant improvement with bed mobility and transfers this session. Pt requires less assistance with activities and is very motivated to participate in therapy. Pt will continue to benefit from PT interventions to improve overall strength, endurance, and stability with functional activities.   End of Session Patient Position Up in chair;Alarm on   Outpatient Education   Individual(s) Educated Patient   Education Provided Fall Risk;Body Mechanics;POC;Posture   Risk and Benefits  Discussed with Patient/Caregiver/Other yes   Patient/Caregiver Demonstrated Understanding yes   Plan of Care Discussed and Agreed Upon yes   Patient Response to Education Patient/Caregiver Verbalized Understanding of Information   PT Plan   Inpatient/Swing Bed or Outpatient Inpatient   PT Plan   Treatment/Interventions Bed mobility;Transfer training;Therapeutic exercise   PT Plan Ongoing PT   PT Frequency Daily   PT Discharge Recommendations High intensity level of continued care   Equipment Recommended upon Discharge Wheeled walker   PT Recommended Transfer Status Assist x2       Outcome Measures:  [facility] Basic Mobility  Turning from your back to your side while in a flat bed without using bedrails: A little  Moving from lying on your back to sitting on the side of a flat bed without using bedrails: A lot  Moving to and from bed to chair (including a wheelchair): A lot  Standing up from a chair using your arms (e.g. wheelchair or bedside chair): A lot  To walk in hospital room: Total  Climbing 3-5 steps with railing: Total  Basic Mobility - Total Score: 11     EDUCATION:  Outpatient Education  Individual(s) Educated: Patient  Education Provided: Fall Risk, Body Mechanics, POC, Posture  Risk and Benefits Discussed with Patient/Caregiver/Other: yes  Patient/Caregiver Demonstrated Understanding: yes  Plan of Care Discussed and Agreed Upon: yes  Patient Response to Education: Patient/Caregiver Verbalized Understanding of Information    GOALS:  Encounter Problems       Encounter Problems (Active)       Balance       Pt will demonstrate static/dynamic standing balance for >/= 3 min min A x2 without evidence of instability or LOB with functional mobility tasks.         Start:  02/24/25    Expected End:  03/10/25               Mobility       Pt will be able to ambulate >/= 4 steps (forward, backward, lateral) with FWW min A x2 with good safety awareness.        Start:  02/24/25    Expected End:  03/10/25            Pt will  complete supine, seated and standing exercises to maintain/improve overall strength with minimal verbal cues.         Start:  02/24/25    Expected End:  03/10/25               PT Transfers       Pt will be able to complete all bed mobility tasks SBA with use of bed HR and log roll technique.        Start:  02/24/25    Expected End:  03/10/25            Pt will be able to complete all transfers with FWW min A x2 demonstrating good safety awareness and proper body mechanics and stability.       Start:  02/24/25    Expected End:  03/10/25               Pain - Adult            I personally have reviewed data entered by the student into the flowsheet and agree with this treatment session of this patient.    Piotr Thrasher, PTA

## 2025-02-28 VITALS
BODY MASS INDEX: 27.93 KG/M2 | WEIGHT: 184.3 LBS | OXYGEN SATURATION: 94 % | TEMPERATURE: 96.8 F | HEIGHT: 68 IN | RESPIRATION RATE: 20 BRPM | SYSTOLIC BLOOD PRESSURE: 151 MMHG | HEART RATE: 82 BPM | DIASTOLIC BLOOD PRESSURE: 76 MMHG

## 2025-02-28 LAB
ALBUMIN SERPL BCP-MCNC: 3.3 G/DL (ref 3.4–5)
ALP SERPL-CCNC: 79 U/L (ref 33–136)
ALT SERPL W P-5'-P-CCNC: 41 U/L (ref 10–52)
ANION GAP SERPL CALC-SCNC: 11 MMOL/L (ref 10–20)
AST SERPL W P-5'-P-CCNC: 30 U/L (ref 9–39)
BILIRUB SERPL-MCNC: 0.4 MG/DL (ref 0–1.2)
BUN SERPL-MCNC: 9 MG/DL (ref 6–23)
CALCIUM SERPL-MCNC: 8.5 MG/DL (ref 8.6–10.3)
CHLORIDE SERPL-SCNC: 106 MMOL/L (ref 98–107)
CO2 SERPL-SCNC: 26 MMOL/L (ref 21–32)
CREAT SERPL-MCNC: 0.57 MG/DL (ref 0.5–1.3)
EGFRCR SERPLBLD CKD-EPI 2021: >90 ML/MIN/1.73M*2
ERYTHROCYTE [DISTWIDTH] IN BLOOD BY AUTOMATED COUNT: 17.2 % (ref 11.5–14.5)
GLUCOSE SERPL-MCNC: 90 MG/DL (ref 74–99)
HCT VFR BLD AUTO: 34.1 % (ref 41–52)
HGB BLD-MCNC: 10.5 G/DL (ref 13.5–17.5)
MCH RBC QN AUTO: 31 PG (ref 26–34)
MCHC RBC AUTO-ENTMCNC: 30.8 G/DL (ref 32–36)
MCV RBC AUTO: 101 FL (ref 80–100)
NRBC BLD-RTO: 0 /100 WBCS (ref 0–0)
PLATELET # BLD AUTO: 209 X10*3/UL (ref 150–450)
POTASSIUM SERPL-SCNC: 4 MMOL/L (ref 3.5–5.3)
PROT SERPL-MCNC: 5.9 G/DL (ref 6.4–8.2)
RBC # BLD AUTO: 3.39 X10*6/UL (ref 4.5–5.9)
SODIUM SERPL-SCNC: 139 MMOL/L (ref 136–145)
WBC # BLD AUTO: 6.2 X10*3/UL (ref 4.4–11.3)

## 2025-02-28 PROCEDURE — 97116 GAIT TRAINING THERAPY: CPT | Mod: GP,CQ

## 2025-02-28 PROCEDURE — 84075 ASSAY ALKALINE PHOSPHATASE: CPT | Performed by: PHYSICIAN ASSISTANT

## 2025-02-28 PROCEDURE — 85027 COMPLETE CBC AUTOMATED: CPT | Performed by: PHYSICIAN ASSISTANT

## 2025-02-28 PROCEDURE — 2500000004 HC RX 250 GENERAL PHARMACY W/ HCPCS (ALT 636 FOR OP/ED): Performed by: INTERNAL MEDICINE

## 2025-02-28 PROCEDURE — 97535 SELF CARE MNGMENT TRAINING: CPT | Mod: GO,CO

## 2025-02-28 PROCEDURE — 36415 COLL VENOUS BLD VENIPUNCTURE: CPT | Performed by: PHYSICIAN ASSISTANT

## 2025-02-28 PROCEDURE — 2500000004 HC RX 250 GENERAL PHARMACY W/ HCPCS (ALT 636 FOR OP/ED): Performed by: PHYSICIAN ASSISTANT

## 2025-02-28 PROCEDURE — 2500000001 HC RX 250 WO HCPCS SELF ADMINISTERED DRUGS (ALT 637 FOR MEDICARE OP): Performed by: PHYSICIAN ASSISTANT

## 2025-02-28 PROCEDURE — 97110 THERAPEUTIC EXERCISES: CPT | Mod: GP,CQ

## 2025-02-28 PROCEDURE — 2500000001 HC RX 250 WO HCPCS SELF ADMINISTERED DRUGS (ALT 637 FOR MEDICARE OP): Performed by: NURSE PRACTITIONER

## 2025-02-28 RX ORDER — AMOXICILLIN AND CLAVULANATE POTASSIUM 875; 125 MG/1; MG/1
1 TABLET, FILM COATED ORAL 2 TIMES DAILY
Status: ON HOLD
Start: 2025-02-28 | End: 2025-03-14

## 2025-02-28 RX ADMIN — AMPICILLIN SODIUM AND SULBACTAM SODIUM 3 G: 2; 1 INJECTION, POWDER, FOR SOLUTION INTRAMUSCULAR; INTRAVENOUS at 11:43

## 2025-02-28 RX ADMIN — AMPICILLIN SODIUM AND SULBACTAM SODIUM 3 G: 2; 1 INJECTION, POWDER, FOR SOLUTION INTRAMUSCULAR; INTRAVENOUS at 17:43

## 2025-02-28 RX ADMIN — ENOXAPARIN SODIUM 40 MG: 100 INJECTION SUBCUTANEOUS at 17:43

## 2025-02-28 RX ADMIN — METOPROLOL SUCCINATE 25 MG: 25 TABLET, EXTENDED RELEASE ORAL at 09:55

## 2025-02-28 RX ADMIN — POLYETHYLENE GLYCOL 3350 17 G: 17 POWDER, FOR SOLUTION ORAL at 09:55

## 2025-02-28 RX ADMIN — AMPICILLIN SODIUM AND SULBACTAM SODIUM 3 G: 2; 1 INJECTION, POWDER, FOR SOLUTION INTRAMUSCULAR; INTRAVENOUS at 06:11

## 2025-02-28 RX ADMIN — ATORVASTATIN CALCIUM 10 MG: 10 TABLET, FILM COATED ORAL at 21:16

## 2025-02-28 ASSESSMENT — COGNITIVE AND FUNCTIONAL STATUS - GENERAL
MOVING TO AND FROM BED TO CHAIR: A LOT
PERSONAL GROOMING: A LITTLE
MOBILITY SCORE: 11
MOVING FROM LYING ON BACK TO SITTING ON SIDE OF FLAT BED WITH BEDRAILS: A LITTLE
HELP NEEDED FOR BATHING: A LOT
TOILETING: TOTAL
DAILY ACTIVITIY SCORE: 12
EATING MEALS: A LITTLE
HELP NEEDED FOR BATHING: A LOT
DRESSING REGULAR LOWER BODY CLOTHING: A LOT
CLIMB 3 TO 5 STEPS WITH RAILING: TOTAL
MOVING TO AND FROM BED TO CHAIR: A LOT
WALKING IN HOSPITAL ROOM: TOTAL
EATING MEALS: A LITTLE
MOBILITY SCORE: 14
MOVING FROM LYING ON BACK TO SITTING ON SIDE OF FLAT BED WITH BEDRAILS: A LITTLE
TURNING FROM BACK TO SIDE WHILE IN FLAT BAD: A LOT
TURNING FROM BACK TO SIDE WHILE IN FLAT BAD: A LITTLE
WALKING IN HOSPITAL ROOM: A LOT
TOILETING: A LOT
CLIMB 3 TO 5 STEPS WITH RAILING: A LOT
DRESSING REGULAR UPPER BODY CLOTHING: A LOT
STANDING UP FROM CHAIR USING ARMS: A LOT
PERSONAL GROOMING: A LITTLE
DAILY ACTIVITIY SCORE: 15
DRESSING REGULAR UPPER BODY CLOTHING: A LITTLE
DRESSING REGULAR LOWER BODY CLOTHING: TOTAL
STANDING UP FROM CHAIR USING ARMS: A LOT

## 2025-02-28 ASSESSMENT — PAIN - FUNCTIONAL ASSESSMENT: PAIN_FUNCTIONAL_ASSESSMENT: 0-10

## 2025-02-28 ASSESSMENT — ACTIVITIES OF DAILY LIVING (ADL): HOME_MANAGEMENT_TIME_ENTRY: 23

## 2025-02-28 ASSESSMENT — PAIN SCALES - GENERAL
PAINLEVEL_OUTOF10: 0 - NO PAIN
PAINLEVEL_OUTOF10: 0 - NO PAIN

## 2025-02-28 NOTE — PROGRESS NOTES
Sebas Soto  02/27/25  [id number]  Roya Simeon MD  This is a patient with a past medical history as detailed below admitted to   ED with chief complaint of postoperative infection, patient had a L2 L5 laminectomy with TLIF of L4-L5 with Dr. Qureshi on 1/31/25 was discharged earlier this month to a rehab facility, his wife states that [facility] to [facility] but [facility].  She states that a nurse there noticed that there was some dehiscence of the wound about a week ago, this was the first time that they realized something might be wrong.  Patient has not been progressing as normal, has not yet ambulated, they are still having to use a Julia lift per neurosurgery notes.  He states he has been working on his physical therapy.  When he was seen today for his first appointment since the wound dehisced, Colette Reyes NP became concerned for possible postoperative infection.  Patient states that he has baseline incontinence from previous prostate cancer, but states that that is unchanged, no new saddle paresthesias, he does endorse some weakness mostly in his left leg but that is from previous to surgery.     Patient evaluated by neurosurgery .  No surgical intervention  Seen and examined patient has no new complain chest pain shortness of breath or abdominal pain back pain is tolerable no fever or chills denies numbness or tingling    Assessment and plan   1-postop infection Neurosurgery is consulted patient is status post  L2-L5 laminectomies & L4-5 TLIF at [facility] by Dr. Philippe Qureshi on 1/31/25     IV antibiotics  Status post MRI  Intravenous Unasyn  Wound and blood cultures reviewed  PT OT   for placement he was declined by acute rehab  2-  anemia follow-up CBC stable no bleeding  3-osteoarthritis as needed pain treatment     Discussed with the  consultants.      Review of other systems negative other than HPI  Past medical history    Epic& consultants notes  reviewed  Most recent images Reviewed  Last EKG/Rhythm reviewed      Vital signs has been reviewed     SKIN:  No rashes .   ENT mucosa,  Normal/nose normal   NECK: no jugulovenous distention  NO lymphadenopathy   LUNGS:No wheezing,no ronchi  no rales.  CARDIAC:  S1 and S2  ABDOMEN: Abdomen soft, non-tender.    EXTREMITIES: Extremities normal.   NEURO: non focal    PULSES: + pedal / radial                       Past Medical History  He has a past medical history of Abnormal ECG, Arthritis, Bilateral lower extremity edema, Diverticulosis, Hyperlipidemia, Neurogenic claudication, Personal history of other diseases of the digestive system, Personal history of other infectious and parasitic diseases, Prostate cancer (Multi), Short-term memory loss, and Spondylolisthesis.    Surgical History  He has a past surgical history that includes Hernia repair (07/30/2018); Colonoscopy (07/30/2018); Tonsillectomy (07/30/2018); and Neck surgery (2007).     Social History  He reports that he has quit smoking. His smoking use included cigarettes. He has never been exposed to tobacco smoke. He has never used smokeless tobacco. He reports that he does not drink alcohol and does not use drugs.    Family History  Family History   Problem Relation Name Age of Onset    Alzheimer's disease Mother      Anemia Mother      Breast cancer Sister      Stroke Sister          Allergies  Patient has no known allergies.          Last Recorded Vitals  /53 (BP Location: Left arm, Patient Position: Lying)   Pulse 72   Temp 35.8 °C (96.4 °F) (Temporal)   Resp 18   Wt 83.6 kg (184 lb 4.9 oz)   SpO2 97%          Roya Simeon MD

## 2025-02-28 NOTE — PROGRESS NOTES
INPATIENT PROGRESS NOTES    PATIENT NAME: Sebas Soto    MRN: [id number]  SERVICE DATE:  2/28/2025   SERVICE TIME:  12:48 PM    SIGNATURE: Lennie Sanches MD      ASSESSMENT :   -Lumbar spine surgical wound infection and dehiscence  -Superficial wound culture grew mixed bacteria  -Negative blood culture  -MRI lumbar spine showed postoperative changes small amount of fluid within the laminectomy site  -Status post L2-5 laminectomy on 1/31/2025     #Hyperlipidemia  #Osteoarthritis  #Prostate cancer status post external beam radiation    PLAN:  -Currently on Unasyn  -Closely monitor for antibiotics side effects including rash, Diarrhea/CDI, thrombocytopenia, AMAIRANI, etc.  -Can be discharged on Augmentin for 14 days  -Please arrange follow-up with me in 2 weeks        SUBJECTIVE  Afebrile  No events overnight       OBJECTIVE  PHYSICAL EXAM:   Patient Vitals for the past 24 hrs:   BP Temp Temp src Pulse Resp SpO2   02/28/25 1200 116/75 36 °C (96.8 °F) Temporal 64 18 98 %   02/28/25 0953 134/77 -- -- 96 -- 98 %   02/28/25 0800 (!) 123/95 36.1 °C (97 °F) Temporal 55 18 98 %   02/28/25 0400 142/71 36.3 °C (97.3 °F) Temporal 82 16 94 %   02/28/25 0000 119/70 36 °C (96.8 °F) Temporal 52 16 96 %   02/27/25 2000 122/53 35.8 °C (96.4 °F) Temporal 72 18 97 %   02/27/25 1600 115/73 36.2 °C (97.2 °F) Temporal 80 18 95 %         Surgical wound picture on admission                  Labs:  Lab Results   Component Value Date    WBC 6.2 02/28/2025    HGB 10.5 (L) 02/28/2025    HCT 34.1 (L) 02/28/2025     (H) 02/28/2025     02/28/2025     Lab Results   Component Value Date    GLUCOSE 90 02/28/2025    CALCIUM 8.5 (L) 02/28/2025     02/28/2025    K 4.0 02/28/2025    CO2 26 02/28/2025     02/28/2025    BUN 9 02/28/2025    CREATININE 0.57 02/28/2025   ESR: --  Lab Results   Component Value Date    SEDRATE 30 (H) 02/21/2025     Lab Results   Component Value Date    CRP 4.88 (H) 02/21/2025     Lab Results   Component  "Value Date    ALT 41 02/28/2025    AST 30 02/28/2025    ALKPHOS 79 02/28/2025    BILITOT 0.4 02/28/2025         DATA:   Diagnostic tests reviewed for today's visit:    Labs this admission reviewed  Imagings this admission reviewed  Cultures: Reviewed        Lennie Sanches MD.   Infectious Disease Attending            This note was partially created using voice recognition software and is inherently subject to errors including those of syntax and \"sound-alike\" substitutions which may escape proofreading. In such instances, original meaning may be extrapolated by contextual derivation       "

## 2025-02-28 NOTE — CARE PLAN
Problem: Pain - Adult  Goal: Verbalizes/displays adequate comfort level or baseline comfort level  Outcome: Progressing     Problem: Discharge Planning  Goal: Discharge to home or other facility with appropriate resources  Outcome: Progressing     Problem: Chronic Conditions and Co-morbidities  Goal: Patient's chronic conditions and co-morbidity symptoms are monitored and maintained or improved  Outcome: Progressing     Problem: Nutrition  Goal: Nutrient intake appropriate for maintaining nutritional needs  Outcome: Progressing     Problem: Skin  Goal: Decreased wound size/increased tissue granulation at next dressing change  Outcome: Progressing  Flowsheets (Taken 2/28/2025 1042)  Decreased wound size/increased tissue granulation at next dressing change:   Promote sleep for wound healing   Protective dressings over bony prominences     Problem: Fall/Injury  Goal: Not fall by end of shift  Outcome: Progressing     Problem: Fall/Injury  Goal: Be free from injury by end of the shift  Outcome: Progressing   The patient's goals for the shift include      The clinical goals for the shift include See plan of care  EOS: Patient awaiting discharged to [facility] later this evening, report called to nurse at facility. Back dressing changed earlier in shift, tolerated well. Two assist stand pivot back to bed with walker. No decline in assessment. Injury free throughout shift. Resting in bed with alarm on and call light within reach.

## 2025-02-28 NOTE — PROGRESS NOTES
Occupational Therapy    OT Treatment    Patient Name: Sebas Soto  MRN: [id number]  Today's Date: 2/28/2025  Time Calculation  Start Time: 0955  Stop Time: 1018  Time Calculation (min): 23 min       3017/3017-A    Assessment:  Prognosis: Fair  Barriers to Discharge Home: Caregiver assistance, Physical needs  Evaluation/Treatment Tolerance: Patient tolerated treatment well  End of Session Communication: Bedside nurse  End of Session Patient Position: Up in chair (PTA in room  to continue with tx)  OT Assessment Results: Decreased ADL status, Decreased upper extremity range of motion, Decreased upper extremity strength, Decreased endurance, Decreased functional mobility  Prognosis: Fair  Evaluation/Treatment Tolerance: Patient tolerated treatment well    Plan:  Treatment Interventions: ADL retraining, Functional transfer training  OT Frequency: Daily  OT Discharge Recommendations: High intensity level of continued care  Equipment Recommended upon Discharge: Wheeled walker  Treatment Interventions: ADL retraining, Functional transfer training  Subjective     Outcome Measures:[facility] Daily Activity  Putting on and taking off regular lower body clothing: Total  Bathing (including washing, rinsing, drying): A lot  Putting on and taking off regular upper body clothing: A lot  Toileting, which includes using toilet, bedpan or urinal: Total  Taking care of personal grooming such as brushing teeth: A little  Eating Meals: A little  Daily Activity - Total Score: 12         02/28/25 0955   OT Last Visit   OT Received On 02/28/25   General   Reason for Referral ADLs, discharge planning   Prior to Session Communication Bedside nurse   Patient Position Received Bed, 3 rail up;Alarm on   Preferred Learning Style verbal;visual   General Comment Pt very pleasant and cooperative, RN cleared for therapy.   Precautions   Medical Precautions Fall precautions   Post-Surgical Precautions Spinal precautions   Cognition   Orientation Level  Oriented X4   Toileting   Toileting Level of Assistance Dependent   Where Assessed Bedside commode   Toileting Comments Pt required total assist for posterior kasandra care . Therapist to steady pt in stance with ww support   Bed Mobility   Bed Mobility Yes   Bed Mobility 1   Bed Mobility 1 Supine to sitting   Level of Assistance 1 Minimum assistance   Transfers   Transfer Yes   Transfer 1   Transfer From 1 Bed to   Transfer to 1 Commode-standard   Transfer Device 1 Gait belt   Transfer Level of Assistance 1 Moderate assistance;+2   Trials/Comments 1 arm in arm   Transfers 2   Transfer From 2 Stand to   Transfer to 2 Chair with arms   Transfer Level of Assistance 2 Moderate assistance;+1 to manage equipment   Trials/Comments 2 another person to pull chair under pt   IP OT Assessment   Prognosis Fair   Barriers to Discharge Home Caregiver assistance;Physical needs   Evaluation/Treatment Tolerance Patient tolerated treatment well   End of Session Communication Bedside nurse   End of Session Patient Position Up in chair  (PTA in room  to continue with tx)   OT Assessment   OT Assessment Results Decreased ADL status;Decreased upper extremity range of motion;Decreased upper extremity strength;Decreased endurance;Decreased functional mobility   Education   Individual(s) Educated Patient   Education Provided Risk and benefits of OT discussed with patient or other;Ergonomics and postural realignment   Diagnosis and Precautions spinal   Patient Response to Education Patient/Caregiver Verbalized Understanding of Information   Education Comment Pt would benefit from continued reinforcement   Inpatient Plan   Treatment Interventions ADL retraining;Functional transfer training   OT Frequency Daily   OT Discharge Recommendations High intensity level of continued care   Equipment Recommended upon Discharge Wheeled walker         Goals:  Encounter Problems       Encounter Problems (Active)       Dressings Lower Extremities       STG -  Patient to complete lower body dressing min assist (Progressing)       Start:  02/24/25    Expected End:  03/10/25               Functional Balance       LTG - Patient will maintain standing and sitting balance to allow for completion of daily activities (Progressing)       Start:  02/24/25    Expected End:  03/10/25               Grooming       STG - Patient completes grooming SUP (Progressing)       Start:  02/24/25    Expected End:  03/10/25               OT Transfers       STG - Patient will perform bed mobility SUP (Progressing)       Start:  02/24/25    Expected End:  03/10/25            STG - Patient will perform toilet transfer mod assist (Progressing)       Start:  02/24/25    Expected End:  03/10/25            Pt. will follow spine precautions with transfers and ADLs (Progressing)       Start:  02/24/25    Expected End:  03/10/25

## 2025-02-28 NOTE — PROGRESS NOTES
02/28/25 1655   Discharge Planning   Home or Post Acute Services Post acute facilities (Rehab/SNF/etc)   Type of Post Acute Facility Services Skilled nursing   Expected Discharge Disposition SNF  ([facility])   What day is the transport expected? 02/28/25   What time is the transport expected? 0830   Intensity of Service   Intensity of Service >30 min     Pt cleared for discharge to [facility] this evening at 8:30. Pt and spouse notified of discharge and time.  Discharge paperwork sent to the facility. Nursing will call report prior to pt .

## 2025-02-28 NOTE — CARE PLAN
The patient's goals for the shift include      The clinical goals for the shift include See plan of care    Patient had an uneventful night. Patient vital signs stable. Patient safety maintained.

## 2025-02-28 NOTE — PROGRESS NOTES
Physical Therapy    Physical Therapy    Physical Therapy Treatment    Patient Name: Sebas Soto  MRN: [id number]  Today's Date: 2/28/2025  Time Calculation  Start Time: 1014  Stop Time: 1040  Time Calculation (min): 26 min     3017/3017-A    Assessment/Plan   PT Assessment  PT Assessment Results: Decreased strength, Decreased endurance, Impaired balance, Decreased mobility  End of Session Communication: Bedside nurse  Assessment Comment: pt had no gross LOB or c/o dizziness throughout. Pt demonstrated improved transfer technique after v/t cues. Pt was left in chair with safety alarm activated and call light in reach.  End of Session Patient Position: Up in chair, Alarm on  PT Plan  Inpatient/Swing Bed or Outpatient: Inpatient  PT Plan  Treatment/Interventions: Bed mobility, Transfer training, Therapeutic exercise  PT Plan: Ongoing PT  PT Frequency: Daily  PT Discharge Recommendations: High intensity level of continued care  Equipment Recommended upon Discharge: Wheeled walker  PT Recommended Transfer Status: Assist x2  PT - OK to Discharge: Yes (Once medically cleared to next level of care)    Outcome Measures:  [facility] Basic Mobility  Turning from your back to your side while in a flat bed without using bedrails: A little  Moving from lying on your back to sitting on the side of a flat bed without using bedrails: A lot  Moving to and from bed to chair (including a wheelchair): A lot  Standing up from a chair using your arms (e.g. wheelchair or bedside chair): A lot  To walk in hospital room: Total  Climbing 3-5 steps with railing: Total  Basic Mobility - Total Score: 11            02/28/25 1014   PT  Visit   PT Received On 02/28/25   Response to Previous Treatment Patient with no complaints from previous session.   General   Reason for Referral ADLs, discharge planning   Referred By Dr. Simeon   Past Medical History Relevant to Rehab HLD, prostate cancer, cervical spondylosis, macular degeneration   Prior to  Session Communication Bedside nurse   Patient Position Received Up in chair;Alarm on   Preferred Learning Style verbal;visual   General Comment Pt very pleasant and cooperative, RN cleared for therapy.   Precautions   Medical Precautions Fall precautions   Post-Surgical Precautions Spinal precautions   Pain Assessment   Pain Assessment 0-10   0-10 (Numeric) Pain Score 0 - No pain   Therapeutic Exercise   Therapeutic Exercise Performed Yes   Therapeutic Exercise Activity 1 DF/PF x15   Therapeutic Exercise Activity 2 LAQ x15   Therapeutic Exercise Activity 3 QS x15   Therapeutic Exercise Activity 4 GS x15   Therapeutic Exercise Activity 5 resisted hip abd x15   Therapeutic Exercise Activity 6 pillow squeezes x15   Transfers   Transfer Yes   Transfer 1   Technique 1 Sit to stand;Stand to sit   Transfer Device 1 Walker;Gait belt   Transfer Level of Assistance 1 Moderate assistance   Trials/Comments 1 x4 STS from chair to increase strength and stability w/ functional transfers  (pt required v/t cues for proper hand placement)   PT Assessment   PT Assessment Results Decreased strength;Decreased endurance;Impaired balance;Decreased mobility   End of Session Communication Bedside nurse   Assessment Comment pt had no gross LOB or c/o dizziness throughout. Pt demonstrated improved transfer technique after v/t cues. Pt was left in chair with safety alarm activated and call light in reach.   End of Session Patient Position Up in chair;Alarm on   PT Plan   Inpatient/Swing Bed or Outpatient Inpatient   PT Plan   PT Plan Ongoing PT   PT Frequency Daily   PT Discharge Recommendations High intensity level of continued care                 EDUCATION:  Outpatient Education  Individual(s) Educated: Patient  Education Provided: Fall Risk, Body Mechanics, POC, Posture  Risk and Benefits Discussed with Patient/Caregiver/Other: yes  Patient/Caregiver Demonstrated Understanding: yes  Plan of Care Discussed and Agreed Upon: yes  Patient  Response to Education: Patient/Caregiver Verbalized Understanding of Information  Education Documentation  No documentation found.  Education Comments  No comments found.        GOALS:  Encounter Problems       Encounter Problems (Active)       Balance       Pt will demonstrate static/dynamic standing balance for >/= 3 min min A x2 without evidence of instability or LOB with functional mobility tasks.         Start:  02/24/25    Expected End:  03/10/25               Mobility       Pt will be able to ambulate >/= 4 steps (forward, backward, lateral) with FWW min A x2 with good safety awareness.        Start:  02/24/25    Expected End:  03/10/25            Pt will complete supine, seated and standing exercises to maintain/improve overall strength with minimal verbal cues.         Start:  02/24/25    Expected End:  03/10/25               PT Transfers       Pt will be able to complete all bed mobility tasks SBA with use of bed HR and log roll technique.        Start:  02/24/25    Expected End:  03/10/25            Pt will be able to complete all transfers with FWW min A x2 demonstrating good safety awareness and proper body mechanics and stability.       Start:  02/24/25    Expected End:  03/10/25               Pain - Adult

## 2025-02-28 NOTE — PROGRESS NOTES
"Sebas Soto is a 84 y.o. male on day 7 of admission presenting with Postoperative infection, unspecified type, initial encounter.    Subjective   Resting in bed. Reports pain controlled. Feels he is improving.          Objective     Alert, oriented to person, place and year. Forgetful. Speech clear.   BLE HF4+ KE4+ DF/PF 5  Back incision with areas of dehiscence with serous drainage, superior and inferior incisions  Micro with rare polymorphonuclear leukocytes, abundant mixed gram + and gram - bacteria       Last Recorded Vitals  Blood pressure (!) 123/95, pulse 55, temperature 36.1 °C (97 °F), temperature source Temporal, resp. rate 18, height 1.727 m (5' 8\"), weight 83.6 kg (184 lb 4.9 oz), SpO2 98%.  Intake/Output last 3 Shifts:  I/O last 3 completed shifts:  In: 240 (2.9 mL/kg) [P.O.:240]  Out: 2250 (26.9 mL/kg) [Urine:2250 (0.7 mL/kg/hr)]  Weight: 83.6 kg       Assessment/Plan   Assessment & Plan  Postoperative infection, unspecified type, initial encounter    No plans for Neurosurgical intervention. Continue with wound care via nursing and infectious disease recommendations.     CT L spine with no hardware complication, no deep dehiscence seen.    ID following,  remains on Unasyn    Continue dressing changes as per wound nurse recommendations    Ok to discharge from NS standpoint        Colette Zhang APRGERSON-[place] [address] [address] [place] [address]  [address]  [place] [address]     Phone: [phone]  Fax: [phone]             "

## 2025-03-01 NOTE — NURSING NOTE
Discharge. Patient discharged to  [facility] at this time.  [facility] via wheelchair transported the patient.  Patient stable and cooperative at time of discharge.  Report called to SNF during previous shift.

## 2025-03-02 LAB
ATRIAL RATE: 103 BPM
P AXIS: 44 DEGREES
P OFFSET: 177 MS
P ONSET: 121 MS
PR INTERVAL: 172 MS
Q ONSET: 207 MS
QRS COUNT: 17 BEATS
QRS DURATION: 108 MS
QT INTERVAL: 352 MS
QTC CALCULATION(BAZETT): 461 MS
QTC FREDERICIA: 421 MS
R AXIS: -41 DEGREES
T AXIS: 92 DEGREES
T OFFSET: 383 MS
VENTRICULAR RATE: 103 BPM

## 2025-03-06 ENCOUNTER — APPOINTMENT (OUTPATIENT)
Dept: RADIOLOGY | Facility: HOSPITAL | Age: 85
End: 2025-03-06
Payer: MEDICARE

## 2025-03-06 ENCOUNTER — TELEPHONE (OUTPATIENT)
Facility: CLINIC | Age: 85
End: 2025-03-06
Payer: MEDICARE

## 2025-03-06 ENCOUNTER — HOSPITAL ENCOUNTER (INPATIENT)
Facility: HOSPITAL | Age: 85
End: 2025-03-06
Attending: EMERGENCY MEDICINE | Admitting: NEUROLOGICAL SURGERY
Payer: MEDICARE

## 2025-03-06 DIAGNOSIS — M96.89 POSTOPERATIVE SURGICAL COMPLICATION INVOLVING MUSCULOSKELETAL SYSTEM ASSOCIATED WITH MUSCULOSKELETAL PROCEDURE, UNSPECIFIED COMPLICATION: ICD-10-CM

## 2025-03-06 DIAGNOSIS — T81.30XA WOUND DEHISCENCE: ICD-10-CM

## 2025-03-06 DIAGNOSIS — T81.30XA DISRUPTION OF WOUND, UNSPECIFIED, INITIAL ENCOUNTER: ICD-10-CM

## 2025-03-06 DIAGNOSIS — G89.18 ACUTE POSTOPERATIVE PAIN: ICD-10-CM

## 2025-03-06 DIAGNOSIS — T81.40XA POSTOPERATIVE INFECTION, UNSPECIFIED TYPE, INITIAL ENCOUNTER: Primary | ICD-10-CM

## 2025-03-06 LAB
ALBUMIN SERPL BCP-MCNC: 3 G/DL (ref 3.4–5)
ALP SERPL-CCNC: 72 U/L (ref 33–136)
ALT SERPL W P-5'-P-CCNC: 22 U/L (ref 10–52)
ANION GAP SERPL CALC-SCNC: 8 MMOL/L (ref 10–20)
AST SERPL W P-5'-P-CCNC: 23 U/L (ref 9–39)
BASOPHILS # BLD MANUAL: 0 X10*3/UL (ref 0–0.1)
BASOPHILS NFR BLD MANUAL: 0 %
BILIRUB SERPL-MCNC: 0.4 MG/DL (ref 0–1.2)
BUN SERPL-MCNC: 11 MG/DL (ref 6–23)
BURR CELLS BLD QL SMEAR: ABNORMAL
CALCIUM SERPL-MCNC: 8 MG/DL (ref 8.6–10.6)
CHLORIDE SERPL-SCNC: 111 MMOL/L (ref 98–107)
CO2 SERPL-SCNC: 24 MMOL/L (ref 21–32)
CREAT SERPL-MCNC: 0.68 MG/DL (ref 0.5–1.3)
CRP SERPL-MCNC: 6.01 MG/DL
EGFRCR SERPLBLD CKD-EPI 2021: >90 ML/MIN/1.73M*2
EOSINOPHIL # BLD MANUAL: 0.11 X10*3/UL (ref 0–0.4)
EOSINOPHIL NFR BLD MANUAL: 1.8 %
ERYTHROCYTE [DISTWIDTH] IN BLOOD BY AUTOMATED COUNT: 18 % (ref 11.5–14.5)
ERYTHROCYTE [SEDIMENTATION RATE] IN BLOOD BY WESTERGREN METHOD: 29 MM/H (ref 0–20)
GLUCOSE SERPL-MCNC: 108 MG/DL (ref 74–99)
HCT VFR BLD AUTO: 31.5 % (ref 41–52)
HGB BLD-MCNC: 10.2 G/DL (ref 13.5–17.5)
IMM GRANULOCYTES # BLD AUTO: 0.03 X10*3/UL (ref 0–0.5)
IMM GRANULOCYTES NFR BLD AUTO: 0.5 % (ref 0–0.9)
LYMPHOCYTES # BLD MANUAL: 2.2 X10*3/UL (ref 0.8–3)
LYMPHOCYTES NFR BLD MANUAL: 36.6 %
MCH RBC QN AUTO: 30.7 PG (ref 26–34)
MCHC RBC AUTO-ENTMCNC: 32.4 G/DL (ref 32–36)
MCV RBC AUTO: 95 FL (ref 80–100)
MONOCYTES # BLD MANUAL: 1.18 X10*3/UL (ref 0.05–0.8)
MONOCYTES NFR BLD MANUAL: 19.6 %
NEUTROPHILS # BLD MANUAL: 2.52 X10*3/UL (ref 1.6–5.5)
NEUTS BAND # BLD MANUAL: 0.86 X10*3/UL (ref 0–0.5)
NEUTS BAND NFR BLD MANUAL: 14.3 %
NEUTS SEG # BLD MANUAL: 1.66 X10*3/UL (ref 1.6–5)
NEUTS SEG NFR BLD MANUAL: 27.7 %
NRBC BLD-RTO: 0 /100 WBCS (ref 0–0)
OVALOCYTES BLD QL SMEAR: ABNORMAL
PLATELET # BLD AUTO: 249 X10*3/UL (ref 150–450)
POTASSIUM SERPL-SCNC: 4.2 MMOL/L (ref 3.5–5.3)
PROT SERPL-MCNC: 5.5 G/DL (ref 6.4–8.2)
RBC # BLD AUTO: 3.32 X10*6/UL (ref 4.5–5.9)
RBC MORPH BLD: ABNORMAL
SODIUM SERPL-SCNC: 139 MMOL/L (ref 136–145)
TOTAL CELLS COUNTED BLD: 112
WBC # BLD AUTO: 6 X10*3/UL (ref 4.4–11.3)

## 2025-03-06 PROCEDURE — 71046 X-RAY EXAM CHEST 2 VIEWS: CPT | Performed by: RADIOLOGY

## 2025-03-06 PROCEDURE — 86140 C-REACTIVE PROTEIN: CPT | Performed by: STUDENT IN AN ORGANIZED HEALTH CARE EDUCATION/TRAINING PROGRAM

## 2025-03-06 PROCEDURE — 81003 URINALYSIS AUTO W/O SCOPE: CPT | Performed by: STUDENT IN AN ORGANIZED HEALTH CARE EDUCATION/TRAINING PROGRAM

## 2025-03-06 PROCEDURE — 85652 RBC SED RATE AUTOMATED: CPT | Performed by: STUDENT IN AN ORGANIZED HEALTH CARE EDUCATION/TRAINING PROGRAM

## 2025-03-06 PROCEDURE — 86901 BLOOD TYPING SEROLOGIC RH(D): CPT | Performed by: STUDENT IN AN ORGANIZED HEALTH CARE EDUCATION/TRAINING PROGRAM

## 2025-03-06 PROCEDURE — 85027 COMPLETE CBC AUTOMATED: CPT | Performed by: STUDENT IN AN ORGANIZED HEALTH CARE EDUCATION/TRAINING PROGRAM

## 2025-03-06 PROCEDURE — 86850 RBC ANTIBODY SCREEN: CPT | Performed by: STUDENT IN AN ORGANIZED HEALTH CARE EDUCATION/TRAINING PROGRAM

## 2025-03-06 PROCEDURE — 2500000001 HC RX 250 WO HCPCS SELF ADMINISTERED DRUGS (ALT 637 FOR MEDICARE OP): Performed by: STUDENT IN AN ORGANIZED HEALTH CARE EDUCATION/TRAINING PROGRAM

## 2025-03-06 PROCEDURE — 36415 COLL VENOUS BLD VENIPUNCTURE: CPT | Performed by: STUDENT IN AN ORGANIZED HEALTH CARE EDUCATION/TRAINING PROGRAM

## 2025-03-06 PROCEDURE — 85730 THROMBOPLASTIN TIME PARTIAL: CPT | Performed by: STUDENT IN AN ORGANIZED HEALTH CARE EDUCATION/TRAINING PROGRAM

## 2025-03-06 PROCEDURE — 87040 BLOOD CULTURE FOR BACTERIA: CPT | Performed by: STUDENT IN AN ORGANIZED HEALTH CARE EDUCATION/TRAINING PROGRAM

## 2025-03-06 PROCEDURE — 99285 EMERGENCY DEPT VISIT HI MDM: CPT | Mod: 25 | Performed by: EMERGENCY MEDICINE

## 2025-03-06 PROCEDURE — 71046 X-RAY EXAM CHEST 2 VIEWS: CPT

## 2025-03-06 PROCEDURE — 84075 ASSAY ALKALINE PHOSPHATASE: CPT | Performed by: STUDENT IN AN ORGANIZED HEALTH CARE EDUCATION/TRAINING PROGRAM

## 2025-03-06 PROCEDURE — 85007 BL SMEAR W/DIFF WBC COUNT: CPT | Performed by: STUDENT IN AN ORGANIZED HEALTH CARE EDUCATION/TRAINING PROGRAM

## 2025-03-06 PROCEDURE — 85610 PROTHROMBIN TIME: CPT | Performed by: STUDENT IN AN ORGANIZED HEALTH CARE EDUCATION/TRAINING PROGRAM

## 2025-03-06 PROCEDURE — 99285 EMERGENCY DEPT VISIT HI MDM: CPT | Performed by: EMERGENCY MEDICINE

## 2025-03-06 RX ORDER — ACETAMINOPHEN 325 MG/1
975 TABLET ORAL ONCE
Status: COMPLETED | OUTPATIENT
Start: 2025-03-06 | End: 2025-03-06

## 2025-03-06 RX ADMIN — ACETAMINOPHEN 975 MG: 325 TABLET ORAL at 21:18

## 2025-03-06 ASSESSMENT — PAIN - FUNCTIONAL ASSESSMENT: PAIN_FUNCTIONAL_ASSESSMENT: 0-10

## 2025-03-06 ASSESSMENT — COLUMBIA-SUICIDE SEVERITY RATING SCALE - C-SSRS
2. HAVE YOU ACTUALLY HAD ANY THOUGHTS OF KILLING YOURSELF?: NO
6. HAVE YOU EVER DONE ANYTHING, STARTED TO DO ANYTHING, OR PREPARED TO DO ANYTHING TO END YOUR LIFE?: NO
1. IN THE PAST MONTH, HAVE YOU WISHED YOU WERE DEAD OR WISHED YOU COULD GO TO SLEEP AND NOT WAKE UP?: NO

## 2025-03-06 ASSESSMENT — PAIN SCALES - GENERAL: PAINLEVEL_OUTOF10: 0 - NO PAIN

## 2025-03-07 ENCOUNTER — APPOINTMENT (OUTPATIENT)
Dept: RADIOLOGY | Facility: HOSPITAL | Age: 85
End: 2025-03-07
Payer: MEDICARE

## 2025-03-07 ENCOUNTER — PREP FOR PROCEDURE (OUTPATIENT)
Dept: PLASTIC SURGERY | Facility: HOSPITAL | Age: 85
End: 2025-03-07

## 2025-03-07 ENCOUNTER — APPOINTMENT (OUTPATIENT)
Dept: CARDIOLOGY | Facility: HOSPITAL | Age: 85
End: 2025-03-07
Payer: MEDICARE

## 2025-03-07 ENCOUNTER — ANESTHESIA EVENT (OUTPATIENT)
Dept: OPERATING ROOM | Facility: HOSPITAL | Age: 85
End: 2025-03-07
Payer: MEDICARE

## 2025-03-07 ENCOUNTER — ANESTHESIA (OUTPATIENT)
Dept: OPERATING ROOM | Facility: HOSPITAL | Age: 85
End: 2025-03-07
Payer: MEDICARE

## 2025-03-07 PROBLEM — M96.89 POSTOPERATIVE SURGICAL COMPLICATION INVOLVING MUSCULOSKELETAL SYSTEM ASSOCIATED WITH MUSCULOSKELETAL PROCEDURE: Status: ACTIVE | Noted: 2025-03-06

## 2025-03-07 PROBLEM — T81.30XA DISRUPTION OF WOUND, UNSPECIFIED, INITIAL ENCOUNTER: Status: ACTIVE | Noted: 2025-03-06

## 2025-03-07 PROBLEM — I25.10 CAD (CORONARY ARTERY DISEASE): Status: ACTIVE | Noted: 2025-03-07

## 2025-03-07 LAB
ABO GROUP (TYPE) IN BLOOD: NORMAL
ABO GROUP (TYPE) IN BLOOD: NORMAL
ALBUMIN SERPL BCP-MCNC: 2.8 G/DL (ref 3.4–5)
ANION GAP BLDA CALCULATED.4IONS-SCNC: 8 MMO/L (ref 10–25)
ANION GAP SERPL CALC-SCNC: 12 MMOL/L (ref 10–20)
ANTIBODY SCREEN: NORMAL
APPEARANCE UR: ABNORMAL
APTT PPP: 24 SECONDS (ref 26–36)
BASE EXCESS BLDA CALC-SCNC: -1.7 MMOL/L (ref -2–3)
BILIRUB UR STRIP.AUTO-MCNC: NEGATIVE MG/DL
BODY TEMPERATURE: 37 DEGREES CELSIUS
BUN SERPL-MCNC: 9 MG/DL (ref 6–23)
CA-I BLDA-SCNC: 1.14 MMOL/L (ref 1.1–1.33)
CALCIUM SERPL-MCNC: 8.1 MG/DL (ref 8.6–10.6)
CHLORIDE BLDA-SCNC: 109 MMOL/L (ref 98–107)
CHLORIDE SERPL-SCNC: 106 MMOL/L (ref 98–107)
CO2 SERPL-SCNC: 24 MMOL/L (ref 21–32)
COHGB MFR BLDA: 1.2 %
COLOR UR: YELLOW
CREAT SERPL-MCNC: 0.63 MG/DL (ref 0.5–1.3)
DO-HGB MFR BLDA: 0 % (ref 0–5)
EGFRCR SERPLBLD CKD-EPI 2021: >90 ML/MIN/1.73M*2
GLUCOSE BLD MANUAL STRIP-MCNC: 105 MG/DL (ref 74–99)
GLUCOSE BLDA-MCNC: 140 MG/DL (ref 74–99)
GLUCOSE SERPL-MCNC: 147 MG/DL (ref 74–99)
GLUCOSE UR STRIP.AUTO-MCNC: NORMAL MG/DL
HCO3 BLDA-SCNC: 23.7 MMOL/L (ref 22–26)
HCT VFR BLD EST: 32 % (ref 41–52)
HGB BLDA-MCNC: 10.6 G/DL (ref 13.5–17.5)
HGB BLDA-MCNC: 10.6 G/DL (ref 13.5–17.5)
HOLD SPECIMEN: NORMAL
INHALED O2 CONCENTRATION: 100 %
INR PPP: 1.2 (ref 0.9–1.1)
KETONES UR STRIP.AUTO-MCNC: NEGATIVE MG/DL
LACTATE BLDA-SCNC: 0.9 MMOL/L (ref 0.4–2)
LEUKOCYTE ESTERASE UR QL STRIP.AUTO: NEGATIVE
METHGB MFR BLDA: 1 % (ref 0–1.5)
NITRITE UR QL STRIP.AUTO: NEGATIVE
OXYHGB MFR BLDA: 97.8 % (ref 94–98)
OXYHGB MFR BLDA: 97.8 % (ref 94–98)
PCO2 BLDA: 42 MM HG (ref 38–42)
PH BLDA: 7.36 PH (ref 7.38–7.42)
PH UR STRIP.AUTO: 5 [PH]
PHOSPHATE SERPL-MCNC: 3.3 MG/DL (ref 2.5–4.9)
PO2 BLDA: 364 MM HG (ref 85–95)
POTASSIUM BLDA-SCNC: 3.5 MMOL/L (ref 3.5–5.3)
POTASSIUM SERPL-SCNC: 3.7 MMOL/L (ref 3.5–5.3)
PROT UR STRIP.AUTO-MCNC: NEGATIVE MG/DL
PROTHROMBIN TIME: 13.4 SECONDS (ref 9.8–12.4)
RBC # UR STRIP.AUTO: NEGATIVE MG/DL
RH FACTOR (ANTIGEN D): NORMAL
RH FACTOR (ANTIGEN D): NORMAL
SAO2 % BLDA: 100 % (ref 94–100)
SODIUM BLDA-SCNC: 137 MMOL/L (ref 136–145)
SODIUM SERPL-SCNC: 138 MMOL/L (ref 136–145)
SP GR UR STRIP.AUTO: 1.02
UROBILINOGEN UR STRIP.AUTO-MCNC: NORMAL MG/DL

## 2025-03-07 PROCEDURE — 3600000009 HC OR TIME - EACH INCREMENTAL 1 MINUTE - PROCEDURE LEVEL FOUR: Performed by: NEUROLOGICAL SURGERY

## 2025-03-07 PROCEDURE — A9575 INJ GADOTERATE MEGLUMI 0.1ML: HCPCS | Performed by: STUDENT IN AN ORGANIZED HEALTH CARE EDUCATION/TRAINING PROGRAM

## 2025-03-07 PROCEDURE — 71260 CT THORAX DX C+: CPT

## 2025-03-07 PROCEDURE — 87077 CULTURE AEROBIC IDENTIFY: CPT | Performed by: NEUROLOGICAL SURGERY

## 2025-03-07 PROCEDURE — 2500000004 HC RX 250 GENERAL PHARMACY W/ HCPCS (ALT 636 FOR OP/ED): Performed by: STUDENT IN AN ORGANIZED HEALTH CARE EDUCATION/TRAINING PROGRAM

## 2025-03-07 PROCEDURE — 72132 CT LUMBAR SPINE W/DYE: CPT

## 2025-03-07 PROCEDURE — 2500000005 HC RX 250 GENERAL PHARMACY W/O HCPCS: Performed by: STUDENT IN AN ORGANIZED HEALTH CARE EDUCATION/TRAINING PROGRAM

## 2025-03-07 PROCEDURE — 0KBG0ZZ EXCISION OF LEFT TRUNK MUSCLE, OPEN APPROACH: ICD-10-PCS | Performed by: SURGERY

## 2025-03-07 PROCEDURE — 84132 ASSAY OF SERUM POTASSIUM: CPT | Performed by: STUDENT IN AN ORGANIZED HEALTH CARE EDUCATION/TRAINING PROGRAM

## 2025-03-07 PROCEDURE — 82947 ASSAY GLUCOSE BLOOD QUANT: CPT

## 2025-03-07 PROCEDURE — 72158 MRI LUMBAR SPINE W/O & W/DYE: CPT | Performed by: RADIOLOGY

## 2025-03-07 PROCEDURE — 3600000004 HC OR TIME - INITIAL BASE CHARGE - PROCEDURE LEVEL FOUR: Performed by: NEUROLOGICAL SURGERY

## 2025-03-07 PROCEDURE — 2720000007 HC OR 272 NO HCPCS: Performed by: NEUROLOGICAL SURGERY

## 2025-03-07 PROCEDURE — 3700000002 HC GENERAL ANESTHESIA TIME - EACH INCREMENTAL 1 MINUTE: Performed by: NEUROLOGICAL SURGERY

## 2025-03-07 PROCEDURE — 36415 COLL VENOUS BLD VENIPUNCTURE: CPT | Performed by: STUDENT IN AN ORGANIZED HEALTH CARE EDUCATION/TRAINING PROGRAM

## 2025-03-07 PROCEDURE — 82810 BLOOD GASES O2 SAT ONLY: CPT

## 2025-03-07 PROCEDURE — 72158 MRI LUMBAR SPINE W/O & W/DYE: CPT

## 2025-03-07 PROCEDURE — 22015 I&D ABSCESS P-SPINE L/S/LS: CPT | Performed by: NEUROLOGICAL SURGERY

## 2025-03-07 PROCEDURE — 93005 ELECTROCARDIOGRAM TRACING: CPT

## 2025-03-07 PROCEDURE — 99233 SBSQ HOSP IP/OBS HIGH 50: CPT

## 2025-03-07 PROCEDURE — 74177 CT ABD & PELVIS W/CONTRAST: CPT | Performed by: RADIOLOGY

## 2025-03-07 PROCEDURE — 97161 PT EVAL LOW COMPLEX 20 MIN: CPT | Mod: GP

## 2025-03-07 PROCEDURE — 0KBF0ZZ EXCISION OF RIGHT TRUNK MUSCLE, OPEN APPROACH: ICD-10-PCS | Performed by: SURGERY

## 2025-03-07 PROCEDURE — 7100000001 HC RECOVERY ROOM TIME - INITIAL BASE CHARGE: Performed by: NEUROLOGICAL SURGERY

## 2025-03-07 PROCEDURE — 82375 ASSAY CARBOXYHB QUANT: CPT

## 2025-03-07 PROCEDURE — 2500000004 HC RX 250 GENERAL PHARMACY W/ HCPCS (ALT 636 FOR OP/ED): Performed by: ANESTHESIOLOGIST ASSISTANT

## 2025-03-07 PROCEDURE — 99222 1ST HOSP IP/OBS MODERATE 55: CPT | Performed by: INTERNAL MEDICINE

## 2025-03-07 PROCEDURE — 3700000001 HC GENERAL ANESTHESIA TIME - INITIAL BASE CHARGE: Performed by: NEUROLOGICAL SURGERY

## 2025-03-07 PROCEDURE — 2500000001 HC RX 250 WO HCPCS SELF ADMINISTERED DRUGS (ALT 637 FOR MEDICARE OP): Performed by: STUDENT IN AN ORGANIZED HEALTH CARE EDUCATION/TRAINING PROGRAM

## 2025-03-07 PROCEDURE — 2500000004 HC RX 250 GENERAL PHARMACY W/ HCPCS (ALT 636 FOR OP/ED): Performed by: ANESTHESIOLOGY

## 2025-03-07 PROCEDURE — 2500000001 HC RX 250 WO HCPCS SELF ADMINISTERED DRUGS (ALT 637 FOR MEDICARE OP): Performed by: NURSE PRACTITIONER

## 2025-03-07 PROCEDURE — 2550000001 HC RX 255 CONTRASTS: Performed by: STUDENT IN AN ORGANIZED HEALTH CARE EDUCATION/TRAINING PROGRAM

## 2025-03-07 PROCEDURE — 84132 ASSAY OF SERUM POTASSIUM: CPT

## 2025-03-07 PROCEDURE — 87102 FUNGUS ISOLATION CULTURE: CPT | Performed by: NEUROLOGICAL SURGERY

## 2025-03-07 PROCEDURE — 7100000002 HC RECOVERY ROOM TIME - EACH INCREMENTAL 1 MINUTE: Performed by: NEUROLOGICAL SURGERY

## 2025-03-07 PROCEDURE — 2500000005 HC RX 250 GENERAL PHARMACY W/O HCPCS: Performed by: NEUROLOGICAL SURGERY

## 2025-03-07 PROCEDURE — 72132 CT LUMBAR SPINE W/DYE: CPT | Performed by: RADIOLOGY

## 2025-03-07 PROCEDURE — 2500000004 HC RX 250 GENERAL PHARMACY W/ HCPCS (ALT 636 FOR OP/ED): Performed by: NEUROLOGICAL SURGERY

## 2025-03-07 PROCEDURE — 1100000001 HC PRIVATE ROOM DAILY

## 2025-03-07 PROCEDURE — 2500000005 HC RX 250 GENERAL PHARMACY W/O HCPCS: Performed by: ANESTHESIOLOGIST ASSISTANT

## 2025-03-07 PROCEDURE — 71260 CT THORAX DX C+: CPT | Performed by: RADIOLOGY

## 2025-03-07 RX ORDER — VANCOMYCIN HYDROCHLORIDE 1 G/200ML
1000 INJECTION, SOLUTION INTRAVENOUS EVERY 12 HOURS
Status: DISPENSED | OUTPATIENT
Start: 2025-03-07

## 2025-03-07 RX ORDER — SODIUM CHLORIDE, SODIUM LACTATE, POTASSIUM CHLORIDE, CALCIUM CHLORIDE 600; 310; 30; 20 MG/100ML; MG/100ML; MG/100ML; MG/100ML
INJECTION, SOLUTION INTRAVENOUS CONTINUOUS PRN
Status: DISCONTINUED | OUTPATIENT
Start: 2025-03-07 | End: 2025-03-07

## 2025-03-07 RX ORDER — SODIUM CHLORIDE, SODIUM LACTATE, POTASSIUM CHLORIDE, CALCIUM CHLORIDE 600; 310; 30; 20 MG/100ML; MG/100ML; MG/100ML; MG/100ML
100 INJECTION, SOLUTION INTRAVENOUS CONTINUOUS
Status: DISCONTINUED | OUTPATIENT
Start: 2025-03-07 | End: 2025-03-07 | Stop reason: HOSPADM

## 2025-03-07 RX ORDER — METOPROLOL SUCCINATE 25 MG/1
25 TABLET, EXTENDED RELEASE ORAL EVERY MORNING
Status: DISPENSED | OUTPATIENT
Start: 2025-03-07

## 2025-03-07 RX ORDER — ONDANSETRON HYDROCHLORIDE 2 MG/ML
4 INJECTION, SOLUTION INTRAVENOUS ONCE AS NEEDED
Status: DISCONTINUED | OUTPATIENT
Start: 2025-03-07 | End: 2025-03-07 | Stop reason: HOSPADM

## 2025-03-07 RX ORDER — SODIUM CHLORIDE 0.9 G/100ML
INJECTION, SOLUTION IRRIGATION AS NEEDED
Status: DISCONTINUED | OUTPATIENT
Start: 2025-03-07 | End: 2025-03-07 | Stop reason: HOSPADM

## 2025-03-07 RX ORDER — FENTANYL CITRATE 50 UG/ML
25 INJECTION, SOLUTION INTRAMUSCULAR; INTRAVENOUS EVERY 5 MIN PRN
Status: DISCONTINUED | OUTPATIENT
Start: 2025-03-07 | End: 2025-03-07 | Stop reason: HOSPADM

## 2025-03-07 RX ORDER — HEPARIN SODIUM 5000 [USP'U]/ML
5000 INJECTION, SOLUTION INTRAVENOUS; SUBCUTANEOUS EVERY 8 HOURS
Status: DISPENSED | OUTPATIENT
Start: 2025-03-08

## 2025-03-07 RX ORDER — FENTANYL CITRATE 50 UG/ML
INJECTION, SOLUTION INTRAMUSCULAR; INTRAVENOUS AS NEEDED
Status: DISCONTINUED | OUTPATIENT
Start: 2025-03-07 | End: 2025-03-07

## 2025-03-07 RX ORDER — GADOTERATE MEGLUMINE 376.9 MG/ML
17 INJECTION INTRAVENOUS
Status: COMPLETED | OUTPATIENT
Start: 2025-03-07 | End: 2025-03-07

## 2025-03-07 RX ORDER — PHENYLEPHRINE HYDROCHLORIDE 10 MG/ML
INJECTION INTRAVENOUS AS NEEDED
Status: DISCONTINUED | OUTPATIENT
Start: 2025-03-07 | End: 2025-03-07

## 2025-03-07 RX ORDER — LIDOCAINE HYDROCHLORIDE 10 MG/ML
0.1 INJECTION, SOLUTION INFILTRATION; PERINEURAL ONCE
Status: DISCONTINUED | OUTPATIENT
Start: 2025-03-07 | End: 2025-03-07 | Stop reason: HOSPADM

## 2025-03-07 RX ORDER — SUCCINYLCHOLINE CHLORIDE 20 MG/ML
INJECTION INTRAMUSCULAR; INTRAVENOUS AS NEEDED
Status: DISCONTINUED | OUTPATIENT
Start: 2025-03-07 | End: 2025-03-07

## 2025-03-07 RX ORDER — PHENYLEPHRINE 10 MG/250 ML(40 MCG/ML)IN 0.9 % SOD.CHLORIDE INTRAVENOUS
CONTINUOUS PRN
Status: DISCONTINUED | OUTPATIENT
Start: 2025-03-07 | End: 2025-03-07

## 2025-03-07 RX ORDER — PROPOFOL 10 MG/ML
INJECTION, EMULSION INTRAVENOUS CONTINUOUS PRN
Status: DISCONTINUED | OUTPATIENT
Start: 2025-03-07 | End: 2025-03-07

## 2025-03-07 RX ORDER — ONDANSETRON 4 MG/1
4 TABLET, FILM COATED ORAL EVERY 8 HOURS PRN
Status: ACTIVE | OUTPATIENT
Start: 2025-03-07

## 2025-03-07 RX ORDER — ACETAMINOPHEN 325 MG/1
650 TABLET ORAL EVERY 6 HOURS
Status: DISPENSED | OUTPATIENT
Start: 2025-03-07

## 2025-03-07 RX ORDER — CEFEPIME HYDROCHLORIDE 2 G/1
INJECTION, POWDER, FOR SOLUTION INTRAVENOUS AS NEEDED
Status: DISCONTINUED | OUTPATIENT
Start: 2025-03-07 | End: 2025-03-07

## 2025-03-07 RX ORDER — ONDANSETRON HYDROCHLORIDE 2 MG/ML
4 INJECTION, SOLUTION INTRAVENOUS EVERY 8 HOURS PRN
Status: ACTIVE | OUTPATIENT
Start: 2025-03-07

## 2025-03-07 RX ORDER — HYDROMORPHONE HYDROCHLORIDE 0.2 MG/ML
0.1 INJECTION INTRAMUSCULAR; INTRAVENOUS; SUBCUTANEOUS EVERY 5 MIN PRN
Status: DISCONTINUED | OUTPATIENT
Start: 2025-03-07 | End: 2025-03-07 | Stop reason: HOSPADM

## 2025-03-07 RX ORDER — BUTYROSPERMUM PARKII(SHEA BUTTER), SIMMONDSIA CHINENSIS (JOJOBA) SEED OIL, ALOE BARBADENSIS LEAF EXTRACT .01; 1; 3.5 G/100G; G/100G; G/100G
250 LIQUID TOPICAL 2 TIMES DAILY
Status: DISPENSED | OUTPATIENT
Start: 2025-03-07

## 2025-03-07 RX ORDER — VANCOMYCIN HYDROCHLORIDE 1 G/20ML
INJECTION, POWDER, LYOPHILIZED, FOR SOLUTION INTRAVENOUS DAILY PRN
Status: DISPENSED | OUTPATIENT
Start: 2025-03-07

## 2025-03-07 RX ORDER — POLYETHYLENE GLYCOL 3350 17 G/17G
17 POWDER, FOR SOLUTION ORAL DAILY
Status: ACTIVE | OUTPATIENT
Start: 2025-03-07

## 2025-03-07 RX ORDER — ATORVASTATIN CALCIUM 10 MG/1
10 TABLET, FILM COATED ORAL NIGHTLY
Status: DISPENSED | OUTPATIENT
Start: 2025-03-07

## 2025-03-07 RX ORDER — SODIUM CHLORIDE 9 MG/ML
100 INJECTION, SOLUTION INTRAVENOUS CONTINUOUS
Status: ACTIVE | OUTPATIENT
Start: 2025-03-07 | End: 2025-03-08

## 2025-03-07 RX ORDER — DEXTROSE 50 % IN WATER (D50W) INTRAVENOUS SYRINGE
12.5
Status: ACTIVE | OUTPATIENT
Start: 2025-03-07

## 2025-03-07 RX ORDER — ONDANSETRON HYDROCHLORIDE 2 MG/ML
INJECTION, SOLUTION INTRAVENOUS AS NEEDED
Status: DISCONTINUED | OUTPATIENT
Start: 2025-03-07 | End: 2025-03-07

## 2025-03-07 RX ORDER — NALOXONE HYDROCHLORIDE 0.4 MG/ML
0.2 INJECTION, SOLUTION INTRAMUSCULAR; INTRAVENOUS; SUBCUTANEOUS EVERY 5 MIN PRN
Status: ACTIVE | OUTPATIENT
Start: 2025-03-07

## 2025-03-07 RX ORDER — LIDOCAINE HYDROCHLORIDE AND EPINEPHRINE 5; 5 MG/ML; UG/ML
INJECTION, SOLUTION INFILTRATION; PERINEURAL AS NEEDED
Status: DISCONTINUED | OUTPATIENT
Start: 2025-03-07 | End: 2025-03-07 | Stop reason: HOSPADM

## 2025-03-07 RX ORDER — ESMOLOL HYDROCHLORIDE 10 MG/ML
INJECTION INTRAVENOUS AS NEEDED
Status: DISCONTINUED | OUTPATIENT
Start: 2025-03-07 | End: 2025-03-07

## 2025-03-07 RX ORDER — AMOXICILLIN AND CLAVULANATE POTASSIUM 875; 125 MG/1; MG/1
1 TABLET, FILM COATED ORAL 2 TIMES DAILY
Status: DISCONTINUED | OUTPATIENT
Start: 2025-03-07 | End: 2025-03-07

## 2025-03-07 RX ORDER — ACETAMINOPHEN 325 MG/1
650 TABLET ORAL EVERY 6 HOURS PRN
Qty: 240 TABLET | Refills: 0 | OUTPATIENT
Start: 2025-03-07

## 2025-03-07 RX ORDER — CEFEPIME 1 G/50ML
2 INJECTION, SOLUTION INTRAVENOUS EVERY 8 HOURS
Status: DISPENSED | OUTPATIENT
Start: 2025-03-07

## 2025-03-07 RX ORDER — DEXTROSE 50 % IN WATER (D50W) INTRAVENOUS SYRINGE
25
Status: ACTIVE | OUTPATIENT
Start: 2025-03-07

## 2025-03-07 RX ORDER — ROCURONIUM BROMIDE 10 MG/ML
INJECTION, SOLUTION INTRAVENOUS AS NEEDED
Status: DISCONTINUED | OUTPATIENT
Start: 2025-03-07 | End: 2025-03-07

## 2025-03-07 RX ORDER — VANCOMYCIN HYDROCHLORIDE 1 G/20ML
INJECTION, POWDER, LYOPHILIZED, FOR SOLUTION INTRAVENOUS AS NEEDED
Status: DISCONTINUED | OUTPATIENT
Start: 2025-03-07 | End: 2025-03-07

## 2025-03-07 RX ORDER — ETOMIDATE 2 MG/ML
INJECTION INTRAVENOUS AS NEEDED
Status: DISCONTINUED | OUTPATIENT
Start: 2025-03-07 | End: 2025-03-07

## 2025-03-07 RX ORDER — POTASSIUM CHLORIDE 14.9 MG/ML
20 INJECTION INTRAVENOUS ONCE
Status: COMPLETED | OUTPATIENT
Start: 2025-03-07 | End: 2025-03-07

## 2025-03-07 RX ADMIN — CEFEPIME 2 G: 1 INJECTION, SOLUTION INTRAVENOUS at 16:14

## 2025-03-07 RX ADMIN — IOHEXOL 90 ML: 350 INJECTION, SOLUTION INTRAVENOUS at 01:42

## 2025-03-07 RX ADMIN — PHENYLEPHRINE HYDROCHLORIDE 80 MCG: 10 INJECTION INTRAVENOUS at 08:45

## 2025-03-07 RX ADMIN — SODIUM CHLORIDE 100 ML/HR: 9 INJECTION, SOLUTION INTRAVENOUS at 00:59

## 2025-03-07 RX ADMIN — POTASSIUM CHLORIDE 20 MEQ: 14.9 INJECTION, SOLUTION INTRAVENOUS at 10:16

## 2025-03-07 RX ADMIN — SUGAMMADEX 200 MG: 100 INJECTION, SOLUTION INTRAVENOUS at 09:37

## 2025-03-07 RX ADMIN — ONDANSETRON 4 MG: 2 INJECTION, SOLUTION INTRAMUSCULAR; INTRAVENOUS at 09:24

## 2025-03-07 RX ADMIN — Medication 1 L/MIN: at 00:58

## 2025-03-07 RX ADMIN — Medication 250 MG: at 20:41

## 2025-03-07 RX ADMIN — PHENYLEPHRINE-NACL IV SOLUTION 10 MG/250ML-0.9% 0.5 MCG/KG/MIN: 10-0.9/25 SOLUTION at 08:39

## 2025-03-07 RX ADMIN — Medication 1 L/MIN: at 12:27

## 2025-03-07 RX ADMIN — PHENYLEPHRINE HYDROCHLORIDE 80 MCG: 10 INJECTION INTRAVENOUS at 08:36

## 2025-03-07 RX ADMIN — ATORVASTATIN CALCIUM 10 MG: 10 TABLET, FILM COATED ORAL at 20:40

## 2025-03-07 RX ADMIN — FENTANYL CITRATE 100 MCG: 50 INJECTION, SOLUTION INTRAMUSCULAR; INTRAVENOUS at 08:30

## 2025-03-07 RX ADMIN — ETOMIDATE 16 MG: 2 INJECTION, SOLUTION INTRAVENOUS at 08:32

## 2025-03-07 RX ADMIN — SUCCINYLCHOLINE CHLORIDE 80 MG: 20 INJECTION, SOLUTION INTRAMUSCULAR; INTRAVENOUS at 08:31

## 2025-03-07 RX ADMIN — Medication 1 L/MIN: at 20:00

## 2025-03-07 RX ADMIN — GADOTERATE MEGLUMINE 17 ML: 376.9 INJECTION INTRAVENOUS at 02:30

## 2025-03-07 RX ADMIN — VANCOMYCIN HYDROCHLORIDE 1000 MG: 1 INJECTION, SOLUTION INTRAVENOUS at 20:48

## 2025-03-07 RX ADMIN — ESMOLOL HYDROCHLORIDE 40 MG: 100 INJECTION, SOLUTION INTRAVENOUS at 08:30

## 2025-03-07 RX ADMIN — Medication 250 MG: at 14:17

## 2025-03-07 RX ADMIN — METOPROLOL SUCCINATE 25 MG: 25 TABLET, EXTENDED RELEASE ORAL at 12:37

## 2025-03-07 RX ADMIN — VANCOMYCIN HYDROCHLORIDE 1.5 G: 1025 INJECTION, POWDER, FOR SOLUTION INTRAVENOUS; ORAL at 09:11

## 2025-03-07 RX ADMIN — ROCURONIUM 40 MG: 50 INJECTION, SOLUTION INTRAVENOUS at 08:42

## 2025-03-07 RX ADMIN — CEFEPIME HYDROCHLORIDE 2 G: 2 INJECTION, POWDER, FOR SOLUTION INTRAVENOUS at 09:25

## 2025-03-07 RX ADMIN — SODIUM CHLORIDE 100 ML/HR: 9 INJECTION, SOLUTION INTRAVENOUS at 16:51

## 2025-03-07 RX ADMIN — ACETAMINOPHEN 650 MG: 325 TABLET ORAL at 14:17

## 2025-03-07 RX ADMIN — SODIUM CHLORIDE, POTASSIUM CHLORIDE, SODIUM LACTATE AND CALCIUM CHLORIDE: 600; 310; 30; 20 INJECTION, SOLUTION INTRAVENOUS at 07:49

## 2025-03-07 SDOH — SOCIAL STABILITY: SOCIAL INSECURITY: ARE THERE ANY APPARENT SIGNS OF INJURIES/BEHAVIORS THAT COULD BE RELATED TO ABUSE/NEGLECT?: NO

## 2025-03-07 SDOH — ECONOMIC STABILITY: INCOME INSECURITY: IN THE PAST 12 MONTHS HAS THE ELECTRIC, GAS, OIL, OR WATER COMPANY THREATENED TO SHUT OFF SERVICES IN YOUR HOME?: NO

## 2025-03-07 SDOH — ECONOMIC STABILITY: HOUSING INSECURITY: AT ANY TIME IN THE PAST 12 MONTHS, WERE YOU HOMELESS OR LIVING IN A SHELTER (INCLUDING NOW)?: NO

## 2025-03-07 SDOH — SOCIAL STABILITY: SOCIAL INSECURITY: WITHIN THE LAST YEAR, HAVE YOU BEEN AFRAID OF YOUR PARTNER OR EX-PARTNER?: NO

## 2025-03-07 SDOH — SOCIAL STABILITY: SOCIAL INSECURITY: ABUSE: ADULT

## 2025-03-07 SDOH — ECONOMIC STABILITY: HOUSING INSECURITY: IN THE LAST 12 MONTHS, WAS THERE A TIME WHEN YOU WERE NOT ABLE TO PAY THE MORTGAGE OR RENT ON TIME?: NO

## 2025-03-07 SDOH — ECONOMIC STABILITY: TRANSPORTATION INSECURITY: IN THE PAST 12 MONTHS, HAS LACK OF TRANSPORTATION KEPT YOU FROM MEDICAL APPOINTMENTS OR FROM GETTING MEDICATIONS?: NO

## 2025-03-07 SDOH — SOCIAL STABILITY: SOCIAL INSECURITY: HAS ANYONE EVER THREATENED TO HURT YOUR FAMILY OR YOUR PETS?: NO

## 2025-03-07 SDOH — ECONOMIC STABILITY: FOOD INSECURITY: WITHIN THE PAST 12 MONTHS, THE FOOD YOU BOUGHT JUST DIDN'T LAST AND YOU DIDN'T HAVE MONEY TO GET MORE.: NEVER TRUE

## 2025-03-07 SDOH — ECONOMIC STABILITY: HOUSING INSECURITY: IN THE PAST 12 MONTHS, HOW MANY TIMES HAVE YOU MOVED WHERE YOU WERE LIVING?: 1

## 2025-03-07 SDOH — ECONOMIC STABILITY: FOOD INSECURITY: WITHIN THE PAST 12 MONTHS, YOU WORRIED THAT YOUR FOOD WOULD RUN OUT BEFORE YOU GOT THE MONEY TO BUY MORE.: NEVER TRUE

## 2025-03-07 SDOH — ECONOMIC STABILITY: FOOD INSECURITY: HOW HARD IS IT FOR YOU TO PAY FOR THE VERY BASICS LIKE FOOD, HOUSING, MEDICAL CARE, AND HEATING?: NOT VERY HARD

## 2025-03-07 SDOH — SOCIAL STABILITY: SOCIAL INSECURITY: HAVE YOU HAD THOUGHTS OF HARMING ANYONE ELSE?: YES

## 2025-03-07 SDOH — SOCIAL STABILITY: SOCIAL INSECURITY: WITHIN THE LAST YEAR, HAVE YOU BEEN HUMILIATED OR EMOTIONALLY ABUSED IN OTHER WAYS BY YOUR PARTNER OR EX-PARTNER?: NO

## 2025-03-07 SDOH — SOCIAL STABILITY: SOCIAL INSECURITY: DO YOU FEEL UNSAFE GOING BACK TO THE PLACE WHERE YOU ARE LIVING?: NO

## 2025-03-07 SDOH — SOCIAL STABILITY: SOCIAL INSECURITY: ARE YOU OR HAVE YOU BEEN THREATENED OR ABUSED PHYSICALLY, EMOTIONALLY, OR SEXUALLY BY ANYONE?: NO

## 2025-03-07 SDOH — SOCIAL STABILITY: SOCIAL INSECURITY: HAVE YOU HAD ANY THOUGHTS OF HARMING ANYONE ELSE?: NO

## 2025-03-07 SDOH — SOCIAL STABILITY: SOCIAL INSECURITY: DOES ANYONE TRY TO KEEP YOU FROM HAVING/CONTACTING OTHER FRIENDS OR DOING THINGS OUTSIDE YOUR HOME?: NO

## 2025-03-07 SDOH — HEALTH STABILITY: MENTAL HEALTH: CURRENT SMOKER: 0

## 2025-03-07 SDOH — SOCIAL STABILITY: SOCIAL INSECURITY: DO YOU FEEL ANYONE HAS EXPLOITED OR TAKEN ADVANTAGE OF YOU FINANCIALLY OR OF YOUR PERSONAL PROPERTY?: NO

## 2025-03-07 ASSESSMENT — PAIN SCALES - WONG BAKER: WONGBAKER_NUMERICALRESPONSE: HURTS LITTLE MORE

## 2025-03-07 ASSESSMENT — COGNITIVE AND FUNCTIONAL STATUS - GENERAL
DRESSING REGULAR UPPER BODY CLOTHING: A LOT
TURNING FROM BACK TO SIDE WHILE IN FLAT BAD: A LITTLE
DRESSING REGULAR UPPER BODY CLOTHING: TOTAL
HELP NEEDED FOR BATHING: TOTAL
STANDING UP FROM CHAIR USING ARMS: A LOT
HELP NEEDED FOR BATHING: A LOT
STANDING UP FROM CHAIR USING ARMS: A LOT
WALKING IN HOSPITAL ROOM: TOTAL
DAILY ACTIVITIY SCORE: 18
PATIENT BASELINE BEDBOUND: NO
EATING MEALS: A LITTLE
PERSONAL GROOMING: A LITTLE
DAILY ACTIVITIY SCORE: 12
TOILETING: A LOT
MOVING FROM LYING ON BACK TO SITTING ON SIDE OF FLAT BED WITH BEDRAILS: A LOT
CLIMB 3 TO 5 STEPS WITH RAILING: TOTAL
MOBILITY SCORE: 10
STANDING UP FROM CHAIR USING ARMS: A LOT
MOBILITY SCORE: 11
MOBILITY SCORE: 18
MOVING TO AND FROM BED TO CHAIR: A LITTLE
STANDING UP FROM CHAIR USING ARMS: A LITTLE
MOVING FROM LYING ON BACK TO SITTING ON SIDE OF FLAT BED WITH BEDRAILS: A LOT
WALKING IN HOSPITAL ROOM: A LITTLE
TURNING FROM BACK TO SIDE WHILE IN FLAT BAD: A LOT
WALKING IN HOSPITAL ROOM: TOTAL
DAILY ACTIVITIY SCORE: 12
HELP NEEDED FOR BATHING: A LITTLE
TOILETING: A LOT
DRESSING REGULAR LOWER BODY CLOTHING: A LOT
CLIMB 3 TO 5 STEPS WITH RAILING: TOTAL
TURNING FROM BACK TO SIDE WHILE IN FLAT BAD: A LOT
DRESSING REGULAR LOWER BODY CLOTHING: TOTAL
TURNING FROM BACK TO SIDE WHILE IN FLAT BAD: A LOT
MOVING TO AND FROM BED TO CHAIR: A LOT
CLIMB 3 TO 5 STEPS WITH RAILING: A LITTLE
DRESSING REGULAR UPPER BODY CLOTHING: A LITTLE
DRESSING REGULAR LOWER BODY CLOTHING: A LITTLE
MOVING TO AND FROM BED TO CHAIR: A LOT
MOVING FROM LYING ON BACK TO SITTING ON SIDE OF FLAT BED WITH BEDRAILS: A LITTLE
PERSONAL GROOMING: A LOT
MOBILITY SCORE: 10
WALKING IN HOSPITAL ROOM: TOTAL
CLIMB 3 TO 5 STEPS WITH RAILING: TOTAL
TOILETING: A LITTLE
MOVING FROM LYING ON BACK TO SITTING ON SIDE OF FLAT BED WITH BEDRAILS: A LITTLE
EATING MEALS: A LOT
PERSONAL GROOMING: A LITTLE
MOVING TO AND FROM BED TO CHAIR: A LOT

## 2025-03-07 ASSESSMENT — PAIN SCALES - GENERAL
PAINLEVEL_OUTOF10: 2
PAINLEVEL_OUTOF10: 2
PAINLEVEL_OUTOF10: 0 - NO PAIN
PAINLEVEL_OUTOF10: 0 - NO PAIN
PAIN_LEVEL: 2
PAINLEVEL_OUTOF10: 2
PAINLEVEL_OUTOF10: 0 - NO PAIN
PAINLEVEL_OUTOF10: 3
PAINLEVEL_OUTOF10: 2
PAINLEVEL_OUTOF10: 1

## 2025-03-07 ASSESSMENT — LIFESTYLE VARIABLES
AUDIT-C TOTAL SCORE: 0
EVER HAD A DRINK FIRST THING IN THE MORNING TO STEADY YOUR NERVES TO GET RID OF A HANGOVER: NO
AUDIT-C TOTAL SCORE: 0
HAVE YOU EVER FELT YOU SHOULD CUT DOWN ON YOUR DRINKING: NO
SKIP TO QUESTIONS 9-10: 1
HOW OFTEN DO YOU HAVE 6 OR MORE DRINKS ON ONE OCCASION: NEVER
EVER FELT BAD OR GUILTY ABOUT YOUR DRINKING: NO
HAVE PEOPLE ANNOYED YOU BY CRITICIZING YOUR DRINKING: NO
HOW OFTEN DO YOU HAVE A DRINK CONTAINING ALCOHOL: NEVER
TOTAL SCORE: 0
HOW MANY STANDARD DRINKS CONTAINING ALCOHOL DO YOU HAVE ON A TYPICAL DAY: PATIENT DOES NOT DRINK

## 2025-03-07 ASSESSMENT — ENCOUNTER SYMPTOMS
NAUSEA: 0
BACK PAIN: 0
WOUND: 1
NUMBNESS: 0
COUGH: 0
CHEST TIGHTNESS: 0
CHILLS: 0
SORE THROAT: 0
DIZZINESS: 0
SHORTNESS OF BREATH: 0
FEVER: 0

## 2025-03-07 ASSESSMENT — ACTIVITIES OF DAILY LIVING (ADL)
HEARING - LEFT EAR: FUNCTIONAL
LACK_OF_TRANSPORTATION: NO
DRESSING YOURSELF: NEEDS ASSISTANCE
ADEQUATE_TO_COMPLETE_ADL: YES
ASSISTIVE_DEVICE: WALKER
JUDGMENT_ADEQUATE_SAFELY_COMPLETE_DAILY_ACTIVITIES: YES
LACK_OF_TRANSPORTATION: NO
FEEDING YOURSELF: NEEDS ASSISTANCE
BATHING: NEEDS ASSISTANCE
TOILETING: NEEDS ASSISTANCE
GROOMING: NEEDS ASSISTANCE
PATIENT'S MEMORY ADEQUATE TO SAFELY COMPLETE DAILY ACTIVITIES?: YES
HEARING - RIGHT EAR: FUNCTIONAL
WALKS IN HOME: NEEDS ASSISTANCE

## 2025-03-07 ASSESSMENT — COLUMBIA-SUICIDE SEVERITY RATING SCALE - C-SSRS
2. HAVE YOU ACTUALLY HAD ANY THOUGHTS OF KILLING YOURSELF?: NO
1. IN THE PAST MONTH, HAVE YOU WISHED YOU WERE DEAD OR WISHED YOU COULD GO TO SLEEP AND NOT WAKE UP?: NO
2. HAVE YOU ACTUALLY HAD ANY THOUGHTS OF KILLING YOURSELF?: NO
6. HAVE YOU EVER DONE ANYTHING, STARTED TO DO ANYTHING, OR PREPARED TO DO ANYTHING TO END YOUR LIFE?: NO
6. HAVE YOU EVER DONE ANYTHING, STARTED TO DO ANYTHING, OR PREPARED TO DO ANYTHING TO END YOUR LIFE?: NO
1. IN THE PAST MONTH, HAVE YOU WISHED YOU WERE DEAD OR WISHED YOU COULD GO TO SLEEP AND NOT WAKE UP?: NO
1. IN THE PAST MONTH, HAVE YOU WISHED YOU WERE DEAD OR WISHED YOU COULD GO TO SLEEP AND NOT WAKE UP?: NO
6. HAVE YOU EVER DONE ANYTHING, STARTED TO DO ANYTHING, OR PREPARED TO DO ANYTHING TO END YOUR LIFE?: NO
2. HAVE YOU ACTUALLY HAD ANY THOUGHTS OF KILLING YOURSELF?: NO

## 2025-03-07 ASSESSMENT — PAIN SCALES - PAIN ASSESSMENT IN ADVANCED DEMENTIA (PAINAD): BREATHING: NORMAL

## 2025-03-07 ASSESSMENT — PAIN DESCRIPTION - PAIN TYPE: TYPE: ACUTE PAIN

## 2025-03-07 ASSESSMENT — PAIN DESCRIPTION - LOCATION: LOCATION: BACK

## 2025-03-07 NOTE — ANESTHESIA PROCEDURE NOTES
Airway  Date/Time: 3/7/2025 8:33 AM  Urgency: elective    Difficult airway    Staffing  Performed: BRITNEY   Authorized by: Thor Knight MD    Performed by: BRITNEY Toussaint  Patient location during procedure: OR    Indications and Patient Condition  Indications for airway management: anesthesia and airway protection  Sedation level: deep  Preoxygenated: yes  Patient position: pt neck fixed.  Mask difficulty assessment: 0 - not attempted  No planned trial extubation    Final Airway Details  Final airway type: endotracheal airway      Successful airway: ETT     Successful intubation technique: video laryngoscopy  Facilitating devices/methods: intubating stylet  Endotracheal tube insertion site: oral  ETT size (mm): 7.0  Cormack-Lehane Classification: grade IIa - partial view of glottis  Placement verified by: chest auscultation and capnometry   Measured from: lips  ETT to lips (cm): 22  Number of attempts at approach: 1  Ventilation between attempts: none  Number of other approaches attempted: 0    Additional Comments  Intubated with succinylcholine, no mask attempt. EZ intubation with glidescope S4 blade. Neck kept in his natural fixed position. Glottic opening visualized on upper left of video laryngoscopy due to fixed neck position tilted to the right.

## 2025-03-07 NOTE — HOSPITAL COURSE
Sebas Soto is a 84 y.o. male w/ h/o L2-5 lami w/ L4-5 TLIF on 1/31, c/b post-op hallucinations and encephalopathy, 2/22 p/w incisional dehiscence, discharged on Abx, 3/6 p/w incisional dehiscence.     3/7 s/p lumbar wound exploration, revision, washout and wound vac placement  Started on IV vanco and cefepime.  ID consulted.    PT/OT eval rec

## 2025-03-07 NOTE — CARE PLAN
The clinical goals for the shift include safety, comfort    Patient oriented x4, vitals WNL at baseline. Wound washout completed today in OR and wound vac placed. Patient denies pain at this time. Continue IV antibiotics at this time. Family at bedside and updated on plan of care.      Problem: Pain - Adult  Goal: Verbalizes/displays adequate comfort level or baseline comfort level  Outcome: Progressing     Problem: Safety - Adult  Goal: Free from fall injury  Outcome: Progressing     Problem: Discharge Planning  Goal: Discharge to home or other facility with appropriate resources  Outcome: Progressing     Problem: Chronic Conditions and Co-morbidities  Goal: Patient's chronic conditions and co-morbidity symptoms are monitored and maintained or improved  Outcome: Progressing     Problem: Nutrition  Goal: Nutrient intake appropriate for maintaining nutritional needs  Outcome: Progressing     Problem: Skin  Goal: Decreased wound size/increased tissue granulation at next dressing change  Outcome: Progressing  Flowsheets (Taken 3/7/2025 1343)  Decreased wound size/increased tissue granulation at next dressing change:   Promote sleep for wound healing   Protective dressings over bony prominences  Goal: Participates in plan/prevention/treatment measures  Outcome: Progressing  Flowsheets (Taken 3/7/2025 1343)  Participates in plan/prevention/treatment measures:   Discuss with provider PT/OT consult   Increase activity/out of bed for meals   Elevate heels  Goal: Prevent/manage excess moisture  Outcome: Progressing  Flowsheets (Taken 3/7/2025 1343)  Prevent/manage excess moisture:   Cleanse incontinence/protect with barrier cream   Moisturize dry skin   Follow provider orders for dressing changes   Monitor for/manage infection if present  Goal: Prevent/minimize sheer/friction injuries  Outcome: Progressing  Flowsheets (Taken 3/7/2025 1343)  Prevent/minimize sheer/friction injuries:   Use pull sheet   Turn/reposition every 2  hours/use positioning/transfer devices   Increase activity/out of bed for meals   Complete micro-shifts as needed if patient unable. Adjust patient position to relieve pressure points, not a full turn  Goal: Promote/optimize nutrition  Outcome: Progressing  Goal: Promote skin healing  Outcome: Progressing     Problem: Fall/Injury  Goal: Not fall by end of shift  Outcome: Progressing  Goal: Be free from injury by end of the shift  Outcome: Progressing  Goal: Verbalize understanding of personal risk factors for fall in the hospital  Outcome: Progressing  Goal: Verbalize understanding of risk factor reduction measures to prevent injury from fall in the home  Outcome: Progressing     Problem: Pain  Goal: Takes deep breaths with improved pain control throughout the shift  Outcome: Progressing  Goal: Turns in bed with improved pain control throughout the shift  Outcome: Progressing  Goal: Walks with improved pain control throughout the shift  Outcome: Progressing  Goal: Performs ADL's with improved pain control throughout shift  Outcome: Progressing

## 2025-03-07 NOTE — CONSULTS
"Inpatient consult to Infectious Diseases  Consult performed by: Hayden Christensen MD  Consult ordered by: Philippe Qureshi MD PhD    Primary MD: Piotr Balderas MD    Reason For Consult;  Lumbar wound dehiscence3    History Of Present Illness  CHART REVIEW:  1/31/2025 OP note excerpt ([facility] 1/31-2/5/25; [facility])   L2-L5 laminectomies and bilateral foraminotomies  L4-L5 transforaminal interbody fusion;   L4-L5 instrumented posterolateral fusion; use of autograft and allograft for fusion     Admitted from clinic to [facility] with postoperative lumbar wound dehiscence    02/24/2025 ID consult note excerpt (Dr. Manuel, [facility]):  \"84 y.o. male who presented on 2/21/2025 with a surgical site ulcer.  He underwent an L2-5 laminectomy with L4-5 TLIF on 1/31/25.  He was then noted to have wound dehiscence and incisional drainage so was sent to the emergency department.  On arrival he was placed on vancomycin and Zosyn.  A wound culture was collected and blood cultures were collected\"  \"His incision site has a couple of areas of dehiscence with deep ulceration and surrounding macerated tissue; there is yellow drainage on the overlying dressing\"    02/28/2025 ID note (Dr. Sanches at [facility]):  -Lumbar spine surgical wound infection and dehiscence  -Superficial wound culture grew mixed bacteria  -Negative blood culture  -MRI lumbar spine showed postoperative changes small amount of fluid within the laminectomy site  -Status post L2-5 laminectomy on 1/31/2025   - ON UNASYN  - Can be discharged on Augmentin for 14 days  - Please arrange follow-up with me in 2 weeks    03/07/2025 Neurosurgery OP note excerpt:  \"The midline wound was noted to be widely dehisced in a superficial fashion. The wound was probed and there was no deep communication or fascial violation.\"  \"The skin was then infiltrated with local anesthetic and we then began the procedure by opening the very top aspect of the dehiscent area with a 10 blade. Two " "superficial cultures were sent. Antibiotics were administered. The muscle and subcutaneous tissue were debrided with a penfield. The necrotic skin edges were debrided sharply with a blade. The wound was pulse lavaged with 2 liters antibiotic irrigation followed by irricept irrigation. Hemostasis was obtained. The wound was packed with black foam, sealed with adhesive sheeting, and connected to a wound vaccuum system.\"       TODAY 3/7/2025 patient seen at the bedside.  Patient's wife and son present.  History reviewed.  Also wound was left open and was being packed at [facility].  Photos reviewed but of limited value.  Reviewed photo from neurosurgery which showed slightly exudative wound base minimal necrosis and no evidence of deep penetration or abscess at this time.     Now patient is postop with small formed fitted wound VAC.  Currently patient has tolerable back pain.  Recently has had no fever, chills, night sweats, weight loss, nausea, vomiting, headache or constipation.       Patient does however report diarrhea     PT at the bedside with patient doing logroll, sat up at the side of bed.           Transient asymptomatic bradycardia noted by PT and RN aware        OTHER PMH  #Hyperlipidemia  #Osteoarthritis  #Prostate cancer status post external beam radiation      Past Medical History  He has a past medical history of Abnormal ECG, Arthritis, Bilateral lower extremity edema, Diverticulosis, Hyperlipidemia, Neurogenic claudication, Personal history of other diseases of the digestive system, Personal history of other infectious and parasitic diseases, Prostate cancer (Multi), Short-term memory loss, and Spondylolisthesis.    Surgical History  He has a past surgical history that includes Hernia repair (07/30/2018); Colonoscopy (07/30/2018); Tonsillectomy (07/30/2018); and Neck surgery (2007).     Social History     Occupational History    Not on file   Tobacco Use    Smoking status: Former     Types: Cigarettes     " Passive exposure: Never    Smokeless tobacco: Never   Vaping Use    Vaping status: Never Used   Substance and Sexual Activity    Alcohol use: Never    Drug use: Never    Sexual activity: Not on file     Travel History   Travel since 26    No documented travel since 26               Family History  Family History   Problem Relation Name Age of Onset    Alzheimer's disease Mother      Anemia Mother      Breast cancer Sister      Stroke Sister       Allergies  Patient has no known allergies.     Immunization History   Administered Date(s) Administered    COVID-19, mRNA, LNP-S, PF, 30 mcg/0.3 mL dose 2021, 2021, 2021    Flu vaccine, quadrivalent, high-dose, preservative free, age 65y+ (FLUZONE) 2020, 2021, 10/27/2022    Flu vaccine, trivalent, preservative free, HIGH-DOSE, age 65y+ (Fluzone) 2018    Influenza, trivalent, adjuvanted 2019    Pfizer COVID-19 vaccine, 12 years and older, (30mcg/0.3mL) (Comirnaty) 11/10/2023, 11/15/2024    Pfizer Gray Cap SARS-CoV-2 2022    Pneumococcal conjugate vaccine, 13-valent (PREVNAR 13) 2016    Pneumococcal polysaccharide vaccine, 23-valent, age 2 years and older (PNEUMOVAX 23) 2008    Tdap vaccine, age 7 year and older (BOOSTRIX, ADACEL) 2017     Medications  Home medications:  Medications Prior to Admission   Medication Sig Dispense Refill Last Dose/Taking    [] acetaminophen (Tylenol) 325 mg tablet Take 2 tablets (650 mg) by mouth every 6 hours if needed for mild pain (1 - 3). 240 tablet 0 Unknown    amoxicillin-pot clavulanate (Augmentin) 875-125 mg tablet Take 1 tablet by mouth 2 times a day for 14 days.   Unknown    atorvastatin (Lipitor) 10 mg tablet Take 1 tablet (10 mg) by mouth once daily at bedtime.   Unknown    chlorhexidine (Peridex) 0.12 % solution 15 milliliter(s) orally once a day for 2 doses 15 ml  the night before surgery and 15 ml morning of surgery - swish for 30 seconds -DO NOT  "SWALLOW, SPIT  mL 0 Unknown    metoprolol succinate XL (Toprol-XL) 25 mg 24 hr tablet Take 1 tablet (25 mg) by mouth once daily in the morning.   Unknown     Current medications:  Scheduled medications  acetaminophen, 650 mg, oral, q6h  atorvastatin, 10 mg, oral, Nightly  cefepime, 2 g, intravenous, q8h  metoprolol succinate XL, 25 mg, oral, q AM  oxygen, , inhalation, Continuous - Inhalation  polyethylene glycol, 17 g, oral, Daily  vancomycin, 1,000 mg, intravenous, q12h         Objective  Range of Vitals (last 24 hours)  Heart Rate:  [76-87]   Temp:  [35.9 °C (96.6 °F)-38.3 °C (101 °F)]   Resp:  [7-20]   BP: ()/(55-71)   Height:  [172.7 cm (5' 8\")]   Weight:  [85.7 kg (189 lb)-90.7 kg (200 lb)]   SpO2:  [94 %-100 %]   Daily Weight  03/07/25 : 85.7 kg (189 lb)    Body mass index is 28.74 kg/m².     Physical Exam  Initially seated partially upright in bed  PT working with patient to sit upright after logroll  No acute distress  Alert and oriented  Normal speech  Extraocular muscles intact, lungs clear posteriorly, S1, S2, I did not hear murmur  Wound VAC in place over lumbar wound.  Appropriate adjacent erythema.  Appropriate tenderness to palpation but no fluctuance palpated      Labs  Results from last 72 hours   Lab Units 03/06/25 2023   WBC AUTO x10*3/uL 6.0   HEMOGLOBIN g/dL 10.2*   HEMATOCRIT % 31.5*   PLATELETS AUTO x10*3/uL 249   LYMPHO PCT MAN % 36.6   MONO PCT MAN % 19.6   EOSINO PCT MAN % 1.8     Results from last 72 hours   Lab Units 03/06/25 2023   SODIUM mmol/L 139   POTASSIUM mmol/L 4.2   CHLORIDE mmol/L 111*   CO2 mmol/L 24   BUN mg/dL 11   CREATININE mg/dL 0.68   GLUCOSE mg/dL 108*   CALCIUM mg/dL 8.0*   ANION GAP mmol/L 8*   EGFR mL/min/1.73m*2 >90     Results from last 72 hours   Lab Units 03/06/25 2023   ALK PHOS U/L 72   BILIRUBIN TOTAL mg/dL 0.4   PROTEIN TOTAL g/dL 5.5*   ALT U/L 22   AST U/L 23   ALBUMIN g/dL 3.0*     Estimated Creatinine Clearance: 87.7 mL/min (by C-G formula " "based on SCr of 0.68 mg/dL).  C-Reactive Protein   Date Value Ref Range Status   03/06/2025 6.01 (H) <1.00 mg/dL Final   02/21/2025 4.88 (H) <1.00 mg/dL Final     Sedimentation Rate   Date Value Ref Range Status   03/06/2025 29 (H) 0 - 20 mm/h Final   02/21/2025 30 (H) 0 - 20 mm/h Final     No results found for: \"HIV1X2\", \"HIVCONF\", \"CEGZEM1RZ\"  No results found for: \"HEPCABINIT\", \"HEPCAB\", \"HCVPCRQUANT\"  Microbiology  Susceptibility data from last 90 days.  Collected Specimen Info Organism   02/21/25 Tissue/Biopsy from Wound/Tissue Mixed Anaerobic Bacteria     Mixed Gram-Positive and Gram-Negative Bacteria           Assessment/Plan   84-year-old gentleman underwent lumbar spine surgery on 1/31/2025 and developed early postoperative wound dehiscence.  Patient readmitted 2/21/2025 to [facility] and started on Unasyn and changed to oral Augmentin (for 2-week duration).  Patient seen by Dr. Sanches and Dr. Manuel ([facility], infectious disease).  2/21/2025 culture showed mixed anaerobic and gram-positive/gram-negative bacteria.  Specific organism was not identified.  However, this generally indicates skin gt and minor enteric organisms.      Preop Photo:      Patient re-presented with additional wound breakdown/exudate and odor.  At [facility] receiving wound care and packing.  Photograph shows minimal surrounding erythema.  Neurosurgery observed some superficial necrosis exudate and odor.  Operative note not concerned about deeper penetrating infection.    Anticipate cultures to show a mixture of gt that could easily include resistant hospital-acquired or healthcare associated organisms like Pseudomonas, Serratia for example.  For now continue broad-spectrum antibiotics.  Will sort out culture results and advise.    Hopefully will not need extended antibiotic course if deeper infection not suspected.  Will again discuss with neurosurgery.    Duration of therapy then will be determined by wound healing.  Hopefully could " continue antibiotics for 2 to 4 weeks and see additional wound healing with aggressive wound care management.    Might be able to consider oral antibiotic regimen but will advise.    # Postoperative lumbar wound dehiscence  # 01/31/2025 Lumbar spine surgery            L2-L5 laminectomies and bilateral foraminotomies            L4-L5 transforaminal interbody fusion;             L4-L5 instrumented posterolateral fusion; use of autograft and allograft for fusion    Recommendations  1.  Continue vancomycin and drug level monitoring    2.  Continue cefepime.          Monitor for neurotoxicity especially given history of encephalopathy.    3.  Antibiotic dosing with Pharm.D. assistance    4.  Will follow-up on intraoperative cultures which will take 48 to 72 hours to finalize.    5.  Might consider oral antibiotic regimen.  Will advise.    6.  Would not place PICC line at this time.  Will advise    Regarding discharge:     ID team will arrange for ID clinic follow-up either with Dr. Sanches (through [facility]) or Dr. Christensen at [facility].  Travel to [facility] campus may be difficult for patient and his family who live in  [facility]        Hayden Christensen MD  ID Staff

## 2025-03-07 NOTE — PROGRESS NOTES
Met with patient and his wife and introduced myself as Care Coordinator and member of the discharge planning team. Patient is s/p wound wash out  and wound vac placement. Patient was admitted from SNF and patient's wife would like him to return to same facility. Referral was sent to [facility] and [facility] through [facility]. Care Coordinator will continue to follow for discharge needs.  Naty Garcia RN

## 2025-03-07 NOTE — ANESTHESIA POSTPROCEDURE EVALUATION
Patient: Sebas Soto    Procedure Summary       Date: 03/07/25 Room / Location: [facility] OR 25 / [facility] OR    Anesthesia Start: 0749 Anesthesia Stop: 1002    Procedure: lumbar wound exploration, revision, washout and wound vac placement (Spine Lumbar) Diagnosis:       Postoperative infection, unspecified type, initial encounter      (Postoperative infection, unspecified type, initial encounter [T81.40XA])    Surgeons: Donta Fu MD Responsible Provider: Thor Knight MD    Anesthesia Type: general ASA Status: 3            Anesthesia Type: general    Vitals Value Taken Time   /62 03/07/25 0950   Temp 36.3 03/07/25 1002   Pulse 79 03/07/25 0956   Resp 15 03/07/25 0956   SpO2 99 % 03/07/25 0956   Vitals shown include unfiled device data.    Anesthesia Post Evaluation    Patient location during evaluation: PACU  Patient participation: complete - patient participated  Level of consciousness: awake  Pain score: 2  Pain management: adequate  Airway patency: patent  Cardiovascular status: acceptable, hemodynamically stable and stable  Respiratory status: acceptable, nonlabored ventilation, face mask, spontaneous ventilation and unassisted  Hydration status: acceptable  Postoperative Nausea and Vomiting: none        There were no known notable events for this encounter.

## 2025-03-07 NOTE — ANESTHESIA PREPROCEDURE EVALUATION
Patient: Sebas Soto    Procedure Information       Anesthesia Start Date/Time: 03/07/25 0749    Procedure: lumbar wound exploration, revision, washout and wound vac placement (Spine Lumbar)    Location: [facility] OR 25 / [facility] OR    Surgeons: Donta Fu MD            Relevant Problems   Cardiac   (+) CAD (coronary artery disease)      Musculoskeletal   (+) Cervical spondylosis with myelopathy   (+) Lumbar stenosis with neurogenic claudication      HEENT   (+) Mixed conductive and sensorineural hearing loss of right ear with restricted hearing of left ear      ID   (+) Postoperative infection, unspecified type, initial encounter      Skin   (+) Pigmented purpuric dermatosis       Clinical information reviewed:   Tobacco  Allergies  Meds   Med Hx  Surg Hx   Fam Hx  Soc Hx        NPO Detail:  NPO/Void Status  Carbohydrate Drink Given Prior to Surgery? : N  Date of Last Liquid: 03/06/25  Time of Last Liquid: 2100  Date of Last Solid: 03/06/25  Time of Last Solid: 1900  Last Intake Type: Clear fluids  Time of Last Void: 0600         Physical Exam    Airway  Mallampati: III  TM distance: <3 FB  Neck ROM: limited  Comments: Fixed neck   Cardiovascular   Rhythm: regular     Dental    Pulmonary   Breath sounds clear to auscultation     Abdominal        Anesthesia Plan    History of general anesthesia?: yes  History of complications of general anesthesia?: no    ASA 3     general     The patient is not a current smoker.    Anesthetic plan and risks discussed with patient.  Use of blood products discussed with patient who consented to blood products.    Plan discussed with CAA.

## 2025-03-07 NOTE — CONSULTS
Vancomycin Dosing by Pharmacy- INITIAL    Sebas Soto is a 84 y.o. year old male who Pharmacy has been consulted for vancomycin dosing for cellulitis, skin and soft tissue. Based on the patient's indication and renal status this patient will be dosed based on a goal AUC of 400-600.     Renal function is currently stable.    Visit Vitals  /69 (BP Location: Right arm, Patient Position: Lying)   Pulse 77   Temp 35.9 °C (96.6 °F) (Temporal)   Resp 12        Lab Results   Component Value Date    CREATININE 0.68 2025    CREATININE 0.57 2025    CREATININE 0.45 (L) 2025    CREATININE 0.48 (L) 2025        Patient weight is as follows:   Vitals:    25 0031   Weight: 85.7 kg (189 lb)       Cultures:  No results found for the encounter in last 14 days.        No intake/output data recorded.  I/O during current shift:  I/O this shift:  In: 957.9 [I.V.:857.9; IV Piggyback:100]  Out: 50 [Blood:50]    Temp (24hrs), Av.6 °C (97.8 °F), Min:35.9 °C (96.6 °F), Max:38.3 °C (101 °F)         Assessment/Plan     Patient received vancomycin 1.5 g in OR on 3/7 at 09:00.  Will initiate vancomycin maintenance,  1000 mg every 12 hours.    This dosing regimen is predicted by [employer] to result in the following pharmacokinetic parameters:  Regimen: 1000 mg IV every 12 hours.  Start time: 21:11 on 2025  Exposure target: AUC24 (range)400-600 mg/L.hr   SEL82-00: 452 mg/L.hr  AUC24,ss: 506 mg/L.hr  Probability of AUC24 > 400: 74 %  Ctrough,ss: 16.2 mg/L  Probability of Ctrough,ss > 20: 32 %    Follow-up level will be ordered on 3/8 with AM labs unless clinically indicated sooner.  Will continue to monitor renal function daily while on vancomycin and order serum creatinine at least every 48 hours if not already ordered.  Follow for continued vancomycin needs, clinical response, and signs/symptoms of toxicity.       Erick Pugh, PharmD

## 2025-03-07 NOTE — H&P
History Of Present Illness  Sebas Soto is a 84 y.o. male w/ h/o L2-5 lami w/ L4-5 TLIF on , c/b post-op hallucinations and encephalopathy,  p/w incisional dehiscence, discharged on Abx, 3/6 p/w incisional dehiscence.     Denies any major changes to his mental status, new weakness or numbness.  Denies any new loss of bowel or bladder function.  He believes that they do some dressing changes to his wound but unsure how often.  He explains to me that he has been to multiple hospitals recently with most recent admission to [facility] ED on .  Denies any fevers chills night sweats.     Past Medical History  He has a past medical history of Abnormal ECG, Arthritis, Bilateral lower extremity edema, Diverticulosis, Hyperlipidemia, Neurogenic claudication, Personal history of other diseases of the digestive system, Personal history of other infectious and parasitic diseases, Prostate cancer (Multi), Short-term memory loss, and Spondylolisthesis.    Surgical History  He has a past surgical history that includes Hernia repair (2018); Colonoscopy (2018); Tonsillectomy (2018); and Neck surgery ().     Social History  He reports that he has quit smoking. His smoking use included cigarettes. He has never been exposed to tobacco smoke. He has never used smokeless tobacco. He reports that he does not drink alcohol and does not use drugs.     Allergies  Patient has no known allergies.    Medications  Medications Prior to Admission   Medication Sig Dispense Refill Last Dose/Taking    [] acetaminophen (Tylenol) 325 mg tablet Take 2 tablets (650 mg) by mouth every 6 hours if needed for mild pain (1 - 3). 240 tablet 0 Unknown    amoxicillin-pot clavulanate (Augmentin) 875-125 mg tablet Take 1 tablet by mouth 2 times a day for 14 days.   Unknown    atorvastatin (Lipitor) 10 mg tablet Take 1 tablet (10 mg) by mouth once daily at bedtime.   Unknown    chlorhexidine (Peridex) 0.12 % solution 15  "milliliter(s) orally once a day for 2 doses 15 ml  the night before surgery and 15 ml morning of surgery - swish for 30 seconds -DO NOT SWALLOW, SPIT  mL 0 Unknown    metoprolol succinate XL (Toprol-XL) 25 mg 24 hr tablet Take 1 tablet (25 mg) by mouth once daily in the morning.   Unknown       14 point ROS reviewed and negative except as per HPI    Physical Exam  RUE D4+, B5, T5, HG4, IO4  LUE D0 (chronic), B5, T5, HG4, IO4  RLE HF 3, KE4, PF5, DF5  LLE HF 3, KE4, PF5, DF5   Incision covered with dressing with evidence of dehiscence, packed with sterile gauze  No obvious evidence of drainage, erythema or purulence     Last Recorded Vitals  Blood pressure 95/58, pulse 85, temperature 36.3 °C (97.3 °F), temperature source Temporal, resp. rate 20, height 1.727 m (5' 8\"), weight 85.7 kg (189 lb), SpO2 98%.    Assessment/Plan   Assessment & Plan  Postoperative infection, unspecified type, initial encounter      Sebas Soto is a 84 y.o. male w/ h/o L2-5 lami w/ L4-5 TLIF on 1/31, c/b post-op hallucinations and encephalopathy, 2/22 p/w incisional dehiscence, discharged on Abx, 3/6 p/w incisional dehiscence.     Recs:  - admitted to floor  - plan for OR for washout today    Farooq Lund MD  Note authored by resident on neurosurgery team, with all questions or to contact team please page at [phone]    "

## 2025-03-07 NOTE — CONSULTS
Plastic Surgery Consult    Patient Name: Sebas Soto  MRN: [id number]  Date:  03/07/25     History of Present Illness  Sebas Soto is a 84 y.o. male with a past medical history of L2-5 laminectomy with L4-5 transforaminal lumbar interbody fusion on 1/31 with Dr. Qureshi, complicated by post-op hallucinations and encephalopathy, previously presented to ED on 2/22 with concern for post-op wound infection, CT L spine during that admission showed no deep dehiscence, so no acute surgical intervention and patient was discharged back to SNF with local wound care and antibiotics on 2/28. On 3/6, Dr. Qureshi was notified regarding concerns for lumbar wound dehiscence, and it was recommended patient come to  ED for evaluation.     Workup in the ED showed elevated ESR/CRP, no leukocytosis. CT lumbar showed postsurgical changes of L3-L4 and L4-L5 vertebral bodies, chronic L1 compression fracture with mild osseous retropulsion, significant subcutaneous fat stranding within the posterior soft tissues overlying L1-L5, retained osseous fragments and subcutaneous emphysema/dehiscence. Possible subcutaneous fluid concerning for infectious process. MRI showed previous post-op fluid collection now displays rim enhancement on postcontrast imaging concerning for abscess measuring 4.7 x 2.1 x 0.9 cm.  Patient was taken to OR today with neurosurgery for lumbar wound washout and vac placement. Intra-op cultures were taken. Plastic surgery was consulted for eventual assistance with wound closure.  Patient was assessed post-operatively this afternoon, he currently denies pain to lumbar spine wound. Discussed plan for RTOR with plastic surgery on 3/10 for wound coverage, patient understood and all questions were answered.     Past Medical History:   Diagnosis Date    Abnormal ECG     Arthritis     Bilateral lower extremity edema     Diverticulosis     Hyperlipidemia     Neurogenic claudication     Personal history of other diseases  of the digestive system     History of diverticulitis    Personal history of other infectious and parasitic diseases     History of herpes zoster    Prostate cancer (Multi)     Short-term memory loss     Spondylolisthesis      Past Surgical History:   Procedure Laterality Date    COLONOSCOPY  07/30/2018    Colonoscopy (Fiberoptic)    HERNIA REPAIR  07/30/2018    Hernia Repair-x3    NECK SURGERY  2007    TONSILLECTOMY  07/30/2018    Tonsillectomy     No Known Allergies    Current Facility-Administered Medications:     [Transfer Hold] acetaminophen (Tylenol) tablet 650 mg, 650 mg, oral, q6h, Denise Bryson MD    [Held by provider] amoxicillin-pot clavulanate (Augmentin) 875-125 mg per tablet 1 tablet, 1 tablet, oral, BID, Denise Bryson MD    atorvastatin (Lipitor) tablet 10 mg, 10 mg, oral, Nightly, Denise Bryson MD    [Transfer Hold] dextrose 50 % injection 12.5 g, 12.5 g, intravenous, q15 min PRN, Denise Bryson MD    [Transfer Hold] dextrose 50 % injection 25 g, 25 g, intravenous, q15 min PRN, Denise Bryson MD    fentaNYL PF (Sublimaze) injection 25 mcg, 25 mcg, intravenous, q5 min PRN, Thor Knight MD    [Transfer Hold] glucagon (Glucagen) injection 1 mg, 1 mg, intramuscular, q15 min PRN, Denise Bryson MD    [Transfer Hold] glucagon (Glucagen) injection 1 mg, 1 mg, intramuscular, q15 min PRN, Denise Bryson MD    HYDROmorphone PF (Dilaudid) injection 0.1 mg, 0.1 mg, intravenous, q5 min PRN, Thor Knight MD    lactated Ringer's infusion, 100 mL/hr, intravenous, Continuous, Thor Knight MD    lidocaine (Xylocaine) 10 mg/mL (1 %) injection 0.1 mL, 0.1 mL, subcutaneous, Once, Thor Knight MD    metoprolol succinate XL (Toprol-XL) 24 hr tablet 25 mg, 25 mg, oral, q AM, Denise Bryson MD    [Transfer Hold] naloxone (Narcan) injection 0.2 mg, 0.2 mg, intravenous, q5 min PRN, Denise Bryson MD    [Transfer Hold] ondansetron (Zofran) tablet 4 mg, 4 mg, oral, q8h PRN **OR** [Transfer Hold] ondansetron  "(Zofran) injection 4 mg, 4 mg, intravenous, q8h PRN, Denise Bryson MD    ondansetron (Zofran) injection 4 mg, 4 mg, intravenous, Once PRN, Thor Knight MD    oxygen (O2) therapy, , inhalation, Continuous - Inhalation, Denise Bryson MD, Last Rate: 60,000 mL/hr at 03/07/25 0058, 1 L/min at 03/07/25 0058    oxygen (O2) therapy, , inhalation, Continuous PRN - O2/gases, Thor Knight MD    [Transfer Hold] polyethylene glycol (Glycolax, Miralax) packet 17 g, 17 g, oral, Daily, Denise Bryson MD    potassium chloride 20 mEq in sterile water for injection 100 mL, 20 mEq, intravenous, Once, Thor Knight MD, Last Rate: 50 mL/hr at 03/07/25 1016, 20 mEq at 03/07/25 1016    sodium chloride 0.9% infusion, 100 mL/hr, intravenous, Continuous, Denise Bryson MD, Last Rate: 100 mL/hr at 03/07/25 0345, 100 mL/hr at 03/07/25 0345   Family History   Problem Relation Name Age of Onset    Alzheimer's disease Mother      Anemia Mother      Breast cancer Sister      Stroke Sister       Social History     Tobacco Use    Smoking status: Former     Types: Cigarettes     Passive exposure: Never    Smokeless tobacco: Never   Vaping Use    Vaping status: Never Used   Substance Use Topics    Alcohol use: Never    Drug use: Never     Review of Systems   Constitutional:  Negative for chills and fever.   HENT:  Negative for sore throat.    Respiratory:  Negative for cough, chest tightness and shortness of breath.    Cardiovascular:  Negative for chest pain.   Gastrointestinal:  Negative for nausea and vomiting.   Musculoskeletal:  Negative for back pain.   Skin:  Positive for wound.   Neurological:  Negative for dizziness and numbness.     Objective    /55   Pulse 78   Temp 36 °C (96.8 °F)   Resp (!) 7   Ht 1.727 m (5' 8\")   Wt 85.7 kg (189 lb)   SpO2 99%   BMI 28.74 kg/m²      Physical Exam   Constitutional: Calm and cooperative, NAD.  Eyes: PERRL, EOMI  ENMT: Moist mucous membranes, no apparent injuries or " lesions.  Head/Neck: NC/AT.   Cardiovascular: Normal rate and regular rhythm. 2+ equal pulses of the distal extremities.  Respiratory/Thorax: Regular respirations on NC. Good symmetric chest expansion.   Gastrointestinal: Abdomen soft, non-distended  Genitourinary: voiding independently   Extremities: SILT to all extremities, moving all 4 extremities actively, generalized BLE edema.   Neurological: A&Ox3.   Psychological: Appropriate mood and behavior.   Focused exam of back: Wound vac intact at lumbar wound site, maintaining low continuous suction at -125mmHg, no alarms for leak, obstruction or malfunction. No surrounding erythema, edema, or drainage.     Diagnostics   Results for orders placed or performed during the hospital encounter of 03/06/25 (from the past 24 hours)   CBC and Auto Differential   Result Value Ref Range    WBC 6.0 4.4 - 11.3 x10*3/uL    nRBC 0.0 0.0 - 0.0 /100 WBCs    RBC 3.32 (L) 4.50 - 5.90 x10*6/uL    Hemoglobin 10.2 (L) 13.5 - 17.5 g/dL    Hematocrit 31.5 (L) 41.0 - 52.0 %    MCV 95 80 - 100 fL    MCH 30.7 26.0 - 34.0 pg    MCHC 32.4 32.0 - 36.0 g/dL    RDW 18.0 (H) 11.5 - 14.5 %    Platelets 249 150 - 450 x10*3/uL    Immature Granulocytes %, Automated 0.5 0.0 - 0.9 %    Immature Granulocytes Absolute, Automated 0.03 0.00 - 0.50 x10*3/uL   Comprehensive metabolic panel   Result Value Ref Range    Glucose 108 (H) 74 - 99 mg/dL    Sodium 139 136 - 145 mmol/L    Potassium 4.2 3.5 - 5.3 mmol/L    Chloride 111 (H) 98 - 107 mmol/L    Bicarbonate 24 21 - 32 mmol/L    Anion Gap 8 (L) 10 - 20 mmol/L    Urea Nitrogen 11 6 - 23 mg/dL    Creatinine 0.68 0.50 - 1.30 mg/dL    eGFR >90 >60 mL/min/1.73m*2    Calcium 8.0 (L) 8.6 - 10.6 mg/dL    Albumin 3.0 (L) 3.4 - 5.0 g/dL    Alkaline Phosphatase 72 33 - 136 U/L    Total Protein 5.5 (L) 6.4 - 8.2 g/dL    AST 23 9 - 39 U/L    Bilirubin, Total 0.4 0.0 - 1.2 mg/dL    ALT 22 10 - 52 U/L   C-Reactive Protein   Result Value Ref Range    C-Reactive Protein 6.01  (H) <1.00 mg/dL   Sedimentation Rate   Result Value Ref Range    Sedimentation Rate 29 (H) 0 - 20 mm/h   Blood Culture    Specimen: Peripheral Venipuncture; Blood culture   Result Value Ref Range    Blood Culture Loaded on Instrument - Culture in progress    Blood Culture    Specimen: Peripheral Venipuncture; Blood culture   Result Value Ref Range    Blood Culture Loaded on Instrument - Culture in progress    Manual Differential   Result Value Ref Range    Neutrophils %, Manual 27.7 40.0 - 80.0 %    Bands %, Manual 14.3 0.0 - 5.0 %    Lymphocytes %, Manual 36.6 13.0 - 44.0 %    Monocytes %, Manual 19.6 2.0 - 10.0 %    Eosinophils %, Manual 1.8 0.0 - 6.0 %    Basophils %, Manual 0.0 0.0 - 2.0 %    Seg Neutrophils Absolute, Manual 1.66 1.60 - 5.00 x10*3/uL    Bands Absolute, Manual 0.86 (H) 0.00 - 0.50 x10*3/uL    Lymphocytes Absolute, Manual 2.20 0.80 - 3.00 x10*3/uL    Monocytes Absolute, Manual 1.18 (H) 0.05 - 0.80 x10*3/uL    Eosinophils Absolute, Manual 0.11 0.00 - 0.40 x10*3/uL    Basophils Absolute, Manual 0.00 0.00 - 0.10 x10*3/uL    Total Cells Counted 112     Neutrophils Absolute, Manual 2.52 1.60 - 5.50 x10*3/uL    RBC Morphology See Below     Ovalocytes Few     Nahma Cells Few    Urinalysis with Reflex Culture and Microscopic   Result Value Ref Range    Color, Urine Yellow Light-Yellow, Yellow, Dark-Yellow    Appearance, Urine Turbid (N) Clear    Specific Gravity, Urine 1.024 1.005 - 1.035    pH, Urine 5.0 5.0, 5.5, 6.0, 6.5, 7.0, 7.5, 8.0    Protein, Urine NEGATIVE NEGATIVE, 10 (TRACE), 20 (TRACE) mg/dL    Glucose, Urine Normal Normal mg/dL    Blood, Urine NEGATIVE NEGATIVE mg/dL    Ketones, Urine NEGATIVE NEGATIVE mg/dL    Bilirubin, Urine NEGATIVE NEGATIVE mg/dL    Urobilinogen, Urine Normal Normal mg/dL    Nitrite, Urine NEGATIVE NEGATIVE    Leukocyte Esterase, Urine NEGATIVE NEGATIVE   Coagulation Screen   Result Value Ref Range    Protime 13.4 (H) 9.8 - 12.4 seconds    INR 1.2 (H) 0.9 - 1.1    aPTT 24  (L) 26 - 36 seconds   Type and Screen   Result Value Ref Range    ABO TYPE A     Rh TYPE POS     ANTIBODY SCREEN NEG    Blood Gas Arterial Full Panel Unsolicited   Result Value Ref Range    POCT pH, Arterial 7.36 (L) 7.38 - 7.42 pH    POCT pCO2, Arterial 42 38 - 42 mm Hg    POCT pO2, Arterial 364 (H) 85 - 95 mm Hg    POCT SO2, Arterial 100 94 - 100 %    POCT Oxy Hemoglobin, Arterial 97.8 94.0 - 98.0 %    POCT Hematocrit Calculated, Arterial 32.0 (L) 41.0 - 52.0 %    POCT Sodium, Arterial 137 136 - 145 mmol/L    POCT Potassium, Arterial 3.5 3.5 - 5.3 mmol/L    POCT Chloride, Arterial 109 (H) 98 - 107 mmol/L    POCT Ionized Calcium, Arterial 1.14 1.10 - 1.33 mmol/L    POCT Glucose, Arterial 140 (H) 74 - 99 mg/dL    POCT Lactate, Arterial 0.9 0.4 - 2.0 mmol/L    POCT Base Excess, Arterial -1.7 -2.0 - 3.0 mmol/L    POCT HCO3 Calculated, Arterial 23.7 22.0 - 26.0 mmol/L    POCT Hemoglobin, Arterial 10.6 (L) 13.5 - 17.5 g/dL    POCT Anion Gap, Arterial 8 (L) 10 - 25 mmo/L    Patient Temperature 37.0 degrees Celsius    FiO2 100 %   Coox Panel, Arterial Unsolicited   Result Value Ref Range    POCT Hemoglobin, Arterial 10.6 (L) 13.5 - 17.5 g/dL    POCT Oxy Hemoglobin, Arterial 97.8 94.0 - 98.0 %    POCT Carboxyhemoglobin, Arterial 1.2 %    POCT Methemoglobin, Arterial 1.0 0.0 - 1.5 %    POCT Deoxy Hemoglobin, Arterial 0.0 0.0 - 5.0 %     MR lumbar spine w and wo IV contrast    Result Date: 3/7/2025  Interpreted By:  Millie Ordonez and Liller Gregory STUDY: MR LUMBAR SPINE W AND WO IV CONTRAST;  3/7/2025 2:53 am   INDICATION: Signs/Symptoms:assess for abscess/wound infection.   COMPARISON: CT lumbar spine 03/07/2025, 02/22/2025, MR lumbar spine 02/21/2025   ACCESSION NUMBER(S): [id number]   ORDERING CLINICIAN: BLUE BURGOS   TECHNIQUE: Sagittal T1, T2, STIR and axial T2 and T1 weighted MR images of the lumbar spine were obtained before and after administration of 17 mL Dotarem via IV without immediate complication.    FINDINGS:   LUMBAR SPINE:   Alignment: Grade 1 anterolisthesis of L4 on L5. Otherwise, alignment is maintained.   Vertebrae/Intervertebral Discs: There are 5 lumbar type vertebral bodies. Redemonstration of compression deformity at L1, similar to prior examination with mild osseous retropulsion measuring up to 3 mm. There is a focus of enhancement within the superior endplate of L1 with associated T2/STIR signal favored to be reactive in nature secondary to is small Schmorl's node. No abnormal enhancement elsewhere within the lumbar spine. Marrow signal is otherwise within normal limits within the remainder of the lumbar spine. Postsurgical changes again noted from L4-5 laminectomy and posterior fusion at the L4-5 level. L4-L5 interbody disc spacer placement noted. Partial laminectomy and spinous process resection at L3. Multilevel loss of normal disc T2 signal and disc height. Dorsal epidural fat noted at multiple levels throughout the lumbar spine.   Conus medullaris: The conus medullaris terminates at L1.   T12-L1: Mild ligamentum flavum and facet arthropathy noted disc osteophyte complex abuts the ventral thecal sac resulting in mild-to-moderate spinal canal stenosis at this level. No significant neural foraminal stenosis. x   L1-2: Mild ligamentum flavum hypertrophy and moderate facet arthropathy noted. Diffuse disc bulge flattens the ventral thecal sac resulting in mild spinal canal stenosis. No significant neural foraminal stenosis.   L2-3: Mild ligamentum flavum hypertrophy and moderate facet arthropathy noted which encroaches upon the bilateral neural foramina. Additionally, there is diffuse disc bulge which flattens the ventral thecal sac without significant spinal canal stenosis however, there is mild bilateral neural foraminal stenosis.   L3-4: Postsurgical changes at this level limit evaluation. Postsurgical changes of laminectomy noted. There is moderate facet arthropathy and ligamentum flavum  hypertrophy which encroaches upon the bilateral neural foramina. Additionally, there is diffuse disc bulge which flattens the ventral thecal sac. No significant spinal canal stenosis. However, there is moderate left and mild right neural foraminal stenosis.   L4-5: Postsurgical changes resulting significant blooming artifact which limited evaluation at this level. Postsurgical changes of laminectomy. Diffuse disc bulge with component of disc uncovering flattens the ventral thecal sac. Additionally, there is moderate to severe facet arthropathy bilaterally which encroach upon the bilateral neural foramina. No significant spinal canal stenosis however, there is severe right and moderate left neural foraminal stenosis.   L5-S1: Moderate ligamentum flavum hypertrophy and facet arthropathy noted. Diffuse disc bulge flattens the ventral thecal sac without critical spinal canal stenosis. There is mild right neural foraminal stenosis. No significant left neural foraminal stenosis.   Postsurgical changes noted within the paraspinous soft tissues posterior to the lumbar spine with interval reduction in size of the previously described fluid collection within the L3-4 surgical bed which measures 4.7 x 2.1 x 0.9 cm (series 4, image 13 and series 10, image 26). However, there appears to be more rim enhancement on current exam raising concern for abscess. The collection abuts and effaces dorsal aspect of the dura from L2-L4.   There is diffuse paraspinal edema and enhancement. No definite additional collections identified hemosiderin deposition and increased T2/stir signal noted within the erector spinae musculature, likely postoperative in nature.       1. Postsurgical changes of L3-4 and L4-L5 laminectomy as well as posterior fusion of L4-5 with interbody disc spacer placement. Interval reduction in size of the previously described postoperative fluid collection effacing the dorsal aspect of the dural sac. However, the  collection now displays rim enhancement on postcontrast imaging concerning for abscess measuring 4.7 x 2.1 x 0.9 cm. No significant associated canal stenosis. 2. Significant T2/STIR hyperintense signal within hand Min within the erector spinae musculature within the surgical bed which may be postoperative in nature or secondary to myositis given the above findings. 3. Multilevel spondylotic changes of the lumbar spine without critical spinal canal stenosis. However, spondylotic changes contribute to severe right and moderate left neural foraminal stenosis at the L4-5 level. 4. Similar compression deformity of the superior endplate of L1 with marrow changes favored to be posttraumatic/macro in nature. No signal abnormality within the adjacent disc. 5. Additional spondylotic changes within the lumbar spine as described in detail above.   I personally reviewed the images/study and I agree with the findings as stated above by resident physician, Dr. Case Slade.   MACRO: None   Signed by: Millie Ordonez 3/7/2025 3:52 AM Dictation workstation:   [id number]    CT chest abdomen pelvis w IV contrast    Result Date: 3/7/2025  Interpreted By:  Millie Ordonez,  and Deann Conway STUDY: CT CHEST ABDOMEN PELVIS W IV CONTRAST; CT LUMBAR SPINE W IV CONTRAST; 3/7/2025 1:29 am   INDICATION: Signs/Symptoms:fever, abdominal pain, elevated infectious markers; Signs/Symptoms:fever, recent back surgery.   COMPARISON: CT lumbar spine 02/22/2025.   ACCESSION NUMBER(S): [id number]; [id number]   ORDERING CLINICIAN: BLUE BURGOS   TECHNIQUE: Contiguous axial images of the chest, abdomen, and pelvis were obtained after the intravenous administration of iodinated contrast. Coronal and sagittal reformatted images were reconstructed from the axial data.   Multiplanar reformatted images of the lumbar spine were reconstructed from source data of concurrent CT chest/abdomen/pelvis acquisition.   FINDINGS: CT CHEST:   MEDIASTINUM AND  LYMPH NODES:  The esophagus appears within normal limits.  No enlarged intrathoracic or axillary lymph nodes by imaging criteria. No pneumomediastinum.   VESSELS:  Normal caliber thoracic aorta. No evidence of traumatic aortic injury. Moderate aortic atherosclerosis.   HEART: Normal size.  Severe coronary artery calcifications. No significant pericardial effusion.   LUNG, AIRWAYS, PLEURA: Bilateral dependent opacities suggestive of atelectasis favored over alveolar pulmonary edema or air trapping. No pneumothorax or pleural effusion. Scattered calcified granulomas.. No consolidation, pulmonary edema, pleural effusion or pneumothorax.   CHEST WALL SOFT TISSUES: No discernible acute abnormality. 15 mm low-attenuation nodule mid posterior right thyroid.   OSSEOUS STRUCTURES: Ankylosis C6-7 and T1-2. Severe degenerative changes of C5-6 with osteophytosis, intervertebral disc height loss, subchondral sclerosis/cyst formation and grade 1 anterolisthesis..     CT ABDOMEN/PELVIS:   ABDOMINAL WALL: No acute abnormality.   LIVER: No significant parenchymal abnormality.   BILE DUCTS: No significant intrahepatic or extrahepatic dilatation.   GALLBLADDER: No significant abnormality.   SPLEEN: No significant abnormality.   PANCREAS: No significant abnormality.   ADRENALS: No significant abnormality.   KIDNEYS, URETERS, BLADDER: The bladder wall is mildly thickened with adjacent fat stranding. Small diverticulum measures up to 0.8 cm. Nonobstructing calculi in the lower right renal pelvis measuring up to 5 mm. Probable parapelvic cysts. No hydronephrosis. Few subcentimeter low-attenuation renal foci, too small to characterize however likely cysts.   REPRODUCTIVE ORGANS: No significant abnormality.   VESSELS: No acute vascular injury. Atherosclerotic calcifications without aneurysmal dilatation seen.   LYMPH NODES/RETROPERITONEUM: No acute retroperitoneal abnormality. No enlarged lymph nodes.   BOWEL/MESENTERY/PERITONEUM: No  small-bowel thickening or dilation. Diffuse colonic wall thickening from the cecum to the rectum with most pronounced wall thickening and adjacent fat stranding of the mid transverse colon to the rectum without dilation. Normal appendix. No ascites, free air or fluid collection.   MUSCULOSKELETAL/CT lumbar:   Mild rightward curvature of the lumbar spine. Partial ankylosis noted of the sacroiliac joints. Unchanged compression fracture of the L1 vertebral body with 25-50% mid vertebral body height loss and 0.6 cm retropulsion. Redemonstration of L3-L4 laminectomy and L4-5 posterior spinal fusion with intervertebral disc spacer. Residual grade 1 anterolisthesis of L4-5. Similar degenerative changes of the L4-5 vertebral bodies. Hardware appears intact. Beam hardening limits evaluation, however within these limits: There is significant posterior subcutaneous fat stranding in the soft tissues overlying the paraspinal levels of L1-L5 with a few retained osseous fragments within the soft tissues (series 303, image 86). There is also subcutaneous emphysema and likely wound dehiscence at the levels of L3-L5. Probable small amount of fluid in the subcutaneous dot no definitive osseous erosive changes or osseous foci of gas to suggest osteomyelitis.   T12-L1: Osseous retropulsion and degenerative changes resultant moderate canal narrowing. Mild left foraminal narrowing. L1-L2: Canal is patent. Mild right foraminal narrowing. L2-L3: The canal is decompressed. Mild left foraminal narrowing. L3-L4: The canal appears decompressed with mild bilateral foraminal narrowing. L4-L5: The canal is partially decompressed with moderate foraminal narrowing. L5-S1: Canal is patent. Moderate foraminal narrowing.       CT lumbar: 1. Postsurgical changes of the L3-L4 and L4-5 vertebral bodies. Chronic L1 compression fracture with mild osseous retropulsion noted. Evaluation of soft tissues is limited due to technique however there is appearance  of significant subcutaneous fat stranding within the posterior soft tissues overlying the levels of L1-L5 with retained osseous fragments and subcutaneous emphysema/dehiscence. Possible subcutaneous fluid. Findings concerning for infectious process. No definitive osteomyelitis or hardware failure. Dedicated MRI has been performed and will be reported separately.   CT chest, abdomen/pelvis: 2. Inflammatory changes with wall thickening and enhancement from the cecum to the rectum suggestive of moderate proctocolitis. 3. Bladder wall thickening and fat stranding which could represent cystitis. 4. Hazy interstitial opacities likely related to atelectasis however component of air trapping not excluded. 5. Additional findings as detailed   I personally reviewed the images/study and I agree with the findings as stated by Dr. Man Zacarias. This study was interpreted at [place], [place].   MACRO: None.   Signed by: Millie Ordonez 3/7/2025 2:54 AM Dictation workstation:   [id number]    CT lumbar spine w IV contrast    Result Date: 3/7/2025  Interpreted By:  Millie Ordonez,  Kobe Conway STUDY: CT CHEST ABDOMEN PELVIS W IV CONTRAST; CT LUMBAR SPINE W IV CONTRAST; 3/7/2025 1:29 am   INDICATION: Signs/Symptoms:fever, abdominal pain, elevated infectious markers; Signs/Symptoms:fever, recent back surgery.   COMPARISON: CT lumbar spine 02/22/2025.   ACCESSION NUMBER(S): [id number]; [id number]   ORDERING CLINICIAN: BLUE BURGOS   TECHNIQUE: Contiguous axial images of the chest, abdomen, and pelvis were obtained after the intravenous administration of iodinated contrast. Coronal and sagittal reformatted images were reconstructed from the axial data.   Multiplanar reformatted images of the lumbar spine were reconstructed from source data of concurrent CT chest/abdomen/pelvis acquisition.   FINDINGS: CT CHEST:   MEDIASTINUM AND LYMPH NODES:  The esophagus appears within normal  limits.  No enlarged intrathoracic or axillary lymph nodes by imaging criteria. No pneumomediastinum.   VESSELS:  Normal caliber thoracic aorta. No evidence of traumatic aortic injury. Moderate aortic atherosclerosis.   HEART: Normal size.  Severe coronary artery calcifications. No significant pericardial effusion.   LUNG, AIRWAYS, PLEURA: Bilateral dependent opacities suggestive of atelectasis favored over alveolar pulmonary edema or air trapping. No pneumothorax or pleural effusion. Scattered calcified granulomas.. No consolidation, pulmonary edema, pleural effusion or pneumothorax.   CHEST WALL SOFT TISSUES: No discernible acute abnormality. 15 mm low-attenuation nodule mid posterior right thyroid.   OSSEOUS STRUCTURES: Ankylosis C6-7 and T1-2. Severe degenerative changes of C5-6 with osteophytosis, intervertebral disc height loss, subchondral sclerosis/cyst formation and grade 1 anterolisthesis..     CT ABDOMEN/PELVIS:   ABDOMINAL WALL: No acute abnormality.   LIVER: No significant parenchymal abnormality.   BILE DUCTS: No significant intrahepatic or extrahepatic dilatation.   GALLBLADDER: No significant abnormality.   SPLEEN: No significant abnormality.   PANCREAS: No significant abnormality.   ADRENALS: No significant abnormality.   KIDNEYS, URETERS, BLADDER: The bladder wall is mildly thickened with adjacent fat stranding. Small diverticulum measures up to 0.8 cm. Nonobstructing calculi in the lower right renal pelvis measuring up to 5 mm. Probable parapelvic cysts. No hydronephrosis. Few subcentimeter low-attenuation renal foci, too small to characterize however likely cysts.   REPRODUCTIVE ORGANS: No significant abnormality.   VESSELS: No acute vascular injury. Atherosclerotic calcifications without aneurysmal dilatation seen.   LYMPH NODES/RETROPERITONEUM: No acute retroperitoneal abnormality. No enlarged lymph nodes.   BOWEL/MESENTERY/PERITONEUM: No small-bowel thickening or dilation. Diffuse colonic wall  thickening from the cecum to the rectum with most pronounced wall thickening and adjacent fat stranding of the mid transverse colon to the rectum without dilation. Normal appendix. No ascites, free air or fluid collection.   MUSCULOSKELETAL/CT lumbar:   Mild rightward curvature of the lumbar spine. Partial ankylosis noted of the sacroiliac joints. Unchanged compression fracture of the L1 vertebral body with 25-50% mid vertebral body height loss and 0.6 cm retropulsion. Redemonstration of L3-L4 laminectomy and L4-5 posterior spinal fusion with intervertebral disc spacer. Residual grade 1 anterolisthesis of L4-5. Similar degenerative changes of the L4-5 vertebral bodies. Hardware appears intact. Beam hardening limits evaluation, however within these limits: There is significant posterior subcutaneous fat stranding in the soft tissues overlying the paraspinal levels of L1-L5 with a few retained osseous fragments within the soft tissues (series 303, image 86). There is also subcutaneous emphysema and likely wound dehiscence at the levels of L3-L5. Probable small amount of fluid in the subcutaneous dot no definitive osseous erosive changes or osseous foci of gas to suggest osteomyelitis.   T12-L1: Osseous retropulsion and degenerative changes resultant moderate canal narrowing. Mild left foraminal narrowing. L1-L2: Canal is patent. Mild right foraminal narrowing. L2-L3: The canal is decompressed. Mild left foraminal narrowing. L3-L4: The canal appears decompressed with mild bilateral foraminal narrowing. L4-L5: The canal is partially decompressed with moderate foraminal narrowing. L5-S1: Canal is patent. Moderate foraminal narrowing.       CT lumbar: 1. Postsurgical changes of the L3-L4 and L4-5 vertebral bodies. Chronic L1 compression fracture with mild osseous retropulsion noted. Evaluation of soft tissues is limited due to technique however there is appearance of significant subcutaneous fat stranding within the  posterior soft tissues overlying the levels of L1-L5 with retained osseous fragments and subcutaneous emphysema/dehiscence. Possible subcutaneous fluid. Findings concerning for infectious process. No definitive osteomyelitis or hardware failure. Dedicated MRI has been performed and will be reported separately.   CT chest, abdomen/pelvis: 2. Inflammatory changes with wall thickening and enhancement from the cecum to the rectum suggestive of moderate proctocolitis. 3. Bladder wall thickening and fat stranding which could represent cystitis. 4. Hazy interstitial opacities likely related to atelectasis however component of air trapping not excluded. 5. Additional findings as detailed   I personally reviewed the images/study and I agree with the findings as stated by Dr. Man Zacarias. This study was interpreted at [place], [place].   MACRO: None.   Signed by: Millie Ordonez 3/7/2025 2:54 AM Dictation workstation:   [id number]    XR chest 2 views    Result Date: 3/6/2025  Interpreted By:  Millie Ordonez and Beyersdorf Conner STUDY: XR CHEST 2 VIEWS;  3/6/2025 7:52 pm   INDICATION: Signs/Symptoms:febrile, assess for PNA.   COMPARISON: Two-view chest 10/11/2024   ACCESSION NUMBER(S): [id number]   ORDERING CLINICIAN: JOSE WANG   FINDINGS: AP radiograph of the chest was provided.     CARDIOMEDIASTINAL SILHOUETTE: Cardiomediastinal silhouette is normal in size and configuration. Tortuous thoracic aorta with atherosclerotic calcifications.   LUNGS: Coarse interstitial opacities. Elevation of the right hemidiaphragm. No focal consolidation, pleural effusion, or pneumothorax.   ABDOMEN: No remarkable upper abdominal findings.   BONES: Degenerative changes and demineralization.       1.  Coarse interstitial opacities likely chronic. No focal consolidation.   I personally reviewed the image(s)/study and resident interpretation. I agree with the findings as stated by resident  Ulisses Rowley. Data analyzed and images interpreted at [facility], [place], [place].   MACRO: None   Signed by: Millie Ordonez 3/6/2025 9:32 PM Dictation workstation:   [id number]    CT lumbar spine wo IV contrast    Result Date: 2/22/2025  Interpreted By:  Sebas Lee, STUDY: CT LUMBAR SPINE WO IV CONTRAST  2/22/2025 8:59 am   INDICATION: Signs/Symptoms:eval hardware, wound infection     COMPARISON: MRI dated 2/21/2025   ACCESSION NUMBER(S): [id number]   ORDERING CLINICIAN: TAVO BERGER   TECHNIQUE: Thin cut axial CT images through the lumbar spine were obtained and reconstructed in the coronal and sagittal planes.   FINDINGS: Postoperative changes are again identified compatible with a previous posterior laminectomies at the L3 and L4 levels, partial resection of the L2 spinous process, as well as a discectomy at the L4/5 level.   There is metallic artifact associated with posterior-lateral orthopedic fixation hardware and pedicle screws transfixing the L4 and L5 vertebrae. The orthopedic hardware appears intact without evidence of loosening.  There is immature bone graft material noted posterolaterally surrounding the orthopedic fixation hardware. There is metallic artifact associated with a interbody fusion device noted along the disc space at the L4/5 level.   There is again evidence of chronic collapse and anterior wedging of the L1 vertebral body. There is retropulsion of the superior/posterior margin of the collapsed L1 vertebral body contributing to mild-to-moderate bony encroachment upon the spinal canal.   There is a dextrocurvature of the lumbar and visualized lower thoracic spine.   There is 7 mm of anterolisthesis of L4 on L5. There is a minimal retrolisthesis of L1 on L2.   The soft tissues of the spinal canal, neural foramen, as well as posterior paraspinal soft tissues are suboptimally evaluated on this noncontrast CT study. Within limitations of the  study, there is ill-defined intermediate attenuation infiltrating the posterior paraspinal soft tissues within lumbar region as well as a few scattered foci of subcutaneous air within the soft tissues along the midline which may be postsurgical in etiology although a superimposed infectious/inflammatory process could give a similar CT appearance and can not be excluded.   There is a 6 mm focus of bony sclerosis noted within the partially imaged left iliac bone which while nonspecific may represent a benign process such as a focal bone island.   There is multilevel spondylosis.   At the L5/S1 level, there are hypertrophic degenerative facet changes and a minimal posterior disc bulge without significant spinal canal narrowing. There is moderate right and mild left-sided neural foraminal narrowing.   At the L4/5 level, there are postoperative changes compatible with a previous L4 laminectomy as well as discectomy and fusion. There is 7 mm of anterolisthesis of L4 on L5. There are hypertrophic degenerative facet changes. There is mild-to-moderate bony encroachment upon the spinal canal. There is moderate to severe right and mild-to-moderate left-sided neural foraminal narrowing.   At the L3/4 level, there are postoperative changes compatible with a previous posterior laminectomy. There is posterior osteophytic spurring and posterior disc bulge along with hypertrophic degenerative facet changes without significant bony encroachment upon the spinal canal. There is mild-to-moderate bony encroachment upon the neural foramen bilaterally.   At the L2/3 level, there is mild posterior osteophytic spurring and posterior disc bulge along with degenerative facet changes and ligamentum flavum hypertrophy contributing to mild-to-moderate spinal canal narrowing. There is mild encroachment upon the inferior recesses of the neural foramen bilaterally.   At the L1/2 level, there is a minimal retrolisthesis of L1 on L2, mild posterior  osteophytic spurring and posterior disc bulge along with degenerative facet changes contributing to mild spinal canal narrowing. There is mild encroachment upon the neural foramen bilaterally.   At the T12/L1 level, there is retropulsion of the superior/posterior margin of the collapsed L1 vertebral body contributing to mild-to-moderate bony encroachment upon the spinal canal. There is mild encroachment upon the neural foramen bilaterally.   Atherosclerotic calcifications are noted along the descending aorta and iliac arteries.         Postoperative changes are again identified compatible with a previous posterior laminectomies at the L3 and L4 levels, partial resection of the L2 spinous process, as well as a discectomy at the L4/5 level.   There is metallic artifact associated with posterior-lateral orthopedic fixation hardware and pedicle screws transfixing the L4 and L5 vertebrae. The orthopedic hardware appears intact without evidence of loosening. There is immature bone graft material noted posterolaterally surrounding the orthopedic fixation hardware. There is metallic artifact associated with a interbody fusion device noted along the disc space at the L4/5 level.   There is again evidence of chronic collapse and anterior wedging of the L1 vertebral body. There is retropulsion of the superior/posterior margin of the collapsed L1 vertebral body contributing to mild-to-moderate bony encroachment upon the spinal canal.   There is a dextrocurvature of the lumbar and visualized lower thoracic spine.   There is 7 mm of anterolisthesis of L4 on L5. There is a minimal retrolisthesis of L1 on L2.   The soft tissues of the spinal canal, neural foramen, as well as posterior paraspinal soft tissues are suboptimally evaluated on this noncontrast CT study. Within limitations of the study, there is ill-defined intermediate attenuation infiltrating the posterior paraspinal soft tissues within lumbar region as well as a few  scattered foci of subcutaneous air within the soft tissues along the midline which may be postsurgical in etiology although a superimposed infectious/inflammatory process could give a similar CT appearance and can not be excluded.   There is multilevel spondylosis. There are varying degrees of spinal canal and neural foraminal narrowing as described above.   MACRO: None   Signed by: Sebas Lee 2/22/2025 9:25 AM Dictation workstation:   [id number]    MR lumbar spine w and wo IV contrast    Result Date: 2/21/2025  Interpreted By:  Jericho Mariano, STUDY: MR LUMBAR SPINE W AND WO IV CONTRAST;  2/21/2025 6:43 pm   INDICATION: Signs/Symptoms:post operative infection.     COMPARISON: MRI lumbar spine June 21, 2024. CT cervical spine June 21, 2024 and chest CT August 8, 2013 are also reviewed with respect to the spine.   ACCESSION NUMBER(S): [id number]   ORDERING CLINICIAN: OLYA RODRIGUEZ   TECHNIQUE: Multiplanar, multisequence MR imaging of the lumbar spine performed prior to and following administration of 18 ML Dotarem intravenous contrast.   FINDINGS: There are segmentation anomalies at cervical and thoracic levels demonstrated on the comparison studies. The labeling of the levels is somewhat arbitrary. For the sake of consistency, L5-S1 level we will again be defined as axial T2 series 8 image 43. Please note: This method of labeling levels assumes 5 lumbar vertebra and therefore T12 has a very hypoplastic right rib and an atretic left rib.   Alignment: There is again a slight retrolisthesis at L1-2 level. At L4-5 level, there was previouslyd 7 mm anterolisthesis. There is similar anterior subluxation at L4-5 level on the current study, although precise measurements are not possible due to the postoperative changes and a new mild compression compression fracture deformity of the L5 superior endplate.   Vertebrae: Interval surgery with decompressive laminectomies at L3 and L4 levels, and interval bilateral  foraminotomies at L4-5 level. Posterior fusion of L4 and L5 with bilateral pedicle screws and interval placement of interbody spacer within the disc space. This hardware appears to be normally positioned within the limitations of MR imaging.   The compression deformity of the L1 vertebral body is maturing as expected, with resolving bone marrow edema, now mild in degree. Mild enhancement along the endplate as expected for a subacute healing fracture. No evidence for discitis or osteomyelitis is seen.   There is a new mild deformity of the L5 posterior-superior vertebral endplate, with mild retropulsion or broad-based buckling of the endplate back into the canal by approximately 4 mm.   Discs: Postoperative changes within the L4-5 disc space, interval placement of interbody disc spacer which appears to be normally positioned. Disc elsewhere unchanged with similar multilevel disc desiccation, mild disc space narrowing and small desiccated disc bulges. No evidence for a new disc herniation.   Spinal cord: Signal within the spinal cord is normal. The conus medullaris terminates at L1 level..   Spinal canal: Interval decompression of the spinal canal at the levels being labeled as L3 and L4. No evidence for epidural hemorrhage, abscess or acute disc herniation. Fluid within the laminectomy site mildly bulges inward, contacting and slightly deforming the thecal sac but without compression of the cauda equina. The canal appears to be well decompressed, the degree of spinal canal narrowing markedly improved at L4-5 level. No evidence for subdural hygroma, or arachnoiditis.   Paraspinal soft tissues: Fluid within the laminectomy site as detailed in the report above, without significant mass effect upon the thecal sac.       1. Multiple cervical and thoracic segmentation variations in the anatomy. Study again dictated assuming 5 lumbar vertebra, although T12 therefore has only a single and hypoplastic rib. 2. There are  expected postoperative changes from recent decompressive lumbar laminectomies, L4-5 foraminotomies, and posterior fusion at L4-5 level. 3. There is a small amount of fluid within the laminectomy site. The imaging appearance looks fairly typical for expected postprocedural seroma. MR imaging can not determine whether or not this fluid is infected or sterile. That being said, there are no aggressive imaging features. 4. MR imaging suggests new mild deformity of the L5 superior vertebral endplate with mild retropulsion, although this does not narrow the thecal sac. A new mild compression fracture of this endplate, or bone erosion of the endplate due to osteomyelitis, are not excluded. However I do not see imaging features suggestive of discitis. 5. Evaluation of bones is inherently limited on MR imaging, and the recent postoperative changes further limit evaluation of the L5 vertebral endplate. Recommend CT imaging for better evaluation of the bones.   MACRO: None   Signed by: Jericho Mariano 2/21/2025 7:51 PM Dictation workstation:   [id number]      Current Medications  Scheduled medications  [Transfer Hold] acetaminophen, 650 mg, oral, q6h  [Held by provider] amoxicillin-pot clavulanate, 1 tablet, oral, BID  atorvastatin, 10 mg, oral, Nightly  lidocaine, 0.1 mL, subcutaneous, Once  metoprolol succinate XL, 25 mg, oral, q AM  oxygen, , inhalation, Continuous - Inhalation  [Transfer Hold] polyethylene glycol, 17 g, oral, Daily  potassium chloride, 20 mEq, intravenous, Once      Continuous medications  lactated Ringer's, 100 mL/hr  sodium chloride 0.9%, 100 mL/hr, Last Rate: 100 mL/hr (03/07/25 0345)      PRN medications  PRN medications: [Transfer Hold] dextrose, [Transfer Hold] dextrose, fentaNYL PF, [Transfer Hold] glucagon, [Transfer Hold] glucagon, HYDROmorphone, [Transfer Hold] naloxone, [Transfer Hold] ondansetron **OR** [Transfer Hold] ondansetron, ondansetron, oxygen     Assessment  Sebas Soto is a 84  y.o. male with a past medical history of L2-5 laminectomy with L4-5 transforaminal lumbar interbody fusion on 1/31 with Dr. Qureshi, complicated by post-op hallucinations and encephalopathy, previously presented to ED on 2/22 with concern for post-op wound infection, CT L spine during that admission showed no deep dehiscence, so no acute surgical intervention and patient was discharged back to SNF with local wound care and antibiotics on 2/28. On 3/6, Dr. Qureshi was notified regarding concerns for lumbar wound dehiscence, and it was recommended patient come to  ED for evaluation. Patient was taken to OR today with neurosurgery for lumbar wound washout and vac placement. Plastic surgery was consulted for eventual assistance with wound closure.     Plan/Recommendations  #S/p Lumbar wound washout and wound vac placement with NSGY on 3/7  - Case requested with Dr. Zacarias for 3/10 for wound debridement and closure with plastic surgery  - Please ensure NPO and T/S orders are UTD prior to OR  - Continue wound vac therapy per NSGY team in the interim  - Follow up OR cultures  - IV antibiotics per ID/primary  - Appreciate remaining care per primary team    I Spent 30 minutes reviewing, evaluating and educating this patient.     Patient and plan were discussed with Dr. Zacarias.    Francesca Ca PA-C   Plastic and Reconstructive Surgery   [place]  Pager #[phone]  Team phone: [phone]

## 2025-03-07 NOTE — ED TRIAGE NOTES
Pt BIB EMS from [facility] for a wound check following a laminectomy on 1/31/25; patient was sent to a different SNF at that time, and his wound dehiscence; was brought back to [facility] at that time and the ultimately discharged to current SNF with abx; per EMS report/patient's statement, the surgeon was in to assess the wound today, and recommended having him sent back to the ER; patient is denying all symptoms on arrival, including pain, chills, body aches, despite having fever in triage; on arrival wound is covered with clean dressing, once removed for assessment, the wound is still open, and there is bright yellow drainage to the dressing; patient is A&Ox4, with no hypoxia nor obvious WOB noted on arrival

## 2025-03-07 NOTE — PROGRESS NOTES
Physical Therapy    Physical Therapy Evaluation    Patient Name: Sebas Soto  MRN: [id number]  Department: [place] [address]  Room: [address] [place] [address]  Today's Date: 3/7/2025   Time Calculation  Start Time: 1441  Stop Time: 1504  Time Calculation (min): 23 min    Assessment/Plan   PT Assessment  PT Assessment Results: Decreased strength, Decreased endurance, Impaired balance, Decreased mobility, Decreased skin integrity  Rehab Prognosis: Good  Barriers to Discharge Home: Caregiver assistance, Physical needs  Caregiver Assistance: Caregiver assistance needed per identified barriers - however, level of patient's required assistance exceeds assistance available at home  Physical Needs: Ambulating household distances limited by function/safety, 24hr mobility assistance needed, Intermittent ADL assistance needed, High falls risk due to function or environment  Evaluation/Treatment Tolerance: Patient tolerated treatment well  Strengths: Ability to acquire knowledge  End of Session Communication: Bedside nurse  Assessment Comment: 85 y/o M who is s/p lumbar washout from previous lumbar surgery; patient demo's impaired balance, strength, functional mobility. Patient would benefit from MOD intensity skilled PT services upon DC to address impairments as stated above  End of Session Patient Position: Bed, 3 rail up, Alarm off, not on at start of session  IP OR SWING BED PT PLAN  Inpatient or Swing Bed: Inpatient  PT Plan  Treatment/Interventions: Bed mobility, Transfer training, Gait training, Balance training, Strengthening, Therapeutic exercise, Therapeutic activity, Positioning, Postural re-education  PT Plan: Ongoing PT  PT Frequency: 5 times per week  PT Discharge Recommendations: Moderate intensity level of continued care  Equipment Recommended upon Discharge:  (n/a)  PT Recommended Transfer Status: Assist x2, Assistive device (2ww)  PT - OK to Discharge: Yes (DC rec made)    Subjective   General Visit Information:  General  Reason  for Referral: 3/6 p/w incisional dehiscence; lumbar wound exploration, revision, washout and wound vac placement  Past Medical History Relevant to Rehab: h/o L2-5 lami w/ L4-5 TLIF on 1/31, c/b post-op hallucinations and encephalopathy, 2/22/25. Abnormal ECG, Arthritis, Bilateral lower extremity edema, Diverticulosis, Hyperlipidemia, Neurogenic claudication, Personal history of other diseases of the digestive system, Personal history of other infectious and parasitic diseases, Prostate cancer (Multi), Short-term memory loss, and Spondylolisthesis.  Family/Caregiver Present: Yes  Caregiver Feedback: wife and son present (infectious disease MD in room for entirety of session talking to family and trying to evaluate patient during PT eval)  Prior to Session Communication: Bedside nurse  Patient Position Received: Bed, 3 rail up, Alarm off, not on at start of session  General Comment: received supine, HOB elevated, agreeable to participate; 2L NC, wound vac; x2 episodes of bradycardia to the low 40s, RN aware.  Home Living:  Home Living  Home Living Comments: has been at SNF since last surgical hospital stay; prior to last surgery, was home with spouse  Prior Level of Function:  Prior Function Per Pt/Caregiver Report  Prior Function Comments: at SNF: reports needing assist with ADLs, iADLs; requires assist for bed mobility, transfers (STS); patient reports NOT ambulating since beginning of February.  Precautions:  Precautions  Hearing/Visual Limitations: hearing appears WFL  Medical Precautions: Fall precautions, Oxygen therapy device and L/min, Spinal precautions  Post-Surgical Precautions: Spinal precautions  Precautions Comment: log roll for bed mobility       Vital Signs Comment: bradycardic to low 40s 2x during session; patient denied dizziness, SOB during session     Objective   Pain:  Pain Assessment  Pain Assessment: 0-10  0-10 (Numeric) Pain Score: 0 - No pain  Cognition:  Cognition  Overall Cognitive Status:  Within Functional Limits  Orientation Level:  (AxO x3)  Following Commands: Follows one step commands with repetition (90%)    General Assessments:        Activity Tolerance  Early Mobility/Exercise Safety Screen: Proceed with mobilization - No exclusion criteria met    Sensation  Light Touch:  (reports N/T in L ankle (chronic per patient report))    Strength  Strength Comments: RUE:  4-/5, elbow flexion 4-/5, shoulder flexion 3/5; LUE:  4-5, elbow flexion 3+5, shoulder flexion <3-/5; BLE: PF/DF 4-/5, knee ext 3+/5  Postural Control  Postural Control: Impaired  Head Control: reports limitation with head rotation L and R  Posture Comment: mild forward flexed trunk posture sitting EOB    Static Sitting Balance  Static Sitting-Balance Support: Feet supported, Bilateral upper extremity supported  Static Sitting-Level of Assistance: Contact guard  Static Sitting-Comment/Number of Minutes: x1  Dynamic Sitting Balance  Dynamic Sitting-Balance Support: Feet supported, Bilateral upper extremity supported  Dynamic Sitting-Level of Assistance: Minimum assistance  Dynamic Sitting-Comments: x1    Static Standing Balance  Static Standing-Balance Support: Bilateral upper extremity supported  Static Standing-Level of Assistance: Moderate assistance  Static Standing-Comment/Number of Minutes: x2, 2ww, mild B knee buckling; stood x30 seconds only  Functional Assessments:  Bed Mobility  Bed Mobility: Yes  Bed Mobility 1  Bed Mobility 1: Supine to sitting  Level of Assistance 1: Maximum assistance, Minimal verbal cues, Minimal tactile cues  Bed Mobility Comments 1: log roll sequencing, bedrail, draw sheet  Bed Mobility 2  Bed Mobility  2: Sitting to supine  Level of Assistance 2: Maximum assistance, Moderate verbal cues, Moderate tactile cues, +2  Bed Mobility Comments 2: via log roll    Transfers  Transfer: Yes  Transfer 1  Transfer From 1: Sit to, Stand to  Transfer to 1: Stand, Sit  Technique 1: Sit to stand, Stand to  sit  Transfer Device 1: Walker  Transfer Level of Assistance 1: Moderate assistance, Moderate verbal cues, Moderate tactile cues, +2, Arm in arm assistance  Trials/Comments 1: B feet blocking; cues for task sequencing    Ambulation/Gait Training  Ambulation/Gait Training Performed: No  Extremity/Trunk Assessments:  RUE   RUE :  (limited AROM shoulder flexion ~80 degrees, AAROM WFL, remainder WFL AROM)  LUE   LUE:  (limited AROM shoulder flexxion ~30 degrees, PROM shoulder flexion 100 degrees, remainder WFL AROM)  RLE   RLE :  (A/AAROM WFL)  LLE   LLE :  (A/AAROM WFL)  Outcome Measures:  [facility] Basic Mobility  Turning from your back to your side while in a flat bed without using bedrails: A lot  Moving from lying on your back to sitting on the side of a flat bed without using bedrails: A lot  Moving to and from bed to chair (including a wheelchair): A lot  Standing up from a chair using your arms (e.g. wheelchair or bedside chair): A lot  To walk in hospital room: Total  Climbing 3-5 steps with railing: Total  Basic Mobility - Total Score: 10    Encounter Problems       Encounter Problems (Active)       PT Problem       Patient will complete bed mobility with MINx1        Start:  03/07/25    Expected End:  03/21/25            Patient will complete STS with MODx1 using LRAD without acute LOB         Start:  03/07/25    Expected End:  03/21/25            Patient will participate in BLE there-ex program in order to assist in improving strength and to assist with the completion of functional mobility tasks.        Start:  03/07/25    Expected End:  03/21/25            Patient will complete static (MINx1) and dynamic (MODx1) standing balance activities using LRAD without acute LOB, while maintaining midline posture.        Start:  03/07/25    Expected End:  03/21/25            Patient will ambulate >/=5' with LRAD with MODx1 without acute LOB        Start:  03/07/25    Expected End:  03/21/25               Pain - Adult               Education Documentation  Body Mechanics, taught by Andreina Briggs PT at 3/7/2025  3:40 PM.  Learner: Patient  Readiness: Acceptance  Method: Explanation  Response: Needs Reinforcement  Comment: log roll, spine precautions    Precautions, taught by Andreina Briggs PT at 3/7/2025  3:40 PM.  Learner: Patient  Readiness: Acceptance  Method: Explanation  Response: Needs Reinforcement  Comment: log roll, spine precautions    Mobility Training, taught by Andreina Briggs PT at 3/7/2025  3:40 PM.  Learner: Patient  Readiness: Acceptance  Method: Explanation  Response: Needs Reinforcement  Comment: log roll, spine precautions    Education Comments  No comments found.        Andreina Briggs, ALBERTO, DPT

## 2025-03-07 NOTE — CONSULTS
Inpatient consult to Neurosurgery  Consult performed by: Denise Bryson MD  Consult ordered by: Jensen Garcia MD  Reason for consult: Incisional dehiscence          Reason For Consult  Incisional dehiscence    History Of Present Illness  Sebas Soto is a 84 y.o. male w/ h/o L2-5 lami w/ L4-5 TLIF on 1/31, c/b post-op hallucinations and encephalopathy, 2/22 p/w incisional dehiscence, discharged on Abx, 3/6 p/w incisional dehiscence.     Denies any major changes to his mental status, new weakness or numbness.  Denies any new loss of bowel or bladder function.  He believes that they do some dressing changes to his wound but unsure how often.  He explains to me that he has been to multiple hospitals recently with most recent admission to [place] ED on 2/22.  Denies any fevers chills night sweats.    Past Medical History  He has a past medical history of Abnormal ECG, Arthritis, Bilateral lower extremity edema, Diverticulosis, Hyperlipidemia, Neurogenic claudication, Personal history of other diseases of the digestive system, Personal history of other infectious and parasitic diseases, Prostate cancer (Multi), Short-term memory loss, and Spondylolisthesis.    Surgical History  He has a past surgical history that includes Hernia repair (07/30/2018); Colonoscopy (07/30/2018); Tonsillectomy (07/30/2018); and Neck surgery (2007).     Social History  He reports that he has quit smoking. His smoking use included cigarettes. He has never been exposed to tobacco smoke. He has never used smokeless tobacco. He reports that he does not drink alcohol and does not use drugs.    Family History  Family History   Problem Relation Name Age of Onset    Alzheimer's disease Mother      Anemia Mother      Breast cancer Sister      Stroke Sister          Allergies  Patient has no known allergies.    Review of Systems   10 point ROS is obtained and negative except the ones mentioned in the HPI    Physical Exam  RUE D4+, B5, T5, HG4,  "IO4  LUE D0 (chronic), B5, T5, HG4, IO4  RLE HF 3, KE4, PF5, DF5  LLE HF 3, KE4, PF5, DF5   Incision covered with dressing with evidence of dehiscence, packed with sterile gauze  No obvious evidence of drainage, erythema or purulence    Last Recorded Vitals  Blood pressure 111/70, pulse 86, temperature (!) 38.3 °C (101 °F), temperature source Oral, resp. rate 16, height 1.727 m (5' 8\"), weight 90.7 kg (200 lb), SpO2 95%.    Relevant Results  None available     Assessment/Plan     Sebas Soto is a 84 y.o. male w/ h/o L2-5 lami w/ L4-5 TLIF on 1/31, c/b post-op hallucinations and encephalopathy, 2/22 p/w incisional dehiscence, discharged on Abx, 3/6 p/w incisional dehiscence.    Recs:  - Please obtain infectious wrkup labs: CBC, Bcx, ESR, CRP  - Please obtain MRI lumbar spine with and without contrast.  Please obtain the following labs: CBC, RFP, coags, type and screen, UA, chest x-ray, EKG  -Further recs pending MRI              "

## 2025-03-07 NOTE — ANESTHESIA PROCEDURE NOTES
Arterial Line:    Date/Time: 3/7/2025 8:14 AM    Staffing  Performed: CAA   Authorized by: Thor Knight MD    Performed by: BRITNEY Toussaint    An arterial line was placed. Procedure performed using surface landmarks.in the OR for the following indication(s): continuous blood pressure monitoring.    A 20 gauge (size), 10 cm (length), Arrow (type) catheter was placed into the Left radial artery, secured by Tegaderm,   Seldinger technique used.  Events:  patient tolerated procedure well with no complications.      Additional notes:  Awake a-line performed with sterile technique, 1x attempt, no issues. Pt tolerated well.

## 2025-03-07 NOTE — ANESTHESIA PROCEDURE NOTES
Peripheral IV  Date/Time: 3/7/2025 8:37 AM      Placement  Needle size: 18 G  Laterality: right  Location: forearm  Site prep: alcohol  Technique: anatomical landmarks  Attempts: 2

## 2025-03-07 NOTE — PROGRESS NOTES
Emergency Department Transition of Care Note       Signout   I received Sebas Soto in signout from Dr. Monet.  Please see the ED Provider Note for all HPI, PE and MDM up to the time of signout at 2300.  This is in addition to the primary record.    In brief Sebas Soto is an 84 y.o. male presenting to the emergency department from skilled nursing facility. Patient had a laminectomy on January 26. He then was recently admitted for postop infection and discharged on February 26. When his wound is being looked at today there were signs of dehiscence, they reached out to Dr. Qureshi who was the primary surgeon who recommended coming to the  ED for evaluation. Patient notes he had some diarrhea recently, and is slightly febrile. Denies any pain in his back. No difficulty breathing. No other concerns at this time.     At the time of signout we were awaiting:  Completion of patient's workup, pending CT CAP, CT L-spine    ED Course & Medical Decision Making   Medical Decision Making:  Under my care, CT lumbar showed postsurgical changes of L3-L4 and L4-L5 vertebral bodies, chronic L1 compression fracture with mild osseous retropulsion.  Significant subcutaneous fat stranding within the posterior soft tissues overlying L1-L5, retained osseous fragments and subcutaneous emphysema/dehiscence.  Possible subcu fluid concerning for infectious process. CT CAP showed inflammatory changes suggestive of moderate proctocolitis.  Bladder wall thickening with fat stranding, possible cystitis.    Urine negative for acute UTI.  CBC shows no leukocytosis with differential showed some bandemia, elevated ESR and CRP, H&H stable.  Patient admitted to neurosurgery for further evaluation and management.    ED Course:  Diagnoses as of 03/07/25 0408   Postoperative surgical complication involving musculoskeletal system associated with musculoskeletal procedure, unspecified complication       Disposition   As a result of their  workup, the patient will require admission to the hospital.  The patient was informed of his diagnosis.  The patient was given the opportunity to ask questions and I answered them. The patient agreed to be admitted to the hospital.    Procedures   Procedures    Patient seen and discussed with ED attending physician.    Vasu Nieto, DO  Emergency Medicine

## 2025-03-07 NOTE — OP NOTE
lumbar wound exploration, revision, washout and wound vac placement Operative Note     Date: 3/6/2025 - 3/7/2025  OR Location: [facility] OR    Name: Sebas Soto, : 1940, Age: 84 y.o., MRN: [id number], Sex: male    Diagnosis  Pre-op Diagnosis      * Postoperative infection, unspecified type, initial encounter [T81.40XA] Post-op Diagnosis     * Postoperative infection, unspecified type, initial encounter [T81.40XA]     Procedures  lumbar wound exploration, revision, washout and wound vac placement  20049 - [place] LACY FACETECTOMY&FORAMOT 1 VRT SGM EA ADDL SGM      Surgeons      * Donta Fu - Primary    Resident/Fellow/Other Assistant:  Surgeons and Role:     * Lois Mijares MD - Resident - Assisting    Staff:   Circulator: Dnani Babcock Person: Gris    Anesthesia Staff: Anesthesiologist: Thor Knight MD  C-AA: BRITNEY Toussaint; BRITNEY Perez    Procedure Summary  Anesthesia: General  ASA: ASA status not filed in the log.  Estimated Blood Loss: 50mL  Intra-op Medications:   Administrations occurring from 0705 to 1040 on 25:   Medication Name Total Dose   polymyxin B 500,000 Units in sodium chloride 0.9% 1,000 mL irrigation 500,000 Units   sodium chloride 0.9 % irrigation solution 3,000 mL   acetaminophen (Tylenol) tablet 650 mg Cannot be calculated   amoxicillin-pot clavulanate (Augmentin) 875-125 mg per tablet 1 tablet Cannot be calculated   cefepime (Maxipime) 2 g 2 g   esmolol (Brevibloc) injection 40 mg   etomidate (Amidate) injection 16 mg   fentaNYL (Sublimaze) injection 50 mcg/mL 100 mcg   lactated Ringer's infusion Cannot be calculated   ondansetron 2 mg/mL 4 mg   phenylephrine (Jose Armando-Synephrine) 10 mg/250 mL NS (40 mcg/mL) infusion 5.18 mg   polyethylene glycol (Glycolax, Miralax) packet 17 g Cannot be calculated   rocuronium (ZeMuron) 50 mg/5 mL injection 40 mg   succinylcholine (Anectine) 20 mg/mL injection 80 mg   vancomycin 1 g 1.5 g              Anesthesia Record                Intraprocedure I/O Totals          Intake    Phenylephrine Drip 0.00 mL    The total shown is the total volume documented since Anesthesia Start was filed.    Total Intake 0 mL          Specimen:   ID Type Source Tests Collected by Time   A : SUPERFICIAL LUMBAR WOUND #1 Swab ABSCESS FUNGAL CULTURE/SMEAR, TISSUE/WOUND CULTURE/SMEAR Donta Fu MD 3/7/2025 0914   B : SUPERFICIAL LUMBAR WOUND #2 Swab ABSCESS FUNGAL CULTURE/SMEAR, TISSUE/WOUND CULTURE/SMEAR Donta Fu MD 3/7/2025 0912                 Drains and/or Catheters: * None in log *    Tourniquet Times:         Implants:     Findings: no elsi purulence, no fascial violation    Indications: Sebas Soto is an 84 y.o. male who is having surgery for Postoperative infection, unspecified type, initial encounter [T81.40XA].     The patient was seen in the preoperative area. The risks, benefits, complications, treatment options, non-operative alternatives, expected recovery and outcomes were discussed with the patient. The possibilities of reaction to medication, pulmonary aspiration, injury to surrounding structures, bleeding, recurrent infection, the need for additional procedures, failure to diagnose a condition, and creating a complication requiring transfusion or operation were discussed with the patient. The patient concurred with the proposed plan, giving informed consent.  The site of surgery was properly noted/marked if necessary per policy. The patient has been actively warmed in preoperative area. Preoperative antibiotics were given after intraoperative wound cultures were sent. Venous thrombosis prophylaxis have been ordered including bilateral sequential compression devices    Procedure Details:   The patient was brought to the operating room theater. A verbal huddle was performed confirming the patient by name, date of birth, medical record number, site of surgery. After all team members were in agreement, they underwent anesthesia  induction without complication. Two large bore IVs were placed as well as endotracheal tube. Perioperative antibiotic administration was confirmed. The patient was flipped prone onto a rebekah table and their back was prepped and draped in a sterile fashion. The midline wound was noted to be widely dehisced in a superficial fashion. The wound was probed and there was no deep communication or fascial violation.    The skin was then infiltrated with local anesthetic and we then began the procedure by opening the very top aspect of the dehiscent area with a 10 blade. Two superficial cultures were sent. Antibiotics were administered. The muscle and subcutaneous tissue were debrided with a penfield. The necrotic skin edges were debrided sharply with a blade. The wound was pulse lavaged with 2 liters antibiotic irrigation followed by irricept irrigation. Hemostasis was obtained. The wound was packed with black foam, sealed with adhesive sheeting, and connected to a wound vaccuum system.    The patient returned to anesthesias care and was extubated and returned to the PACU in stable condition.     Complications:  None; patient tolerated the procedure well.    Disposition: PACU - hemodynamically stable.  Condition: stable         Additional Details: none    Attending Attestation: I was present for the entire procedure.    Donta Fu  Phone Number: [phone]

## 2025-03-07 NOTE — H&P (VIEW-ONLY)
Plastic Surgery Consult    Patient Name: Sebas Soto  MRN: [id number]  Date:  03/07/25     History of Present Illness  Sebas Soto is a 84 y.o. male with a past medical history of L2-5 laminectomy with L4-5 transforaminal lumbar interbody fusion on 1/31 with Dr. Qureshi, complicated by post-op hallucinations and encephalopathy, previously presented to ED on 2/22 with concern for post-op wound infection, CT L spine during that admission showed no deep dehiscence, so no acute surgical intervention and patient was discharged back to SNF with local wound care and antibiotics on 2/28. On 3/6, Dr. Qureshi was notified regarding concerns for lumbar wound dehiscence, and it was recommended patient come to  ED for evaluation.     Workup in the ED showed elevated ESR/CRP, no leukocytosis. CT lumbar showed postsurgical changes of L3-L4 and L4-L5 vertebral bodies, chronic L1 compression fracture with mild osseous retropulsion, significant subcutaneous fat stranding within the posterior soft tissues overlying L1-L5, retained osseous fragments and subcutaneous emphysema/dehiscence. Possible subcutaneous fluid concerning for infectious process. MRI showed previous post-op fluid collection now displays rim enhancement on postcontrast imaging concerning for abscess measuring 4.7 x 2.1 x 0.9 cm.  Patient was taken to OR today with neurosurgery for lumbar wound washout and vac placement. Intra-op cultures were taken. Plastic surgery was consulted for eventual assistance with wound closure.  Patient was assessed post-operatively this afternoon, he currently denies pain to lumbar spine wound. Discussed plan for RTOR with plastic surgery on 3/10 for wound coverage, patient understood and all questions were answered.     Past Medical History:   Diagnosis Date    Abnormal ECG     Arthritis     Bilateral lower extremity edema     Diverticulosis     Hyperlipidemia     Neurogenic claudication     Personal history of other diseases  of the digestive system     History of diverticulitis    Personal history of other infectious and parasitic diseases     History of herpes zoster    Prostate cancer (Multi)     Short-term memory loss     Spondylolisthesis      Past Surgical History:   Procedure Laterality Date    COLONOSCOPY  07/30/2018    Colonoscopy (Fiberoptic)    HERNIA REPAIR  07/30/2018    Hernia Repair-x3    NECK SURGERY  2007    TONSILLECTOMY  07/30/2018    Tonsillectomy     No Known Allergies    Current Facility-Administered Medications:     [Transfer Hold] acetaminophen (Tylenol) tablet 650 mg, 650 mg, oral, q6h, Denise Bryson MD    [Held by provider] amoxicillin-pot clavulanate (Augmentin) 875-125 mg per tablet 1 tablet, 1 tablet, oral, BID, Denise Bryson MD    atorvastatin (Lipitor) tablet 10 mg, 10 mg, oral, Nightly, Denise Bryson MD    [Transfer Hold] dextrose 50 % injection 12.5 g, 12.5 g, intravenous, q15 min PRN, Denise Bryson MD    [Transfer Hold] dextrose 50 % injection 25 g, 25 g, intravenous, q15 min PRN, Denise Bryson MD    fentaNYL PF (Sublimaze) injection 25 mcg, 25 mcg, intravenous, q5 min PRN, Thor Knight MD    [Transfer Hold] glucagon (Glucagen) injection 1 mg, 1 mg, intramuscular, q15 min PRN, Denise Bryson MD    [Transfer Hold] glucagon (Glucagen) injection 1 mg, 1 mg, intramuscular, q15 min PRN, Denise Bryson MD    HYDROmorphone PF (Dilaudid) injection 0.1 mg, 0.1 mg, intravenous, q5 min PRN, Thor Knight MD    lactated Ringer's infusion, 100 mL/hr, intravenous, Continuous, Thor Knight MD    lidocaine (Xylocaine) 10 mg/mL (1 %) injection 0.1 mL, 0.1 mL, subcutaneous, Once, Thor Knight MD    metoprolol succinate XL (Toprol-XL) 24 hr tablet 25 mg, 25 mg, oral, q AM, Denise Bryson MD    [Transfer Hold] naloxone (Narcan) injection 0.2 mg, 0.2 mg, intravenous, q5 min PRN, Denise Bryson MD    [Transfer Hold] ondansetron (Zofran) tablet 4 mg, 4 mg, oral, q8h PRN **OR** [Transfer Hold] ondansetron  "(Zofran) injection 4 mg, 4 mg, intravenous, q8h PRN, Denise Bryson MD    ondansetron (Zofran) injection 4 mg, 4 mg, intravenous, Once PRN, Thor Knight MD    oxygen (O2) therapy, , inhalation, Continuous - Inhalation, Denise Bryson MD, Last Rate: 60,000 mL/hr at 03/07/25 0058, 1 L/min at 03/07/25 0058    oxygen (O2) therapy, , inhalation, Continuous PRN - O2/gases, Thor Knight MD    [Transfer Hold] polyethylene glycol (Glycolax, Miralax) packet 17 g, 17 g, oral, Daily, Denise Bryson MD    potassium chloride 20 mEq in sterile water for injection 100 mL, 20 mEq, intravenous, Once, Thor Knight MD, Last Rate: 50 mL/hr at 03/07/25 1016, 20 mEq at 03/07/25 1016    sodium chloride 0.9% infusion, 100 mL/hr, intravenous, Continuous, Denise Bryson MD, Last Rate: 100 mL/hr at 03/07/25 0345, 100 mL/hr at 03/07/25 0345   Family History   Problem Relation Name Age of Onset    Alzheimer's disease Mother      Anemia Mother      Breast cancer Sister      Stroke Sister       Social History     Tobacco Use    Smoking status: Former     Types: Cigarettes     Passive exposure: Never    Smokeless tobacco: Never   Vaping Use    Vaping status: Never Used   Substance Use Topics    Alcohol use: Never    Drug use: Never     Review of Systems   Constitutional:  Negative for chills and fever.   HENT:  Negative for sore throat.    Respiratory:  Negative for cough, chest tightness and shortness of breath.    Cardiovascular:  Negative for chest pain.   Gastrointestinal:  Negative for nausea and vomiting.   Musculoskeletal:  Negative for back pain.   Skin:  Positive for wound.   Neurological:  Negative for dizziness and numbness.     Objective    /55   Pulse 78   Temp 36 °C (96.8 °F)   Resp (!) 7   Ht 1.727 m (5' 8\")   Wt 85.7 kg (189 lb)   SpO2 99%   BMI 28.74 kg/m²      Physical Exam   Constitutional: Calm and cooperative, NAD.  Eyes: PERRL, EOMI  ENMT: Moist mucous membranes, no apparent injuries or " lesions.  Head/Neck: NC/AT.   Cardiovascular: Normal rate and regular rhythm. 2+ equal pulses of the distal extremities.  Respiratory/Thorax: Regular respirations on NC. Good symmetric chest expansion.   Gastrointestinal: Abdomen soft, non-distended  Genitourinary: voiding independently   Extremities: SILT to all extremities, moving all 4 extremities actively, generalized BLE edema.   Neurological: A&Ox3.   Psychological: Appropriate mood and behavior.   Focused exam of back: Wound vac intact at lumbar wound site, maintaining low continuous suction at -125mmHg, no alarms for leak, obstruction or malfunction. No surrounding erythema, edema, or drainage.     Diagnostics   Results for orders placed or performed during the hospital encounter of 03/06/25 (from the past 24 hours)   CBC and Auto Differential   Result Value Ref Range    WBC 6.0 4.4 - 11.3 x10*3/uL    nRBC 0.0 0.0 - 0.0 /100 WBCs    RBC 3.32 (L) 4.50 - 5.90 x10*6/uL    Hemoglobin 10.2 (L) 13.5 - 17.5 g/dL    Hematocrit 31.5 (L) 41.0 - 52.0 %    MCV 95 80 - 100 fL    MCH 30.7 26.0 - 34.0 pg    MCHC 32.4 32.0 - 36.0 g/dL    RDW 18.0 (H) 11.5 - 14.5 %    Platelets 249 150 - 450 x10*3/uL    Immature Granulocytes %, Automated 0.5 0.0 - 0.9 %    Immature Granulocytes Absolute, Automated 0.03 0.00 - 0.50 x10*3/uL   Comprehensive metabolic panel   Result Value Ref Range    Glucose 108 (H) 74 - 99 mg/dL    Sodium 139 136 - 145 mmol/L    Potassium 4.2 3.5 - 5.3 mmol/L    Chloride 111 (H) 98 - 107 mmol/L    Bicarbonate 24 21 - 32 mmol/L    Anion Gap 8 (L) 10 - 20 mmol/L    Urea Nitrogen 11 6 - 23 mg/dL    Creatinine 0.68 0.50 - 1.30 mg/dL    eGFR >90 >60 mL/min/1.73m*2    Calcium 8.0 (L) 8.6 - 10.6 mg/dL    Albumin 3.0 (L) 3.4 - 5.0 g/dL    Alkaline Phosphatase 72 33 - 136 U/L    Total Protein 5.5 (L) 6.4 - 8.2 g/dL    AST 23 9 - 39 U/L    Bilirubin, Total 0.4 0.0 - 1.2 mg/dL    ALT 22 10 - 52 U/L   C-Reactive Protein   Result Value Ref Range    C-Reactive Protein 6.01  (H) <1.00 mg/dL   Sedimentation Rate   Result Value Ref Range    Sedimentation Rate 29 (H) 0 - 20 mm/h   Blood Culture    Specimen: Peripheral Venipuncture; Blood culture   Result Value Ref Range    Blood Culture Loaded on Instrument - Culture in progress    Blood Culture    Specimen: Peripheral Venipuncture; Blood culture   Result Value Ref Range    Blood Culture Loaded on Instrument - Culture in progress    Manual Differential   Result Value Ref Range    Neutrophils %, Manual 27.7 40.0 - 80.0 %    Bands %, Manual 14.3 0.0 - 5.0 %    Lymphocytes %, Manual 36.6 13.0 - 44.0 %    Monocytes %, Manual 19.6 2.0 - 10.0 %    Eosinophils %, Manual 1.8 0.0 - 6.0 %    Basophils %, Manual 0.0 0.0 - 2.0 %    Seg Neutrophils Absolute, Manual 1.66 1.60 - 5.00 x10*3/uL    Bands Absolute, Manual 0.86 (H) 0.00 - 0.50 x10*3/uL    Lymphocytes Absolute, Manual 2.20 0.80 - 3.00 x10*3/uL    Monocytes Absolute, Manual 1.18 (H) 0.05 - 0.80 x10*3/uL    Eosinophils Absolute, Manual 0.11 0.00 - 0.40 x10*3/uL    Basophils Absolute, Manual 0.00 0.00 - 0.10 x10*3/uL    Total Cells Counted 112     Neutrophils Absolute, Manual 2.52 1.60 - 5.50 x10*3/uL    RBC Morphology See Below     Ovalocytes Few     Fernwood Cells Few    Urinalysis with Reflex Culture and Microscopic   Result Value Ref Range    Color, Urine Yellow Light-Yellow, Yellow, Dark-Yellow    Appearance, Urine Turbid (N) Clear    Specific Gravity, Urine 1.024 1.005 - 1.035    pH, Urine 5.0 5.0, 5.5, 6.0, 6.5, 7.0, 7.5, 8.0    Protein, Urine NEGATIVE NEGATIVE, 10 (TRACE), 20 (TRACE) mg/dL    Glucose, Urine Normal Normal mg/dL    Blood, Urine NEGATIVE NEGATIVE mg/dL    Ketones, Urine NEGATIVE NEGATIVE mg/dL    Bilirubin, Urine NEGATIVE NEGATIVE mg/dL    Urobilinogen, Urine Normal Normal mg/dL    Nitrite, Urine NEGATIVE NEGATIVE    Leukocyte Esterase, Urine NEGATIVE NEGATIVE   Coagulation Screen   Result Value Ref Range    Protime 13.4 (H) 9.8 - 12.4 seconds    INR 1.2 (H) 0.9 - 1.1    aPTT 24  (L) 26 - 36 seconds   Type and Screen   Result Value Ref Range    ABO TYPE A     Rh TYPE POS     ANTIBODY SCREEN NEG    Blood Gas Arterial Full Panel Unsolicited   Result Value Ref Range    POCT pH, Arterial 7.36 (L) 7.38 - 7.42 pH    POCT pCO2, Arterial 42 38 - 42 mm Hg    POCT pO2, Arterial 364 (H) 85 - 95 mm Hg    POCT SO2, Arterial 100 94 - 100 %    POCT Oxy Hemoglobin, Arterial 97.8 94.0 - 98.0 %    POCT Hematocrit Calculated, Arterial 32.0 (L) 41.0 - 52.0 %    POCT Sodium, Arterial 137 136 - 145 mmol/L    POCT Potassium, Arterial 3.5 3.5 - 5.3 mmol/L    POCT Chloride, Arterial 109 (H) 98 - 107 mmol/L    POCT Ionized Calcium, Arterial 1.14 1.10 - 1.33 mmol/L    POCT Glucose, Arterial 140 (H) 74 - 99 mg/dL    POCT Lactate, Arterial 0.9 0.4 - 2.0 mmol/L    POCT Base Excess, Arterial -1.7 -2.0 - 3.0 mmol/L    POCT HCO3 Calculated, Arterial 23.7 22.0 - 26.0 mmol/L    POCT Hemoglobin, Arterial 10.6 (L) 13.5 - 17.5 g/dL    POCT Anion Gap, Arterial 8 (L) 10 - 25 mmo/L    Patient Temperature 37.0 degrees Celsius    FiO2 100 %   Coox Panel, Arterial Unsolicited   Result Value Ref Range    POCT Hemoglobin, Arterial 10.6 (L) 13.5 - 17.5 g/dL    POCT Oxy Hemoglobin, Arterial 97.8 94.0 - 98.0 %    POCT Carboxyhemoglobin, Arterial 1.2 %    POCT Methemoglobin, Arterial 1.0 0.0 - 1.5 %    POCT Deoxy Hemoglobin, Arterial 0.0 0.0 - 5.0 %     MR lumbar spine w and wo IV contrast    Result Date: 3/7/2025  Interpreted By:  Millie Ordonez and Liller Gregory STUDY: MR LUMBAR SPINE W AND WO IV CONTRAST;  3/7/2025 2:53 am   INDICATION: Signs/Symptoms:assess for abscess/wound infection.   COMPARISON: CT lumbar spine 03/07/2025, 02/22/2025, MR lumbar spine 02/21/2025   ACCESSION NUMBER(S): [id number]   ORDERING CLINICIAN: BLUE BURGOS   TECHNIQUE: Sagittal T1, T2, STIR and axial T2 and T1 weighted MR images of the lumbar spine were obtained before and after administration of 17 mL Dotarem via IV without immediate complication.    FINDINGS:   LUMBAR SPINE:   Alignment: Grade 1 anterolisthesis of L4 on L5. Otherwise, alignment is maintained.   Vertebrae/Intervertebral Discs: There are 5 lumbar type vertebral bodies. Redemonstration of compression deformity at L1, similar to prior examination with mild osseous retropulsion measuring up to 3 mm. There is a focus of enhancement within the superior endplate of L1 with associated T2/STIR signal favored to be reactive in nature secondary to is small Schmorl's node. No abnormal enhancement elsewhere within the lumbar spine. Marrow signal is otherwise within normal limits within the remainder of the lumbar spine. Postsurgical changes again noted from L4-5 laminectomy and posterior fusion at the L4-5 level. L4-L5 interbody disc spacer placement noted. Partial laminectomy and spinous process resection at L3. Multilevel loss of normal disc T2 signal and disc height. Dorsal epidural fat noted at multiple levels throughout the lumbar spine.   Conus medullaris: The conus medullaris terminates at L1.   T12-L1: Mild ligamentum flavum and facet arthropathy noted disc osteophyte complex abuts the ventral thecal sac resulting in mild-to-moderate spinal canal stenosis at this level. No significant neural foraminal stenosis. x   L1-2: Mild ligamentum flavum hypertrophy and moderate facet arthropathy noted. Diffuse disc bulge flattens the ventral thecal sac resulting in mild spinal canal stenosis. No significant neural foraminal stenosis.   L2-3: Mild ligamentum flavum hypertrophy and moderate facet arthropathy noted which encroaches upon the bilateral neural foramina. Additionally, there is diffuse disc bulge which flattens the ventral thecal sac without significant spinal canal stenosis however, there is mild bilateral neural foraminal stenosis.   L3-4: Postsurgical changes at this level limit evaluation. Postsurgical changes of laminectomy noted. There is moderate facet arthropathy and ligamentum flavum  hypertrophy which encroaches upon the bilateral neural foramina. Additionally, there is diffuse disc bulge which flattens the ventral thecal sac. No significant spinal canal stenosis. However, there is moderate left and mild right neural foraminal stenosis.   L4-5: Postsurgical changes resulting significant blooming artifact which limited evaluation at this level. Postsurgical changes of laminectomy. Diffuse disc bulge with component of disc uncovering flattens the ventral thecal sac. Additionally, there is moderate to severe facet arthropathy bilaterally which encroach upon the bilateral neural foramina. No significant spinal canal stenosis however, there is severe right and moderate left neural foraminal stenosis.   L5-S1: Moderate ligamentum flavum hypertrophy and facet arthropathy noted. Diffuse disc bulge flattens the ventral thecal sac without critical spinal canal stenosis. There is mild right neural foraminal stenosis. No significant left neural foraminal stenosis.   Postsurgical changes noted within the paraspinous soft tissues posterior to the lumbar spine with interval reduction in size of the previously described fluid collection within the L3-4 surgical bed which measures 4.7 x 2.1 x 0.9 cm (series 4, image 13 and series 10, image 26). However, there appears to be more rim enhancement on current exam raising concern for abscess. The collection abuts and effaces dorsal aspect of the dura from L2-L4.   There is diffuse paraspinal edema and enhancement. No definite additional collections identified hemosiderin deposition and increased T2/stir signal noted within the erector spinae musculature, likely postoperative in nature.       1. Postsurgical changes of L3-4 and L4-L5 laminectomy as well as posterior fusion of L4-5 with interbody disc spacer placement. Interval reduction in size of the previously described postoperative fluid collection effacing the dorsal aspect of the dural sac. However, the  collection now displays rim enhancement on postcontrast imaging concerning for abscess measuring 4.7 x 2.1 x 0.9 cm. No significant associated canal stenosis. 2. Significant T2/STIR hyperintense signal within hand Min within the erector spinae musculature within the surgical bed which may be postoperative in nature or secondary to myositis given the above findings. 3. Multilevel spondylotic changes of the lumbar spine without critical spinal canal stenosis. However, spondylotic changes contribute to severe right and moderate left neural foraminal stenosis at the L4-5 level. 4. Similar compression deformity of the superior endplate of L1 with marrow changes favored to be posttraumatic/macro in nature. No signal abnormality within the adjacent disc. 5. Additional spondylotic changes within the lumbar spine as described in detail above.   I personally reviewed the images/study and I agree with the findings as stated above by resident physician, Dr. Case Slade.   MACRO: None   Signed by: Millie Ordonez 3/7/2025 3:52 AM Dictation workstation:   [id number]    CT chest abdomen pelvis w IV contrast    Result Date: 3/7/2025  Interpreted By:  Millie Ordonez,  and Deann Conway STUDY: CT CHEST ABDOMEN PELVIS W IV CONTRAST; CT LUMBAR SPINE W IV CONTRAST; 3/7/2025 1:29 am   INDICATION: Signs/Symptoms:fever, abdominal pain, elevated infectious markers; Signs/Symptoms:fever, recent back surgery.   COMPARISON: CT lumbar spine 02/22/2025.   ACCESSION NUMBER(S): [id number]; [id number]   ORDERING CLINICIAN: BLUE BURGOS   TECHNIQUE: Contiguous axial images of the chest, abdomen, and pelvis were obtained after the intravenous administration of iodinated contrast. Coronal and sagittal reformatted images were reconstructed from the axial data.   Multiplanar reformatted images of the lumbar spine were reconstructed from source data of concurrent CT chest/abdomen/pelvis acquisition.   FINDINGS: CT CHEST:   MEDIASTINUM AND  LYMPH NODES:  The esophagus appears within normal limits.  No enlarged intrathoracic or axillary lymph nodes by imaging criteria. No pneumomediastinum.   VESSELS:  Normal caliber thoracic aorta. No evidence of traumatic aortic injury. Moderate aortic atherosclerosis.   HEART: Normal size.  Severe coronary artery calcifications. No significant pericardial effusion.   LUNG, AIRWAYS, PLEURA: Bilateral dependent opacities suggestive of atelectasis favored over alveolar pulmonary edema or air trapping. No pneumothorax or pleural effusion. Scattered calcified granulomas.. No consolidation, pulmonary edema, pleural effusion or pneumothorax.   CHEST WALL SOFT TISSUES: No discernible acute abnormality. 15 mm low-attenuation nodule mid posterior right thyroid.   OSSEOUS STRUCTURES: Ankylosis C6-7 and T1-2. Severe degenerative changes of C5-6 with osteophytosis, intervertebral disc height loss, subchondral sclerosis/cyst formation and grade 1 anterolisthesis..     CT ABDOMEN/PELVIS:   ABDOMINAL WALL: No acute abnormality.   LIVER: No significant parenchymal abnormality.   BILE DUCTS: No significant intrahepatic or extrahepatic dilatation.   GALLBLADDER: No significant abnormality.   SPLEEN: No significant abnormality.   PANCREAS: No significant abnormality.   ADRENALS: No significant abnormality.   KIDNEYS, URETERS, BLADDER: The bladder wall is mildly thickened with adjacent fat stranding. Small diverticulum measures up to 0.8 cm. Nonobstructing calculi in the lower right renal pelvis measuring up to 5 mm. Probable parapelvic cysts. No hydronephrosis. Few subcentimeter low-attenuation renal foci, too small to characterize however likely cysts.   REPRODUCTIVE ORGANS: No significant abnormality.   VESSELS: No acute vascular injury. Atherosclerotic calcifications without aneurysmal dilatation seen.   LYMPH NODES/RETROPERITONEUM: No acute retroperitoneal abnormality. No enlarged lymph nodes.   BOWEL/MESENTERY/PERITONEUM: No  small-bowel thickening or dilation. Diffuse colonic wall thickening from the cecum to the rectum with most pronounced wall thickening and adjacent fat stranding of the mid transverse colon to the rectum without dilation. Normal appendix. No ascites, free air or fluid collection.   MUSCULOSKELETAL/CT lumbar:   Mild rightward curvature of the lumbar spine. Partial ankylosis noted of the sacroiliac joints. Unchanged compression fracture of the L1 vertebral body with 25-50% mid vertebral body height loss and 0.6 cm retropulsion. Redemonstration of L3-L4 laminectomy and L4-5 posterior spinal fusion with intervertebral disc spacer. Residual grade 1 anterolisthesis of L4-5. Similar degenerative changes of the L4-5 vertebral bodies. Hardware appears intact. Beam hardening limits evaluation, however within these limits: There is significant posterior subcutaneous fat stranding in the soft tissues overlying the paraspinal levels of L1-L5 with a few retained osseous fragments within the soft tissues (series 303, image 86). There is also subcutaneous emphysema and likely wound dehiscence at the levels of L3-L5. Probable small amount of fluid in the subcutaneous dot no definitive osseous erosive changes or osseous foci of gas to suggest osteomyelitis.   T12-L1: Osseous retropulsion and degenerative changes resultant moderate canal narrowing. Mild left foraminal narrowing. L1-L2: Canal is patent. Mild right foraminal narrowing. L2-L3: The canal is decompressed. Mild left foraminal narrowing. L3-L4: The canal appears decompressed with mild bilateral foraminal narrowing. L4-L5: The canal is partially decompressed with moderate foraminal narrowing. L5-S1: Canal is patent. Moderate foraminal narrowing.       CT lumbar: 1. Postsurgical changes of the L3-L4 and L4-5 vertebral bodies. Chronic L1 compression fracture with mild osseous retropulsion noted. Evaluation of soft tissues is limited due to technique however there is appearance  of significant subcutaneous fat stranding within the posterior soft tissues overlying the levels of L1-L5 with retained osseous fragments and subcutaneous emphysema/dehiscence. Possible subcutaneous fluid. Findings concerning for infectious process. No definitive osteomyelitis or hardware failure. Dedicated MRI has been performed and will be reported separately.   CT chest, abdomen/pelvis: 2. Inflammatory changes with wall thickening and enhancement from the cecum to the rectum suggestive of moderate proctocolitis. 3. Bladder wall thickening and fat stranding which could represent cystitis. 4. Hazy interstitial opacities likely related to atelectasis however component of air trapping not excluded. 5. Additional findings as detailed   I personally reviewed the images/study and I agree with the findings as stated by Dr. Man Zacarias. This study was interpreted at [place], [place].   MACRO: None.   Signed by: Millie Ordonez 3/7/2025 2:54 AM Dictation workstation:   [id number]    CT lumbar spine w IV contrast    Result Date: 3/7/2025  Interpreted By:  Millie Ordonez,  Kobe Conway STUDY: CT CHEST ABDOMEN PELVIS W IV CONTRAST; CT LUMBAR SPINE W IV CONTRAST; 3/7/2025 1:29 am   INDICATION: Signs/Symptoms:fever, abdominal pain, elevated infectious markers; Signs/Symptoms:fever, recent back surgery.   COMPARISON: CT lumbar spine 02/22/2025.   ACCESSION NUMBER(S): [id number]; [id number]   ORDERING CLINICIAN: BLUE BURGOS   TECHNIQUE: Contiguous axial images of the chest, abdomen, and pelvis were obtained after the intravenous administration of iodinated contrast. Coronal and sagittal reformatted images were reconstructed from the axial data.   Multiplanar reformatted images of the lumbar spine were reconstructed from source data of concurrent CT chest/abdomen/pelvis acquisition.   FINDINGS: CT CHEST:   MEDIASTINUM AND LYMPH NODES:  The esophagus appears within normal  limits.  No enlarged intrathoracic or axillary lymph nodes by imaging criteria. No pneumomediastinum.   VESSELS:  Normal caliber thoracic aorta. No evidence of traumatic aortic injury. Moderate aortic atherosclerosis.   HEART: Normal size.  Severe coronary artery calcifications. No significant pericardial effusion.   LUNG, AIRWAYS, PLEURA: Bilateral dependent opacities suggestive of atelectasis favored over alveolar pulmonary edema or air trapping. No pneumothorax or pleural effusion. Scattered calcified granulomas.. No consolidation, pulmonary edema, pleural effusion or pneumothorax.   CHEST WALL SOFT TISSUES: No discernible acute abnormality. 15 mm low-attenuation nodule mid posterior right thyroid.   OSSEOUS STRUCTURES: Ankylosis C6-7 and T1-2. Severe degenerative changes of C5-6 with osteophytosis, intervertebral disc height loss, subchondral sclerosis/cyst formation and grade 1 anterolisthesis..     CT ABDOMEN/PELVIS:   ABDOMINAL WALL: No acute abnormality.   LIVER: No significant parenchymal abnormality.   BILE DUCTS: No significant intrahepatic or extrahepatic dilatation.   GALLBLADDER: No significant abnormality.   SPLEEN: No significant abnormality.   PANCREAS: No significant abnormality.   ADRENALS: No significant abnormality.   KIDNEYS, URETERS, BLADDER: The bladder wall is mildly thickened with adjacent fat stranding. Small diverticulum measures up to 0.8 cm. Nonobstructing calculi in the lower right renal pelvis measuring up to 5 mm. Probable parapelvic cysts. No hydronephrosis. Few subcentimeter low-attenuation renal foci, too small to characterize however likely cysts.   REPRODUCTIVE ORGANS: No significant abnormality.   VESSELS: No acute vascular injury. Atherosclerotic calcifications without aneurysmal dilatation seen.   LYMPH NODES/RETROPERITONEUM: No acute retroperitoneal abnormality. No enlarged lymph nodes.   BOWEL/MESENTERY/PERITONEUM: No small-bowel thickening or dilation. Diffuse colonic wall  thickening from the cecum to the rectum with most pronounced wall thickening and adjacent fat stranding of the mid transverse colon to the rectum without dilation. Normal appendix. No ascites, free air or fluid collection.   MUSCULOSKELETAL/CT lumbar:   Mild rightward curvature of the lumbar spine. Partial ankylosis noted of the sacroiliac joints. Unchanged compression fracture of the L1 vertebral body with 25-50% mid vertebral body height loss and 0.6 cm retropulsion. Redemonstration of L3-L4 laminectomy and L4-5 posterior spinal fusion with intervertebral disc spacer. Residual grade 1 anterolisthesis of L4-5. Similar degenerative changes of the L4-5 vertebral bodies. Hardware appears intact. Beam hardening limits evaluation, however within these limits: There is significant posterior subcutaneous fat stranding in the soft tissues overlying the paraspinal levels of L1-L5 with a few retained osseous fragments within the soft tissues (series 303, image 86). There is also subcutaneous emphysema and likely wound dehiscence at the levels of L3-L5. Probable small amount of fluid in the subcutaneous dot no definitive osseous erosive changes or osseous foci of gas to suggest osteomyelitis.   T12-L1: Osseous retropulsion and degenerative changes resultant moderate canal narrowing. Mild left foraminal narrowing. L1-L2: Canal is patent. Mild right foraminal narrowing. L2-L3: The canal is decompressed. Mild left foraminal narrowing. L3-L4: The canal appears decompressed with mild bilateral foraminal narrowing. L4-L5: The canal is partially decompressed with moderate foraminal narrowing. L5-S1: Canal is patent. Moderate foraminal narrowing.       CT lumbar: 1. Postsurgical changes of the L3-L4 and L4-5 vertebral bodies. Chronic L1 compression fracture with mild osseous retropulsion noted. Evaluation of soft tissues is limited due to technique however there is appearance of significant subcutaneous fat stranding within the  posterior soft tissues overlying the levels of L1-L5 with retained osseous fragments and subcutaneous emphysema/dehiscence. Possible subcutaneous fluid. Findings concerning for infectious process. No definitive osteomyelitis or hardware failure. Dedicated MRI has been performed and will be reported separately.   CT chest, abdomen/pelvis: 2. Inflammatory changes with wall thickening and enhancement from the cecum to the rectum suggestive of moderate proctocolitis. 3. Bladder wall thickening and fat stranding which could represent cystitis. 4. Hazy interstitial opacities likely related to atelectasis however component of air trapping not excluded. 5. Additional findings as detailed   I personally reviewed the images/study and I agree with the findings as stated by Dr. Man Zacarias. This study was interpreted at [place], [place].   MACRO: None.   Signed by: Millie Ordonez 3/7/2025 2:54 AM Dictation workstation:   [id number]    XR chest 2 views    Result Date: 3/6/2025  Interpreted By:  Millie Ordonez and Beyersdorf Conner STUDY: XR CHEST 2 VIEWS;  3/6/2025 7:52 pm   INDICATION: Signs/Symptoms:febrile, assess for PNA.   COMPARISON: Two-view chest 10/11/2024   ACCESSION NUMBER(S): [id number]   ORDERING CLINICIAN: JOSE WANG   FINDINGS: AP radiograph of the chest was provided.     CARDIOMEDIASTINAL SILHOUETTE: Cardiomediastinal silhouette is normal in size and configuration. Tortuous thoracic aorta with atherosclerotic calcifications.   LUNGS: Coarse interstitial opacities. Elevation of the right hemidiaphragm. No focal consolidation, pleural effusion, or pneumothorax.   ABDOMEN: No remarkable upper abdominal findings.   BONES: Degenerative changes and demineralization.       1.  Coarse interstitial opacities likely chronic. No focal consolidation.   I personally reviewed the image(s)/study and resident interpretation. I agree with the findings as stated by resident  Ulisses Rowley. Data analyzed and images interpreted at [facility], [place], [place].   MACRO: None   Signed by: Millie Ordonez 3/6/2025 9:32 PM Dictation workstation:   [id number]    CT lumbar spine wo IV contrast    Result Date: 2/22/2025  Interpreted By:  Sebas Lee, STUDY: CT LUMBAR SPINE WO IV CONTRAST  2/22/2025 8:59 am   INDICATION: Signs/Symptoms:eval hardware, wound infection     COMPARISON: MRI dated 2/21/2025   ACCESSION NUMBER(S): [id number]   ORDERING CLINICIAN: TAVO BERGER   TECHNIQUE: Thin cut axial CT images through the lumbar spine were obtained and reconstructed in the coronal and sagittal planes.   FINDINGS: Postoperative changes are again identified compatible with a previous posterior laminectomies at the L3 and L4 levels, partial resection of the L2 spinous process, as well as a discectomy at the L4/5 level.   There is metallic artifact associated with posterior-lateral orthopedic fixation hardware and pedicle screws transfixing the L4 and L5 vertebrae. The orthopedic hardware appears intact without evidence of loosening.  There is immature bone graft material noted posterolaterally surrounding the orthopedic fixation hardware. There is metallic artifact associated with a interbody fusion device noted along the disc space at the L4/5 level.   There is again evidence of chronic collapse and anterior wedging of the L1 vertebral body. There is retropulsion of the superior/posterior margin of the collapsed L1 vertebral body contributing to mild-to-moderate bony encroachment upon the spinal canal.   There is a dextrocurvature of the lumbar and visualized lower thoracic spine.   There is 7 mm of anterolisthesis of L4 on L5. There is a minimal retrolisthesis of L1 on L2.   The soft tissues of the spinal canal, neural foramen, as well as posterior paraspinal soft tissues are suboptimally evaluated on this noncontrast CT study. Within limitations of the  study, there is ill-defined intermediate attenuation infiltrating the posterior paraspinal soft tissues within lumbar region as well as a few scattered foci of subcutaneous air within the soft tissues along the midline which may be postsurgical in etiology although a superimposed infectious/inflammatory process could give a similar CT appearance and can not be excluded.   There is a 6 mm focus of bony sclerosis noted within the partially imaged left iliac bone which while nonspecific may represent a benign process such as a focal bone island.   There is multilevel spondylosis.   At the L5/S1 level, there are hypertrophic degenerative facet changes and a minimal posterior disc bulge without significant spinal canal narrowing. There is moderate right and mild left-sided neural foraminal narrowing.   At the L4/5 level, there are postoperative changes compatible with a previous L4 laminectomy as well as discectomy and fusion. There is 7 mm of anterolisthesis of L4 on L5. There are hypertrophic degenerative facet changes. There is mild-to-moderate bony encroachment upon the spinal canal. There is moderate to severe right and mild-to-moderate left-sided neural foraminal narrowing.   At the L3/4 level, there are postoperative changes compatible with a previous posterior laminectomy. There is posterior osteophytic spurring and posterior disc bulge along with hypertrophic degenerative facet changes without significant bony encroachment upon the spinal canal. There is mild-to-moderate bony encroachment upon the neural foramen bilaterally.   At the L2/3 level, there is mild posterior osteophytic spurring and posterior disc bulge along with degenerative facet changes and ligamentum flavum hypertrophy contributing to mild-to-moderate spinal canal narrowing. There is mild encroachment upon the inferior recesses of the neural foramen bilaterally.   At the L1/2 level, there is a minimal retrolisthesis of L1 on L2, mild posterior  osteophytic spurring and posterior disc bulge along with degenerative facet changes contributing to mild spinal canal narrowing. There is mild encroachment upon the neural foramen bilaterally.   At the T12/L1 level, there is retropulsion of the superior/posterior margin of the collapsed L1 vertebral body contributing to mild-to-moderate bony encroachment upon the spinal canal. There is mild encroachment upon the neural foramen bilaterally.   Atherosclerotic calcifications are noted along the descending aorta and iliac arteries.         Postoperative changes are again identified compatible with a previous posterior laminectomies at the L3 and L4 levels, partial resection of the L2 spinous process, as well as a discectomy at the L4/5 level.   There is metallic artifact associated with posterior-lateral orthopedic fixation hardware and pedicle screws transfixing the L4 and L5 vertebrae. The orthopedic hardware appears intact without evidence of loosening. There is immature bone graft material noted posterolaterally surrounding the orthopedic fixation hardware. There is metallic artifact associated with a interbody fusion device noted along the disc space at the L4/5 level.   There is again evidence of chronic collapse and anterior wedging of the L1 vertebral body. There is retropulsion of the superior/posterior margin of the collapsed L1 vertebral body contributing to mild-to-moderate bony encroachment upon the spinal canal.   There is a dextrocurvature of the lumbar and visualized lower thoracic spine.   There is 7 mm of anterolisthesis of L4 on L5. There is a minimal retrolisthesis of L1 on L2.   The soft tissues of the spinal canal, neural foramen, as well as posterior paraspinal soft tissues are suboptimally evaluated on this noncontrast CT study. Within limitations of the study, there is ill-defined intermediate attenuation infiltrating the posterior paraspinal soft tissues within lumbar region as well as a few  scattered foci of subcutaneous air within the soft tissues along the midline which may be postsurgical in etiology although a superimposed infectious/inflammatory process could give a similar CT appearance and can not be excluded.   There is multilevel spondylosis. There are varying degrees of spinal canal and neural foraminal narrowing as described above.   MACRO: None   Signed by: Sebas Lee 2/22/2025 9:25 AM Dictation workstation:   [id number]    MR lumbar spine w and wo IV contrast    Result Date: 2/21/2025  Interpreted By:  Jericho Mariano, STUDY: MR LUMBAR SPINE W AND WO IV CONTRAST;  2/21/2025 6:43 pm   INDICATION: Signs/Symptoms:post operative infection.     COMPARISON: MRI lumbar spine June 21, 2024. CT cervical spine June 21, 2024 and chest CT August 8, 2013 are also reviewed with respect to the spine.   ACCESSION NUMBER(S): [id number]   ORDERING CLINICIAN: OLYA RODRIGUEZ   TECHNIQUE: Multiplanar, multisequence MR imaging of the lumbar spine performed prior to and following administration of 18 ML Dotarem intravenous contrast.   FINDINGS: There are segmentation anomalies at cervical and thoracic levels demonstrated on the comparison studies. The labeling of the levels is somewhat arbitrary. For the sake of consistency, L5-S1 level we will again be defined as axial T2 series 8 image 43. Please note: This method of labeling levels assumes 5 lumbar vertebra and therefore T12 has a very hypoplastic right rib and an atretic left rib.   Alignment: There is again a slight retrolisthesis at L1-2 level. At L4-5 level, there was previouslyd 7 mm anterolisthesis. There is similar anterior subluxation at L4-5 level on the current study, although precise measurements are not possible due to the postoperative changes and a new mild compression compression fracture deformity of the L5 superior endplate.   Vertebrae: Interval surgery with decompressive laminectomies at L3 and L4 levels, and interval bilateral  foraminotomies at L4-5 level. Posterior fusion of L4 and L5 with bilateral pedicle screws and interval placement of interbody spacer within the disc space. This hardware appears to be normally positioned within the limitations of MR imaging.   The compression deformity of the L1 vertebral body is maturing as expected, with resolving bone marrow edema, now mild in degree. Mild enhancement along the endplate as expected for a subacute healing fracture. No evidence for discitis or osteomyelitis is seen.   There is a new mild deformity of the L5 posterior-superior vertebral endplate, with mild retropulsion or broad-based buckling of the endplate back into the canal by approximately 4 mm.   Discs: Postoperative changes within the L4-5 disc space, interval placement of interbody disc spacer which appears to be normally positioned. Disc elsewhere unchanged with similar multilevel disc desiccation, mild disc space narrowing and small desiccated disc bulges. No evidence for a new disc herniation.   Spinal cord: Signal within the spinal cord is normal. The conus medullaris terminates at L1 level..   Spinal canal: Interval decompression of the spinal canal at the levels being labeled as L3 and L4. No evidence for epidural hemorrhage, abscess or acute disc herniation. Fluid within the laminectomy site mildly bulges inward, contacting and slightly deforming the thecal sac but without compression of the cauda equina. The canal appears to be well decompressed, the degree of spinal canal narrowing markedly improved at L4-5 level. No evidence for subdural hygroma, or arachnoiditis.   Paraspinal soft tissues: Fluid within the laminectomy site as detailed in the report above, without significant mass effect upon the thecal sac.       1. Multiple cervical and thoracic segmentation variations in the anatomy. Study again dictated assuming 5 lumbar vertebra, although T12 therefore has only a single and hypoplastic rib. 2. There are  expected postoperative changes from recent decompressive lumbar laminectomies, L4-5 foraminotomies, and posterior fusion at L4-5 level. 3. There is a small amount of fluid within the laminectomy site. The imaging appearance looks fairly typical for expected postprocedural seroma. MR imaging can not determine whether or not this fluid is infected or sterile. That being said, there are no aggressive imaging features. 4. MR imaging suggests new mild deformity of the L5 superior vertebral endplate with mild retropulsion, although this does not narrow the thecal sac. A new mild compression fracture of this endplate, or bone erosion of the endplate due to osteomyelitis, are not excluded. However I do not see imaging features suggestive of discitis. 5. Evaluation of bones is inherently limited on MR imaging, and the recent postoperative changes further limit evaluation of the L5 vertebral endplate. Recommend CT imaging for better evaluation of the bones.   MACRO: None   Signed by: Jericho Mariano 2/21/2025 7:51 PM Dictation workstation:   [id number]      Current Medications  Scheduled medications  [Transfer Hold] acetaminophen, 650 mg, oral, q6h  [Held by provider] amoxicillin-pot clavulanate, 1 tablet, oral, BID  atorvastatin, 10 mg, oral, Nightly  lidocaine, 0.1 mL, subcutaneous, Once  metoprolol succinate XL, 25 mg, oral, q AM  oxygen, , inhalation, Continuous - Inhalation  [Transfer Hold] polyethylene glycol, 17 g, oral, Daily  potassium chloride, 20 mEq, intravenous, Once      Continuous medications  lactated Ringer's, 100 mL/hr  sodium chloride 0.9%, 100 mL/hr, Last Rate: 100 mL/hr (03/07/25 0345)      PRN medications  PRN medications: [Transfer Hold] dextrose, [Transfer Hold] dextrose, fentaNYL PF, [Transfer Hold] glucagon, [Transfer Hold] glucagon, HYDROmorphone, [Transfer Hold] naloxone, [Transfer Hold] ondansetron **OR** [Transfer Hold] ondansetron, ondansetron, oxygen     Assessment  Sebas Soto is a 84  y.o. male with a past medical history of L2-5 laminectomy with L4-5 transforaminal lumbar interbody fusion on 1/31 with Dr. Qureshi, complicated by post-op hallucinations and encephalopathy, previously presented to ED on 2/22 with concern for post-op wound infection, CT L spine during that admission showed no deep dehiscence, so no acute surgical intervention and patient was discharged back to SNF with local wound care and antibiotics on 2/28. On 3/6, Dr. Qureshi was notified regarding concerns for lumbar wound dehiscence, and it was recommended patient come to  ED for evaluation. Patient was taken to OR today with neurosurgery for lumbar wound washout and vac placement. Plastic surgery was consulted for eventual assistance with wound closure.     Plan/Recommendations  #S/p Lumbar wound washout and wound vac placement with NSGY on 3/7  - Case requested with Dr. Zacarias for 3/10 for wound debridement and closure with plastic surgery  - Please ensure NPO and T/S orders are UTD prior to OR  - Continue wound vac therapy per NSGY team in the interim  - Follow up OR cultures  - IV antibiotics per ID/primary  - Appreciate remaining care per primary team    I Spent 30 minutes reviewing, evaluating and educating this patient.     Patient and plan were discussed with Dr. Zacarias.    Francesca Ca PA-C   Plastic and Reconstructive Surgery   [place]  Pager #[phone]  Team phone: [phone]

## 2025-03-07 NOTE — ED PROVIDER NOTES
Emergency Department Provider Note        History of Present Illness     History provided by: Patient and EMS  Limitations to History: None  External Records Reviewed with Brief Summary:  Prior inpatient, operation, and discharge notes.  Patient had laminectomy at the end of January.  Was admitted and discharged 2 weeks ago for concerns of postop infection.  Currently at [facility].    HPI:  Sebas Soto is a 84 y.o. male presenting to the emergency department from skilled nursing facility.  Patient had a laminectomy on January 26.  He then was recently admitted for postop infection and discharged on February 26.  When his wound is being looked at today there were signs of dehiscence, they reached out to Dr. Qureshi who was the primary surgeon who recommended coming to the  ED for evaluation.  Patient notes he had some diarrhea recently, and is slightly febrile.  Denies any pain in his back.  No difficulty breathing.  No other concerns at this time.    Physical Exam   Triage vitals:  T (!) 38.3 °C (101 °F)  HR 86  /70  RR 16  O2 95 % None (Room air)    Physical Exam  Vitals reviewed.   Constitutional:       Appearance: Normal appearance.   Eyes:      General:         Right eye: No discharge.         Left eye: No discharge.   Cardiovascular:      Rate and Rhythm: Normal rate.   Pulmonary:      Effort: Pulmonary effort is normal. No respiratory distress.   Skin:     General: Skin is warm and dry.      Capillary Refill: Capillary refill takes less than 2 seconds.      Findings: No rash (No appreciable rashes on patient body, please see media tab for wound picture).      Comments: Incision near the lumbar spine that does not appear grossly infected.  Picture in the media tab   Neurological:      Mental Status: He is alert and oriented to person, place, and time.   Psychiatric:         Mood and Affect: Mood normal.         Behavior: Behavior normal.          Medical Decision Making & ED Course   Medical Decision  [age] y.o. male Presenting as per HPI, differential is broad and includes but not limited to cellulitis, delayed wound healing, abscess, enterocolitis, pneumonia , UTI ,other viral illness, cellulitis.   As a result, workup was ordered targeting these diagnoses including Xrays, CTs, and  Blood work.  Workup came back remarkable for pending at time of signout, please see oncoming provider's note for final disposition.     ----    Differential diagnoses considered include but are not limited to: As per MDM    Chronic conditions affecting the patient's care: As documented above in MDM    The patient was discussed with the following consultants/services:  Neurosurgery    Care Considerations: As documented above in MDM    ED Course:     Disposition   Patient was signed out to Dr. Nieto at 2300 pending completion of their work-up.  Please see the next provider's transition of care note for the remainder of the patient's care.     Procedures   Procedures      Naveen Collins DO  Emergency Medicine      Naveen Collins DO  Resident  [phone]

## 2025-03-07 NOTE — PROGRESS NOTES
"Pharmacy Medication History Review    Sebas Soto is a 84 y.o. male in ED for No Principal Problem: There is no principal problem currently on the Problem List. Pharmacy reviewed the patient's [id number] medications and allergies for accuracy.      PTA Medication List has been verified/updated as appears on Order Summary Report from [facility] (3/6/25, 14:53:24 ET).    A copy of this facility paperwork has been uploaded to \"Media\" under Chart Review in Epic for future reference.       The list below reflects the updated PTA list.   Prior to Admission Medications   Prescriptions  Facility Reported?   acetaminophen (Tylenol) 325 mg tablet  Yes   Sig: Take 2 tablets (650 mg) by mouth every 6 hours   if needed for mild pain (1 - 3).   amoxicillin-pot clavulanate (Augmentin) 875-125 mg tablet  Yes   Sig: Take 1 tablet by mouth 2 times a day for 14 days.  --> START DATE per SNF Med List 3/1/25.   atorvastatin (Lipitor) 10 mg tablet  Yes   Sig: Take 1 tablet (10 mg) by mouth once daily at bedtime.   chlorhexidine (Peridex) 0.12 % solution  Yes   Sig: 15 milliliter(s) orally once a day for 2 doses 15 ml    the night before surgery and 15 ml morning of surgery -   swish for 30 seconds -DO NOT SWALLOW, SPIT OUT  --> Start Date 3/1/25 per SNF with no end date(?).   metoprolol succinate XL (Toprol-XL) 25 mg 24 hr tablet  Yes   Sig: Take 1 tablet (25 mg) by mouth once daily in the morning.            Additional order on SNF Med List:  House Barrier Cream  Apply to affected area(s) every shift x 14 days  For skin integrity/incontinence  Start 3/1/25, End 3/15/25    The list below reflects the updated allergy list. Please review each documented allergy for additional clarification and justification.  Allergies  Reviewed by Kamryn Mendez RN on 3/6/2025   No Known Allergies       ----------------------------------  Sebastian Lemons, Brenda, [facility]  Transitions of Care Pharmacist  Medication " reconciliation complete  Please reach out via Epic Secure Chat ([email or web address]) for questions,   or if no response call [phone].

## 2025-03-08 LAB
ALBUMIN SERPL BCP-MCNC: 2.6 G/DL (ref 3.4–5)
ANION GAP SERPL CALC-SCNC: 10 MMOL/L (ref 10–20)
BUN SERPL-MCNC: 7 MG/DL (ref 6–23)
C DIF TOX TCDA+TCDB STL QL NAA+PROBE: DETECTED
C DIFF TOX A+B STL QL IA: POSITIVE
CALCIUM SERPL-MCNC: 7.9 MG/DL (ref 8.6–10.6)
CHLORIDE SERPL-SCNC: 109 MMOL/L (ref 98–107)
CO2 SERPL-SCNC: 26 MMOL/L (ref 21–32)
CREAT SERPL-MCNC: 0.59 MG/DL (ref 0.5–1.3)
EGFRCR SERPLBLD CKD-EPI 2021: >90 ML/MIN/1.73M*2
ERYTHROCYTE [DISTWIDTH] IN BLOOD BY AUTOMATED COUNT: 17.4 % (ref 11.5–14.5)
FUNGUS SPEC FUNGUS STN: NORMAL
FUNGUS SPEC FUNGUS STN: NORMAL
GLUCOSE SERPL-MCNC: 92 MG/DL (ref 74–99)
HCT VFR BLD AUTO: 30.1 % (ref 41–52)
HGB BLD-MCNC: 9.3 G/DL (ref 13.5–17.5)
MCH RBC QN AUTO: 31.2 PG (ref 26–34)
MCHC RBC AUTO-ENTMCNC: 30.9 G/DL (ref 32–36)
MCV RBC AUTO: 101 FL (ref 80–100)
NRBC BLD-RTO: 0 /100 WBCS (ref 0–0)
PHOSPHATE SERPL-MCNC: 2.9 MG/DL (ref 2.5–4.9)
PLATELET # BLD AUTO: 202 X10*3/UL (ref 150–450)
POTASSIUM SERPL-SCNC: 3.5 MMOL/L (ref 3.5–5.3)
RBC # BLD AUTO: 2.98 X10*6/UL (ref 4.5–5.9)
SODIUM SERPL-SCNC: 141 MMOL/L (ref 136–145)
VANCOMYCIN SERPL-MCNC: 10.5 UG/ML (ref 5–20)
WBC # BLD AUTO: 5.4 X10*3/UL (ref 4.4–11.3)

## 2025-03-08 PROCEDURE — 1100000001 HC PRIVATE ROOM DAILY

## 2025-03-08 PROCEDURE — 99232 SBSQ HOSP IP/OBS MODERATE 35: CPT | Performed by: INTERNAL MEDICINE

## 2025-03-08 PROCEDURE — 87324 CLOSTRIDIUM AG IA: CPT

## 2025-03-08 PROCEDURE — 36415 COLL VENOUS BLD VENIPUNCTURE: CPT | Performed by: STUDENT IN AN ORGANIZED HEALTH CARE EDUCATION/TRAINING PROGRAM

## 2025-03-08 PROCEDURE — 2500000004 HC RX 250 GENERAL PHARMACY W/ HCPCS (ALT 636 FOR OP/ED): Performed by: STUDENT IN AN ORGANIZED HEALTH CARE EDUCATION/TRAINING PROGRAM

## 2025-03-08 PROCEDURE — 80069 RENAL FUNCTION PANEL: CPT | Performed by: STUDENT IN AN ORGANIZED HEALTH CARE EDUCATION/TRAINING PROGRAM

## 2025-03-08 PROCEDURE — 2500000002 HC RX 250 W HCPCS SELF ADMINISTERED DRUGS (ALT 637 FOR MEDICARE OP, ALT 636 FOR OP/ED)

## 2025-03-08 PROCEDURE — 80202 ASSAY OF VANCOMYCIN: CPT | Performed by: STUDENT IN AN ORGANIZED HEALTH CARE EDUCATION/TRAINING PROGRAM

## 2025-03-08 PROCEDURE — 2500000004 HC RX 250 GENERAL PHARMACY W/ HCPCS (ALT 636 FOR OP/ED)

## 2025-03-08 PROCEDURE — 2500000005 HC RX 250 GENERAL PHARMACY W/O HCPCS: Performed by: STUDENT IN AN ORGANIZED HEALTH CARE EDUCATION/TRAINING PROGRAM

## 2025-03-08 PROCEDURE — 2500000001 HC RX 250 WO HCPCS SELF ADMINISTERED DRUGS (ALT 637 FOR MEDICARE OP): Performed by: NURSE PRACTITIONER

## 2025-03-08 PROCEDURE — 87493 C DIFF AMPLIFIED PROBE: CPT

## 2025-03-08 PROCEDURE — 2500000001 HC RX 250 WO HCPCS SELF ADMINISTERED DRUGS (ALT 637 FOR MEDICARE OP): Performed by: STUDENT IN AN ORGANIZED HEALTH CARE EDUCATION/TRAINING PROGRAM

## 2025-03-08 PROCEDURE — 85027 COMPLETE CBC AUTOMATED: CPT | Performed by: STUDENT IN AN ORGANIZED HEALTH CARE EDUCATION/TRAINING PROGRAM

## 2025-03-08 RX ORDER — VANCOMYCIN HYDROCHLORIDE 125 MG/1
125 CAPSULE ORAL 4 TIMES DAILY
Status: DISPENSED | OUTPATIENT
Start: 2025-03-08

## 2025-03-08 RX ADMIN — HEPARIN SODIUM 5000 UNITS: 5000 INJECTION, SOLUTION INTRAVENOUS; SUBCUTANEOUS at 05:44

## 2025-03-08 RX ADMIN — Medication 250 MG: at 21:05

## 2025-03-08 RX ADMIN — CEFEPIME 2 G: 1 INJECTION, SOLUTION INTRAVENOUS at 16:17

## 2025-03-08 RX ADMIN — Medication 250 MG: at 08:43

## 2025-03-08 RX ADMIN — VANCOMYCIN HYDROCHLORIDE 125 MG: 125 CAPSULE ORAL at 21:05

## 2025-03-08 RX ADMIN — ATORVASTATIN CALCIUM 10 MG: 10 TABLET, FILM COATED ORAL at 21:05

## 2025-03-08 RX ADMIN — HEPARIN SODIUM 5000 UNITS: 5000 INJECTION, SOLUTION INTRAVENOUS; SUBCUTANEOUS at 21:12

## 2025-03-08 RX ADMIN — Medication 11 L/MIN: at 21:13

## 2025-03-08 RX ADMIN — CEFEPIME 2 G: 1 INJECTION, SOLUTION INTRAVENOUS at 08:45

## 2025-03-08 RX ADMIN — SODIUM CHLORIDE 100 ML/HR: 9 INJECTION, SOLUTION INTRAVENOUS at 00:04

## 2025-03-08 RX ADMIN — VANCOMYCIN HYDROCHLORIDE 1000 MG: 1 INJECTION, SOLUTION INTRAVENOUS at 08:46

## 2025-03-08 RX ADMIN — CEFEPIME 2 G: 1 INJECTION, SOLUTION INTRAVENOUS at 00:06

## 2025-03-08 RX ADMIN — VANCOMYCIN HYDROCHLORIDE 1000 MG: 1 INJECTION, SOLUTION INTRAVENOUS at 21:05

## 2025-03-08 RX ADMIN — HEPARIN SODIUM 5000 UNITS: 5000 INJECTION, SOLUTION INTRAVENOUS; SUBCUTANEOUS at 14:07

## 2025-03-08 ASSESSMENT — COGNITIVE AND FUNCTIONAL STATUS - GENERAL
WALKING IN HOSPITAL ROOM: TOTAL
TURNING FROM BACK TO SIDE WHILE IN FLAT BAD: A LOT
MOVING TO AND FROM BED TO CHAIR: A LOT
DRESSING REGULAR UPPER BODY CLOTHING: TOTAL
DAILY ACTIVITIY SCORE: 12
TURNING FROM BACK TO SIDE WHILE IN FLAT BAD: A LITTLE
MOVING FROM LYING ON BACK TO SITTING ON SIDE OF FLAT BED WITH BEDRAILS: A LITTLE
CLIMB 3 TO 5 STEPS WITH RAILING: TOTAL
PERSONAL GROOMING: A LOT
MOBILITY SCORE: 10
MOBILITY SCORE: 11
TOILETING: TOTAL
MOVING FROM LYING ON BACK TO SITTING ON SIDE OF FLAT BED WITH BEDRAILS: A LITTLE
DRESSING REGULAR LOWER BODY CLOTHING: A LOT
HELP NEEDED FOR BATHING: A LOT
DRESSING REGULAR LOWER BODY CLOTHING: A LOT
MOVING TO AND FROM BED TO CHAIR: TOTAL
STANDING UP FROM CHAIR USING ARMS: A LOT
TOILETING: A LOT
DAILY ACTIVITIY SCORE: 12
EATING MEALS: A LOT
WALKING IN HOSPITAL ROOM: TOTAL
HELP NEEDED FOR BATHING: TOTAL
STANDING UP FROM CHAIR USING ARMS: TOTAL
CLIMB 3 TO 5 STEPS WITH RAILING: TOTAL
PERSONAL GROOMING: A LITTLE
DRESSING REGULAR UPPER BODY CLOTHING: A LOT

## 2025-03-08 ASSESSMENT — PAIN SCALES - GENERAL
PAINLEVEL_OUTOF10: 0 - NO PAIN

## 2025-03-08 ASSESSMENT — PAIN - FUNCTIONAL ASSESSMENT
PAIN_FUNCTIONAL_ASSESSMENT: 0-10
PAIN_FUNCTIONAL_ASSESSMENT: 0-10

## 2025-03-08 NOTE — CARE PLAN
The clinical goals for the shift include safety, comfort    Patient answers orientation questions appropriately although intermittently confused, easily redirectable. Denies pain, vitals WNL at baseline. Continue wound vac at this time. Continue IV antibiotics. Notified neurosurgery that patient did result positive for cdiff, appropriate precautions in place. Anticipated discharge back to SNF when medically cleared.      Problem: Pain - Adult  Goal: Verbalizes/displays adequate comfort level or baseline comfort level  Outcome: Progressing     Problem: Safety - Adult  Goal: Free from fall injury  Outcome: Progressing     Problem: Discharge Planning  Goal: Discharge to home or other facility with appropriate resources  Outcome: Progressing     Problem: Chronic Conditions and Co-morbidities  Goal: Patient's chronic conditions and co-morbidity symptoms are monitored and maintained or improved  Outcome: Progressing     Problem: Nutrition  Goal: Nutrient intake appropriate for maintaining nutritional needs  Outcome: Progressing     Problem: Skin  Goal: Decreased wound size/increased tissue granulation at next dressing change  Outcome: Progressing  Flowsheets (Taken 3/8/2025 1136)  Decreased wound size/increased tissue granulation at next dressing change:   Promote sleep for wound healing   Protective dressings over bony prominences  Goal: Participates in plan/prevention/treatment measures  Outcome: Progressing  Goal: Prevent/manage excess moisture  Outcome: Progressing  Flowsheets (Taken 3/8/2025 1136)  Prevent/manage excess moisture:   Cleanse incontinence/protect with barrier cream   Follow provider orders for dressing changes   Monitor for/manage infection if present   Moisturize dry skin  Goal: Prevent/minimize sheer/friction injuries  Outcome: Progressing  Flowsheets (Taken 3/8/2025 1136)  Prevent/minimize sheer/friction injuries:   Complete micro-shifts as needed if patient unable. Adjust patient position to relieve  pressure points, not a full turn   Turn/reposition every 2 hours/use positioning/transfer devices   Use pull sheet  Goal: Promote/optimize nutrition  Outcome: Progressing  Flowsheets (Taken 3/8/2025 1136)  Promote/optimize nutrition: Assist with feeding     Problem: Fall/Injury  Goal: Not fall by end of shift  Outcome: Progressing  Goal: Be free from injury by end of the shift  Outcome: Progressing  Goal: Verbalize understanding of personal risk factors for fall in the hospital  Outcome: Progressing  Goal: Verbalize understanding of risk factor reduction measures to prevent injury from fall in the home  Outcome: Progressing     Problem: Pain  Goal: Takes deep breaths with improved pain control throughout the shift  Outcome: Progressing  Goal: Turns in bed with improved pain control throughout the shift  Outcome: Progressing  Goal: Walks with improved pain control throughout the shift  Outcome: Progressing  Goal: Performs ADL's with improved pain control throughout shift  Outcome: Progressing

## 2025-03-08 NOTE — PROGRESS NOTES
"Sebas Soto is a 84 y.o. male on day 1 of admission presenting with Postoperative infection, unspecified type, initial encounter.    Subjective   No acute events overnight, had several episodes of diarrhea, otherwise no complaints       Objective     Physical Exam  Aox3  RUE D4+ B5 T5 HG4 IO4  GILLES D0 (chronic) B5 T5 HG4 IO4  RLE HF3 KE4 PF5 DF5  LL HF3 KE4 PF5 DF5    Last Recorded Vitals  Blood pressure 129/70, pulse 83, temperature 36.6 °C (97.9 °F), temperature source Temporal, resp. rate 23, height 1.727 m (5' 8\"), weight 85.7 kg (189 lb), SpO2 93%.  Intake/Output last 3 Shifts:  I/O last 3 completed shifts:  In: 1297.9 (15.1 mL/kg) [P.O.:240; I.V.:857.9 (10 mL/kg); IV Piggyback:200]  Out: 1150 (13.4 mL/kg) [Urine:1100 (0.4 mL/kg/hr); Blood:50]  Weight: 85.7 kg           Assessment/Plan   Assessment & Plan  Postoperative infection, unspecified type, initial encounter    CAD (coronary artery disease)    Postoperative surgical complication involving musculoskeletal system associated with musculoskeletal procedure    Disruption of wound, unspecified, initial encounter    Sebas Soto is a 84 y.o. male w/ h/o L2-5 lami w/ L4-5 TLIF on 1/31, c/b post-op hallucinations and encephalopathy, 2/22 p/w incisional dehiscence, discharged on Abx, 3/6 p/w incisional dehiscence.     3/7 s/p lumbar wound exploration, revision, washout and wound vac placement  Started on IV vanco and cefepime.  ID consulted.    Plan  Tele  Vac per plastics  OR planning Mon 3/10 wound closure/debridement  Plastics recs  Bcx  OR cx  ID recs  PTOT           Sebas Bahena MD      "

## 2025-03-08 NOTE — PROGRESS NOTES
Pharmacy to Dose Vancomycin:  Sebas Soto is a 84 y.o. male ordered pharmacy to dose vancomycin for complicated skin and soft tissue infection. Today is day 2 of therapy.  Goal: -600mg/L*hr  Results from last 7 days   Lab Units 03/08/25  0656 03/07/25  1517 03/06/25 2023   BUN mg/dL 7 9 11   CREATININE mg/dL 0.59 0.63 0.68   WBC AUTO x10*3/uL 5.4  --  6.0   VANCOMYCIN RM ug/mL 10.5  --   --       Staph/MRSA Screen Culture   Date/Time Value Ref Range Status   01/21/2025 11:18 AM No Staphylococcus aureus isolated  Final     Urine Culture   Date/Time Value Ref Range Status   02/22/2025 04:03 AM   Final    Clinically insignificant growth based on current clinical standards.     Blood Culture   Date/Time Value Ref Range Status   03/06/2025 08:23 PM No growth at 1 day  Preliminary   03/06/2025 08:23 PM No growth at 1 day  Preliminary     Tissue/Wound Culture/Smear   Date/Time Value Ref Range Status   02/21/2025 02:50 PM   Final    (4+) Abundant Mixed Gram-Positive and Gram-Negative Bacteria   02/21/2025 02:50 PM (4+) Abundant Mixed Anaerobic Bacteria  Final     Gram Stain   Date/Time Value Ref Range Status   03/07/2025 09:14 AM No polymorphonuclear leukocytes seen  Preliminary   03/07/2025 09:14 AM No organisms seen  Preliminary      Susceptibility data from last 90 days.  Collected Specimen Info Organism   02/21/25 Tissue/Biopsy from Wound/Tissue Mixed Anaerobic Bacteria     Mixed Gram-Positive and Gram-Negative Bacteria     Antibiotics: Cefepime (Day 2).  Pertinent Medications: none.  Renal function remains stable.  Patient's current regimen is predicted to achieve AUC24,ss of 432.    Plan:  Continue vanco [id number].  The above dosing regimen is predicted by [employer] to result in the following pharmacokinetic parameters:  Loading dose: N/A  Regimen: 1000 mg IV every 12 hours.  Start time: 20:46 on 03/08/2025  Exposure target: AUC24 (range)400-600 mg/L.hr   [id number]: 418 mg/L.hr  AUC24,ss: 432  mg/L.hr  Probability of AUC24 > 400: 65 %  Ctrough,ss: 13.3 mg/L  Probability of Ctrough,ss > 20: 9 %  Follow-up level ordered for 3/11 AM unless clinically indicated sooner.  Pharmacy will continue daily vancomycin monitoring. Please contact the pharmacy with any questions.    Thank you,  Enrique Chavez [facility]

## 2025-03-09 LAB
ABO GROUP (TYPE) IN BLOOD: NORMAL
ALBUMIN SERPL BCP-MCNC: 3 G/DL (ref 3.4–5)
ANION GAP SERPL CALC-SCNC: 13 MMOL/L (ref 10–20)
ANTIBODY SCREEN: NORMAL
APTT PPP: 27 SECONDS (ref 26–36)
BACTERIA BLD CULT: NORMAL
BACTERIA BLD CULT: NORMAL
BACTERIA SPEC CULT: ABNORMAL
BACTERIA SPEC CULT: ABNORMAL
BACTERIA SPEC CULT: NORMAL
BLOOD EXPIRATION DATE: NORMAL
BUN SERPL-MCNC: 8 MG/DL (ref 6–23)
CALCIUM SERPL-MCNC: 8.1 MG/DL (ref 8.6–10.6)
CHLORIDE SERPL-SCNC: 105 MMOL/L (ref 98–107)
CO2 SERPL-SCNC: 25 MMOL/L (ref 21–32)
CREAT SERPL-MCNC: 0.5 MG/DL (ref 0.5–1.3)
DISPENSE STATUS: NORMAL
EGFRCR SERPLBLD CKD-EPI 2021: >90 ML/MIN/1.73M*2
ERYTHROCYTE [DISTWIDTH] IN BLOOD BY AUTOMATED COUNT: 17.2 % (ref 11.5–14.5)
GLUCOSE SERPL-MCNC: 106 MG/DL (ref 74–99)
GRAM STN SPEC: ABNORMAL
GRAM STN SPEC: ABNORMAL
GRAM STN SPEC: NORMAL
GRAM STN SPEC: NORMAL
HCT VFR BLD AUTO: 35.9 % (ref 41–52)
HGB BLD-MCNC: 11 G/DL (ref 13.5–17.5)
INR PPP: 1.2 (ref 0.9–1.1)
MCH RBC QN AUTO: 30.2 PG (ref 26–34)
MCHC RBC AUTO-ENTMCNC: 30.6 G/DL (ref 32–36)
MCV RBC AUTO: 99 FL (ref 80–100)
NRBC BLD-RTO: 0 /100 WBCS (ref 0–0)
PHOSPHATE SERPL-MCNC: 2.5 MG/DL (ref 2.5–4.9)
PLATELET # BLD AUTO: 237 X10*3/UL (ref 150–450)
POTASSIUM SERPL-SCNC: 3.4 MMOL/L (ref 3.5–5.3)
PRODUCT BLOOD TYPE: 6200
PRODUCT CODE: NORMAL
PROTHROMBIN TIME: 13.7 SECONDS (ref 9.8–12.4)
RBC # BLD AUTO: 3.64 X10*6/UL (ref 4.5–5.9)
RH FACTOR (ANTIGEN D): NORMAL
SODIUM SERPL-SCNC: 140 MMOL/L (ref 136–145)
UNIT ABO: NORMAL
UNIT NUMBER: NORMAL
UNIT RH: NORMAL
UNIT VOLUME: 350
WBC # BLD AUTO: 7.1 X10*3/UL (ref 4.4–11.3)
XM INTEP: NORMAL

## 2025-03-09 PROCEDURE — 36415 COLL VENOUS BLD VENIPUNCTURE: CPT | Performed by: STUDENT IN AN ORGANIZED HEALTH CARE EDUCATION/TRAINING PROGRAM

## 2025-03-09 PROCEDURE — 1100000001 HC PRIVATE ROOM DAILY

## 2025-03-09 PROCEDURE — 85730 THROMBOPLASTIN TIME PARTIAL: CPT | Performed by: STUDENT IN AN ORGANIZED HEALTH CARE EDUCATION/TRAINING PROGRAM

## 2025-03-09 PROCEDURE — 80069 RENAL FUNCTION PANEL: CPT | Performed by: STUDENT IN AN ORGANIZED HEALTH CARE EDUCATION/TRAINING PROGRAM

## 2025-03-09 PROCEDURE — 2500000001 HC RX 250 WO HCPCS SELF ADMINISTERED DRUGS (ALT 637 FOR MEDICARE OP): Performed by: STUDENT IN AN ORGANIZED HEALTH CARE EDUCATION/TRAINING PROGRAM

## 2025-03-09 PROCEDURE — 99232 SBSQ HOSP IP/OBS MODERATE 35: CPT

## 2025-03-09 PROCEDURE — 2500000004 HC RX 250 GENERAL PHARMACY W/ HCPCS (ALT 636 FOR OP/ED)

## 2025-03-09 PROCEDURE — 85027 COMPLETE CBC AUTOMATED: CPT | Performed by: STUDENT IN AN ORGANIZED HEALTH CARE EDUCATION/TRAINING PROGRAM

## 2025-03-09 PROCEDURE — 2500000002 HC RX 250 W HCPCS SELF ADMINISTERED DRUGS (ALT 637 FOR MEDICARE OP, ALT 636 FOR OP/ED)

## 2025-03-09 PROCEDURE — 86920 COMPATIBILITY TEST SPIN: CPT

## 2025-03-09 PROCEDURE — 86901 BLOOD TYPING SEROLOGIC RH(D): CPT | Performed by: STUDENT IN AN ORGANIZED HEALTH CARE EDUCATION/TRAINING PROGRAM

## 2025-03-09 PROCEDURE — 36415 COLL VENOUS BLD VENIPUNCTURE: CPT

## 2025-03-09 PROCEDURE — 2500000005 HC RX 250 GENERAL PHARMACY W/O HCPCS: Performed by: STUDENT IN AN ORGANIZED HEALTH CARE EDUCATION/TRAINING PROGRAM

## 2025-03-09 PROCEDURE — 86850 RBC ANTIBODY SCREEN: CPT | Performed by: STUDENT IN AN ORGANIZED HEALTH CARE EDUCATION/TRAINING PROGRAM

## 2025-03-09 PROCEDURE — 85610 PROTHROMBIN TIME: CPT | Performed by: STUDENT IN AN ORGANIZED HEALTH CARE EDUCATION/TRAINING PROGRAM

## 2025-03-09 PROCEDURE — 2500000001 HC RX 250 WO HCPCS SELF ADMINISTERED DRUGS (ALT 637 FOR MEDICARE OP): Performed by: NURSE PRACTITIONER

## 2025-03-09 PROCEDURE — 2500000004 HC RX 250 GENERAL PHARMACY W/ HCPCS (ALT 636 FOR OP/ED): Performed by: STUDENT IN AN ORGANIZED HEALTH CARE EDUCATION/TRAINING PROGRAM

## 2025-03-09 RX ORDER — POTASSIUM CHLORIDE 20 MEQ/1
20 TABLET, EXTENDED RELEASE ORAL ONCE
Status: COMPLETED | OUTPATIENT
Start: 2025-03-09 | End: 2025-03-09

## 2025-03-09 RX ORDER — POTASSIUM CHLORIDE 14.9 MG/ML
20 INJECTION INTRAVENOUS ONCE
Status: COMPLETED | OUTPATIENT
Start: 2025-03-09 | End: 2025-03-09

## 2025-03-09 RX ADMIN — VANCOMYCIN HYDROCHLORIDE 1000 MG: 1 INJECTION, SOLUTION INTRAVENOUS at 09:00

## 2025-03-09 RX ADMIN — POTASSIUM CHLORIDE 20 MEQ: 1500 TABLET, EXTENDED RELEASE ORAL at 13:11

## 2025-03-09 RX ADMIN — HEPARIN SODIUM 5000 UNITS: 5000 INJECTION, SOLUTION INTRAVENOUS; SUBCUTANEOUS at 06:18

## 2025-03-09 RX ADMIN — HEPARIN SODIUM 5000 UNITS: 5000 INJECTION, SOLUTION INTRAVENOUS; SUBCUTANEOUS at 20:49

## 2025-03-09 RX ADMIN — CEFEPIME 2 G: 1 INJECTION, SOLUTION INTRAVENOUS at 01:16

## 2025-03-09 RX ADMIN — VANCOMYCIN HYDROCHLORIDE 125 MG: 125 CAPSULE ORAL at 20:50

## 2025-03-09 RX ADMIN — CEFEPIME 2 G: 1 INJECTION, SOLUTION INTRAVENOUS at 10:18

## 2025-03-09 RX ADMIN — VANCOMYCIN HYDROCHLORIDE 125 MG: 125 CAPSULE ORAL at 13:11

## 2025-03-09 RX ADMIN — POTASSIUM CHLORIDE 20 MEQ: 14.9 INJECTION, SOLUTION INTRAVENOUS at 15:17

## 2025-03-09 RX ADMIN — VANCOMYCIN HYDROCHLORIDE 125 MG: 125 CAPSULE ORAL at 17:28

## 2025-03-09 RX ADMIN — Medication 250 MG: at 20:53

## 2025-03-09 RX ADMIN — VANCOMYCIN HYDROCHLORIDE 1000 MG: 1 INJECTION, SOLUTION INTRAVENOUS at 20:48

## 2025-03-09 RX ADMIN — CEFEPIME 2 G: 1 INJECTION, SOLUTION INTRAVENOUS at 17:28

## 2025-03-09 RX ADMIN — Medication 1 L/MIN: at 20:52

## 2025-03-09 RX ADMIN — VANCOMYCIN HYDROCHLORIDE 125 MG: 125 CAPSULE ORAL at 09:00

## 2025-03-09 RX ADMIN — HEPARIN SODIUM 5000 UNITS: 5000 INJECTION, SOLUTION INTRAVENOUS; SUBCUTANEOUS at 13:11

## 2025-03-09 RX ADMIN — ACETAMINOPHEN 650 MG: 325 TABLET ORAL at 15:17

## 2025-03-09 RX ADMIN — Medication 1 L/MIN: at 20:00

## 2025-03-09 RX ADMIN — Medication 250 MG: at 09:00

## 2025-03-09 RX ADMIN — ATORVASTATIN CALCIUM 10 MG: 10 TABLET, FILM COATED ORAL at 20:52

## 2025-03-09 ASSESSMENT — COGNITIVE AND FUNCTIONAL STATUS - GENERAL
TURNING FROM BACK TO SIDE WHILE IN FLAT BAD: A LOT
MOVING TO AND FROM BED TO CHAIR: A LOT
STANDING UP FROM CHAIR USING ARMS: A LOT
CLIMB 3 TO 5 STEPS WITH RAILING: TOTAL
PERSONAL GROOMING: A LITTLE
DRESSING REGULAR UPPER BODY CLOTHING: TOTAL
DRESSING REGULAR LOWER BODY CLOTHING: TOTAL
MOBILITY SCORE: 10
WALKING IN HOSPITAL ROOM: TOTAL
HELP NEEDED FOR BATHING: TOTAL
MOVING FROM LYING ON BACK TO SITTING ON SIDE OF FLAT BED WITH BEDRAILS: A LOT
DAILY ACTIVITIY SCORE: 11
TOILETING: TOTAL

## 2025-03-09 ASSESSMENT — ENCOUNTER SYMPTOMS: VOMITING: 0

## 2025-03-09 ASSESSMENT — PAIN SCALES - GENERAL: PAINLEVEL_OUTOF10: 0 - NO PAIN

## 2025-03-09 NOTE — PROGRESS NOTES
"  Department of Plastic and Reconstructive Surgery  Daily Progress Note    Patient Name: Sebas Soto  MRN: [id number]  Date:  03/09/25     Subjective  Patient resting in bed this AM on exam. He reports that he has had mild abdominal pain over past two days with increasing episodes of diarrhea yesterday. C. Diff testing afternoon yesterday was positive. He continues to report mild abdominal pain and several episodes of diarrhea, but otherwise denies fever, chills, night sweats, SOB, or CP. Wound vac to lumbar spinal wound intact, maintaining low continuous suction at -125mmHg, no alarms for leak, obstruction or malfunction.    Objective    Vital Signs  /78   Pulse 101   Temp 36 °C (96.8 °F)   Resp 15   Ht 1.727 m (5' 8\")   Wt 85.7 kg (189 lb)   SpO2 95%   BMI 28.74 kg/m²     Physical Exam   Constitutional: Calm and cooperative, NAD.  Eyes: PERRL, EOMI  ENMT: Moist mucous membranes, no apparent injuries or lesions.  Head/Neck: NC/AT.   Cardiovascular: Slightly tachycardic per monitor. 2+ equal pulses of the distal extremities.  Respiratory/Thorax: Regular respirations on RA. Good symmetric chest expansion.   Gastrointestinal: Abdomen soft, diffusely tender  Genitourinary: voiding independently   Extremities: SILT to all extremities, moving all 4 extremities actively, generalized BLE edema.   Neurological: A&Ox3.   Psychological: Appropriate mood and behavior.   Focused exam of back: Wound vac intact at lumbar wound site, maintaining low continuous suction at -125mmHg, no alarms for leak, obstruction or malfunction. No surrounding erythema, edema, or drainage.     Diagnostics   No results found for this or any previous visit (from the past 24 hours).  MR lumbar spine w and wo IV contrast    Result Date: 3/7/2025  Interpreted By:  Millie Ordonez and Liller Gregory STUDY: MR LUMBAR SPINE W AND WO IV CONTRAST;  3/7/2025 2:53 am   INDICATION: Signs/Symptoms:assess for abscess/wound infection.   " COMPARISON: CT lumbar spine 03/07/2025, 02/22/2025, MR lumbar spine 02/21/2025   ACCESSION NUMBER(S): [id number]   ORDERING CLINICIAN: BLUE BURGOS   TECHNIQUE: Sagittal T1, T2, STIR and axial T2 and T1 weighted MR images of the lumbar spine were obtained before and after administration of 17 mL Dotarem via IV without immediate complication.   FINDINGS:   LUMBAR SPINE:   Alignment: Grade 1 anterolisthesis of L4 on L5. Otherwise, alignment is maintained.   Vertebrae/Intervertebral Discs: There are 5 lumbar type vertebral bodies. Redemonstration of compression deformity at L1, similar to prior examination with mild osseous retropulsion measuring up to 3 mm. There is a focus of enhancement within the superior endplate of L1 with associated T2/STIR signal favored to be reactive in nature secondary to is small Schmorl's node. No abnormal enhancement elsewhere within the lumbar spine. Marrow signal is otherwise within normal limits within the remainder of the lumbar spine. Postsurgical changes again noted from L4-5 laminectomy and posterior fusion at the L4-5 level. L4-L5 interbody disc spacer placement noted. Partial laminectomy and spinous process resection at L3. Multilevel loss of normal disc T2 signal and disc height. Dorsal epidural fat noted at multiple levels throughout the lumbar spine.   Conus medullaris: The conus medullaris terminates at L1.   T12-L1: Mild ligamentum flavum and facet arthropathy noted disc osteophyte complex abuts the ventral thecal sac resulting in mild-to-moderate spinal canal stenosis at this level. No significant neural foraminal stenosis. x   L1-2: Mild ligamentum flavum hypertrophy and moderate facet arthropathy noted. Diffuse disc bulge flattens the ventral thecal sac resulting in mild spinal canal stenosis. No significant neural foraminal stenosis.   L2-3: Mild ligamentum flavum hypertrophy and moderate facet arthropathy noted which encroaches upon the bilateral neural foramina.  Additionally, there is diffuse disc bulge which flattens the ventral thecal sac without significant spinal canal stenosis however, there is mild bilateral neural foraminal stenosis.   L3-4: Postsurgical changes at this level limit evaluation. Postsurgical changes of laminectomy noted. There is moderate facet arthropathy and ligamentum flavum hypertrophy which encroaches upon the bilateral neural foramina. Additionally, there is diffuse disc bulge which flattens the ventral thecal sac. No significant spinal canal stenosis. However, there is moderate left and mild right neural foraminal stenosis.   L4-5: Postsurgical changes resulting significant blooming artifact which limited evaluation at this level. Postsurgical changes of laminectomy. Diffuse disc bulge with component of disc uncovering flattens the ventral thecal sac. Additionally, there is moderate to severe facet arthropathy bilaterally which encroach upon the bilateral neural foramina. No significant spinal canal stenosis however, there is severe right and moderate left neural foraminal stenosis.   L5-S1: Moderate ligamentum flavum hypertrophy and facet arthropathy noted. Diffuse disc bulge flattens the ventral thecal sac without critical spinal canal stenosis. There is mild right neural foraminal stenosis. No significant left neural foraminal stenosis.   Postsurgical changes noted within the paraspinous soft tissues posterior to the lumbar spine with interval reduction in size of the previously described fluid collection within the L3-4 surgical bed which measures 4.7 x 2.1 x 0.9 cm (series 4, image 13 and series 10, image 26). However, there appears to be more rim enhancement on current exam raising concern for abscess. The collection abuts and effaces dorsal aspect of the dura from L2-L4.   There is diffuse paraspinal edema and enhancement. No definite additional collections identified hemosiderin deposition and increased T2/stir signal noted within the  erector spinae musculature, likely postoperative in nature.       1. Postsurgical changes of L3-4 and L4-L5 laminectomy as well as posterior fusion of L4-5 with interbody disc spacer placement. Interval reduction in size of the previously described postoperative fluid collection effacing the dorsal aspect of the dural sac. However, the collection now displays rim enhancement on postcontrast imaging concerning for abscess measuring 4.7 x 2.1 x 0.9 cm. No significant associated canal stenosis. 2. Significant T2/STIR hyperintense signal within hand Min within the erector spinae musculature within the surgical bed which may be postoperative in nature or secondary to myositis given the above findings. 3. Multilevel spondylotic changes of the lumbar spine without critical spinal canal stenosis. However, spondylotic changes contribute to severe right and moderate left neural foraminal stenosis at the L4-5 level. 4. Similar compression deformity of the superior endplate of L1 with marrow changes favored to be posttraumatic/macro in nature. No signal abnormality within the adjacent disc. 5. Additional spondylotic changes within the lumbar spine as described in detail above.   I personally reviewed the images/study and I agree with the findings as stated above by resident physician, Dr. Case Slade.   MACRO: None   Signed by: Millie Ordonez 3/7/2025 3:52 AM Dictation workstation:   [id number]    CT chest abdomen pelvis w IV contrast    Result Date: 3/7/2025  Interpreted By:  Millie Ordonez,  Kobe Conway STUDY: CT CHEST ABDOMEN PELVIS W IV CONTRAST; CT LUMBAR SPINE W IV CONTRAST; 3/7/2025 1:29 am   INDICATION: Signs/Symptoms:fever, abdominal pain, elevated infectious markers; Signs/Symptoms:fever, recent back surgery.   COMPARISON: CT lumbar spine 02/22/2025.   ACCESSION NUMBER(S): [id number]; [id number]   ORDERING CLINICIAN: BLUE BURGOS   TECHNIQUE: Contiguous axial images of the chest, abdomen, and pelvis  were obtained after the intravenous administration of iodinated contrast. Coronal and sagittal reformatted images were reconstructed from the axial data.   Multiplanar reformatted images of the lumbar spine were reconstructed from source data of concurrent CT chest/abdomen/pelvis acquisition.   FINDINGS: CT CHEST:   MEDIASTINUM AND LYMPH NODES:  The esophagus appears within normal limits.  No enlarged intrathoracic or axillary lymph nodes by imaging criteria. No pneumomediastinum.   VESSELS:  Normal caliber thoracic aorta. No evidence of traumatic aortic injury. Moderate aortic atherosclerosis.   HEART: Normal size.  Severe coronary artery calcifications. No significant pericardial effusion.   LUNG, AIRWAYS, PLEURA: Bilateral dependent opacities suggestive of atelectasis favored over alveolar pulmonary edema or air trapping. No pneumothorax or pleural effusion. Scattered calcified granulomas.. No consolidation, pulmonary edema, pleural effusion or pneumothorax.   CHEST WALL SOFT TISSUES: No discernible acute abnormality. 15 mm low-attenuation nodule mid posterior right thyroid.   OSSEOUS STRUCTURES: Ankylosis C6-7 and T1-2. Severe degenerative changes of C5-6 with osteophytosis, intervertebral disc height loss, subchondral sclerosis/cyst formation and grade 1 anterolisthesis..     CT ABDOMEN/PELVIS:   ABDOMINAL WALL: No acute abnormality.   LIVER: No significant parenchymal abnormality.   BILE DUCTS: No significant intrahepatic or extrahepatic dilatation.   GALLBLADDER: No significant abnormality.   SPLEEN: No significant abnormality.   PANCREAS: No significant abnormality.   ADRENALS: No significant abnormality.   KIDNEYS, URETERS, BLADDER: The bladder wall is mildly thickened with adjacent fat stranding. Small diverticulum measures up to 0.8 cm. Nonobstructing calculi in the lower right renal pelvis measuring up to 5 mm. Probable parapelvic cysts. No hydronephrosis. Few subcentimeter low-attenuation renal foci,  too small to characterize however likely cysts.   REPRODUCTIVE ORGANS: No significant abnormality.   VESSELS: No acute vascular injury. Atherosclerotic calcifications without aneurysmal dilatation seen.   LYMPH NODES/RETROPERITONEUM: No acute retroperitoneal abnormality. No enlarged lymph nodes.   BOWEL/MESENTERY/PERITONEUM: No small-bowel thickening or dilation. Diffuse colonic wall thickening from the cecum to the rectum with most pronounced wall thickening and adjacent fat stranding of the mid transverse colon to the rectum without dilation. Normal appendix. No ascites, free air or fluid collection.   MUSCULOSKELETAL/CT lumbar:   Mild rightward curvature of the lumbar spine. Partial ankylosis noted of the sacroiliac joints. Unchanged compression fracture of the L1 vertebral body with 25-50% mid vertebral body height loss and 0.6 cm retropulsion. Redemonstration of L3-L4 laminectomy and L4-5 posterior spinal fusion with intervertebral disc spacer. Residual grade 1 anterolisthesis of L4-5. Similar degenerative changes of the L4-5 vertebral bodies. Hardware appears intact. Beam hardening limits evaluation, however within these limits: There is significant posterior subcutaneous fat stranding in the soft tissues overlying the paraspinal levels of L1-L5 with a few retained osseous fragments within the soft tissues (series 303, image 86). There is also subcutaneous emphysema and likely wound dehiscence at the levels of L3-L5. Probable small amount of fluid in the subcutaneous dot no definitive osseous erosive changes or osseous foci of gas to suggest osteomyelitis.   T12-L1: Osseous retropulsion and degenerative changes resultant moderate canal narrowing. Mild left foraminal narrowing. L1-L2: Canal is patent. Mild right foraminal narrowing. L2-L3: The canal is decompressed. Mild left foraminal narrowing. L3-L4: The canal appears decompressed with mild bilateral foraminal narrowing. L4-L5: The canal is partially  decompressed with moderate foraminal narrowing. L5-S1: Canal is patent. Moderate foraminal narrowing.       CT lumbar: 1. Postsurgical changes of the L3-L4 and L4-5 vertebral bodies. Chronic L1 compression fracture with mild osseous retropulsion noted. Evaluation of soft tissues is limited due to technique however there is appearance of significant subcutaneous fat stranding within the posterior soft tissues overlying the levels of L1-L5 with retained osseous fragments and subcutaneous emphysema/dehiscence. Possible subcutaneous fluid. Findings concerning for infectious process. No definitive osteomyelitis or hardware failure. Dedicated MRI has been performed and will be reported separately.   CT chest, abdomen/pelvis: 2. Inflammatory changes with wall thickening and enhancement from the cecum to the rectum suggestive of moderate proctocolitis. 3. Bladder wall thickening and fat stranding which could represent cystitis. 4. Hazy interstitial opacities likely related to atelectasis however component of air trapping not excluded. 5. Additional findings as detailed   I personally reviewed the images/study and I agree with the findings as stated by Dr. Man Zacarias. This study was interpreted at [place], [place].   MACRO: None.   Signed by: Millie Ordonez 3/7/2025 2:54 AM Dictation workstation:   [id number]    CT lumbar spine w IV contrast    Result Date: 3/7/2025  Interpreted By:  Millie Ordonez and Ohs Zachary STUDY: CT CHEST ABDOMEN PELVIS W IV CONTRAST; CT LUMBAR SPINE W IV CONTRAST; 3/7/2025 1:29 am   INDICATION: Signs/Symptoms:fever, abdominal pain, elevated infectious markers; Signs/Symptoms:fever, recent back surgery.   COMPARISON: CT lumbar spine 02/22/2025.   ACCESSION NUMBER(S): [id number]; [id number]   ORDERING CLINICIAN: BLUE BURGOS   TECHNIQUE: Contiguous axial images of the chest, abdomen, and pelvis were obtained after the intravenous administration  of iodinated contrast. Coronal and sagittal reformatted images were reconstructed from the axial data.   Multiplanar reformatted images of the lumbar spine were reconstructed from source data of concurrent CT chest/abdomen/pelvis acquisition.   FINDINGS: CT CHEST:   MEDIASTINUM AND LYMPH NODES:  The esophagus appears within normal limits.  No enlarged intrathoracic or axillary lymph nodes by imaging criteria. No pneumomediastinum.   VESSELS:  Normal caliber thoracic aorta. No evidence of traumatic aortic injury. Moderate aortic atherosclerosis.   HEART: Normal size.  Severe coronary artery calcifications. No significant pericardial effusion.   LUNG, AIRWAYS, PLEURA: Bilateral dependent opacities suggestive of atelectasis favored over alveolar pulmonary edema or air trapping. No pneumothorax or pleural effusion. Scattered calcified granulomas.. No consolidation, pulmonary edema, pleural effusion or pneumothorax.   CHEST WALL SOFT TISSUES: No discernible acute abnormality. 15 mm low-attenuation nodule mid posterior right thyroid.   OSSEOUS STRUCTURES: Ankylosis C6-7 and T1-2. Severe degenerative changes of C5-6 with osteophytosis, intervertebral disc height loss, subchondral sclerosis/cyst formation and grade 1 anterolisthesis..     CT ABDOMEN/PELVIS:   ABDOMINAL WALL: No acute abnormality.   LIVER: No significant parenchymal abnormality.   BILE DUCTS: No significant intrahepatic or extrahepatic dilatation.   GALLBLADDER: No significant abnormality.   SPLEEN: No significant abnormality.   PANCREAS: No significant abnormality.   ADRENALS: No significant abnormality.   KIDNEYS, URETERS, BLADDER: The bladder wall is mildly thickened with adjacent fat stranding. Small diverticulum measures up to 0.8 cm. Nonobstructing calculi in the lower right renal pelvis measuring up to 5 mm. Probable parapelvic cysts. No hydronephrosis. Few subcentimeter low-attenuation renal foci, too small to characterize however likely cysts.    REPRODUCTIVE ORGANS: No significant abnormality.   VESSELS: No acute vascular injury. Atherosclerotic calcifications without aneurysmal dilatation seen.   LYMPH NODES/RETROPERITONEUM: No acute retroperitoneal abnormality. No enlarged lymph nodes.   BOWEL/MESENTERY/PERITONEUM: No small-bowel thickening or dilation. Diffuse colonic wall thickening from the cecum to the rectum with most pronounced wall thickening and adjacent fat stranding of the mid transverse colon to the rectum without dilation. Normal appendix. No ascites, free air or fluid collection.   MUSCULOSKELETAL/CT lumbar:   Mild rightward curvature of the lumbar spine. Partial ankylosis noted of the sacroiliac joints. Unchanged compression fracture of the L1 vertebral body with 25-50% mid vertebral body height loss and 0.6 cm retropulsion. Redemonstration of L3-L4 laminectomy and L4-5 posterior spinal fusion with intervertebral disc spacer. Residual grade 1 anterolisthesis of L4-5. Similar degenerative changes of the L4-5 vertebral bodies. Hardware appears intact. Beam hardening limits evaluation, however within these limits: There is significant posterior subcutaneous fat stranding in the soft tissues overlying the paraspinal levels of L1-L5 with a few retained osseous fragments within the soft tissues (series 303, image 86). There is also subcutaneous emphysema and likely wound dehiscence at the levels of L3-L5. Probable small amount of fluid in the subcutaneous dot no definitive osseous erosive changes or osseous foci of gas to suggest osteomyelitis.   T12-L1: Osseous retropulsion and degenerative changes resultant moderate canal narrowing. Mild left foraminal narrowing. L1-L2: Canal is patent. Mild right foraminal narrowing. L2-L3: The canal is decompressed. Mild left foraminal narrowing. L3-L4: The canal appears decompressed with mild bilateral foraminal narrowing. L4-L5: The canal is partially decompressed with moderate foraminal narrowing. L5-S1:  Canal is patent. Moderate foraminal narrowing.       CT lumbar: 1. Postsurgical changes of the L3-L4 and L4-5 vertebral bodies. Chronic L1 compression fracture with mild osseous retropulsion noted. Evaluation of soft tissues is limited due to technique however there is appearance of significant subcutaneous fat stranding within the posterior soft tissues overlying the levels of L1-L5 with retained osseous fragments and subcutaneous emphysema/dehiscence. Possible subcutaneous fluid. Findings concerning for infectious process. No definitive osteomyelitis or hardware failure. Dedicated MRI has been performed and will be reported separately.   CT chest, abdomen/pelvis: 2. Inflammatory changes with wall thickening and enhancement from the cecum to the rectum suggestive of moderate proctocolitis. 3. Bladder wall thickening and fat stranding which could represent cystitis. 4. Hazy interstitial opacities likely related to atelectasis however component of air trapping not excluded. 5. Additional findings as detailed   I personally reviewed the images/study and I agree with the findings as stated by Dr. Man Zacarias. This study was interpreted at [place], [place].   MACRO: None.   Signed by: Millie Ordonez 3/7/2025 2:54 AM Dictation workstation:   [id number]    XR chest 2 views    Result Date: 3/6/2025  Interpreted By:  Millie Ordonez and Beyersdorf Conner STUDY: XR CHEST 2 VIEWS;  3/6/2025 7:52 pm   INDICATION: Signs/Symptoms:febrile, assess for PNA.   COMPARISON: Two-view chest 10/11/2024   ACCESSION NUMBER(S): [id number]   ORDERING CLINICIAN: JOSE WANG   FINDINGS: AP radiograph of the chest was provided.     CARDIOMEDIASTINAL SILHOUETTE: Cardiomediastinal silhouette is normal in size and configuration. Tortuous thoracic aorta with atherosclerotic calcifications.   LUNGS: Coarse interstitial opacities. Elevation of the right hemidiaphragm. No focal consolidation,  pleural effusion, or pneumothorax.   ABDOMEN: No remarkable upper abdominal findings.   BONES: Degenerative changes and demineralization.       1.  Coarse interstitial opacities likely chronic. No focal consolidation.   I personally reviewed the image(s)/study and resident interpretation. I agree with the findings as stated by resident Ulisses Rowley. Data analyzed and images interpreted at [facility], [place], [place].   MACRO: None   Signed by: Millie Ordonez 3/6/2025 9:32 PM Dictation workstation:   [id number]    CT lumbar spine wo IV contrast    Result Date: 2/22/2025  Interpreted By:  Sebas Lee, STUDY: CT LUMBAR SPINE WO IV CONTRAST  2/22/2025 8:59 am   INDICATION: Signs/Symptoms:eval hardware, wound infection     COMPARISON: MRI dated 2/21/2025   ACCESSION NUMBER(S): [id number]   ORDERING CLINICIAN: TAVO BERGER   TECHNIQUE: Thin cut axial CT images through the lumbar spine were obtained and reconstructed in the coronal and sagittal planes.   FINDINGS: Postoperative changes are again identified compatible with a previous posterior laminectomies at the L3 and L4 levels, partial resection of the L2 spinous process, as well as a discectomy at the L4/5 level.   There is metallic artifact associated with posterior-lateral orthopedic fixation hardware and pedicle screws transfixing the L4 and L5 vertebrae. The orthopedic hardware appears intact without evidence of loosening.  There is immature bone graft material noted posterolaterally surrounding the orthopedic fixation hardware. There is metallic artifact associated with a interbody fusion device noted along the disc space at the L4/5 level.   There is again evidence of chronic collapse and anterior wedging of the L1 vertebral body. There is retropulsion of the superior/posterior margin of the collapsed L1 vertebral body contributing to mild-to-moderate bony encroachment upon the spinal canal.   There is a  dextrocurvature of the lumbar and visualized lower thoracic spine.   There is 7 mm of anterolisthesis of L4 on L5. There is a minimal retrolisthesis of L1 on L2.   The soft tissues of the spinal canal, neural foramen, as well as posterior paraspinal soft tissues are suboptimally evaluated on this noncontrast CT study. Within limitations of the study, there is ill-defined intermediate attenuation infiltrating the posterior paraspinal soft tissues within lumbar region as well as a few scattered foci of subcutaneous air within the soft tissues along the midline which may be postsurgical in etiology although a superimposed infectious/inflammatory process could give a similar CT appearance and can not be excluded.   There is a 6 mm focus of bony sclerosis noted within the partially imaged left iliac bone which while nonspecific may represent a benign process such as a focal bone island.   There is multilevel spondylosis.   At the L5/S1 level, there are hypertrophic degenerative facet changes and a minimal posterior disc bulge without significant spinal canal narrowing. There is moderate right and mild left-sided neural foraminal narrowing.   At the L4/5 level, there are postoperative changes compatible with a previous L4 laminectomy as well as discectomy and fusion. There is 7 mm of anterolisthesis of L4 on L5. There are hypertrophic degenerative facet changes. There is mild-to-moderate bony encroachment upon the spinal canal. There is moderate to severe right and mild-to-moderate left-sided neural foraminal narrowing.   At the L3/4 level, there are postoperative changes compatible with a previous posterior laminectomy. There is posterior osteophytic spurring and posterior disc bulge along with hypertrophic degenerative facet changes without significant bony encroachment upon the spinal canal. There is mild-to-moderate bony encroachment upon the neural foramen bilaterally.   At the L2/3 level, there is mild posterior  osteophytic spurring and posterior disc bulge along with degenerative facet changes and ligamentum flavum hypertrophy contributing to mild-to-moderate spinal canal narrowing. There is mild encroachment upon the inferior recesses of the neural foramen bilaterally.   At the L1/2 level, there is a minimal retrolisthesis of L1 on L2, mild posterior osteophytic spurring and posterior disc bulge along with degenerative facet changes contributing to mild spinal canal narrowing. There is mild encroachment upon the neural foramen bilaterally.   At the T12/L1 level, there is retropulsion of the superior/posterior margin of the collapsed L1 vertebral body contributing to mild-to-moderate bony encroachment upon the spinal canal. There is mild encroachment upon the neural foramen bilaterally.   Atherosclerotic calcifications are noted along the descending aorta and iliac arteries.         Postoperative changes are again identified compatible with a previous posterior laminectomies at the L3 and L4 levels, partial resection of the L2 spinous process, as well as a discectomy at the L4/5 level.   There is metallic artifact associated with posterior-lateral orthopedic fixation hardware and pedicle screws transfixing the L4 and L5 vertebrae. The orthopedic hardware appears intact without evidence of loosening. There is immature bone graft material noted posterolaterally surrounding the orthopedic fixation hardware. There is metallic artifact associated with a interbody fusion device noted along the disc space at the L4/5 level.   There is again evidence of chronic collapse and anterior wedging of the L1 vertebral body. There is retropulsion of the superior/posterior margin of the collapsed L1 vertebral body contributing to mild-to-moderate bony encroachment upon the spinal canal.   There is a dextrocurvature of the lumbar and visualized lower thoracic spine.   There is 7 mm of anterolisthesis of L4 on L5. There is a minimal  retrolisthesis of L1 on L2.   The soft tissues of the spinal canal, neural foramen, as well as posterior paraspinal soft tissues are suboptimally evaluated on this noncontrast CT study. Within limitations of the study, there is ill-defined intermediate attenuation infiltrating the posterior paraspinal soft tissues within lumbar region as well as a few scattered foci of subcutaneous air within the soft tissues along the midline which may be postsurgical in etiology although a superimposed infectious/inflammatory process could give a similar CT appearance and can not be excluded.   There is multilevel spondylosis. There are varying degrees of spinal canal and neural foraminal narrowing as described above.   MACRO: None   Signed by: Sebas Lee 2/22/2025 9:25 AM Dictation workstation:   [id number]    MR lumbar spine w and wo IV contrast    Result Date: 2/21/2025  Interpreted By:  Jericho Mariano, STUDY: MR LUMBAR SPINE W AND WO IV CONTRAST;  2/21/2025 6:43 pm   INDICATION: Signs/Symptoms:post operative infection.     COMPARISON: MRI lumbar spine June 21, 2024. CT cervical spine June 21, 2024 and chest CT August 8, 2013 are also reviewed with respect to the spine.   ACCESSION NUMBER(S): [id number]   ORDERING CLINICIAN: OLYA RODRIGUEZ   TECHNIQUE: Multiplanar, multisequence MR imaging of the lumbar spine performed prior to and following administration of 18 ML Dotarem intravenous contrast.   FINDINGS: There are segmentation anomalies at cervical and thoracic levels demonstrated on the comparison studies. The labeling of the levels is somewhat arbitrary. For the sake of consistency, L5-S1 level we will again be defined as axial T2 series 8 image 43. Please note: This method of labeling levels assumes 5 lumbar vertebra and therefore T12 has a very hypoplastic right rib and an atretic left rib.   Alignment: There is again a slight retrolisthesis at L1-2 level. At L4-5 level, there was previouslyd 7 mm anterolisthesis.  There is similar anterior subluxation at L4-5 level on the current study, although precise measurements are not possible due to the postoperative changes and a new mild compression compression fracture deformity of the L5 superior endplate.   Vertebrae: Interval surgery with decompressive laminectomies at L3 and L4 levels, and interval bilateral foraminotomies at L4-5 level. Posterior fusion of L4 and L5 with bilateral pedicle screws and interval placement of interbody spacer within the disc space. This hardware appears to be normally positioned within the limitations of MR imaging.   The compression deformity of the L1 vertebral body is maturing as expected, with resolving bone marrow edema, now mild in degree. Mild enhancement along the endplate as expected for a subacute healing fracture. No evidence for discitis or osteomyelitis is seen.   There is a new mild deformity of the L5 posterior-superior vertebral endplate, with mild retropulsion or broad-based buckling of the endplate back into the canal by approximately 4 mm.   Discs: Postoperative changes within the L4-5 disc space, interval placement of interbody disc spacer which appears to be normally positioned. Disc elsewhere unchanged with similar multilevel disc desiccation, mild disc space narrowing and small desiccated disc bulges. No evidence for a new disc herniation.   Spinal cord: Signal within the spinal cord is normal. The conus medullaris terminates at L1 level..   Spinal canal: Interval decompression of the spinal canal at the levels being labeled as L3 and L4. No evidence for epidural hemorrhage, abscess or acute disc herniation. Fluid within the laminectomy site mildly bulges inward, contacting and slightly deforming the thecal sac but without compression of the cauda equina. The canal appears to be well decompressed, the degree of spinal canal narrowing markedly improved at L4-5 level. No evidence for subdural hygroma, or arachnoiditis.    Paraspinal soft tissues: Fluid within the laminectomy site as detailed in the report above, without significant mass effect upon the thecal sac.       1. Multiple cervical and thoracic segmentation variations in the anatomy. Study again dictated assuming 5 lumbar vertebra, although T12 therefore has only a single and hypoplastic rib. 2. There are expected postoperative changes from recent decompressive lumbar laminectomies, L4-5 foraminotomies, and posterior fusion at L4-5 level. 3. There is a small amount of fluid within the laminectomy site. The imaging appearance looks fairly typical for expected postprocedural seroma. MR imaging can not determine whether or not this fluid is infected or sterile. That being said, there are no aggressive imaging features. 4. MR imaging suggests new mild deformity of the L5 superior vertebral endplate with mild retropulsion, although this does not narrow the thecal sac. A new mild compression fracture of this endplate, or bone erosion of the endplate due to osteomyelitis, are not excluded. However I do not see imaging features suggestive of discitis. 5. Evaluation of bones is inherently limited on MR imaging, and the recent postoperative changes further limit evaluation of the L5 vertebral endplate. Recommend CT imaging for better evaluation of the bones.   MACRO: None   Signed by: Jericho Mariano 2/21/2025 7:51 PM Dictation workstation:   [id number]      Current Medications  Scheduled medications  acetaminophen, 650 mg, oral, q6h  atorvastatin, 10 mg, oral, Nightly  cefepime, 2 g, intravenous, q8h  heparin (porcine), 5,000 Units, subcutaneous, q8h  [Held by provider] metoprolol succinate XL, 25 mg, oral, q AM  oxygen, , inhalation, Continuous - Inhalation  polyethylene glycol, 17 g, oral, Daily  saccharomyces boulardii, 250 mg, oral, BID  vancomycin, 1,000 mg, intravenous, q12h  vancomycin, 125 mg, oral, 4x daily      Continuous medications     PRN medications  PRN medications:  dextrose, dextrose, glucagon, glucagon, naloxone, ondansetron **OR** ondansetron, vancomycin     Assessment  Sebas Soto is a 84 y.o. male with a past medical history of L2-5 laminectomy with L4-5 transforaminal lumbar interbody fusion on 1/31 with Dr. Qureshi, complicated by post-op hallucinations and encephalopathy, previously presented to ED on 2/22 with concern for post-op wound infection, CT L spine during that admission showed no deep dehiscence, so no acute surgical intervention and patient was discharged back to SNF with local wound care and antibiotics on 2/28. On 3/6, Dr. Qureshi was notified regarding concerns for lumbar wound dehiscence, and it was recommended patient come to  ED for evaluation. Patient was taken to OR on 3/6 with neurosurgery for lumbar wound washout and vac placement. Plastic surgery was consulted for eventual assistance with wound closure.     Plan/Recommendations  - Dr. Zacarias made aware of + C. Diff, plan to proceed to OR on 3/10 for wound debridement and closure with plastic surgery, add-on case requested  - Please ensure NPO and T/S orders are UTD prior to OR  - Continue wound vac therapy per NSGY team in the interim  - Follow up OR cultures (prelim with 1+ rare providencia rettgeri)  - IV antibiotics per ID/primary  - Appreciate remaining care per primary team    Patient and plan discussed with Dr. Rell Ca PA-C   Plastic and Reconstructive Surgery   Terry  Pager #[phone]  Team phone: [phone]

## 2025-03-09 NOTE — PROGRESS NOTES
Subjective   Interval History:      No family present at this time     Patient seen during early afternoon rounds.  Patient's supine in bed and in no acute distress.     Today clarifies that he noted some slight abdominal pain 3/7/2025.  C. difficile testing today positive for both PCR and toxin     Reports having 2-3 bowel movements and mild abdominal pain.  Denies fever, chills, rigors, night sweats, headache, shortness of breath or chest pain.      Objective   Range of Vitals (last 24 hours)  Heart Rate:  [67-90]   Temp:  [36.4 °C (97.5 °F)-36.7 °C (98.1 °F)]   Resp:  [8-26]   BP: (112-138)/(60-71)   SpO2:  [92 %-96 %]   Daily Weight  03/07/25 : 85.7 kg (189 lb)    Body mass index is 28.74 kg/m².    Physical Exam  Supine in bed and in no acute distress  Mild abdominal distention and tenderness to palpation  Bowel sounds present  Nonrigid abdomen  Anterolateral chest clear  Distant S1 and S2, I did not hear murmur      Antibiotics  amoxicillin-pot clavulanate - 875-125 mg  cefepime - 2 gram/100 mL  vancomycin - 1 gram/200 mL, 125 mg    Relevant Results  Labs  Results from last 72 hours   Lab Units 03/08/25  0656 03/06/25 2023   WBC AUTO x10*3/uL 5.4 6.0   HEMOGLOBIN g/dL 9.3* 10.2*   HEMATOCRIT % 30.1* 31.5*   PLATELETS AUTO x10*3/uL 202 249   LYMPHO PCT MAN %  --  36.6   MONO PCT MAN %  --  19.6   EOSINO PCT MAN %  --  1.8     Results from last 72 hours   Lab Units 03/08/25  0656 03/07/25  1517 03/06/25 2023   SODIUM mmol/L 141 138 139   POTASSIUM mmol/L 3.5 3.7 4.2   CHLORIDE mmol/L 109* 106 111*   CO2 mmol/L 26 24 24   BUN mg/dL 7 9 11   CREATININE mg/dL 0.59 0.63 0.68   GLUCOSE mg/dL 92 147* 108*   CALCIUM mg/dL 7.9* 8.1* 8.0*   ANION GAP mmol/L 10 12 8*   EGFR mL/min/1.73m*2 >90 >90 >90   PHOSPHORUS mg/dL 2.9 3.3  --      Results from last 72 hours   Lab Units 03/08/25  0656 03/07/25  1517 03/06/25 2023   ALK PHOS U/L  --   --  72   BILIRUBIN TOTAL mg/dL  --   --  0.4   PROTEIN TOTAL g/dL  --   --  5.5*  [Time Spent: ___ minutes] : I have spent [unfilled] minutes of time on the encounter.   ALT U/L  --   --  22   AST U/L  --   --  23   ALBUMIN g/dL 2.6* 2.8* 3.0*     Estimated Creatinine Clearance: 101 mL/min (by C-G formula based on SCr of 0.59 mg/dL).  C-Reactive Protein   Date Value Ref Range Status   03/06/2025 6.01 (H) <1.00 mg/dL Final   02/21/2025 4.88 (H) <1.00 mg/dL Final          Assessment/Plan   84-year-old gentleman underwent lumbar spine surgery on 1/31/2025 and developed early postoperative wound dehiscence.  Patient readmitted 2/21/2025 to [facility] and started on Unasyn and changed to oral Augmentin (for 2-week duration).  Patient seen by Dr. Sanches and Dr. Manuel ([facility], infectious disease).  2/21/2025 culture showed mixed anaerobic and gram-positive/gram-negative bacteria.  Specific organism was not identified.  However, this generally indicates skin gt and minor enteric organisms.       Preop Photo:       Patient re-presented with additional wound breakdown/exudate and odor.  At [facility] receiving wound care and packing.  Photograph shows minimal surrounding erythema.  Neurosurgery observed some superficial necrosis exudate and odor.  Operative note not concerned about deeper penetrating infection.     Anticipate cultures to show a mixture of gt that could easily include resistant hospital-acquired or healthcare associated organisms like Pseudomonas, Serratia for example.  For now continue broad-spectrum antibiotics.  Will sort out culture results and advise.     Hopefully will not need extended antibiotic course if deeper infection not suspected.  Will again discuss with neurosurgery.     Duration of therapy then will be determined by wound healing.  Hopefully could continue antibiotics for 2 to 4 weeks and see additional wound healing with aggressive wound care management.     Might be able to consider oral antibiotic regimen but will advise.     # Postoperative lumbar wound dehiscence  # 01/31/2025 Lumbar spine surgery            L2-L5 laminectomies and bilateral  foraminotomies            L4-L5 transforaminal interbody fusion;             L4-L5 instrumented posterolateral fusion; use of autograft and allograft for fusion    03/08/2025     During rounds cultures negative to date.     Continue cefepime and vancomycin pending final culture and antibiotic testing     Will de-escalate antibiotics when culture data available and to assist with treatment of C. difficile.  Will need p.o. vancomycin and plan for 10-day course for initial episode.  If long-term antibiotics required then may put on slow taper until antibiotic treatment of back wound completed.  Will advise    Recommendations  1.  Continue vancomycin and drug level monitoring     2.  Continue cefepime.          Monitor for neurotoxicity especially given history of encephalopathy.     3.  Antibiotic dosing with Pharm.D. assistance     4.  Will follow-up on intraoperative cultures which will take 48 to 72 hours to finalize.    5.  3/8/2025 started p.o. Vancomycin, 125 mg p.o. 4 times daily      I spent 30 minutes in the professional and overall care of this patient.      Hayden Christensen MD  ID Staff

## 2025-03-09 NOTE — PROGRESS NOTES
"Sebas Soto is a 84 y.o. male on day 2 of admission presenting with Postoperative infection, unspecified type, initial encounter.    Subjective   No acute events overnight, continues to have copious amounts of diarrhea       Objective     Physical Exam  Aox3  RUE D4+ B5 T5 HG4 IO4  GILLES D0 (chronic) B5 T5 HG4 IO4  RLE HF3 KE4 PF5 DF5  LL HF3 KE4 PF5 DF5    Last Recorded Vitals  Blood pressure 112/70, pulse 99, temperature 36.6 °C (97.9 °F), resp. rate 20, height 1.727 m (5' 8\"), weight 85.7 kg (189 lb), SpO2 93%.  Intake/Output last 3 Shifts:  I/O last 3 completed shifts:  In: 2197.9 (25.6 mL/kg) [P.O.:840; I.V.:857.9 (10 mL/kg); IV Piggyback:500]  Out: 2150 (25.1 mL/kg) [Urine:2100 (0.7 mL/kg/hr); Blood:50]  Weight: 85.7 kg           Assessment/Plan   Assessment & Plan  Postoperative infection, unspecified type, initial encounter    CAD (coronary artery disease)    Postoperative surgical complication involving musculoskeletal system associated with musculoskeletal procedure    Disruption of wound, unspecified, initial encounter    Sebas Soto is a 84 y.o. male w/ h/o L2-5 lami w/ L4-5 TLIF on 1/31, c/b post-op hallucinations and encephalopathy, 2/22 p/w incisional dehiscence, discharged on Abx, 3/6 p/w incisional dehiscence.     3/7 s/p lumbar wound exploration, revision, washout and wound vac placement  Started on IV vanco and cefepime.  ID consulted.    Plan  Tele  Vac per plastics  OR planning Mon 3/10 wound closure/debridement  Plastics recs  Bcx  OR cx  ID recs  PTOT           Sebas Bahena MD      "

## 2025-03-10 ENCOUNTER — ANESTHESIA (OUTPATIENT)
Dept: OPERATING ROOM | Facility: HOSPITAL | Age: 85
End: 2025-03-10
Payer: MEDICARE

## 2025-03-10 ENCOUNTER — ANESTHESIA EVENT (OUTPATIENT)
Dept: OPERATING ROOM | Facility: HOSPITAL | Age: 85
End: 2025-03-10
Payer: MEDICARE

## 2025-03-10 VITALS
BODY MASS INDEX: 28.64 KG/M2 | RESPIRATION RATE: 19 BRPM | DIASTOLIC BLOOD PRESSURE: 74 MMHG | WEIGHT: 189 LBS | HEIGHT: 68 IN | SYSTOLIC BLOOD PRESSURE: 126 MMHG | OXYGEN SATURATION: 94 % | TEMPERATURE: 97.2 F | HEART RATE: 120 BPM

## 2025-03-10 PROBLEM — I10 HTN (HYPERTENSION): Status: ACTIVE | Noted: 2025-03-10

## 2025-03-10 PROBLEM — F05 POSTOPERATIVE DELIRIUM: Status: ACTIVE | Noted: 2025-03-10

## 2025-03-10 PROBLEM — E78.5 HYPERLIPIDEMIA: Status: ACTIVE | Noted: 2025-03-10

## 2025-03-10 PROBLEM — T88.4XXA DIFFICULT INTUBATION: Status: ACTIVE | Noted: 2025-03-10

## 2025-03-10 PROBLEM — G89.29 CHRONIC LOW BACK PAIN: Status: ACTIVE | Noted: 2025-03-10

## 2025-03-10 PROBLEM — D64.9 ANEMIA: Status: ACTIVE | Noted: 2025-03-10

## 2025-03-10 PROBLEM — G70.9 NEUROMUSCULAR DISEASE (MULTI): Status: ACTIVE | Noted: 2025-03-10

## 2025-03-10 PROBLEM — M54.2 CHRONIC NECK PAIN: Status: ACTIVE | Noted: 2025-03-10

## 2025-03-10 PROBLEM — M54.50 CHRONIC LOW BACK PAIN: Status: ACTIVE | Noted: 2025-03-10

## 2025-03-10 PROBLEM — K21.9 GASTROESOPHAGEAL REFLUX DISEASE WITHOUT ESOPHAGITIS: Status: ACTIVE | Noted: 2025-03-10

## 2025-03-10 PROBLEM — F41.9 ANXIETY: Status: ACTIVE | Noted: 2025-03-10

## 2025-03-10 PROBLEM — C61 PROSTATE CANCER (MULTI): Status: ACTIVE | Noted: 2025-03-10

## 2025-03-10 PROBLEM — R94.31 ABNORMAL EKG: Status: ACTIVE | Noted: 2025-03-10

## 2025-03-10 PROBLEM — G89.29 CHRONIC NECK PAIN: Status: ACTIVE | Noted: 2025-03-10

## 2025-03-10 PROBLEM — M51.9 LUMBAR DISC DISEASE: Status: ACTIVE | Noted: 2025-03-10

## 2025-03-10 PROBLEM — F03.90 DEMENTIA: Status: ACTIVE | Noted: 2025-03-10

## 2025-03-10 LAB
ALBUMIN SERPL BCP-MCNC: 2.5 G/DL (ref 3.4–5)
ANION GAP SERPL CALC-SCNC: 10 MMOL/L (ref 10–20)
BACTERIA BLD CULT: NORMAL
BACTERIA BLD CULT: NORMAL
BUN SERPL-MCNC: 9 MG/DL (ref 6–23)
CALCIUM SERPL-MCNC: 7.9 MG/DL (ref 8.6–10.6)
CHLORIDE SERPL-SCNC: 106 MMOL/L (ref 98–107)
CO2 SERPL-SCNC: 28 MMOL/L (ref 21–32)
CREAT SERPL-MCNC: 0.68 MG/DL (ref 0.5–1.3)
EGFRCR SERPLBLD CKD-EPI 2021: >90 ML/MIN/1.73M*2
ERYTHROCYTE [DISTWIDTH] IN BLOOD BY AUTOMATED COUNT: 17.6 % (ref 11.5–14.5)
GLUCOSE BLD MANUAL STRIP-MCNC: 206 MG/DL (ref 74–99)
GLUCOSE SERPL-MCNC: 109 MG/DL (ref 74–99)
HCT VFR BLD AUTO: 31.9 % (ref 41–52)
HGB BLD-MCNC: 10.1 G/DL (ref 13.5–17.5)
MCH RBC QN AUTO: 30.2 PG (ref 26–34)
MCHC RBC AUTO-ENTMCNC: 31.7 G/DL (ref 32–36)
MCV RBC AUTO: 96 FL (ref 80–100)
NRBC BLD-RTO: 0 /100 WBCS (ref 0–0)
PHOSPHATE SERPL-MCNC: 2.6 MG/DL (ref 2.5–4.9)
PLATELET # BLD AUTO: 229 X10*3/UL (ref 150–450)
POTASSIUM SERPL-SCNC: 3.5 MMOL/L (ref 3.5–5.3)
RBC # BLD AUTO: 3.34 X10*6/UL (ref 4.5–5.9)
SODIUM SERPL-SCNC: 140 MMOL/L (ref 136–145)
WBC # BLD AUTO: 9 X10*3/UL (ref 4.4–11.3)

## 2025-03-10 PROCEDURE — 3600000008 HC OR TIME - EACH INCREMENTAL 1 MINUTE - PROCEDURE LEVEL THREE: Performed by: SURGERY

## 2025-03-10 PROCEDURE — 0KBG0ZZ EXCISION OF LEFT TRUNK MUSCLE, OPEN APPROACH: ICD-10-PCS | Performed by: SURGERY

## 2025-03-10 PROCEDURE — 2500000005 HC RX 250 GENERAL PHARMACY W/O HCPCS: Performed by: ANESTHESIOLOGIST ASSISTANT

## 2025-03-10 PROCEDURE — 2500000001 HC RX 250 WO HCPCS SELF ADMINISTERED DRUGS (ALT 637 FOR MEDICARE OP)

## 2025-03-10 PROCEDURE — 2500000001 HC RX 250 WO HCPCS SELF ADMINISTERED DRUGS (ALT 637 FOR MEDICARE OP): Performed by: STUDENT IN AN ORGANIZED HEALTH CARE EDUCATION/TRAINING PROGRAM

## 2025-03-10 PROCEDURE — 7100000001 HC RECOVERY ROOM TIME - INITIAL BASE CHARGE: Performed by: SURGERY

## 2025-03-10 PROCEDURE — 87075 CULTR BACTERIA EXCEPT BLOOD: CPT | Performed by: SURGERY

## 2025-03-10 PROCEDURE — 99232 SBSQ HOSP IP/OBS MODERATE 35: CPT | Performed by: PHYSICIAN ASSISTANT

## 2025-03-10 PROCEDURE — 36415 COLL VENOUS BLD VENIPUNCTURE: CPT | Performed by: STUDENT IN AN ORGANIZED HEALTH CARE EDUCATION/TRAINING PROGRAM

## 2025-03-10 PROCEDURE — 84100 ASSAY OF PHOSPHORUS: CPT | Performed by: STUDENT IN AN ORGANIZED HEALTH CARE EDUCATION/TRAINING PROGRAM

## 2025-03-10 PROCEDURE — 0KBF0ZZ EXCISION OF RIGHT TRUNK MUSCLE, OPEN APPROACH: ICD-10-PCS | Performed by: SURGERY

## 2025-03-10 PROCEDURE — 2500000004 HC RX 250 GENERAL PHARMACY W/ HCPCS (ALT 636 FOR OP/ED)

## 2025-03-10 PROCEDURE — 85027 COMPLETE CBC AUTOMATED: CPT | Performed by: STUDENT IN AN ORGANIZED HEALTH CARE EDUCATION/TRAINING PROGRAM

## 2025-03-10 PROCEDURE — 3600000003 HC OR TIME - INITIAL BASE CHARGE - PROCEDURE LEVEL THREE: Performed by: SURGERY

## 2025-03-10 PROCEDURE — 2500000004 HC RX 250 GENERAL PHARMACY W/ HCPCS (ALT 636 FOR OP/ED): Performed by: ANESTHESIOLOGY

## 2025-03-10 PROCEDURE — 3700000001 HC GENERAL ANESTHESIA TIME - INITIAL BASE CHARGE: Performed by: SURGERY

## 2025-03-10 PROCEDURE — 13101 CMPLX RPR TRUNK 2.6-7.5 CM: CPT | Performed by: SURGERY

## 2025-03-10 PROCEDURE — 1200000002 HC GENERAL ROOM WITH TELEMETRY DAILY

## 2025-03-10 PROCEDURE — 2500000004 HC RX 250 GENERAL PHARMACY W/ HCPCS (ALT 636 FOR OP/ED): Performed by: STUDENT IN AN ORGANIZED HEALTH CARE EDUCATION/TRAINING PROGRAM

## 2025-03-10 PROCEDURE — 2500000005 HC RX 250 GENERAL PHARMACY W/O HCPCS

## 2025-03-10 PROCEDURE — 2500000001 HC RX 250 WO HCPCS SELF ADMINISTERED DRUGS (ALT 637 FOR MEDICARE OP): Performed by: NURSE PRACTITIONER

## 2025-03-10 PROCEDURE — 3700000002 HC GENERAL ANESTHESIA TIME - EACH INCREMENTAL 1 MINUTE: Performed by: SURGERY

## 2025-03-10 PROCEDURE — 2500000002 HC RX 250 W HCPCS SELF ADMINISTERED DRUGS (ALT 637 FOR MEDICARE OP, ALT 636 FOR OP/ED)

## 2025-03-10 PROCEDURE — 7100000002 HC RECOVERY ROOM TIME - EACH INCREMENTAL 1 MINUTE: Performed by: SURGERY

## 2025-03-10 PROCEDURE — 82947 ASSAY GLUCOSE BLOOD QUANT: CPT

## 2025-03-10 PROCEDURE — P9045 ALBUMIN (HUMAN), 5%, 250 ML: HCPCS | Mod: JZ,TB | Performed by: ANESTHESIOLOGY

## 2025-03-10 PROCEDURE — 13102 CMPLX RPR TRUNK ADDL 5CM/<: CPT | Performed by: SURGERY

## 2025-03-10 PROCEDURE — 2500000004 HC RX 250 GENERAL PHARMACY W/ HCPCS (ALT 636 FOR OP/ED): Performed by: ANESTHESIOLOGIST ASSISTANT

## 2025-03-10 PROCEDURE — 2720000007 HC OR 272 NO HCPCS: Performed by: SURGERY

## 2025-03-10 RX ORDER — NORETHINDRONE AND ETHINYL ESTRADIOL 0.5-0.035
KIT ORAL AS NEEDED
Status: DISCONTINUED | OUTPATIENT
Start: 2025-03-10 | End: 2025-03-10

## 2025-03-10 RX ORDER — HEPARIN SODIUM 5000 [USP'U]/ML
5000 INJECTION, SOLUTION INTRAVENOUS; SUBCUTANEOUS EVERY 8 HOURS
Status: DISCONTINUED | OUTPATIENT
Start: 2025-03-11 | End: 2025-03-15 | Stop reason: HOSPADM

## 2025-03-10 RX ORDER — ALBUMIN HUMAN 50 G/1000ML
12.5 SOLUTION INTRAVENOUS ONCE
Status: COMPLETED | OUTPATIENT
Start: 2025-03-10 | End: 2025-03-10

## 2025-03-10 RX ORDER — ROCURONIUM BROMIDE 10 MG/ML
INJECTION, SOLUTION INTRAVENOUS AS NEEDED
Status: DISCONTINUED | OUTPATIENT
Start: 2025-03-10 | End: 2025-03-10

## 2025-03-10 RX ORDER — PROPOFOL 10 MG/ML
INJECTION, EMULSION INTRAVENOUS AS NEEDED
Status: DISCONTINUED | OUTPATIENT
Start: 2025-03-10 | End: 2025-03-10

## 2025-03-10 RX ORDER — FENTANYL CITRATE 50 UG/ML
12.5 INJECTION, SOLUTION INTRAMUSCULAR; INTRAVENOUS EVERY 5 MIN PRN
Status: DISCONTINUED | OUTPATIENT
Start: 2025-03-10 | End: 2025-03-10 | Stop reason: HOSPADM

## 2025-03-10 RX ORDER — DROPERIDOL 2.5 MG/ML
0.62 INJECTION, SOLUTION INTRAMUSCULAR; INTRAVENOUS ONCE AS NEEDED
Status: DISCONTINUED | OUTPATIENT
Start: 2025-03-10 | End: 2025-03-10 | Stop reason: HOSPADM

## 2025-03-10 RX ORDER — ALBUTEROL SULFATE 0.83 MG/ML
2.5 SOLUTION RESPIRATORY (INHALATION) ONCE AS NEEDED
Status: DISCONTINUED | OUTPATIENT
Start: 2025-03-10 | End: 2025-03-10 | Stop reason: HOSPADM

## 2025-03-10 RX ORDER — HYDRALAZINE HYDROCHLORIDE 20 MG/ML
5 INJECTION INTRAMUSCULAR; INTRAVENOUS EVERY 10 MIN PRN
Status: DISCONTINUED | OUTPATIENT
Start: 2025-03-10 | End: 2025-03-10 | Stop reason: HOSPADM

## 2025-03-10 RX ORDER — LABETALOL HYDROCHLORIDE 5 MG/ML
5 INJECTION, SOLUTION INTRAVENOUS EVERY 10 MIN PRN
Status: DISCONTINUED | OUTPATIENT
Start: 2025-03-10 | End: 2025-03-10 | Stop reason: HOSPADM

## 2025-03-10 RX ORDER — GLYCOPYRROLATE 0.2 MG/ML
INJECTION INTRAMUSCULAR; INTRAVENOUS AS NEEDED
Status: DISCONTINUED | OUTPATIENT
Start: 2025-03-10 | End: 2025-03-10

## 2025-03-10 RX ORDER — PHENYLEPHRINE HCL IN 0.9% NACL 0.4MG/10ML
SYRINGE (ML) INTRAVENOUS AS NEEDED
Status: DISCONTINUED | OUTPATIENT
Start: 2025-03-10 | End: 2025-03-10

## 2025-03-10 RX ORDER — HYDROMORPHONE HYDROCHLORIDE 0.2 MG/ML
0.2 INJECTION INTRAMUSCULAR; INTRAVENOUS; SUBCUTANEOUS EVERY 5 MIN PRN
Status: DISCONTINUED | OUTPATIENT
Start: 2025-03-10 | End: 2025-03-10 | Stop reason: HOSPADM

## 2025-03-10 RX ORDER — ACETAMINOPHEN 10 MG/ML
1000 INJECTION, SOLUTION INTRAVENOUS ONCE
Status: COMPLETED | OUTPATIENT
Start: 2025-03-10 | End: 2025-03-10

## 2025-03-10 RX ORDER — PHENYLEPHRINE 10 MG/250 ML(40 MCG/ML)IN 0.9 % SOD.CHLORIDE INTRAVENOUS
CONTINUOUS PRN
Status: DISCONTINUED | OUTPATIENT
Start: 2025-03-10 | End: 2025-03-10

## 2025-03-10 RX ORDER — OXYCODONE AND ACETAMINOPHEN 5; 325 MG/1; MG/1
1 TABLET ORAL EVERY 4 HOURS PRN
Status: DISCONTINUED | OUTPATIENT
Start: 2025-03-10 | End: 2025-03-10 | Stop reason: HOSPADM

## 2025-03-10 RX ORDER — SODIUM CHLORIDE, SODIUM LACTATE, POTASSIUM CHLORIDE, CALCIUM CHLORIDE 600; 310; 30; 20 MG/100ML; MG/100ML; MG/100ML; MG/100ML
5 INJECTION, SOLUTION INTRAVENOUS CONTINUOUS
Status: DISCONTINUED | OUTPATIENT
Start: 2025-03-10 | End: 2025-03-10 | Stop reason: HOSPADM

## 2025-03-10 RX ORDER — ACETAMINOPHEN 325 MG/1
650 TABLET ORAL EVERY 4 HOURS PRN
Status: DISCONTINUED | OUTPATIENT
Start: 2025-03-10 | End: 2025-03-10 | Stop reason: HOSPADM

## 2025-03-10 RX ORDER — SODIUM CHLORIDE, SODIUM LACTATE, POTASSIUM CHLORIDE, CALCIUM CHLORIDE 600; 310; 30; 20 MG/100ML; MG/100ML; MG/100ML; MG/100ML
INJECTION, SOLUTION INTRAVENOUS CONTINUOUS PRN
Status: DISCONTINUED | OUTPATIENT
Start: 2025-03-10 | End: 2025-03-10

## 2025-03-10 RX ORDER — OXYCODONE HYDROCHLORIDE 5 MG/1
10 TABLET ORAL EVERY 4 HOURS PRN
Status: DISCONTINUED | OUTPATIENT
Start: 2025-03-10 | End: 2025-03-10 | Stop reason: HOSPADM

## 2025-03-10 RX ORDER — FENTANYL CITRATE 50 UG/ML
INJECTION, SOLUTION INTRAMUSCULAR; INTRAVENOUS AS NEEDED
Status: DISCONTINUED | OUTPATIENT
Start: 2025-03-10 | End: 2025-03-10

## 2025-03-10 RX ORDER — LIDOCAINE HYDROCHLORIDE 20 MG/ML
INJECTION, SOLUTION INFILTRATION; PERINEURAL AS NEEDED
Status: DISCONTINUED | OUTPATIENT
Start: 2025-03-10 | End: 2025-03-10

## 2025-03-10 RX ORDER — METOCLOPRAMIDE HYDROCHLORIDE 5 MG/ML
10 INJECTION INTRAMUSCULAR; INTRAVENOUS ONCE AS NEEDED
Status: DISCONTINUED | OUTPATIENT
Start: 2025-03-10 | End: 2025-03-10 | Stop reason: HOSPADM

## 2025-03-10 RX ORDER — LIDOCAINE HYDROCHLORIDE 10 MG/ML
0.1 INJECTION, SOLUTION INFILTRATION; PERINEURAL ONCE
Status: DISCONTINUED | OUTPATIENT
Start: 2025-03-10 | End: 2025-03-10 | Stop reason: HOSPADM

## 2025-03-10 RX ADMIN — Medication 250 MG: at 20:55

## 2025-03-10 RX ADMIN — FENTANYL CITRATE 100 MCG: 50 INJECTION, SOLUTION INTRAMUSCULAR; INTRAVENOUS at 12:44

## 2025-03-10 RX ADMIN — EPHEDRINE SULFATE 5 MG: 50 INJECTION, SOLUTION INTRAVENOUS at 13:05

## 2025-03-10 RX ADMIN — ALBUMIN HUMAN 12.5 G: 0.05 INJECTION, SOLUTION INTRAVENOUS at 14:58

## 2025-03-10 RX ADMIN — Medication 160 MCG: at 12:52

## 2025-03-10 RX ADMIN — SODIUM CHLORIDE, POTASSIUM CHLORIDE, SODIUM LACTATE AND CALCIUM CHLORIDE: 600; 310; 30; 20 INJECTION, SOLUTION INTRAVENOUS at 12:31

## 2025-03-10 RX ADMIN — ACETAMINOPHEN 650 MG: 325 TABLET, FILM COATED ORAL at 09:12

## 2025-03-10 RX ADMIN — ACETAMINOPHEN 1000 MG: 10 INJECTION, SOLUTION INTRAVENOUS at 14:45

## 2025-03-10 RX ADMIN — VANCOMYCIN HYDROCHLORIDE 125 MG: 125 CAPSULE ORAL at 18:18

## 2025-03-10 RX ADMIN — METOPROLOL SUCCINATE 25 MG: 25 TABLET, FILM COATED, EXTENDED RELEASE ORAL at 09:12

## 2025-03-10 RX ADMIN — Medication 1 L/MIN: at 23:24

## 2025-03-10 RX ADMIN — PHENYLEPHRINE-NACL IV SOLUTION 10 MG/250ML-0.9% 0.6 MCG/KG/MIN: 10-0.9/25 SOLUTION at 12:53

## 2025-03-10 RX ADMIN — ROCURONIUM BROMIDE 50 MG: 10 INJECTION INTRAVENOUS at 12:46

## 2025-03-10 RX ADMIN — Medication 250 MG: at 09:12

## 2025-03-10 RX ADMIN — VANCOMYCIN HYDROCHLORIDE 1000 MG: 1 INJECTION, SOLUTION INTRAVENOUS at 09:57

## 2025-03-10 RX ADMIN — Medication 240 MCG: at 12:45

## 2025-03-10 RX ADMIN — VANCOMYCIN HYDROCHLORIDE 1000 MG: 1 INJECTION, SOLUTION INTRAVENOUS at 20:35

## 2025-03-10 RX ADMIN — VANCOMYCIN HYDROCHLORIDE 125 MG: 125 CAPSULE ORAL at 18:19

## 2025-03-10 RX ADMIN — ATORVASTATIN CALCIUM 10 MG: 10 TABLET, FILM COATED ORAL at 20:34

## 2025-03-10 RX ADMIN — ACETAMINOPHEN 650 MG: 325 TABLET, FILM COATED ORAL at 20:34

## 2025-03-10 RX ADMIN — EPHEDRINE SULFATE 5 MG: 50 INJECTION, SOLUTION INTRAVENOUS at 12:54

## 2025-03-10 RX ADMIN — CEFEPIME 2 G: 1 INJECTION, SOLUTION INTRAVENOUS at 09:14

## 2025-03-10 RX ADMIN — GLYCOPYRROLATE 0.1 MG: 0.2 INJECTION, SOLUTION INTRAMUSCULAR; INTRAVENOUS at 12:54

## 2025-03-10 RX ADMIN — VANCOMYCIN HYDROCHLORIDE 125 MG: 125 CAPSULE ORAL at 20:34

## 2025-03-10 RX ADMIN — DEXAMETHASONE SODIUM PHOSPHATE 4 MG: 4 INJECTION INTRA-ARTICULAR; INTRALESIONAL; INTRAMUSCULAR; INTRAVENOUS; SOFT TISSUE at 12:54

## 2025-03-10 RX ADMIN — CEFEPIME 2 G: 1 INJECTION, SOLUTION INTRAVENOUS at 00:17

## 2025-03-10 RX ADMIN — LIDOCAINE HYDROCHLORIDE 100 MG: 20 INJECTION, SOLUTION INFILTRATION; PERINEURAL at 12:44

## 2025-03-10 RX ADMIN — SODIUM CHLORIDE 500 ML: 9 INJECTION, SOLUTION INTRAVENOUS at 06:36

## 2025-03-10 RX ADMIN — ONDANSETRON 4 MG: 2 INJECTION INTRAMUSCULAR; INTRAVENOUS at 13:55

## 2025-03-10 RX ADMIN — PROPOFOL 50 MG: 10 INJECTION, EMULSION INTRAVENOUS at 12:45

## 2025-03-10 SDOH — HEALTH STABILITY: MENTAL HEALTH: CURRENT SMOKER: 0

## 2025-03-10 ASSESSMENT — COGNITIVE AND FUNCTIONAL STATUS - GENERAL
MOVING TO AND FROM BED TO CHAIR: TOTAL
DRESSING REGULAR UPPER BODY CLOTHING: A LOT
STANDING UP FROM CHAIR USING ARMS: TOTAL
MOVING FROM LYING ON BACK TO SITTING ON SIDE OF FLAT BED WITH BEDRAILS: A LOT
TURNING FROM BACK TO SIDE WHILE IN FLAT BAD: A LOT
DAILY ACTIVITIY SCORE: 12
TOILETING: A LOT
PERSONAL GROOMING: A LOT
EATING MEALS: A LOT
HELP NEEDED FOR BATHING: A LOT
DRESSING REGULAR LOWER BODY CLOTHING: A LOT
WALKING IN HOSPITAL ROOM: TOTAL
MOBILITY SCORE: 8
CLIMB 3 TO 5 STEPS WITH RAILING: TOTAL

## 2025-03-10 ASSESSMENT — PAIN - FUNCTIONAL ASSESSMENT
PAIN_FUNCTIONAL_ASSESSMENT: 0-10

## 2025-03-10 ASSESSMENT — PAIN SCALES - GENERAL
PAINLEVEL_OUTOF10: 0 - NO PAIN
PAIN_LEVEL: 0
PAINLEVEL_OUTOF10: 0 - NO PAIN
PAINLEVEL_OUTOF10: 1
PAINLEVEL_OUTOF10: 0 - NO PAIN

## 2025-03-10 NOTE — OP NOTE
DEBRIDEMENT, BACK Operative Note     Date: 3/10/2025  OR Location: [facility] OR    Name: Sebas Soto, : 1940, Age: 84 y.o., MRN: [id number], Sex: male    Diagnosis  Pre-op Diagnosis      * Postoperative surgical complication involving musculoskeletal system associated with musculoskeletal procedure, unspecified complication [M96.89]     * Disruption of wound, unspecified, initial encounter [T81.30XA] Post-op Diagnosis     * Postoperative surgical complication involving musculoskeletal system associated with musculoskeletal procedure, unspecified complication [M96.89]     * Disruption of wound, unspecified, initial encounter [T81.30XA]     Procedures  DEBRIDEMENT, BACK  04748 - IN DEBRIDEMENT MUSCLE &/FASCIA 1ST 20 SQ CM/<    Complex closure of lumbar back wound 12 cm (88616, 97603)    Surgeons      * Edd Zacarias - Primary    Resident/Fellow/Other Assistant:  Surgeons and Role:  * No surgeons found with a matching role *    Staff:   Scrub Person: Nicho  Scrub Person: Isabela  Circulator: Adeola Boyce Scrub: Chloé    Anesthesia Staff: Anesthesiologist: Jose A Michele MD  C-AA: BRITNEY Rodríguez Capp  [facility]    Procedure Summary  Anesthesia: General  ASA: III  Estimated Blood Loss: 30mL  Intra-op Medications:   Administrations occurring from 1045 to 1625 on 03/10/25:   Medication Name Total Dose   acetaminophen (Tylenol) tablet 650 mg Cannot be calculated   atorvastatin (Lipitor) tablet 10 mg Cannot be calculated   cefepime (Maxipime) 2 g in dextrose (iso)  mL Cannot be calculated   dextrose 50 % injection 12.5 g Cannot be calculated   dextrose 50 % injection 25 g Cannot be calculated   glucagon (Glucagen) injection 1 mg Cannot be calculated   glucagon (Glucagen) injection 1 mg Cannot be calculated   heparin (porcine) injection 5,000 Units Cannot be calculated   metoprolol succinate XL (Toprol-XL) 24 hr tablet 25 mg Cannot be calculated   naloxone (Narcan) injection 0.2 mg Cannot be  calculated   polyethylene glycol (Glycolax, Miralax) packet 17 g Cannot be calculated   saccharomyces boulardii (Florastor) capsule 250 mg Cannot be calculated   vancomycin (Vancocin) 1,000 mg in dextrose 5%  mL Cannot be calculated   vancomycin (Vancocin) capsule 125 mg Cannot be calculated   vancomycin (Vancocin) pharmacy to dose - pharmacy monitoring Cannot be calculated   dexAMETHasone (Decadron) injection 4 mg/mL 4 mg   ePHEDrine injection 10 mg   fentaNYL (Sublimaze) injection 50 mcg/mL 100 mcg   glycopyrrolate (Robinul) injection 0.1 mg   lactated Ringer's infusion Cannot be calculated   lidocaine (Xylocaine) injection 2 % 100 mg   phenylephrine (Jose Armando-Synephrine) 10 mg in sodium chloride 0.9% 250 mL (0.04 mg/mL) infusion (premix) 3.06 mg   phenylephrine 40 mcg/mL syringe 10 mL 400 mcg   propofol (Diprivan) injection 10 mg/mL 50 mg   rocuronium (ZeMuron) 50 mg/5 mL injection 50 mg              Anesthesia Record               Intraprocedure I/O Totals          Intake    Phenylephrine Drip 76.49 mL    The total shown is the total volume documented since Anesthesia Start was filed.    lactated Ringer's 500.00 mL    Total Intake 576.49 mL       Output    Est. Blood Loss 30 mL    Total Output 30 mL       Net    Net Volume 546.49 mL          Specimen:   ID Type Source Tests Collected by Time   A : POST DEBRIDEMENT CULTURE Swab ABSCESS TISSUE/WOUND CULTURE/SMEAR Edd Zacarias MD 3/10/2025 1357                 Drains and/or Catheters:   External Urinary Catheter Male (Active)   External Catheter Status Changed 03/09/25 0600   Collection Container Standard drainage bag 03/10/25 1428   Output (mL) 800 mL 03/09/25 0516       Tourniquet Times:         Implants:     Findings: Lumbar back wound dehiscence     Indications: Sebas Soto is an 84 y.o. male who is having surgery for Postoperative surgical complication involving musculoskeletal system associated with musculoskeletal procedure, unspecified complication  [M96.89]  Disruption of wound, unspecified, initial encounter [T81.30XA]. The patient had a posterior spinal fusion on 1/31/25 with Dr. Qureshi. He was discharged to a rehab facility but develop wound dehiscence. He underwent washout with the neurosurgery team on Friday 3/7 and now presents for planned wound closure.  Of note, preoperative MRI indicated a very small deep fluid collection.  In discussion with the neurosurgery team, there is no fascial disruption and no indication for exploration of the deep collection.    I had discussed with the patient and his wife our plan for debridement and wound closure. The patient was seen in his room. The risks, benefits, complications, treatment options, non-operative alternatives, expected recovery and outcomes were discussed with the patient. The possibilities of bleeding, infection, numbness, weakness, seroma, hematoma, wound dehiscence, reaction to medication, pulmonary aspiration, injury to surrounding structures, bleeding, recurrent infection, the need for additional procedures, failure to diagnose a condition, and creating a complication requiring transfusion or operation were discussed with the patient. The patient concurred with the proposed plan, giving informed consent.  The site of surgery was properly noted/marked if necessary per policy. The patient has been actively warmed in preoperative area. Preoperative antibiotics are not indicated. Venous thrombosis prophylaxis have been ordered including bilateral sequential compression devices    Procedure Details:   The patient was brought to the operating room and positioned supine.  A timeout was performed identify the patient by name, medical record number, date of birth, site of procedure and the procedure to be performed.  General anesthesia was induced by the anesthesiology team.  The patient was then turned prone with all pressure points well-padded.  Area was then prepped and draped in the usual sterile  fashion.    We started by examining the wound which was full-thickness down to the fascia.  There is no obvious purulence and there appeared to be healthy granulation tissue that was beginning to form.  There is also no evidence of fascial dehiscence or drainage.  Therefore, I then began the procedure by performing excisional debridement of the skin edges using a 15 blade.  The excision extended all the way down to the muscle fascia using a curette to clean the entire area.  Total area of excisional debridement down to muscle and fascia measured 2 cm x 10 cm    I then performed wide undermining of adipose cutaneous flaps which were elevated bilaterally to reduce the tension of closure.  I then performed copious irrigation of the wound using both sterile saline and antiseptic solution.  A postdebridement culture was obtained.  A 15 Czech drain was then inserted and secured using a 2-0 silk suture.  The wound was then closed in multiple layers using dissolving sutures.  A total of 12 cm of complex lumbar wound closure was performed.     At the conclusion of the case, all counts were correct x2. The patient was then returned to the care of the anesthesiology team for extubation and emergence, and then he was transferred to PACU for recovery by the neurosurgery team.      Complications:  None; patient tolerated the procedure well.    Disposition: PACU - hemodynamically stable.  Condition: stable                 Additional Details: none    Attending Attestation: I was present and scrubbed for the entire procedure.    Edd Zacarias  Phone Number: [phone]

## 2025-03-10 NOTE — ANESTHESIA PREPROCEDURE EVALUATION
Patient: Sebas Soto    Procedure Information       Date/Time: 03/10/25 1045    Procedures:       DEBRIDEMENT, BACK      CREATION, FLAP, MUSCLE, TORSO    Location: [facility] OR 24 / [facility] OR    Surgeons: Edd Zacarias MD          Sebas Soto is a 84 y.o. male w/ h/o L2-5 lami w/ L4-5 TLIF on 1/31, c/b post-op hallucinations and encephalopathy, 2/22 p/w incisional dehiscence, discharged on Abx, 3/6 p/w incisional dehiscence.     He has a past medical history of Abnormal ECG, Arthritis, Bilateral lower extremity edema, Diverticulosis, Hyperlipidemia, Neurogenic claudication, Prostate cancer (Multi), Short-term memory loss, and Spondylolisthesis.     - plan for OR for washout today   -C diff    Relevant Problems   Anesthesia   (+) Difficult intubation (Known difficult intubation -- not overly difficult with videolaryngoscopy)   (+) Postoperative delirium (Had hallucinations post-op after surgery last year -- has had another surgery after that, did not have hallucinations)      Cardiac   (+) Abnormal EKG (LAFB)   (+) CAD (coronary artery disease)   (+) HTN (hypertension)   (+) Hyperlipidemia      Pulmonary (within normal limits)      Neuro  Klippel-Feil anomaly, myopathy, and facial dysmorphism syndrome   (+) Anxiety   (+) Dementia (Hx short-term memory loss)   (+) Neuromuscular disease (Multi) (Neurogenic claudication)      GI  Hx diverticulosis;  Current C.diff infection;  Hx bilateral inguinal hernia repair (pt believes he did not have mesh placed)   (+) Gastroesophageal reflux disease without esophagitis      /Renal   (+) Prostate cancer (Multi) (Hx radiation therapy, no surgery or chemotherapy)      Liver (within normal limits)      Endocrine (within normal limits)      Hematology   (+) Anemia (Mild  )      Musculoskeletal   (+) Cervical spondylosis with myelopathy   (+) Chronic low back pain   (+) Chronic neck pain (Hx myelomalacia of C-spine, s/p neck surgery with immobile neck)   (+)  Lumbar disc disease   (+) Lumbar stenosis with neurogenic claudication      HEENT   (+) Mixed conductive and sensorineural hearing loss of right ear with restricted hearing of left ear      ID   (+) Postoperative infection, unspecified type, initial encounter      Skin   (+) 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 of skin)       Clinical information reviewed:   Tobacco  Allergies  Meds   Med Hx  Surg Hx   Fam Hx  Soc Hx      Vitals:    03/10/25 0800   BP:    Pulse: (!) 112   Resp: 14   Temp:    SpO2:        Past Surgical History:   Procedure Laterality Date    COLONOSCOPY  07/30/2018    Colonoscopy (Fiberoptic)    HERNIA REPAIR  07/30/2018    Hernia Repair-x3    NECK SURGERY  2007    TONSILLECTOMY  07/30/2018    Tonsillectomy     Past Medical History:   Diagnosis Date    Abnormal ECG     Arthritis     Bilateral lower extremity edema     Diverticulosis     Hyperlipidemia     Neurogenic claudication     Personal history of other diseases of the digestive system     History of diverticulitis    Personal history of other infectious and parasitic diseases     History of herpes zoster    Prostate cancer (Multi)     Short-term memory loss     Spondylolisthesis        Current Facility-Administered Medications:     acetaminophen (Tylenol) tablet 650 mg, 650 mg, oral, q6h, Lois Mijares MD, 650 mg at 03/10/25 0912    atorvastatin (Lipitor) tablet 10 mg, 10 mg, oral, Nightly, Lois Mijares MD, 10 mg at 03/09/25 2052    cefepime (Maxipime) 2 g in dextrose (iso)  mL, 2 g, intravenous, q8h, Lois Mijares MD, Stopped at 03/10/25 0948    dextrose 50 % injection 12.5 g, 12.5 g, intravenous, q15 min PRN, Lois Mijares MD    dextrose 50 % injection 25 g, 25 g, intravenous, q15 min PRN, Lios Mijares MD    glucagon (Glucagen) injection 1 mg, 1 mg, intramuscular, q15 min PRN, Lois Mijares MD    glucagon (Glucagen) injection 1 mg, 1 mg, intramuscular, q15 min PRN, Lois Mijares MD    [Held by provider] heparin  (porcine) injection 5,000 Units, 5,000 Units, subcutaneous, q8h, Sebas Bahena MD, 5,000 Units at 03/09/25 2049    metoprolol succinate XL (Toprol-XL) 24 hr tablet 25 mg, 25 mg, oral, q AM, SHELLY King-CNP, 25 mg at 03/10/25 0912    naloxone (Narcan) injection 0.2 mg, 0.2 mg, intravenous, q5 min PRN, Lois Mijares MD    ondansetron (Zofran) tablet 4 mg, 4 mg, oral, q8h PRN **OR** ondansetron (Zofran) injection 4 mg, 4 mg, intravenous, q8h PRN, Lois Mijares MD    oxygen (O2) therapy, , inhalation, Continuous - Inhalation, Lois Mijares MD, Stopped at 03/09/25 2053    polyethylene glycol (Glycolax, Miralax) packet 17 g, 17 g, oral, Daily, Lois Mijares MD    saccharomyces boulardii (Florastor) capsule 250 mg, 250 mg, oral, BID, SHELLY King-CNP, 250 mg at 03/10/25 0912    vancomycin (Vancocin) 1,000 mg in dextrose 5%  mL, 1,000 mg, intravenous, q12h, Lois Mijares MD, Last Rate: 200 mL/hr at 03/10/25 0957, 1,000 mg at 03/10/25 0957    vancomycin (Vancocin) capsule 125 mg, 125 mg, oral, 4x daily, Sebas Bahena MD, 125 mg at 03/09/25 2050    vancomycin (Vancocin) pharmacy to dose - pharmacy monitoring, , miscellaneous, Daily PRN, Lois Mijares MD  Prior to Admission medications    Medication Sig Start Date End Date Taking? Authorizing Provider   acetaminophen (Tylenol) 325 mg tablet Take 2 tablets (650 mg) by mouth every 6 hours if needed for mild pain (1 - 3). 2/4/25 3/6/25  Beau Agarwal PA-C   amoxicillin-pot clavulanate (Augmentin) 875-125 mg tablet Take 1 tablet by mouth 2 times a day for 14 days. 2/28/25 3/14/25  SHELLY Myles-CNP   atorvastatin (Lipitor) 10 mg tablet Take 1 tablet (10 mg) by mouth once daily at bedtime.    Historical Provider, MD   chlorhexidine (Peridex) 0.12 % solution 15 milliliter(s) orally once a day for 2 doses 15 ml  the night before surgery and 15 ml morning of surgery - swish for 30 seconds -DO NOT SWALLOW, SPIT OUT 1/21/25   Lyndsey Reyes,  APRN-CNP   metoprolol succinate XL (Toprol-XL) 25 mg 24 hr tablet Take 1 tablet (25 mg) by mouth once daily in the morning.    Historical Provider, MD     No Known Allergies  Social History     Tobacco Use    Smoking status: Former     Types: Cigarettes     Passive exposure: Never    Smokeless tobacco: Never   Substance Use Topics    Alcohol use: Never         Chemistry    Lab Results   Component Value Date/Time     03/09/2025 0923    K 3.4 (L) 03/09/2025 0923     03/09/2025 0923    CO2 25 03/09/2025 0923    BUN 8 03/09/2025 0923    CREATININE 0.50 03/09/2025 0923    Lab Results   Component Value Date/Time    CALCIUM 8.1 (L) 03/09/2025 0923    ALKPHOS 72 03/06/2025 2023    AST 23 03/06/2025 2023    ALT 22 03/06/2025 2023    BILITOT 0.4 03/06/2025 2023          Lab Results   Component Value Date/Time    WBC 7.1 03/09/2025 0923    HGB 11.0 (L) 03/09/2025 0923    HCT 35.9 (L) 03/09/2025 0923     03/09/2025 0923     Lab Results   Component Value Date/Time    PROTIME 13.7 (H) 03/09/2025 1550    INR 1.2 (H) 03/09/2025 1550     Encounter Date: 10/25/24   ECG 12 lead   Result Value    Ventricular Rate 77    Atrial Rate 77    NC Interval 136    QRS Duration 118    QT Interval 390    QTC Calculation(Bazett) 441    P Axis 2    R Axis -52    T Axis 67    QRS Count 13    Q Onset 208    P Onset 140    P Offset 174    T Offset 403    QTC Fredericia 423    Narrative    Normal sinus rhythm  Left anterior fascicular block  Left ventricular hypertrophy with QRS widening  Cannot rule out Septal infarct , age undetermined  Abnormal ECG  No previous ECGs available  Confirmed by Roberto Carlos Hu ([id number]) on 10/16/2024 8:15:36 PM     No results found for this or any previous visit from the past 1095 days.    NPO Detail:  No data recorded     Physical Exam    Airway  Mallampati: II  TM distance: <3 FB  Neck ROM: limited  Comments: Fixed neck   Cardiovascular - normal exam  Rhythm: regular  Rate: normal     Dental   (+) upper  "dentures, lower dentures     Pulmonary - normal exam  Breath sounds clear to auscultation     Abdominal - normal exam  Abdomen: soft  Bowel sounds: normal     Other findings: Pt is edentulous.  Hx of Grade 2a view with videoscope - documented as \"fixed neck\"           Anesthesia Plan    History of general anesthesia?: yes  History of complications of general anesthesia?: yes    ASA 3     general   (Plan GETA/PIVx2/A-line/possible post-op vent)  The patient is not a current smoker.  Patient was previously instructed to abstain from smoking on day of procedure.  Patient did not smoke on day of procedure.  Education provided regarding risk of obstructive sleep apnea.  intravenous induction   Postoperative administration of opioids is intended.  Trial extubation is planned.  Anesthetic plan and risks discussed with patient.  Use of blood products discussed with patient who consented to blood products.    Plan discussed with CAA and attending.      "

## 2025-03-10 NOTE — ANESTHESIA PROCEDURE NOTES
Peripheral IV  Date/Time: 3/10/2025 1:06 PM  Inserted by: Larry Ferguson    Placement  Needle size: 18 G  Laterality: right  Location: forearm  Local anesthetic: none  Site prep: alcohol  Technique: anatomical landmarks  Attempts: 1

## 2025-03-10 NOTE — PROGRESS NOTES
Physical Therapy                 Therapy Communication Note    Patient Name: Sebas Soto  MRN: [id number]  Department: [place] [id number]  Room: [address] [place] [address]  Today's Date: 3/10/2025     Discipline: Physical Therapy    Missed Visit Reason: Missed Visit Reason:  (per chart review, pt pending OR with plastics.)    Missed Time: Attempt

## 2025-03-10 NOTE — PROGRESS NOTES
Occupational Therapy    Communication Note    Patient Name: Sebas Soto  MRN: [id number]  Today's Date: 3/10/2025   Room: [address] [place] [address]A    Discipline: Occupational Therapy      Missed Visit Reason: Other (Comment) (OR today 3/10 wound closure/debridement with plastic surgery)      03/10/25 at 9:47 AM   Ashley Velázquez, OT   Rehab Office: [phone]

## 2025-03-10 NOTE — ANESTHESIA POSTPROCEDURE EVALUATION
Patient: Sebas Soto    Procedure Summary       Date: 03/10/25 Room / Location: [facility] OR 24 / [facility] OR    Anesthesia Start: 1231 Anesthesia Stop: 1424    Procedure: DEBRIDEMENT, BACK Diagnosis:       Postoperative surgical complication involving musculoskeletal system associated with musculoskeletal procedure, unspecified complication      Disruption of wound, unspecified, initial encounter      (Postoperative surgical complication involving musculoskeletal system associated with musculoskeletal procedure, unspecified complication [M96.89])      (Disruption of wound, unspecified, initial encounter [T81.30XA])    Surgeons: Edd Zacarias MD Responsible Provider: Jose A Michele MD    Anesthesia Type: general ASA Status: 3            Anesthesia Type: general    Vitals Value Taken Time   /61 03/10/25 1424   Temp 37.1 03/10/25 1424   Pulse 90 03/10/25 1424   Resp 12 03/10/25 1424   SpO2 100 03/10/25 1424       Anesthesia Post Evaluation    Patient location during evaluation: PACU  Patient participation: complete - patient participated  Level of consciousness: awake and alert  Pain score: 0  Pain management: adequate  Multimodal analgesia pain management approach  Airway patency: patent  Two or more strategies used to mitigate risk of obstructive sleep apnea  Cardiovascular status: acceptable  Respiratory status: acceptable, face mask and spontaneous ventilation  Hydration status: acceptable  Postoperative Nausea and Vomiting: none        There were no known notable events for this encounter.

## 2025-03-10 NOTE — ANESTHESIA PROCEDURE NOTES
Airway  Date/Time: 3/10/2025 12:49 PM  Urgency: elective    Airway not difficult    Staffing  Performed: WENDY   Authorized by: Jose A Michele MD    Performed by: BRITNEY Rodríguez Capp  Patient location during procedure: OR    Indications and Patient Condition  Indications for airway management: airway protection and anesthesia  Spontaneous Ventilation: absent  Sedation level: deep  Preoxygenated: yes  Patient position: sniffing  MILS not maintained throughout  Mask difficulty assessment: 1 - vent by mask  Planned trial extubation    Final Airway Details  Final airway type: endotracheal airway      Successful airway: ETT  Cuffed: yes   Successful intubation technique: video laryngoscopy (Dominguez)  Facilitating devices/methods: intubating stylet  Endotracheal tube insertion site: oral  Blade: Jb  Blade size: #4 (Dominguez)  ETT size (mm): 7.5  Cormack-Lehane Classification: grade I - full view of glottis  Placement verified by: chest auscultation and capnometry   Cuff volume (mL): 15  Measured from: lips  ETT to lips (cm): 22  Number of attempts at approach: 1  Ventilation between attempts: none  Number of other approaches attempted: 0    Additional Comments  Atraumatic Intubation. Lips and teeth in preanesthetic condition.

## 2025-03-10 NOTE — PROGRESS NOTES
"  Division of Plastic and Reconstructive Surgery  Postoperative Check Note    Patient Name: Sebas Soto  MRN: [id number]  Date:  03/10/25     Subjective    To OR today with plastic surgery for spinal wound closure and application of incisional wound vac. Evaluated postoperatively in PACU, no acute concerns, sleeping, vac maintaining seal without issue, KASANDRA drain patent.     Objective    Vital Signs  BP 98/55   Pulse 72   Temp 36.7 °C (98.1 °F)   Resp 16   Ht 1.727 m (5' 8\")   Wt 85.7 kg (189 lb)   SpO2 96%   BMI 28.74 kg/m²      Physical Exam  Constitutional: A&Ox3, NAD.  Eyes: EOMI, clear sclera.  ENMT: Moist mucous membranes.  Head/Neck: NC/AT. Neck supple.   Cardiovascular: Normal rate.   Respiratory/Thorax: Breathing comfortably with regular respirations on RA. Good symmetric chest expansion.   Gastrointestinal: Abdomen soft, non-tender.   Extremities: Moves all 4 extremities actively, strength appropriate.   Neurological: A&Ox3.   Psychological: Appropriate mood and behavior.   Skin: Warm and dry.   Back: Incisional wound vac intact at midline spinal surgical site, maintaining low continuous suction at -125mmHg, no alarms for leak, obstruction or malfunction. X1 KASANDRA drain exiting paravertebral spine with bloody serous output, drain patent.     Current Medications  Scheduled medications  acetaminophen, 650 mg, oral, q6h  atorvastatin, 10 mg, oral, Nightly  cefepime, 2 g, intravenous, q8h  [Held by provider] heparin (porcine), 5,000 Units, subcutaneous, q8h  metoprolol succinate XL, 25 mg, oral, q AM  oxygen, , inhalation, Continuous - Inhalation  polyethylene glycol, 17 g, oral, Daily  saccharomyces boulardii, 250 mg, oral, BID  vancomycin, 1,000 mg, intravenous, q12h  vancomycin, 125 mg, oral, 4x daily      Continuous medications     PRN medications  PRN medications: dextrose, dextrose, glucagon, glucagon, naloxone, ondansetron **OR** ondansetron, vancomycin     Assessment   Sebas Soto is a 84 y.o. " male with a past medical history of L2-5 laminectomy with L4-5 transforaminal lumbar interbody fusion on 1/31 with Dr. Qureshi, complicated by post-op hallucinations and encephalopathy, previously presented to ED on 2/22 with concern for post-op wound infection, CT L spine during that admission showed no deep dehiscence, so no acute surgical intervention and patient was discharged back to SNF with local wound care and antibiotics on 2/28. On 3/6, Dr. Qureshi was notified regarding concerns for lumbar wound dehiscence, and it was recommended patient come to  ED for evaluation. Patient was taken to OR on 3/6 with neurosurgery for lumbar wound washout and vac placement. Plastic surgery was consulted for eventual assistance with wound closure.     Plan/Recommendations:  - Maintain prevena incisional wound vac overlying closed ** surgical incision x2 weeks postoperatively    Settings: -125mmHg, low, continuous suction   Nursing to monitor and record vac canister output at least q8h (please record 0 for no output)  For any issues or concerns with vac, please notify plastic surgery team at [phone] or pager #[phone]  Plan for vac dressing removal on Friday 3/14  - Continue drain care to x1 KASANDRA drains present at paravertebral spine which is inserted above fascia (nursing orders placed)  Please ensure that drains are compressed flat and plugged to maintain self suction at all times aside from when emptying   Nursing to strip drain tubing at least q4h and PRN to avoid drain/tubing obstruction   Nursing to please also empty and record output quantities at least TID and PRN if drains are full   Please notify the plastics team if drain outputs exceed >30 ml in 1 hr or >200 in 24 hrs  Drains to be removed per plastics team once 24 hr outputs remain <30 ml total x2 consecutive days  If drains do not meet output criteria for removal at time of medical clearance for DC, will plan for removal per plastics upon outpatient follow up   -  IV antibiotics per ID/primary  - Appreciate remaining care per primary team  - Plastic Surgery will continue to follow     Patient and plan discussed with Dr. Zacarias.    Rajwinder Rubio PA-C  Plastic and Reconstructive Surgery   [place]  Pager #[phone]  Team phones: [phone]

## 2025-03-10 NOTE — ANESTHESIA PROCEDURE NOTES
Arterial Line:    Date/Time: 3/10/2025 1:02 PM    Staffing  Performed: WENDY   Authorized by: Jose A Michele MD    Performed by: BRITNEY Rodríguez Capp    An arterial line was placed. Procedure performed using ultrasound guidance.in the OR for the following indication(s): continuous blood pressure monitoring and blood sampling needed.    A 20 gauge (size), 1 and 3/4 inch (length), Angiocath (type) catheter was placed into the Right radial artery, secured by Tegaderm   Seldinger technique used.  Events:  patient tolerated procedure well with no complications.      Additional notes:  Sterile prep and glove. Sterile dressing placed.

## 2025-03-10 NOTE — PROGRESS NOTES
"Sebas Soto is a 84 y.o. male on day 3 of admission presenting with Postoperative infection, unspecified type, initial encounter.    Subjective   No acute events overnight.    Objective     Physical Exam  AOx3  RUE D4+ B5 T5 HG4 IO4  LUE D0 (chronic) B5 T5 HG4 IO4  RLE HF3 KE4 PF5 DF5  LLE HF3 KE3 PF2 DF2    Last Recorded Vitals  Blood pressure 99/61, pulse (!) 121, temperature 37.3 °C (99.1 °F), resp. rate 22, height 1.727 m (5' 8\"), weight 85.7 kg (189 lb), SpO2 95%.  Intake/Output last 3 Shifts:  I/O last 3 completed shifts:  In: 900 (10.5 mL/kg) [P.O.:600; IV Piggyback:300]  Out: 1800 (21 mL/kg) [Urine:1800 (0.6 mL/kg/hr)]  Weight: 85.7 kg           Assessment/Plan   Assessment & Plan  Postoperative infection, unspecified type, initial encounter    CAD (coronary artery disease)    Postoperative surgical complication involving musculoskeletal system associated with musculoskeletal procedure    Disruption of wound, unspecified, initial encounter    Sebas Soto is a 84 y.o. male w/ h/o L2-5 lami w/ L4-5 TLIF on 1/31, c/b post-op hallucinations and encephalopathy, 2/22 p/w incisional dehiscence, discharged on Abx, 3/6 p/w incisional dehiscence.     3/7 s/p lumbar wound exploration, revision, washout and wound vac placement  Started on IV vanco and cefepime.  ID consulted.    Plan  Tele  Maintain wound vac  OR today 3/10 wound closure/debridement with plastic surgery  Plastics recs  Follow up Bcx  ID recs- Cont vanc and cefe, PO vanc for c diff  PTOT  SCD, hold [facility]           Abiodun Ibanez MD      "

## 2025-03-11 LAB
ALBUMIN SERPL BCP-MCNC: 2.6 G/DL (ref 3.4–5)
ANION GAP SERPL CALC-SCNC: 10 MMOL/L (ref 10–20)
BUN SERPL-MCNC: 17 MG/DL (ref 6–23)
CALCIUM SERPL-MCNC: 7.6 MG/DL (ref 8.6–10.6)
CHLORIDE SERPL-SCNC: 106 MMOL/L (ref 98–107)
CO2 SERPL-SCNC: 25 MMOL/L (ref 21–32)
CREAT SERPL-MCNC: 0.67 MG/DL (ref 0.5–1.3)
EGFRCR SERPLBLD CKD-EPI 2021: >90 ML/MIN/1.73M*2
ERYTHROCYTE [DISTWIDTH] IN BLOOD BY AUTOMATED COUNT: 17.3 % (ref 11.5–14.5)
GLUCOSE SERPL-MCNC: 139 MG/DL (ref 74–99)
HCT VFR BLD AUTO: 28.9 % (ref 41–52)
HGB BLD-MCNC: 9.2 G/DL (ref 13.5–17.5)
MAGNESIUM SERPL-MCNC: 2.23 MG/DL (ref 1.6–2.4)
MCH RBC QN AUTO: 31.4 PG (ref 26–34)
MCHC RBC AUTO-ENTMCNC: 31.8 G/DL (ref 32–36)
MCV RBC AUTO: 99 FL (ref 80–100)
NRBC BLD-RTO: 0 /100 WBCS (ref 0–0)
PHOSPHATE SERPL-MCNC: 2.7 MG/DL (ref 2.5–4.9)
PLATELET # BLD AUTO: 211 X10*3/UL (ref 150–450)
POTASSIUM SERPL-SCNC: 5 MMOL/L (ref 3.5–5.3)
RBC # BLD AUTO: 2.93 X10*6/UL (ref 4.5–5.9)
SODIUM SERPL-SCNC: 136 MMOL/L (ref 136–145)
VANCOMYCIN SERPL-MCNC: 17.5 UG/ML (ref 5–20)
VANCOMYCIN SERPL-MCNC: 38 UG/ML (ref 5–20)
WBC # BLD AUTO: 8.9 X10*3/UL (ref 4.4–11.3)

## 2025-03-11 PROCEDURE — 2500000001 HC RX 250 WO HCPCS SELF ADMINISTERED DRUGS (ALT 637 FOR MEDICARE OP)

## 2025-03-11 PROCEDURE — 1200000002 HC GENERAL ROOM WITH TELEMETRY DAILY

## 2025-03-11 PROCEDURE — 97535 SELF CARE MNGMENT TRAINING: CPT | Mod: GO

## 2025-03-11 PROCEDURE — 2500000004 HC RX 250 GENERAL PHARMACY W/ HCPCS (ALT 636 FOR OP/ED)

## 2025-03-11 PROCEDURE — 36415 COLL VENOUS BLD VENIPUNCTURE: CPT

## 2025-03-11 PROCEDURE — 80202 ASSAY OF VANCOMYCIN: CPT

## 2025-03-11 PROCEDURE — 97164 PT RE-EVAL EST PLAN CARE: CPT | Mod: GP

## 2025-03-11 PROCEDURE — 97530 THERAPEUTIC ACTIVITIES: CPT | Mod: GP

## 2025-03-11 PROCEDURE — 97165 OT EVAL LOW COMPLEX 30 MIN: CPT | Mod: GO

## 2025-03-11 PROCEDURE — 2500000005 HC RX 250 GENERAL PHARMACY W/O HCPCS

## 2025-03-11 PROCEDURE — 80202 ASSAY OF VANCOMYCIN: CPT | Performed by: PATHOLOGY

## 2025-03-11 PROCEDURE — 80069 RENAL FUNCTION PANEL: CPT

## 2025-03-11 PROCEDURE — 83735 ASSAY OF MAGNESIUM: CPT

## 2025-03-11 PROCEDURE — 2500000004 HC RX 250 GENERAL PHARMACY W/ HCPCS (ALT 636 FOR OP/ED): Performed by: NURSE PRACTITIONER

## 2025-03-11 PROCEDURE — 85027 COMPLETE CBC AUTOMATED: CPT

## 2025-03-11 PROCEDURE — 2500000002 HC RX 250 W HCPCS SELF ADMINISTERED DRUGS (ALT 637 FOR MEDICARE OP, ALT 636 FOR OP/ED)

## 2025-03-11 RX ORDER — SODIUM CHLORIDE 9 MG/ML
100 INJECTION, SOLUTION INTRAVENOUS CONTINUOUS
Status: ACTIVE | OUTPATIENT
Start: 2025-03-11 | End: 2025-03-12

## 2025-03-11 RX ADMIN — Medication 1 L/MIN: at 20:50

## 2025-03-11 RX ADMIN — Medication 250 MG: at 09:53

## 2025-03-11 RX ADMIN — CEFEPIME 2 G: 1 INJECTION, SOLUTION INTRAVENOUS at 17:13

## 2025-03-11 RX ADMIN — ACETAMINOPHEN 650 MG: 325 TABLET, FILM COATED ORAL at 20:07

## 2025-03-11 RX ADMIN — HEPARIN SODIUM 5000 UNITS: 5000 INJECTION, SOLUTION INTRAVENOUS; SUBCUTANEOUS at 06:21

## 2025-03-11 RX ADMIN — HEPARIN SODIUM 5000 UNITS: 5000 INJECTION, SOLUTION INTRAVENOUS; SUBCUTANEOUS at 14:51

## 2025-03-11 RX ADMIN — VANCOMYCIN HYDROCHLORIDE 125 MG: 125 CAPSULE ORAL at 17:13

## 2025-03-11 RX ADMIN — SODIUM CHLORIDE 1000 ML: 9 INJECTION, SOLUTION INTRAVENOUS at 12:44

## 2025-03-11 RX ADMIN — ACETAMINOPHEN 650 MG: 325 TABLET, FILM COATED ORAL at 14:52

## 2025-03-11 RX ADMIN — Medication 250 MG: at 20:07

## 2025-03-11 RX ADMIN — VANCOMYCIN HYDROCHLORIDE 125 MG: 125 CAPSULE ORAL at 12:43

## 2025-03-11 RX ADMIN — VANCOMYCIN HYDROCHLORIDE 125 MG: 125 CAPSULE ORAL at 06:21

## 2025-03-11 RX ADMIN — SODIUM CHLORIDE 100 ML/HR: 9 INJECTION, SOLUTION INTRAVENOUS at 17:13

## 2025-03-11 RX ADMIN — HEPARIN SODIUM 5000 UNITS: 5000 INJECTION, SOLUTION INTRAVENOUS; SUBCUTANEOUS at 22:52

## 2025-03-11 RX ADMIN — SODIUM CHLORIDE 1000 ML: 9 INJECTION, SOLUTION INTRAVENOUS at 17:30

## 2025-03-11 RX ADMIN — VANCOMYCIN HYDROCHLORIDE 1000 MG: 1 INJECTION, SOLUTION INTRAVENOUS at 07:56

## 2025-03-11 RX ADMIN — METOPROLOL SUCCINATE 25 MG: 25 TABLET, FILM COATED, EXTENDED RELEASE ORAL at 09:53

## 2025-03-11 RX ADMIN — VANCOMYCIN HYDROCHLORIDE 1000 MG: 1 INJECTION, SOLUTION INTRAVENOUS at 20:07

## 2025-03-11 RX ADMIN — VANCOMYCIN HYDROCHLORIDE 125 MG: 125 CAPSULE ORAL at 20:08

## 2025-03-11 RX ADMIN — ATORVASTATIN CALCIUM 10 MG: 10 TABLET, FILM COATED ORAL at 20:08

## 2025-03-11 RX ADMIN — CEFEPIME 2 G: 1 INJECTION, SOLUTION INTRAVENOUS at 09:53

## 2025-03-11 RX ADMIN — CEFEPIME 2 G: 1 INJECTION, SOLUTION INTRAVENOUS at 01:15

## 2025-03-11 ASSESSMENT — COGNITIVE AND FUNCTIONAL STATUS - GENERAL
CLIMB 3 TO 5 STEPS WITH RAILING: TOTAL
DRESSING REGULAR LOWER BODY CLOTHING: A LOT
WALKING IN HOSPITAL ROOM: TOTAL
EATING MEALS: A LITTLE
DRESSING REGULAR UPPER BODY CLOTHING: A LOT
DAILY ACTIVITIY SCORE: 12
DRESSING REGULAR LOWER BODY CLOTHING: TOTAL
EATING MEALS: A LOT
MOVING TO AND FROM BED TO CHAIR: TOTAL
MOVING FROM LYING ON BACK TO SITTING ON SIDE OF FLAT BED WITH BEDRAILS: A LOT
PERSONAL GROOMING: A LOT
STANDING UP FROM CHAIR USING ARMS: TOTAL
DAILY ACTIVITIY SCORE: 12
TOILETING: A LOT
PERSONAL GROOMING: A LITTLE
CLIMB 3 TO 5 STEPS WITH RAILING: TOTAL
HELP NEEDED FOR BATHING: A LOT
MOBILITY SCORE: 8
DAILY ACTIVITIY SCORE: 13
STANDING UP FROM CHAIR USING ARMS: TOTAL
DRESSING REGULAR UPPER BODY CLOTHING: A LITTLE
EATING MEALS: A LOT
TOILETING: TOTAL
MOVING TO AND FROM BED TO CHAIR: TOTAL
HELP NEEDED FOR BATHING: A LOT
MOVING TO AND FROM BED TO CHAIR: TOTAL
DRESSING REGULAR UPPER BODY CLOTHING: A LOT
TURNING FROM BACK TO SIDE WHILE IN FLAT BAD: A LOT
MOBILITY SCORE: 8
PERSONAL GROOMING: A LOT
WALKING IN HOSPITAL ROOM: TOTAL
MOBILITY SCORE: 7
WALKING IN HOSPITAL ROOM: TOTAL
STANDING UP FROM CHAIR USING ARMS: TOTAL
MOVING FROM LYING ON BACK TO SITTING ON SIDE OF FLAT BED WITH BEDRAILS: A LOT
HELP NEEDED FOR BATHING: A LOT
CLIMB 3 TO 5 STEPS WITH RAILING: TOTAL
TOILETING: A LOT
TURNING FROM BACK TO SIDE WHILE IN FLAT BAD: TOTAL
DRESSING REGULAR LOWER BODY CLOTHING: A LOT
TURNING FROM BACK TO SIDE WHILE IN FLAT BAD: A LOT
MOVING FROM LYING ON BACK TO SITTING ON SIDE OF FLAT BED WITH BEDRAILS: A LOT

## 2025-03-11 ASSESSMENT — PAIN - FUNCTIONAL ASSESSMENT
PAIN_FUNCTIONAL_ASSESSMENT: 0-10

## 2025-03-11 ASSESSMENT — ACTIVITIES OF DAILY LIVING (ADL)
HOME_MANAGEMENT_TIME_ENTRY: 15
BATHING_ASSISTANCE: MAXIMAL

## 2025-03-11 ASSESSMENT — PAIN SCALES - GENERAL
PAINLEVEL_OUTOF10: 0 - NO PAIN

## 2025-03-11 NOTE — PROGRESS NOTES
"  Division of Plastic and Reconstructive Surgery  Postoperative Check Note    Patient Name: Sebas Soto  MRN: [id number]  Date:  03/11/25     Subjective    No acute concerns this AM. Patient found resting comfortably in bed. Pain well controlled. Tolerating diet, breakfast delivered this AM, awaiting assistance with feeds. Denies any fever, chills, night sweats, CP, SOB, headache, extremity numbness/weakness, vision changes, palpitations, nausea, vomiting, or abdominal pain/discomfort.     Objective    Vital Signs  /64 (BP Location: Right arm, Patient Position: Lying)   Pulse 78   Temp 36.3 °C (97.3 °F) (Temporal)   Resp (!) 9   Ht 1.727 m (5' 8\")   Wt 85.7 kg (189 lb)   SpO2 92%   BMI 28.74 kg/m²      Physical Exam  Constitutional: A&Ox3, NAD.  Eyes: EOMI, clear sclera.  ENMT: Moist mucous membranes.  Head/Neck: NC/AT. Neck supple.   Cardiovascular: Normal rate.   Respiratory/Thorax: Breathing comfortably with regular respirations on RA. Good symmetric chest expansion.   Gastrointestinal: Abdomen soft, non-tender.   Extremities: Moves all 4 extremities actively, strength appropriate.   Neurological: A&Ox3.   Psychological: Appropriate mood and behavior.   Skin: Warm and dry.   Back: Incisional wound vac intact at midline spinal surgical site, maintaining low continuous suction at -125mmHg, no alarms for leak, obstruction or malfunction, scant bloody drainage collecting in vac cannister. X1 KASANDRA drain exiting paravertebral spine with bloody serous output, drain patent.     Current Medications  Scheduled medications  acetaminophen, 650 mg, oral, q6h  atorvastatin, 10 mg, oral, Nightly  cefepime, 2 g, intravenous, q8h  heparin (porcine), 5,000 Units, subcutaneous, q8h  metoprolol succinate XL, 25 mg, oral, q AM  oxygen, , inhalation, Continuous - Inhalation  polyethylene glycol, 17 g, oral, Daily  saccharomyces boulardii, 250 mg, oral, BID  vancomycin, 1,000 mg, intravenous, q12h  vancomycin, 125 mg, " oral, 4x daily      Continuous medications     PRN medications  PRN medications: dextrose, dextrose, glucagon, glucagon, naloxone, ondansetron **OR** ondansetron, vancomycin     Assessment   Sebas Soto is a 84 y.o. male with a past medical history of L2-5 laminectomy with L4-5 transforaminal lumbar interbody fusion on 1/31 with Dr. Qureshi, complicated by post-op hallucinations and encephalopathy, previously presented to ED on 2/22 with concern for post-op wound infection, CT L spine during that admission showed no deep dehiscence, so no acute surgical intervention and patient was discharged back to SNF with local wound care and antibiotics on 2/28. On 3/6, Dr. Qureshi was notified regarding concerns for lumbar wound dehiscence, and it was recommended patient come to  ED for evaluation. Patient was taken to OR on 3/6 with neurosurgery for lumbar wound washout and vac placement. Plastic surgery was consulted for eventual assistance with wound closure.     Plan/Recommendations:  - Maintain prevena incisional wound vac overlying closed surgical incision until Friday 3/14  Settings: -125mmHg, low, continuous suction   Nursing to monitor and record vac canister output at least q8h (please record 0 for no output)  For any issues or concerns with vac, please notify plastic surgery team at [phone] or pager #[phone]  Plan for vac dressing removal on Friday 3/14  - Continue drain care to x1 KASANDRA drains present at paravertebral spine which is inserted above fascia (nursing orders placed)  Please ensure that drains are compressed flat and plugged to maintain self suction at all times aside from when emptying   Nursing to strip drain tubing at least q4h and PRN to avoid drain/tubing obstruction   Nursing to please also empty and record output quantities at least TID and PRN if drains are full   Please notify the plastics team if drain outputs exceed >30 ml in 1 hr or >200 in 24 hrs  Drains to be removed per plastics team  once 24 hr outputs remain <30 ml total x2 consecutive days  If drains do not meet output criteria for removal at time of medical clearance for DC, will plan for removal per plastics upon outpatient follow up   - IV antibiotics per ID/primary  - Appreciate remaining care per primary team  - Plastic Surgery will continue to follow     Patient and plan discussed with Dr. Zacarias.    Dayne Whitaker, SHELLY-CNP  Plastic and Reconstructive Surgery   Available via Epic chat, pager: [phone] or team phones: [phone]

## 2025-03-11 NOTE — SIGNIFICANT EVENT
Rapid Response Nurse Note: RADAR score of 6    Pager time: 121  Arrival time: 121  Event end time:   Location: [place] [id number]    Rapid response initiated by:  [] Rapid response RN [] Family [] Nursing Supervisor [] Physician   [x] RADAR auto page [] Sepsis auto-page [] RN [] RT   [] NP/PA [] Other:     Primary reason for call:   [] BAT [] New CPAP/BiPAP [] Bleeding [] Change in mental status   [] Chest pain [] Code blue [] FiO2 >/= 50% [] HR </= 40 bpm   [] HR >/= 130 bpm [] Hyperglycemia [] Hypoglycemia [x] RADAR    [] RR </= 8 bpm [] RR >/= 30 bpm [] SBP </= 90 mmHg [] SpO2 < 90%   [] Seizure [] Sepsis [] Shortness of breath  [] Staff concern: see comments     Interventions:  [x] None [] ABG/VBG [] Assist w/ICU transfer [] BAT paged    [] Bag mask [] Blood [] Cardioversion [] Code Blue   [] Code blue for intubation [] Code status changed [] Chest x-ray [] EKG   [] IV fluid/bolus [] KUB x-ray [] Labs/cultures [] Medication   [] Nebulizer treatment [] NIPPV (CPAP/BiPAP) [] Oxygen [] Oral airway   [] Peripheral IV [] Palliative care consult [] CT/MRI [] Sepsis protocol    [] Suctioned [] Other:     Outcome:  [] Coded and  [] Code blue for intubation [] Coded and transferred to ICU []  on division   [x] Remained on division (no change) [] Remained on division + additional monitoring [] Remained in ED [] Transferred to ED   [] Transferred to ICU [] Transferred to inpatient status [] Transferred for interventions (procedure) [] Transferred to ICU stepdown    [] Transferred to surgery [] Transferred to telemetry [] Sepsis protocol [] STEMI protocol   [] Stroke protocol       Additional Comments:      Nurse at bedside; notified of DOUGLAS page: 76 15 89/51 90.  Nurse notifying provider of VS.  No assistance needed at this time; encouraged to call a rapid response as needed if patient status changes.

## 2025-03-11 NOTE — PROGRESS NOTES
Per rounds, pending ID recs. ADOD 3/14. LSW sent updates to [facility]. Pending update on bed availability and if precert is needed to return. Care Transitions will continue to follow.     [facility] confirmed patient is accepted, precert needed to return, and will need wound vac orders. ADOD 3/14 will plan for precert to be started 3/12. Care Transitions will continue to follow.     RASHAAD Barreto

## 2025-03-11 NOTE — PROGRESS NOTES
Physical Therapy    Physical Therapy Re-Evaluation & Treatment    Patient Name: Sebas Soto  MRN: [id number]  Department: [place] [address]  Room: [address] [place] [address]  Today's Date: 3/11/2025   Time Calculation  Start Time: 1109  Stop Time: 1139  Time Calculation (min): 30 min    Assessment/Plan   PT Assessment  PT Assessment Results: Decreased strength, Decreased endurance, Impaired balance, Decreased mobility, Decreased coordination, Impaired judgement, Decreased safety awareness, Pain, Orthopedic restrictions  Rehab Prognosis: Good  Barriers to Discharge Home: Physical needs  Physical Needs: 24hr mobility assistance needed  Evaluation/Treatment Tolerance: Patient tolerated treatment well  Medical Staff Made Aware: Yes  Strengths: Attitude of self  Barriers to Participation: Comorbidities  End of Session Communication: Bedside nurse  Assessment Comment: 84 year old male admitted with incisional dehiscence; lumbar wound exploration, revision, washout and wound vac placement on 3/7/25. RE-EVAL now for pt s/p wound closure by plastics on 3/10/25. Pt presents with decreased strength, endurance and balance impacting functional mobility. Pt would benefit from continued PT while in hospital to improve functional mobility and return to PLOF.  End of Session Patient Position: Bed, 3 rail up, Alarm on   IP OR SWING BED PT PLAN  Inpatient or Swing Bed: Inpatient  PT Plan  Treatment/Interventions: Bed mobility, Transfer training, Gait training, Balance training, Neuromuscular re-education, Strengthening, Endurance training, Therapeutic exercise, Therapeutic activity, Home exercise program, Positioning, Postural re-education  PT Plan: Ongoing PT  PT Frequency: 5 times per week  PT Discharge Recommendations: Moderate intensity level of continued care  Equipment Recommended upon Discharge:  (n/a)  PT Recommended Transfer Status: Assist x2  PT - OK to Discharge: Yes    Subjective     General Visit Information:  General  Reason for Referral: 84  year old male  admitted with incisional dehiscence; lumbar wound exploration, revision, washout and wound vac placement on 3/7/25. RE-EVAL now for pt s/p wound closure by plastics on 3/10/25.  Past Medical History Relevant to Rehab: h/o L2-5 lami w/ L4-5 TLIF on 1/31, c/b post-op hallucinations and encephalopathy, 2/22/25. Abnormal ECG, Arthritis, Bilateral lower extremity edema, Diverticulosis, Hyperlipidemia, Neurogenic claudication, Personal history of other diseases of the digestive system, Personal history of other infectious and parasitic diseases, Prostate cancer, Short-term memory loss, and Spondylolisthesis.  Co-Treatment: OT  Co-Treatment Reason: to safely progress functional mobility with unsuccessful mobiization by nursing and [facility] of <10  Prior to Session Communication: Bedside nurse  Patient Position Received: Bed, 3 rail up, Alarm on  General Comment: Pt supine in bed upon entry to room. Pt pleasant, cooperative and willing to work with PT. Noted x1 seun, wound vac, PIV, tele.  Home Living:  Home Living  Home Living Comments: pt admitted from SNF, prior to admission, was living in a house with spouse.  Prior Level of Function:  Prior Function Per Pt/Caregiver Report  Level of [place]: Independent with homemaking with ambulation, Independent with ADLs and functional transfers  Ambulatory Assistance:  (Jason with [facility]ww)  Prior Function Comments: pt admitted from SNF, pt stating that he has not ambualted for ~1 month  Precautions:  Precautions  Medical Precautions: Fall precautions, Oxygen therapy device and L/min  Post-Surgical Precautions: Spinal precautions  Precautions Comment: contact plus precautions    Objective   Pain:  Pain Assessment  Pain Assessment: 0-10  0-10 (Numeric) Pain Score:  (unrated back pain)  Cognition:  Cognition  Overall Cognitive Status: Impaired  Orientation Level: Disoriented to time, Disoriented to situation, Disoriented to place  Cognition Comments: pt with moments of  "confusion throughout session. Pt stating that he was \"hanging from the wall last night\"    General Assessments:  Activity Tolerance  Endurance: Decreased tolerance for upright activites    Sensation  Light Touch: No apparent deficits    Static Sitting Balance  Static Sitting-Level of Assistance:  (cga-min assist)  Dynamic Sitting Balance  Dynamic Sitting-Level of Assistance: Minimum assistance    Static Standing Balance  Static Standing-Level of Assistance: Maximum assistance  Dynamic Standing Balance  Dynamic Standing-Level of Assistance: Maximum assistance  Functional Assessments:  Bed Mobility  Bed Mobility: Yes  Bed Mobility 1  Bed Mobility 1: Supine to sitting  Level of Assistance 1: Moderate assistance, Moderate verbal cues, Moderate tactile cues  Bed Mobility Comments 1: x2 assist, HOB partially elevated  Bed Mobility 2  Bed Mobility  2: Sitting to supine  Level of Assistance 2: Maximum assistance, Maximum verbal cues, Maximum tactile cues  Bed Mobility Comments 2: x2 assist; use of draw sheet    Transfers  Transfer: Yes  Transfer 1  Technique 1: Sit to stand, Stand to sit  Transfer Device 1:  (B arm in arm assist)  Transfer Level of Assistance 1: Moderate assistance, Moderate verbal cues, Moderate tactile cues  Trials/Comments 1: x2 assist, B feet blocking, R leg off floor during stand    Ambulation/Gait Training  Ambulation/Gait Training Performed: No (unable to safely progress)  Extremity/Trunk Assessments:  RLE   RLE :  (grossly at least 3+/5 based on functional observation; ? tone in RLE)  LLE   LLE :  (grossly at least 3+/5 based on functional observation)  Treatments:  Therapeutic Activity  Therapeutic Activity Performed: Yes  Therapeutic Activity 1: Pt completing B rolling with max assist x2 to assist in kasandra care. Pt sitting EOB for ~10 minutes to promote upright activity tolerance. Pt noted to have retro lean with posterior pelvic tilt and RLE flexed and off of ground despite max verbal " rich.  Outcome Measures:  [facility] Basic Mobility  Turning from your back to your side while in a flat bed without using bedrails: A lot  Moving from lying on your back to sitting on the side of a flat bed without using bedrails: Total  Moving to and from bed to chair (including a wheelchair): Total  Standing up from a chair using your arms (e.g. wheelchair or bedside chair): Total  To walk in hospital room: Total  Climbing 3-5 steps with railing: Total  Basic Mobility - Total Score: 7    Encounter Problems       Encounter Problems (Active)       PT Problem       Patient will complete bed mobility with MINx1        Start:  03/07/25    Expected End:  03/21/25            Patient will complete STS with MODx1 using LRAD without acute LOB         Start:  03/07/25    Expected End:  03/21/25            Patient will participate in BLE there-ex program in order to assist in improving strength and to assist with the completion of functional mobility tasks.        Start:  03/07/25    Expected End:  03/21/25            Patient will complete static (MINx1) and dynamic (MODx1) standing balance activities using LRAD without acute LOB, while maintaining midline posture.        Start:  03/07/25    Expected End:  03/21/25            Patient will ambulate >/=5' with LRAD with MODx1 without acute LOB        Start:  03/07/25    Expected End:  03/21/25               Pain - Adult              Education Documentation  Precautions, taught by Vilma Madden, PT at 3/11/2025  3:22 PM.  Learner: Patient  Readiness: Acceptance  Method: Explanation  Response: Verbalizes Understanding, Needs Reinforcement  Comment: spinal precautions    Mobility Training, taught by Vilma Madden, PT at 3/11/2025  3:22 PM.  Learner: Patient  Readiness: Acceptance  Method: Explanation  Response: Verbalizes Understanding, Needs Reinforcement  Comment: spinal precautions    Education Comments  No comments found.

## 2025-03-11 NOTE — PROGRESS NOTES
"Sebas Soto is a 84 y.o. male on day 4 of admission presenting with Postoperative infection, unspecified type, initial encounter.    Subjective   No acute events overnight.    Objective     Physical Exam  AOx3  RUE D4+ B5 T5 HG4 IO4  LUE D0 (chronic) B5 T5 HG4 IO4  RLE HF3 KE4 PF5 DF5  LLE HF3 KE3 PF2 DF2    Last Recorded Vitals  Blood pressure 102/60, pulse 67, temperature 36.1 °C (97 °F), temperature source Temporal, resp. rate 12, height 1.727 m (5' 8\"), weight 85.7 kg (189 lb), SpO2 93%.  Intake/Output last 3 Shifts:  I/O last 3 completed shifts:  In: 576.5 (6.7 mL/kg) [I.V.:576.5 (6.7 mL/kg)]  Out: 30 (0.3 mL/kg) [Blood:30]  Weight: 85.7 kg           Assessment/Plan   Assessment & Plan  Postoperative infection, unspecified type, initial encounter    CAD (coronary artery disease)    Postoperative surgical complication involving musculoskeletal system associated with musculoskeletal procedure    Disruption of wound, unspecified, initial encounter    Chronic neck pain    Lumbar disc disease    Difficult intubation    Chronic low back pain    Dementia    Prostate cancer (Multi)    HTN (hypertension)    Hyperlipidemia    Neuromuscular disease (Multi)    Anemia    Abnormal EKG    Postoperative delirium    Gastroesophageal reflux disease without esophagitis    Anxiety    Sebas Soto is a 84 y.o. male w/ h/o L2-5 lami w/ L4-5 TLIF on 1/31, c/b post-op hallucinations and encephalopathy, 2/22 p/w incisional dehiscence, discharged on Abx, 3/6 p/w incisional dehiscence.     3/7 s/p lumbar wound exploration, revision, washout and wound vac placement  Started on IV vanco and cefepime.  ID consulted.  3/10 s/p wound closure by plastics    Plan  Tele  Maintain wound vac until Friday 3/14  Plastics recs  OR Cx  ID recs- Cont vanc and cefe, PO vanc for c diff  PTOT  SCD, SQH           Pato Greenfield MD      "

## 2025-03-11 NOTE — PROGRESS NOTES
Vancomycin Dosing by Pharmacy- FOLLOW UP    Sebsa Soto is a 84 y.o. year old male who Pharmacy has been consulted for vancomycin dosing for cellulitis, skin and soft tissue. Based on the patient's indication and renal status this patient is being dosed based on a goal AUC of 400-600.     Renal function is currently stable.    Current vancomycin dose: 1000 mg given every 12 hours    Most recent random level: 38 mcg/mL This level was drawn ONLY 43 Min. AFTER Dose was given.   We will Re-order new Level for this afternoon PRIOR to next dose.      Visit Vitals  /64 (BP Location: Right arm, Patient Position: Lying)   Pulse 78   Temp 36.3 °C (97.3 °F) (Temporal)   Resp (!) 9        Lab Results   Component Value Date    CREATININE 0.67 2025    CREATININE 0.68 03/10/2025    CREATININE 0.50 2025    CREATININE 0.59 2025        Patient weight is as follows:   Vitals:    25 0031   Weight: 85.7 kg (189 lb)       Cultures:  Susceptibility data for the encounter in last 14 days.  Collected Specimen Info Organism Amoxicillin/Clavulanate Ampicillin Ampicillin/Sulbactam Cefazolin Ceftriaxone Ciprofloxacin Gentamicin Levofloxacin Piperacillin/Tazobactam Tetracycline Trimethoprim/Sulfamethoxazole   25 Swab from ABSCESS Providencia rettgeri  R  R  S  R  S  S  S  S  S  R  S     Mixed Skin Microorganisms               25 Swab from ABSCESS Mixed Gram-Positive and Gram-Negative Bacteria                   I/O last 3 completed shifts:  In: 576.5 (6.7 mL/kg) [I.V.:576.5 (6.7 mL/kg)]  Out: 30 (0.3 mL/kg) [Blood:30]  Weight: 85.7 kg   I/O during current shift:  I/O this shift:  In: 250 [P.O.:250]  Out: -     Temp (24hrs), Av.4 °C (97.6 °F), Min:36.1 °C (97 °F), Max:37 °C (98.6 °F)      Assessment/Plan    Most recent random level: 38 mcg/mL This level was drawn ONLY 43 Min. AFTER Dose was given.   We will Re-order new Level for this afternoon PRIOR to next dose.   The next level will be obtained  on 3/11 at 1400. May be obtained sooner if clinically indicated.   Will continue to monitor renal function daily while on vancomycin and order serum creatinine at least every 48 hours if not already ordered.  Follow for continued vancomycin needs, clinical response, and signs/symptoms of toxicity.       Andrea Gill, PharmD

## 2025-03-11 NOTE — PROGRESS NOTES
Occupational Therapy    Evaluation/Treatment    Patient Name: Sebas Soto  MRN: [id number]  Department: [place] [address]  Room: [address] [place] [address]  Today's Date: 03/11/25  Time Calculation  Start Time: 1109  Stop Time: 1139  Time Calculation (min): 30 min       Assessment:  OT Assessment: Pt will benefit from continued skilled OT toincrease independence in ADLs, functional mobility, activity tolerance, safety, strength, and cognition.  Prognosis: Good  Barriers to Discharge Home: Caregiver assistance, Physical needs, Cognition needs  Caregiver Assistance: Caregiver assistance needed per identified barriers - however, level of patient's required assistance exceeds assistance available at home  Cognition Needs: 24hr supervision for safety awareness needed  Physical Needs: Intermittent mobility assistance needed, Ambulating household distances limited by function/safety, 24hr mobility assistance needed, 24hr ADL assistance needed  Evaluation/Treatment Tolerance: Patient tolerated treatment well  Medical Staff Made Aware: Yes  End of Session Communication: Bedside nurse  End of Session Patient Position: Bed, 3 rail up, Alarm on  OT Assessment Results: Decreased ADL status, Decreased upper extremity strength, Decreased safe judgment during ADL, Decreased cognition, Decreased endurance, Decreased functional mobility, Decreased IADLs  Prognosis: Good  Evaluation/Treatment Tolerance: Patient tolerated treatment well  Medical Staff Made Aware: Yes  Strengths: Attitude of self  Barriers to Participation: Comorbidities  Plan:  Treatment Interventions: ADL retraining, Functional transfer training, UE strengthening/ROM, Endurance training, Patient/family training, Equipment evaluation/education, Neuromuscular reeducation, Compensatory technique education  OT Frequency: 3 times per week  OT Discharge Recommendations: Moderate intensity level of continued care  Equipment Recommended upon Discharge:  (TBD at next level of care)  OT Recommended  Transfer Status: Assist of 2  OT - OK to Discharge: Yes (upon medical clearance)  Treatment Interventions: ADL retraining, Functional transfer training, UE strengthening/ROM, Endurance training, Patient/family training, Equipment evaluation/education, Neuromuscular reeducation, Compensatory technique education    Subjective   Current Problem:  1. Postoperative infection, unspecified type, initial encounter  Case Request Operating Room: lumbar wound exploration, revision, washout and wound vac placement    Case Request Operating Room: lumbar wound exploration, revision, washout and wound vac placement    Fungal Culture/Smear    Fungal Culture/Smear    Tissue/Wound Culture/Smear    Tissue/Wound Culture/Smear    Fungal Culture/Smear    Fungal Culture/Smear    Tissue/Wound Culture/Smear    Tissue/Wound Culture/Smear    CANCELED: Tissue/Wound Culture/Smear    CANCELED: Tissue/Wound Culture/Smear      2. Postoperative surgical complication involving musculoskeletal system associated with musculoskeletal procedure, unspecified complication  Case Request Operating Room: DEBRIDEMENT, BACK, CREATION, FLAP, MUSCLE, TORSO    Case Request Operating Room: DEBRIDEMENT, BACK, CREATION, FLAP, MUSCLE, TORSO    Tissue/Wound Culture/Smear    Tissue/Wound Culture/Smear      3. Disruption of wound, unspecified, initial encounter  Case Request Operating Room: DEBRIDEMENT, BACK, CREATION, FLAP, MUSCLE, TORSO    Case Request Operating Room: DEBRIDEMENT, BACK, CREATION, FLAP, MUSCLE, TORSO    Tissue/Wound Culture/Smear    Tissue/Wound Culture/Smear      4. Acute postoperative pain          General:   OT Received On: 03/11/25  General  Reason for Referral: 3/6 p/w incisional dehiscence; lumbar wound exploration, revision, washout and wound vac placement, 3/10 s/p wound closure by plastics  Past Medical History Relevant to Rehab: h/o L2-5 lami w/ L4-5 TLIF on 1/31, c/b post-op hallucinations and encephalopathy, 2/22/25. Abnormal ECG,  "Arthritis, Bilateral lower extremity edema, Diverticulosis, Hyperlipidemia, Neurogenic claudication, Personal history of other diseases of the digestive system, Personal history of other infectious and parasitic diseases, Prostate cancer (Multi), Short-term memory loss, and Spondylolisthesis.  Family/Caregiver Present: No  Co-Treatment: PT  Co-Treatment Reason: to maximize pt safety with mobility, [facility] 10  Prior to Session Communication: Bedside nurse  Patient Position Received: Bed, 3 rail up, Alarm on  General Comment: Pt supine in bed upon arrival, agreeable to OT   Precautions:  Hearing/Visual Limitations: hearing appears WFL  Medical Precautions: Fall precautions, Oxygen therapy device and L/min, Spinal precautions  Post-Surgical Precautions: Spinal precautions  Precautions Comment: log roll for bed mobility, Contact +    Pain:  Pain Assessment  Pain Assessment: 0-10  0-10 (Numeric) Pain Score:  (did not rate)  Pain Type: Acute pain, Surgical pain  Pain Location: Back  Pain Interventions: Repositioned, Distraction    Objective   Cognition:  Overall Cognitive Status: Impaired  Orientation Level: Disoriented to place, Disoriented to time, Disoriented to situation (reported february, repoted 2025)  Following Commands: Follows one step commands with increased time  Cognition Comments: Pt confused reporting \"hanging from the wall\" previous night, unaware of sx on 3/10, pleasant throughout session  Safety/Judgement: Exceptions to WFL  Insight: Mild  Impulsive: Within functional limits  Processing Speed: Delayed     Home Living:  Home Living Comments: admitted from SNF, has been at SNF since last hospital stay, prior to hospital stay home with spouse  Prior Function:  Prior Function Comments: at SNF: reports needing assist with ADLs, iADLs; requires assist for bed mobility, transfers (STS); patient reports NOT ambulating since beginning of February, pt reports sponge bathes  IADL History:  Homemaking " Responsibilities: No  ADL:  Eating Assistance: Stand by (anticipated)  Grooming Assistance: Stand by (anticipated)  Bathing Assistance: Maximal (anticipated seated)  UE Dressing Assistance: Minimal  LE Dressing Assistance: Total (anticipated)  Toileting Assistance with Device: Total  Activities of Daily Living:      UE Dressing  UE Dressing Comments: min A gown management seated EOB    Toileting  Toileting Level of Assistance: Dependent  Where Assessed: Bed level  Toileting Comments: Pt with BM upon arrival, rolling R/L for kasandra care, dep kasandra care supine, extended time to complete 2/2 BM  Activity Tolerance:  Endurance: Tolerates 10 - 20 min exercise with multiple rests    Bed Mobility/Transfers: Bed Mobility  Bed Mobility: Yes  Bed Mobility 1  Bed Mobility 1: Rolling right, Rolling left  Level of Assistance 1: Maximum assistance, +2  Bed Mobility Comments 1: VCs for hand placement  Bed Mobility 2  Bed Mobility  2: Supine to sitting  Level of Assistance 2: Moderate assistance, Minimal verbal cues  Bed Mobility Comments 2: VCs for hand placement, VCs for log roll  Bed Mobility 3  Bed Mobility 3: Sitting to supine  Level of Assistance 3: Maximum assistance, +2, Minimal verbal cues  Bed Mobility Comments 3: VCs for log roll  Bed Mobility 4  Bed Mobility 4: Scooting  Level of Assistance 4: Maximum assistance, +2  Bed Mobility Comments 4: boost up in bed    Transfers  Transfer: Yes  Transfer 1  Transfer From 1: Sit to, Stand to  Transfer to 1: Stand, Sit  Technique 1: Sit to stand, Stand to sit  Transfer Device 1:  (B arm in arm)  Transfer Level of Assistance 1: Moderate assistance, +2, Minimal verbal cues  Trials/Comments 1: VCs for hand placement    Functional Mobility:  Functional Mobility  Functional Mobility Performed: No  Sitting Balance:  Static Sitting Balance  Static Sitting-Balance Support: Feet supported  Static Sitting-Level of Assistance: Close supervision, Minimum assistance  Dynamic Sitting  Balance  Dynamic Sitting-Balance Support: Feet supported  Dynamic Sitting-Level of Assistance: Minimum assistance  Standing Balance:  Static Standing Balance  Static Standing-Balance Support: Bilateral upper extremity supported  Static Standing-Level of Assistance: Moderate assistance (x2)    Modalities:  Modalities Used: No    Sensation:  Light Touch: No apparent deficits  Strength:  Strength Comments: LUE impaired, pt reports hx of LUE impairment  ~20 years, RUE WFL    Perception:  Inattention/Neglect: Appears intact  Coordination:  Movements are Fluid and Coordinated: No  Upper Body Coordination: LUE impaired   Hand Function:  Hand Function  Gross Grasp: Impaired (LUE impaired)  Coordination: Impaired  Extremities: RUE   RUE : Within Functional Limits   Outcome Measures: [facility] Daily Activity  Putting on and taking off regular lower body clothing: Total  Bathing (including washing, rinsing, drying): A lot  Putting on and taking off regular upper body clothing: A little  Toileting, which includes using toilet, bedpan or urinal: Total  Taking care of personal grooming such as brushing teeth: A little  Eating Meals: A little  Daily Activity - Total Score: 13     and OT Adult Other Outcome Measures  4AT: positive    Education Documentation  Body Mechanics, taught by Wilbur Griggs OT at 3/11/2025  1:15 PM.  Learner: Patient  Readiness: Acceptance  Method: Explanation  Response: Verbalizes Understanding, Needs Reinforcement  Comment: OT POC, d/c rec, safety, ADLs    Precautions, taught by Wilbur Griggs OT at 3/11/2025  1:15 PM.  Learner: Patient  Readiness: Acceptance  Method: Explanation  Response: Verbalizes Understanding, Needs Reinforcement  Comment: OT POC, d/c rec, safety, ADLs    ADL Training, taught by Wilbur Griggs OT at 3/11/2025  1:15 PM.  Learner: Patient  Readiness: Acceptance  Method: Explanation  Response: Verbalizes Understanding, Needs Reinforcement  Comment: OT POC, d/c rec, safety,  ADLs    Education Comments  No comments found.      Goals:  Encounter Problems       Encounter Problems (Active)       ADLs       Patient with complete lower body dressing with moderate assist level of assistance donning and doffing all LE clothes  with PRN adaptive equipment while supported sitting and standing (Progressing)       Start:  03/11/25    Expected End:  04/01/25            Patient will complete toileting including hygiene clothing management/hygiene with moderate assist level of assistance and bedside commode. (Progressing)       Start:  03/11/25    Expected End:  04/01/25               BALANCE       Pt will maintain dynamic standing balance during ADL task with moderate assist level of assistance in order to demonstrate decreased risk of falling and improved postural control. (Progressing)       Start:  03/11/25    Expected End:  04/01/25               COGNITION/SAFETY       Patient will demonstrated orientation x 4 with verbal cues. (Progressing)       Start:  03/11/25    Expected End:  04/01/25       ORIENTATION            MOBILITY          TRANSFERS       Patient will complete sit to stand transfer with moderate assist level of assistance and least restrictive device in order to improve safety and prepare for out of bed mobility. (Progressing)       Start:  03/11/25    Expected End:  04/01/25               TRANSFERS       Patient will complete functional transfer with least restrictive device with moderate assist level of assistance. (Progressing)       Start:  03/11/25    Expected End:  04/01/25               ITZEL Aguilera/NAI

## 2025-03-11 NOTE — PROGRESS NOTES
Vancomycin Dosing by Pharmacy- FOLLOW UP    Sebas Soto is a 84 y.o. year old male who Pharmacy has been consulted for vancomycin dosing for cellulitis, skin and soft tissue. Based on the patient's indication and renal status this patient is being dosed based on a goal AUC of 400-600.     Renal function is currently stable.    Current vancomycin dose: 1000 mg given every 12 hours    Estimated vancomycin AUC on current dose: 475 mg/L.hr     Visit Vitals  BP 82/50 (BP Location: Right arm, Patient Position: Lying)   Pulse 54   Temp 36.4 °C (97.5 °F) (Temporal)   Resp 11        Lab Results   Component Value Date    CREATININE 0.67 2025    CREATININE 0.68 03/10/2025    CREATININE 0.50 2025    CREATININE 0.59 2025        Patient weight is as follows:   Vitals:    25 0031   Weight: 85.7 kg (189 lb)       Cultures:  Susceptibility data for the encounter in last 14 days.  Collected Specimen Info Organism Amoxicillin/Clavulanate Ampicillin Ampicillin/Sulbactam Cefazolin Ceftriaxone Ciprofloxacin Gentamicin Levofloxacin Piperacillin/Tazobactam Tetracycline Trimethoprim/Sulfamethoxazole   25 Swab from ABSCESS Providencia rettgeri  R  R  S  R  S  S  S  S  S  R  S     Mixed Skin Microorganisms               25 Swab from ABSCESS Mixed Gram-Positive and Gram-Negative Bacteria                   I/O last 3 completed shifts:  In: 576.5 (6.7 mL/kg) [I.V.:576.5 (6.7 mL/kg)]  Out: 30 (0.3 mL/kg) [Blood:30]  Weight: 85.7 kg   I/O during current shift:  I/O this shift:  In: 1250 [P.O.:250; IV Piggyback:1000]  Out: -     Temp (24hrs), Av.3 °C (97.4 °F), Min:36.1 °C (97 °F), Max:36.6 °C (97.9 °F)      Assessment/Plan    Within goal AUC range. Continue current vancomycin regimen.    This dosing regimen is predicted by [employer]ight[employer] to result in the following pharmacokinetic parameters:  Loading dose: N/A  Regimen: 1000 mg IV every 12 hours.  Start time: 19:56 on 2025  Exposure target: AUC24  (range)400-600 mg/L.hr   NEC34-35: 474 mg/L.hr  AUC24,ss: 475 mg/L.hr  Probability of AUC24 > 400: 92 %  Ctrough,ss: 14.9 mg/L  Probability of Ctrough,ss > 20: 4 %    The next level will be obtained on 3/18 at am draw. May be obtained sooner if clinically indicated.   Will continue to monitor renal function daily while on vancomycin and order serum creatinine at least every 48 hours if not already ordered.  Follow for continued vancomycin needs, clinical response, and signs/symptoms of toxicity.       Phylicia Denis, PharmD

## 2025-03-11 NOTE — DISCHARGE INSTRUCTIONS
Plastic Surgery Post Discharge Instructions  You were admitted to [facility] for postoperative monitoring and control of pain following lumbar back debridement and closure on 3/10/25 with Dr. Zacarias of plastic surgery. Included below are post-discharge instructions and details regarding follow-up.     Thank you for allowing us to participate in your care and we wish you the best!    Best Regards,  [facility]  Department of Plastic and Reconstructive Surgery    Activity:  Activity as tolerated. No pushing, pulling or lifting objects greater than 5 pounds until follow up.    You may locally bathe following discharge. Avoid soaking or submerging surgical incisions/sites or wetting wound vac dressing or device ***.      Surgical Site/Wound Care:  Prevena/wound vac: lumbar back: Please allow Prevena incisional wound vac dressing to remain in place until output follow-up with plastic surgery. When showering, do not allow the wound vac dressing or device to become wet. When resting, please make sure to plug in the device with the supplied power cord to maintain the battery. The device has a power button at the center which is encircled by green lights. There will be one less light illuminated each day (every 24 hrs) which is to be expected, and signifies the count down of the duration of the vac device. If you experience any issues with your wound vac including alarms, malfunction or dressing issues please refer to the provided instruction manual or contact the plastic surgery office at [phone].     Local wound care instructions ***. Avoid application of creams, lotions or ointments to surgical site, no soaking or scrubbing of surgical sites.     Drain care:  You are being discharged with KASANDRA drain x1 to paravertebral spine. To empty the drain, open the cap, tip into cup and squeeze to empty. Squeeze drain flat then replace the cap.  Please empty the drain and record  its output 3 times a day and bring these numbers to your follow up appointment.  The drain output should decrease and the color of the drainage should become lighter (red to pink to yellow).  This drain is sutured into place.  Keep the area around the drain clean and dry.  You may use mild soap and water to cleanse around the drain.  It is ok to shower; do not soak in a tub. Change the gauze around the drain as needed.  Call the office if you notice drastic changes in drain output, bloody drain output, or redness/drainage around insertion site.    Call Physician If:  Contact the plastic surgery office for any questions and/or concerns regarding the surgical incision/site.  1. [phone] if Monday-Friday (8 a.m. - 4:30 p.m.)  2. [phone] and ask for the Plastic Surgery team on call provider if after hours or on weekends  3. Email [email or web address] for any non-urgent concerns and when relaying weekly progress photos of flap sites. ***    Call your MD or seek immediate medical attention if you experience any of the following symptoms:  1. Fever of 101.5 (38.5 C) or greater  2. Pain not controlled with prescribed pain medications  3. Uncontrolled nausea and/or vomiting  4. Drainage or swelling around your incisions and/or surgical sites   5. Separation of incisions, or tearing of the incision line  6. Large fluid collection under or around the incision or flap sites   7. Flap discoloration (including darkened appearance)  8. Difficulty breathing  9. Swelling, pain, heat and/or redness in your legs and/or calves  10. Inability to tolerate diet/fluid intake    Additional Notes:  ***    Follow-up/Post Discharge Appointments:  Follow-up care is a key part of your treatment and safety. It is very important that you maintain follow-up care as directed so that your surgical site heals properly and does not lead to problems. Always carry a current medication list with you and bring it to ALL healthcare  Provider visits. Be sure to maintain follow up with plastic surgery at your scheduled appointment. If you are unable to keep your appointment, or need to reschedule please contact our office at [phone]. ***

## 2025-03-12 ENCOUNTER — APPOINTMENT (OUTPATIENT)
Dept: CARDIOLOGY | Facility: HOSPITAL | Age: 85
End: 2025-03-12
Payer: MEDICARE

## 2025-03-12 LAB
ALBUMIN SERPL BCP-MCNC: 2.7 G/DL (ref 3.4–5)
ANION GAP SERPL CALC-SCNC: 10 MMOL/L (ref 10–20)
ATRIAL RATE: 68 BPM
BACTERIA SPEC CULT: NORMAL
BLOOD EXPIRATION DATE: NORMAL
BUN SERPL-MCNC: 13 MG/DL (ref 6–23)
CALCIUM SERPL-MCNC: 7.7 MG/DL (ref 8.6–10.6)
CHLORIDE SERPL-SCNC: 108 MMOL/L (ref 98–107)
CO2 SERPL-SCNC: 25 MMOL/L (ref 21–32)
CREAT SERPL-MCNC: 0.59 MG/DL (ref 0.5–1.3)
DISPENSE STATUS: NORMAL
EGFRCR SERPLBLD CKD-EPI 2021: >90 ML/MIN/1.73M*2
ERYTHROCYTE [DISTWIDTH] IN BLOOD BY AUTOMATED COUNT: 17.2 % (ref 11.5–14.5)
FUNGUS SPEC CULT: NORMAL
FUNGUS SPEC CULT: NORMAL
FUNGUS SPEC FUNGUS STN: NORMAL
FUNGUS SPEC FUNGUS STN: NORMAL
GLUCOSE SERPL-MCNC: 100 MG/DL (ref 74–99)
GRAM STN SPEC: NORMAL
GRAM STN SPEC: NORMAL
HCT VFR BLD AUTO: 30.6 % (ref 41–52)
HGB BLD-MCNC: 9.8 G/DL (ref 13.5–17.5)
MCH RBC QN AUTO: 31.4 PG (ref 26–34)
MCHC RBC AUTO-ENTMCNC: 32 G/DL (ref 32–36)
MCV RBC AUTO: 98 FL (ref 80–100)
NRBC BLD-RTO: 0 /100 WBCS (ref 0–0)
P AXIS: 39 DEGREES
P OFFSET: 181 MS
P ONSET: 122 MS
PHOSPHATE SERPL-MCNC: 1.6 MG/DL (ref 2.5–4.9)
PLATELET # BLD AUTO: 235 X10*3/UL (ref 150–450)
POTASSIUM SERPL-SCNC: 3.6 MMOL/L (ref 3.5–5.3)
PR INTERVAL: 172 MS
PRODUCT BLOOD TYPE: 6200
PRODUCT CODE: NORMAL
Q ONSET: 208 MS
QRS COUNT: 11 BEATS
QRS DURATION: 110 MS
QT INTERVAL: 414 MS
QTC CALCULATION(BAZETT): 440 MS
QTC FREDERICIA: 431 MS
R AXIS: -40 DEGREES
RBC # BLD AUTO: 3.12 X10*6/UL (ref 4.5–5.9)
SODIUM SERPL-SCNC: 139 MMOL/L (ref 136–145)
T AXIS: 64 DEGREES
T OFFSET: 415 MS
UNIT ABO: NORMAL
UNIT NUMBER: NORMAL
UNIT RH: NORMAL
UNIT VOLUME: 350
VENTRICULAR RATE: 68 BPM
WBC # BLD AUTO: 9.4 X10*3/UL (ref 4.4–11.3)
XM INTEP: NORMAL

## 2025-03-12 PROCEDURE — 2500000001 HC RX 250 WO HCPCS SELF ADMINISTERED DRUGS (ALT 637 FOR MEDICARE OP)

## 2025-03-12 PROCEDURE — 93005 ELECTROCARDIOGRAM TRACING: CPT

## 2025-03-12 PROCEDURE — 36415 COLL VENOUS BLD VENIPUNCTURE: CPT

## 2025-03-12 PROCEDURE — 2500000002 HC RX 250 W HCPCS SELF ADMINISTERED DRUGS (ALT 637 FOR MEDICARE OP, ALT 636 FOR OP/ED)

## 2025-03-12 PROCEDURE — 2500000004 HC RX 250 GENERAL PHARMACY W/ HCPCS (ALT 636 FOR OP/ED)

## 2025-03-12 PROCEDURE — 1200000002 HC GENERAL ROOM WITH TELEMETRY DAILY

## 2025-03-12 PROCEDURE — 93010 ELECTROCARDIOGRAM REPORT: CPT | Performed by: INTERNAL MEDICINE

## 2025-03-12 PROCEDURE — 81001 URINALYSIS AUTO W/SCOPE: CPT

## 2025-03-12 PROCEDURE — 99231 SBSQ HOSP IP/OBS SF/LOW 25: CPT | Performed by: PHYSICIAN ASSISTANT

## 2025-03-12 PROCEDURE — 99233 SBSQ HOSP IP/OBS HIGH 50: CPT | Performed by: INTERNAL MEDICINE

## 2025-03-12 PROCEDURE — 80053 COMPREHEN METABOLIC PANEL: CPT

## 2025-03-12 PROCEDURE — 82248 BILIRUBIN DIRECT: CPT

## 2025-03-12 PROCEDURE — 85027 COMPLETE CBC AUTOMATED: CPT

## 2025-03-12 PROCEDURE — 84443 ASSAY THYROID STIM HORMONE: CPT

## 2025-03-12 PROCEDURE — 2500000005 HC RX 250 GENERAL PHARMACY W/O HCPCS

## 2025-03-12 RX ORDER — QUETIAPINE FUMARATE 25 MG/1
25 TABLET, FILM COATED ORAL NIGHTLY
Status: DISCONTINUED | OUTPATIENT
Start: 2025-03-12 | End: 2025-03-13

## 2025-03-12 RX ADMIN — Medication 1 L/MIN: at 20:05

## 2025-03-12 RX ADMIN — HEPARIN SODIUM 5000 UNITS: 5000 INJECTION, SOLUTION INTRAVENOUS; SUBCUTANEOUS at 21:29

## 2025-03-12 RX ADMIN — HEPARIN SODIUM 5000 UNITS: 5000 INJECTION, SOLUTION INTRAVENOUS; SUBCUTANEOUS at 06:12

## 2025-03-12 RX ADMIN — QUETIAPINE FUMARATE 25 MG: 25 TABLET ORAL at 22:17

## 2025-03-12 RX ADMIN — VANCOMYCIN HYDROCHLORIDE 125 MG: 125 CAPSULE ORAL at 21:29

## 2025-03-12 RX ADMIN — ACETAMINOPHEN 650 MG: 325 TABLET, FILM COATED ORAL at 08:57

## 2025-03-12 RX ADMIN — CEFEPIME 2 G: 1 INJECTION, SOLUTION INTRAVENOUS at 10:06

## 2025-03-12 RX ADMIN — POTASSIUM PHOSPHATE, MONOBASIC 500 MG: 500 TABLET, SOLUBLE ORAL at 17:13

## 2025-03-12 RX ADMIN — VANCOMYCIN HYDROCHLORIDE 1000 MG: 1 INJECTION, SOLUTION INTRAVENOUS at 08:57

## 2025-03-12 RX ADMIN — ACETAMINOPHEN 650 MG: 325 TABLET, FILM COATED ORAL at 03:06

## 2025-03-12 RX ADMIN — POTASSIUM PHOSPHATE, MONOBASIC 500 MG: 500 TABLET, SOLUBLE ORAL at 13:43

## 2025-03-12 RX ADMIN — VANCOMYCIN HYDROCHLORIDE 125 MG: 125 CAPSULE ORAL at 13:43

## 2025-03-12 RX ADMIN — VANCOMYCIN HYDROCHLORIDE 125 MG: 125 CAPSULE ORAL at 06:12

## 2025-03-12 RX ADMIN — VANCOMYCIN HYDROCHLORIDE 125 MG: 125 CAPSULE ORAL at 17:11

## 2025-03-12 RX ADMIN — HEPARIN SODIUM 5000 UNITS: 5000 INJECTION, SOLUTION INTRAVENOUS; SUBCUTANEOUS at 13:43

## 2025-03-12 RX ADMIN — CEFEPIME 2 G: 1 INJECTION, SOLUTION INTRAVENOUS at 17:11

## 2025-03-12 RX ADMIN — POTASSIUM PHOSPHATE, MONOBASIC 500 MG: 500 TABLET, SOLUBLE ORAL at 21:29

## 2025-03-12 RX ADMIN — ATORVASTATIN CALCIUM 10 MG: 10 TABLET, FILM COATED ORAL at 21:29

## 2025-03-12 RX ADMIN — Medication 250 MG: at 21:29

## 2025-03-12 RX ADMIN — Medication 250 MG: at 08:59

## 2025-03-12 RX ADMIN — ACETAMINOPHEN 650 MG: 325 TABLET, FILM COATED ORAL at 21:29

## 2025-03-12 RX ADMIN — CEFEPIME 2 G: 1 INJECTION, SOLUTION INTRAVENOUS at 00:32

## 2025-03-12 ASSESSMENT — PAIN - FUNCTIONAL ASSESSMENT
PAIN_FUNCTIONAL_ASSESSMENT: 0-10

## 2025-03-12 ASSESSMENT — COGNITIVE AND FUNCTIONAL STATUS - GENERAL
TURNING FROM BACK TO SIDE WHILE IN FLAT BAD: A LOT
DRESSING REGULAR LOWER BODY CLOTHING: A LOT
PERSONAL GROOMING: A LOT
DAILY ACTIVITIY SCORE: 12
HELP NEEDED FOR BATHING: A LOT
CLIMB 3 TO 5 STEPS WITH RAILING: TOTAL
WALKING IN HOSPITAL ROOM: TOTAL
MOVING TO AND FROM BED TO CHAIR: TOTAL
MOVING FROM LYING ON BACK TO SITTING ON SIDE OF FLAT BED WITH BEDRAILS: A LOT
EATING MEALS: A LOT
DRESSING REGULAR UPPER BODY CLOTHING: A LOT
MOBILITY SCORE: 8
STANDING UP FROM CHAIR USING ARMS: TOTAL
TOILETING: A LOT

## 2025-03-12 ASSESSMENT — PAIN SCALES - GENERAL
PAINLEVEL_OUTOF10: 5 - MODERATE PAIN
PAINLEVEL_OUTOF10: 0 - NO PAIN

## 2025-03-12 NOTE — PROGRESS NOTES
Precert team reports final ID recs are needed to initiate precert. Team reports ID will see patient this afternoon. SNF notified. Care Transitions will continue to follow.     RASHAAD Barreto

## 2025-03-12 NOTE — CARE PLAN
The clinical goals for the shift include patient will remain oriented to person/place/situation throughout shift. The patient did not meet this goal.

## 2025-03-12 NOTE — PROGRESS NOTES
"Sebas Soto is a 84 y.o. male on day 5 of admission presenting with Postoperative infection, unspecified type, initial encounter.    Subjective   No acute events overnight.    Objective     Physical Exam  AOx3  RUE D4+ B5 T5 HG4 IO4  LUE D0 (chronic) B5 T5 HG4 IO4  RLE HF3 KE4 PF5 DF5  LLE HF3 KE3 PF4 DF4    Last Recorded Vitals  Blood pressure 135/70, pulse 86, temperature 36.8 °C (98.2 °F), resp. rate 20, height 1.727 m (5' 8\"), weight 85.7 kg (189 lb), SpO2 96%.  Intake/Output last 3 Shifts:  I/O last 3 completed shifts:  In: 1826.5 (21.3 mL/kg) [P.O.:250; I.V.:576.5 (6.7 mL/kg); IV Piggyback:1000]  Out: 30 (0.3 mL/kg) [Blood:30]  Weight: 85.7 kg     Results from last 72 hours   Lab Units 03/11/25  0839 03/10/25  0901   GLUCOSE mg/dL 139* 109*   SODIUM mmol/L 136 140   POTASSIUM mmol/L 5.0 3.5   CHLORIDE mmol/L 106 106   CO2 mmol/L 25 28   ANION GAP mmol/L 10 10   BUN mg/dL 17 9   CREATININE mg/dL 0.67 0.68   EGFR mL/min/1.73m*2 >90 >90   CALCIUM mg/dL 7.6* 7.9*   PHOSPHORUS mg/dL 2.7 2.6   ALBUMIN g/dL 2.6* 2.5*   MAGNESIUM mg/dL 2.23  --       Results from last 72 hours   Lab Units 03/11/25  1104 03/10/25  0901   WBC AUTO x10*3/uL 8.9 9.0   NRBC AUTO /100 WBCs 0.0 0.0   RBC AUTO x10*6/uL 2.93* 3.34*   HEMOGLOBIN g/dL 9.2* 10.1*   HEMATOCRIT % 28.9* 31.9*   MCV fL 99 96   MCH pg 31.4 30.2   MCHC g/dL 31.8* 31.7*   RDW % 17.3* 17.6*   PLATELETS AUTO x10*3/uL 211 229            Assessment/Plan   Assessment & Plan  Postoperative infection, unspecified type, initial encounter    CAD (coronary artery disease)    Postoperative surgical complication involving musculoskeletal system associated with musculoskeletal procedure    Disruption of wound, unspecified, initial encounter    Chronic neck pain    Lumbar disc disease    Difficult intubation    Chronic low back pain    Dementia    Prostate cancer (Multi)    HTN (hypertension)    Hyperlipidemia    Neuromuscular disease (Multi)    Anemia    Abnormal " EKG    Postoperative delirium    Gastroesophageal reflux disease without esophagitis    Anxiety    Sebas Soto is a 84 y.o. male w/ h/o L2-5 lami w/ L4-5 TLIF on 1/31, c/b post-op hallucinations and encephalopathy, 2/22 p/w incisional dehiscence, discharged on Abx, 3/6 p/w incisional dehiscence.     3/7 s/p lumbar wound exploration, revision, washout and wound vac placement  Started on IV vanco and cefepime.  ID consulted.  3/10 s/p wound closure by plastics    Plan  Tele  Maintain wound vac until Friday 3/14  Plastics recs  OR Cx  ID recs- Cont vanc and cefe, PO vanc for c diff  PTOT  SCD, SQH      Julienne Urias MD

## 2025-03-12 NOTE — PROGRESS NOTES
Vancomycin Dosing by Pharmacy- Cessation of Therapy    Consult to pharmacy for vancomycin dosing has been discontinued by the prescriber, pharmacy will sign off at this time.    Please call pharmacy if there are further questions or re-enter a consult if vancomycin is resumed.     LULU ZHANG, PharmD

## 2025-03-12 NOTE — PROGRESS NOTES
"  Division of Plastic and Reconstructive Surgery  Postoperative Check Note    Patient Name: Sebas Soto  MRN: [id number]  Date:  03/12/25     Subjective    No acute concerns this AM. Patient found resting comfortably in bed. Pain well controlled. Tolerating diet with assistance from nursing staff for feeds. Denies any fever, chills, night sweats, CP, SOB, headache, extremity numbness/weakness, vision changes, palpitations, nausea, vomiting, or abdominal pain/discomfort.     Objective    Vital Signs  /80   Pulse 94   Temp 36 °C (96.8 °F)   Resp 21   Ht 1.727 m (5' 8\")   Wt 85.7 kg (189 lb)   SpO2 95%   BMI 28.74 kg/m²      Physical Exam  Constitutional: A&Ox3, NAD.  Eyes: EOMI, clear sclera.  ENMT: Moist mucous membranes.  Head/Neck: NC/AT. Neck supple.   Cardiovascular: Normal rate.   Respiratory/Thorax: Breathing comfortably with regular respirations on RA. Good symmetric chest expansion.   Gastrointestinal: Abdomen soft, non-tender.   Extremities: Moves all 4 extremities actively, strength appropriate.   Neurological: A&Ox3.   Psychological: Appropriate mood and behavior.   Skin: Warm and dry.   Back: Incisional wound vac intact at midline spinal surgical site, maintaining low continuous suction at -125mmHg, no alarms for leak, obstruction or malfunction, scant bloody drainage collecting in vac cannister. X1 KASANDRA drain exiting paravertebral spine with bloody serous output, drain patent.     Current Medications  Scheduled medications  acetaminophen, 650 mg, oral, q6h  atorvastatin, 10 mg, oral, Nightly  cefepime, 2 g, intravenous, q8h  heparin (porcine), 5,000 Units, subcutaneous, q8h  [Held by provider] metoprolol succinate XL, 25 mg, oral, q AM  oxygen, , inhalation, Continuous - Inhalation  [Held by provider] polyethylene glycol, 17 g, oral, Daily  saccharomyces boulardii, 250 mg, oral, BID  vancomycin, 1,000 mg, intravenous, q12h  vancomycin, 125 mg, oral, 4x daily      Continuous " medications  sodium chloride 0.9%, 100 mL/hr, Last Rate: 100 mL/hr (03/11/25 3108)      PRN medications  PRN medications: dextrose, dextrose, glucagon, glucagon, naloxone, ondansetron **OR** ondansetron, vancomycin     Assessment   Sebas Soto is a 84 y.o. male with a past medical history of L2-5 laminectomy with L4-5 transforaminal lumbar interbody fusion on 1/31 with Dr. Qureshi, complicated by post-op hallucinations and encephalopathy, previously presented to ED on 2/22 with concern for post-op wound infection, CT L spine during that admission showed no deep dehiscence, so no acute surgical intervention and patient was discharged back to SNF with local wound care and antibiotics on 2/28. On 3/6, Dr. Qureshi was notified regarding concerns for lumbar wound dehiscence, and it was recommended patient come to  ED for evaluation. Patient was taken to OR on 3/6 with neurosurgery for lumbar wound washout and vac placement. Plastic surgery was consulted for eventual assistance with wound closure.     Plan/Recommendations:  - Maintain prevena incisional wound vac overlying closed surgical incision until Friday 3/14  Settings: -125mmHg, low, continuous suction   Nursing to monitor and record vac canister output at least q8h (please record 0 for no output)  For any issues or concerns with vac, please notify plastic surgery team at [phone] or pager #[phone]  Plan for vac dressing removal on Friday 3/14  - Continue drain care to x1 KASANDRA drains present at paravertebral spine which is inserted above fascia (nursing orders placed)  Please ensure that drains are compressed flat and plugged to maintain self suction at all times aside from when emptying   Nursing to strip drain tubing at least q4h and PRN to avoid drain/tubing obstruction   Nursing to please also empty and record output quantities at least TID and PRN if drains are full   Please notify the plastics team if drain outputs exceed >30 ml in 1 hr or >200 in 24  hrs  Drains to be removed per plastics team once 24 hr outputs remain <30 ml total x2 consecutive days  If drains do not meet output criteria for removal at time of medical clearance for DC, will plan for removal per plastics upon outpatient follow up   - IV antibiotics per ID/primary (vancomycin and cefepime)  - Appreciate remaining care per primary team  - Plastic Surgery will continue to follow     Patient and plan discussed with Dr. Zacarias.    Ludy Rios PA-C  Plastic and Reconstructive Surgery   Available via Epic chat, pager: [phone] or team phones: [phone]

## 2025-03-12 NOTE — PROGRESS NOTES
"  Subjective   Interval History:      I saw the patient during late afternoon rounds approximately 7 PM.     Patient was lying left to right in bed with feet dangling.  He is in no acute distress.  When I began to ask how he was feeling it became apparent that patient was not able to focus on the conversation.      He had noted some issue with his wife \"watching a movie\" but she was not present.  Patient but I arrived in the room from the steps behind the bathroom door.  Patient seems distracted.  Then I noticed that the phone was off the hook when I picked it up I could hear a voice and was able to speak to the patient's wife who was concerned about the patient's current mental status.  I spoke to the wife who indicated that she thought the patient was hallucinating as well (she did not indicate that it was happening all day as told by nurses).  Primary team aware.  The wife reminded me that he had similar reaction postanesthesia at outside hospital.       Patient has some degree of encephalopathy I was unable to determine if he has visual hallucinations but he did suggest that he has some auditory hallucinations.       Patient was resting comfortably and was in no distress.  He follow commands.  He was alert.  Anterior chest clear.  I deferred examination of the back at this time.       Patient had closure of the lumbar wound on 3/10/2025.  Postdebridement lavage culture pending.  Gram stain showed no PMNs or organisms.    Objective   Range of Vitals (last 24 hours)  Heart Rate:  [65-95]   Temp:  [36 °C (96.8 °F)-36.8 °C (98.2 °F)]   Resp:  [10-23]   BP: ()/(47-84)   SpO2:  [91 %-96 %]   Daily Weight  03/07/25 : 85.7 kg (189 lb)    Body mass index is 28.74 kg/m².    Physical Exam  Resting comfortably and flat in bed.  Laying across the bed left to right with feet dangling off the left side of the bed.  In stable position guard rails up.  Nurse aware and monitoring  Patient appears to be hallucinating, " auditory?  Not certain about visual.  Anterior chest clear  S1, S2, I did not hear murmur  I did not examine the back wound    Antibiotics  amoxicillin-pot clavulanate - 875-125 mg  vancomycin - 125 mg    Relevant Results  Labs  Results from last 72 hours   Lab Units 03/12/25  0951 03/11/25  1104 03/10/25  0901   WBC AUTO x10*3/uL 9.4 8.9 9.0   HEMOGLOBIN g/dL 9.8* 9.2* 10.1*   HEMATOCRIT % 30.6* 28.9* 31.9*   PLATELETS AUTO x10*3/uL 235 211 229     Results from last 72 hours   Lab Units 03/12/25  0951 03/11/25  0839 03/10/25  0901   SODIUM mmol/L 139 136 140   POTASSIUM mmol/L 3.6 5.0 3.5   CHLORIDE mmol/L 108* 106 106   CO2 mmol/L 25 25 28   BUN mg/dL 13 17 9   CREATININE mg/dL 0.59 0.67 0.68   GLUCOSE mg/dL 100* 139* 109*   CALCIUM mg/dL 7.7* 7.6* 7.9*   ANION GAP mmol/L 10 10 10   EGFR mL/min/1.73m*2 >90 >90 >90   PHOSPHORUS mg/dL 1.6* 2.7 2.6     Results from last 72 hours   Lab Units 03/12/25  0951 03/11/25  0839 03/10/25  0901   ALBUMIN g/dL 2.7* 2.6* 2.5*     Estimated Creatinine Clearance: 101 mL/min (by C-G formula based on SCr of 0.59 mg/dL).  C-Reactive Protein   Date Value Ref Range Status   03/06/2025 6.01 (H) <1.00 mg/dL Final   02/21/2025 4.88 (H) <1.00 mg/dL Final     Microbiology  Susceptibility data from last 14 days.  Collected Specimen Info Organism Amoxicillin/Clavulanate Ampicillin Ampicillin/Sulbactam Cefazolin Ceftriaxone Ciprofloxacin Gentamicin Levofloxacin Piperacillin/Tazobactam Tetracycline Trimethoprim/Sulfamethoxazole   03/07/25 Swab from ABSCESS Providencia rettgeri  R  R  S  R  S  S  S  S  S  R  S     Mixed Skin Microorganisms               03/07/25 Swab from ABSCESS Mixed Gram-Positive and Gram-Negative Bacteria                  Assessment/Plan   84-year-old gentleman underwent lumbar spine surgery on 1/31/2025 and developed early postoperative wound dehiscence.  Patient readmitted 2/21/2025 to [facility] and started on Unasyn and changed to oral Augmentin (for 2-week duration).   Patient seen by Dr. Sanches and Dr. Manuel ([place], infectious disease).  2/21/2025 culture showed mixed anaerobic and gram-positive/gram-negative bacteria.  Specific organism was not identified.  However, this generally indicates skin gt and minor enteric organisms.       Preop Photo:       Patient re-presented with additional wound breakdown/exudate and odor.  At SNF receiving wound care and packing.  Photograph shows minimal surrounding erythema.  Neurosurgery observed some superficial necrosis exudate and odor.  Operative note not concerned about deeper penetrating infection.     Anticipate cultures to show a mixture of gt that could easily include resistant hospital-acquired or healthcare associated organisms like Pseudomonas, Serratia for example.  For now continue broad-spectrum antibiotics.  Will sort out culture results and advise.     Hopefully will not need extended antibiotic course if deeper infection not suspected.  Will again discuss with neurosurgery.     Duration of therapy then will be determined by wound healing.  Hopefully could continue antibiotics for 2 to 4 weeks and see additional wound healing with aggressive wound care management.     Might be able to consider oral antibiotic regimen but will advise.     # Postoperative lumbar wound dehiscence  # 01/31/2025 Lumbar spine surgery            L2-L5 laminectomies and bilateral foraminotomies            L4-L5 transforaminal interbody fusion;             L4-L5 instrumented posterolateral fusion; use of autograft and allograft for fusion  # 3/8/2025 C. difficile (PCR positive, EIA positive)       Started on oral regimen of vancomycin x 10 days  # 3/12/2025 encephalopathy and hallucinationS     Patient clinically stable.  I have not seen the patient since 3/8/2025     03/08/2025     During rounds cultures negative to date.     Continue cefepime and vancomycin pending final culture and antibiotic testing     Will de-escalate antibiotics when  "culture data available and to assist with treatment of C. difficile.  Will need p.o. vancomycin and plan for 10-day course for initial episode.  If long-term antibiotics required then may put on slow taper until antibiotic treatment of back wound completed.  Will advise    03/12/2025 Update:     During late afternoon rounds.  Patient hallucinating.  Seems primarily auditory but cannot rule out visual.  Patient clinically stable in no acute distress.  I spoke directly with the patient's wife who had been on the phone with the patient shortly before I arrived and was still on the phone but the patient was not speaking into the  (which was lying at the foot of the bed).     On 3/10/2025 patient had debridement of lumbar wound down to muscle and fascia.      3/10/25 OP note noted:   \". . . no obvious purulence and there appeared to be healthy granulation tissue that was beginning to form.  There is also no evidence of fascial dehiscence or drainage.  Therefore, I then began the procedure by performing excisional debridement of the skin edges  . . .   extended all the way down to the muscle fascia using a curette. . .'       \" . . . I then performed wide undermining of adipose cutaneous flaps which were elevated bilaterally to reduce the tension of closure.  I then performed copious irrigation of the wound using both sterile saline and antiseptic solution . . \"              Recommendations    Discontinue Cefepime as primary culprit for ongoing hallucinations and encephalopathy  Discontinue vancomycin which is probably not necessary at this time  At this time would hold all antibiotics to see if patient's mental status improves.  Patient did not have a critical deep lumbar infection and I prefer not adding new antibiotic until patient's mental status clears.  May not need IV given that infection was mild and did not spread deeply.  Could cover broadly with  oral agents like Augmentin which patient tolerated " previously and oral agent like Cipro or TMP-SMX to cover the Providencia  5.   Follow-up on 3/10/2025 operative culture obtained after lavage and before closing  6. 3/8/2025 started p.o. Vancomycin, 125 mg p.o. 4 times daily     Discussed with the patient's wife by phone  Discussed with the RN and patient's primary team  Hayden Christensen MD    I spent 75 minutes in the professional and overall care of this patient.    This is a complex infectious disease issue and the following was performed today (for more details please see the above note):

## 2025-03-13 LAB
ALBUMIN SERPL BCP-MCNC: 2.6 G/DL (ref 3.4–5)
ALBUMIN SERPL BCP-MCNC: 2.8 G/DL (ref 3.4–5)
ALP SERPL-CCNC: 58 U/L (ref 33–136)
ALT SERPL W P-5'-P-CCNC: 39 U/L (ref 10–52)
AMMONIA PLAS-SCNC: 27 UMOL/L (ref 16–53)
ANION GAP SERPL CALC-SCNC: 13 MMOL/L (ref 10–20)
APPEARANCE UR: ABNORMAL
AST SERPL W P-5'-P-CCNC: 49 U/L (ref 9–39)
ATRIAL RATE: 88 BPM
BILIRUB DIRECT SERPL-MCNC: 0.1 MG/DL (ref 0–0.3)
BILIRUB SERPL-MCNC: 0.4 MG/DL (ref 0–1.2)
BILIRUB UR STRIP.AUTO-MCNC: NEGATIVE MG/DL
BUN SERPL-MCNC: 8 MG/DL (ref 6–23)
CALCIUM SERPL-MCNC: 7.9 MG/DL (ref 8.6–10.6)
CHLORIDE SERPL-SCNC: 111 MMOL/L (ref 98–107)
CO2 SERPL-SCNC: 23 MMOL/L (ref 21–32)
COLOR UR: ABNORMAL
CREAT SERPL-MCNC: 0.49 MG/DL (ref 0.5–1.3)
EGFRCR SERPLBLD CKD-EPI 2021: >90 ML/MIN/1.73M*2
ERYTHROCYTE [DISTWIDTH] IN BLOOD BY AUTOMATED COUNT: 17 % (ref 11.5–14.5)
GLUCOSE SERPL-MCNC: 79 MG/DL (ref 74–99)
GLUCOSE UR STRIP.AUTO-MCNC: NORMAL MG/DL
HCT VFR BLD AUTO: 31.3 % (ref 41–52)
HGB BLD-MCNC: 10 G/DL (ref 13.5–17.5)
KETONES UR STRIP.AUTO-MCNC: ABNORMAL MG/DL
LEUKOCYTE ESTERASE UR QL STRIP.AUTO: NEGATIVE
MCH RBC QN AUTO: 30.9 PG (ref 26–34)
MCHC RBC AUTO-ENTMCNC: 31.9 G/DL (ref 32–36)
MCV RBC AUTO: 97 FL (ref 80–100)
NITRITE UR QL STRIP.AUTO: NEGATIVE
NRBC BLD-RTO: 0 /100 WBCS (ref 0–0)
P AXIS: 26 DEGREES
P OFFSET: 180 MS
P ONSET: 124 MS
PH UR STRIP.AUTO: 6 [PH]
PHOSPHATE SERPL-MCNC: 2.4 MG/DL (ref 2.5–4.9)
PLATELET # BLD AUTO: 258 X10*3/UL (ref 150–450)
POTASSIUM SERPL-SCNC: 3.3 MMOL/L (ref 3.5–5.3)
PR INTERVAL: 156 MS
PROT SERPL-MCNC: 4.5 G/DL (ref 6.4–8.2)
PROT UR STRIP.AUTO-MCNC: ABNORMAL MG/DL
Q ONSET: 202 MS
QRS COUNT: 15 BEATS
QRS DURATION: 128 MS
QT INTERVAL: 402 MS
QTC CALCULATION(BAZETT): 486 MS
QTC FREDERICIA: 456 MS
R AXIS: -38 DEGREES
RBC # BLD AUTO: 3.24 X10*6/UL (ref 4.5–5.9)
RBC # UR STRIP.AUTO: NEGATIVE MG/DL
RBC #/AREA URNS AUTO: ABNORMAL /HPF
SODIUM SERPL-SCNC: 144 MMOL/L (ref 136–145)
SP GR UR STRIP.AUTO: 1.01
T AXIS: 78 DEGREES
T OFFSET: 403 MS
TSH SERPL-ACNC: 1.56 MIU/L (ref 0.44–3.98)
URATE CRY #/AREA UR COMP ASSIST: ABNORMAL /HPF
UROBILINOGEN UR STRIP.AUTO-MCNC: NORMAL MG/DL
VENTRICULAR RATE: 88 BPM
WBC # BLD AUTO: 7.5 X10*3/UL (ref 4.4–11.3)
WBC #/AREA URNS AUTO: ABNORMAL /HPF

## 2025-03-13 PROCEDURE — 2500000001 HC RX 250 WO HCPCS SELF ADMINISTERED DRUGS (ALT 637 FOR MEDICARE OP)

## 2025-03-13 PROCEDURE — 99232 SBSQ HOSP IP/OBS MODERATE 35: CPT | Performed by: PHYSICIAN ASSISTANT

## 2025-03-13 PROCEDURE — 99232 SBSQ HOSP IP/OBS MODERATE 35: CPT | Performed by: INTERNAL MEDICINE

## 2025-03-13 PROCEDURE — 2500000004 HC RX 250 GENERAL PHARMACY W/ HCPCS (ALT 636 FOR OP/ED)

## 2025-03-13 PROCEDURE — 36415 COLL VENOUS BLD VENIPUNCTURE: CPT

## 2025-03-13 PROCEDURE — 97530 THERAPEUTIC ACTIVITIES: CPT | Mod: GP

## 2025-03-13 PROCEDURE — 97530 THERAPEUTIC ACTIVITIES: CPT | Mod: GO

## 2025-03-13 PROCEDURE — 84100 ASSAY OF PHOSPHORUS: CPT

## 2025-03-13 PROCEDURE — 82140 ASSAY OF AMMONIA: CPT

## 2025-03-13 PROCEDURE — 1100000001 HC PRIVATE ROOM DAILY

## 2025-03-13 PROCEDURE — 85027 COMPLETE CBC AUTOMATED: CPT

## 2025-03-13 PROCEDURE — 97535 SELF CARE MNGMENT TRAINING: CPT | Mod: GO

## 2025-03-13 PROCEDURE — 2500000002 HC RX 250 W HCPCS SELF ADMINISTERED DRUGS (ALT 637 FOR MEDICARE OP, ALT 636 FOR OP/ED)

## 2025-03-13 RX ORDER — AMOXICILLIN AND CLAVULANATE POTASSIUM 875; 125 MG/1; MG/1
1 TABLET, FILM COATED ORAL EVERY 12 HOURS SCHEDULED
Status: DISCONTINUED | OUTPATIENT
Start: 2025-03-13 | End: 2025-03-15 | Stop reason: HOSPADM

## 2025-03-13 RX ORDER — POTASSIUM CHLORIDE 1.5 G/1.58G
40 POWDER, FOR SOLUTION ORAL ONCE
Status: COMPLETED | OUTPATIENT
Start: 2025-03-13 | End: 2025-03-13

## 2025-03-13 RX ORDER — CIPROFLOXACIN 500 MG/1
500 TABLET ORAL EVERY 12 HOURS SCHEDULED
Status: DISCONTINUED | OUTPATIENT
Start: 2025-03-13 | End: 2025-03-14

## 2025-03-13 RX ADMIN — Medication 250 MG: at 20:54

## 2025-03-13 RX ADMIN — Medication 250 MG: at 10:01

## 2025-03-13 RX ADMIN — AMOXICILLIN AND CLAVULANATE POTASSIUM 1 TABLET: 875; 125 TABLET, FILM COATED ORAL at 20:51

## 2025-03-13 RX ADMIN — VANCOMYCIN HYDROCHLORIDE 125 MG: 125 CAPSULE ORAL at 17:44

## 2025-03-13 RX ADMIN — POTASSIUM PHOSPHATE, MONOBASIC 500 MG: 500 TABLET, SOLUBLE ORAL at 06:28

## 2025-03-13 RX ADMIN — ATORVASTATIN CALCIUM 10 MG: 10 TABLET, FILM COATED ORAL at 20:50

## 2025-03-13 RX ADMIN — HEPARIN SODIUM 5000 UNITS: 5000 INJECTION, SOLUTION INTRAVENOUS; SUBCUTANEOUS at 06:28

## 2025-03-13 RX ADMIN — CIPROFOLXACIN 500 MG: 500 TABLET ORAL at 20:51

## 2025-03-13 RX ADMIN — ACETAMINOPHEN 650 MG: 325 TABLET, FILM COATED ORAL at 10:01

## 2025-03-13 RX ADMIN — ACETAMINOPHEN 650 MG: 325 TABLET, FILM COATED ORAL at 04:01

## 2025-03-13 RX ADMIN — ACETAMINOPHEN 650 MG: 325 TABLET, FILM COATED ORAL at 17:45

## 2025-03-13 RX ADMIN — ACETAMINOPHEN 650 MG: 325 TABLET, FILM COATED ORAL at 21:01

## 2025-03-13 RX ADMIN — POTASSIUM CHLORIDE 40 MEQ: 1.5 POWDER, FOR SOLUTION ORAL at 17:44

## 2025-03-13 RX ADMIN — VANCOMYCIN HYDROCHLORIDE 125 MG: 125 CAPSULE ORAL at 06:28

## 2025-03-13 RX ADMIN — HEPARIN SODIUM 5000 UNITS: 5000 INJECTION, SOLUTION INTRAVENOUS; SUBCUTANEOUS at 21:01

## 2025-03-13 RX ADMIN — VANCOMYCIN HYDROCHLORIDE 125 MG: 125 CAPSULE ORAL at 20:50

## 2025-03-13 ASSESSMENT — ACTIVITIES OF DAILY LIVING (ADL): HOME_MANAGEMENT_TIME_ENTRY: 25

## 2025-03-13 ASSESSMENT — COGNITIVE AND FUNCTIONAL STATUS - GENERAL
DRESSING REGULAR LOWER BODY CLOTHING: TOTAL
WALKING IN HOSPITAL ROOM: TOTAL
PERSONAL GROOMING: A LITTLE
TOILETING: TOTAL
MOVING FROM LYING ON BACK TO SITTING ON SIDE OF FLAT BED WITH BEDRAILS: TOTAL
MOVING TO AND FROM BED TO CHAIR: TOTAL
DRESSING REGULAR UPPER BODY CLOTHING: A LITTLE
DAILY ACTIVITIY SCORE: 13
EATING MEALS: A LITTLE
TURNING FROM BACK TO SIDE WHILE IN FLAT BAD: TOTAL
STANDING UP FROM CHAIR USING ARMS: TOTAL
MOBILITY SCORE: 6
CLIMB 3 TO 5 STEPS WITH RAILING: TOTAL
HELP NEEDED FOR BATHING: A LOT

## 2025-03-13 ASSESSMENT — PAIN SCALES - GENERAL
PAINLEVEL_OUTOF10: 0 - NO PAIN

## 2025-03-13 ASSESSMENT — PAIN - FUNCTIONAL ASSESSMENT
PAIN_FUNCTIONAL_ASSESSMENT: 0-10

## 2025-03-13 NOTE — CARE PLAN
The patient's goals for the shift include To rest    The clinical goals for the shift include Patient will remain safe and free from injury overngiht.

## 2025-03-13 NOTE — PROGRESS NOTES
"Sebas Soto is a 84 y.o. male on day 6 of admission presenting with Postoperative infection, unspecified type, initial encounter.    Subjective   Patient was hallucinating yesterday and overnight, was given seroquil but was still restless    Objective     Physical Exam  AOx3  RUE D4+ B5 T5 HG4 IO4  LUE D0 (chronic) B5 T5 HG4 IO4  RLE HF3 KE4 PF5 DF5  LLE HF3 KE3 PF4 DF4    Last Recorded Vitals  Blood pressure 149/71, pulse 92, temperature 36.8 °C (98.2 °F), resp. rate 17, height 1.727 m (5' 8\"), weight 85.7 kg (189 lb), SpO2 93%.  Intake/Output last 3 Shifts:  I/O last 3 completed shifts:  In: 4037.2 (47.1 mL/kg) [I.V.:2737.2 (31.9 mL/kg); IV Piggyback:1300]  Out: 3207 (37.4 mL/kg) [Urine:3150 (1 mL/kg/hr); Drains:57]  Weight: 85.7 kg     Results from last 72 hours   Lab Units 03/12/25  2357 03/12/25  0951 03/11/25  0839   GLUCOSE mg/dL  --  100* 139*   SODIUM mmol/L  --  139 136   POTASSIUM mmol/L  --  3.6 5.0   CHLORIDE mmol/L  --  108* 106   CO2 mmol/L  --  25 25   ANION GAP mmol/L  --  10 10   BUN mg/dL  --  13 17   CREATININE mg/dL  --  0.59 0.67   EGFR mL/min/1.73m*2  --  >90 >90   CALCIUM mg/dL  --  7.7* 7.6*   PHOSPHORUS mg/dL  --  1.6* 2.7   ALBUMIN g/dL 2.6* 2.7* 2.6*   MAGNESIUM mg/dL  --   --  2.23      Results from last 72 hours   Lab Units 03/12/25  0951 03/11/25  1104   WBC AUTO x10*3/uL 9.4 8.9   NRBC AUTO /100 WBCs 0.0 0.0   RBC AUTO x10*6/uL 3.12* 2.93*   HEMOGLOBIN g/dL 9.8* 9.2*   HEMATOCRIT % 30.6* 28.9*   MCV fL 98 99   MCH pg 31.4 31.4   MCHC g/dL 32.0 31.8*   RDW % 17.2* 17.3*   PLATELETS AUTO x10*3/uL 235 211            Assessment/Plan   Assessment & Plan  Postoperative infection, unspecified type, initial encounter    CAD (coronary artery disease)    Postoperative surgical complication involving musculoskeletal system associated with musculoskeletal procedure    Disruption of wound, unspecified, initial encounter    Chronic neck pain    Lumbar disc disease    Difficult " intubation    Chronic low back pain    Dementia    Prostate cancer (Multi)    HTN (hypertension)    Hyperlipidemia    Neuromuscular disease (Multi)    Anemia    Abnormal EKG    Postoperative delirium    Gastroesophageal reflux disease without esophagitis    Anxiety    Sebas Soto is a 84 y.o. male w/ h/o L2-5 lami w/ L4-5 TLIF on 1/31, c/b post-op hallucinations and encephalopathy, 2/22 p/w incisional dehiscence, discharged on Abx, 3/6 p/w incisional dehiscence.     3/7 s/p lumbar wound exploration, revision, washout and wound vac placement  Started on IV vanco and cefepime.  ID consulted.  3/10 s/p wound closure by plastics    Plan  Tele  Maintain wound vac until Friday 3/14  Plastics recs  OR Cx  ID recs- Stop vanc and cefepime, continue PO vanc  PTOT- SNF  SCD, SQH      Sebas Bahena MD

## 2025-03-13 NOTE — PROGRESS NOTES
"Subjective      Patient seen during late morning rounds     Patient resting in bed comfortably in no acute distress.  Appears to be pleasantly hallucinating.  Primarily visual hallucinations?.  Patient thought he was at a hamburger joint and people around eating and drinking.  It was difficult to get additional information to characterize his hallucinations but when asked what he was thinking about, patient responded \"going home.\"      Objective   Range of Vitals (last 24 hours)  Heart Rate:  []   Temp:  [36.1 °C (97 °F)-36.8 °C (98.2 °F)]   Resp:  [15-23]   BP: (123-161)/(64-84)   SpO2:  [92 %-95 %]   Daily Weight  03/07/25 : 85.7 kg (189 lb)    Body mass index is 28.74 kg/m².    Physical Exam  Deferred today for patient comfort    Antibiotics  amoxicillin-pot clavulanate - 875-125 mg, 875-125 mg  ciprofloxacin - 500 mg  vancomycin - 125 mg    Relevant Results  Labs  Results from last 72 hours   Lab Units 03/13/25  0932 03/12/25  0951 03/11/25  1104   WBC AUTO x10*3/uL 7.5 9.4 8.9   HEMOGLOBIN g/dL 10.0* 9.8* 9.2*   HEMATOCRIT % 31.3* 30.6* 28.9*   PLATELETS AUTO x10*3/uL 258 235 211     Results from last 72 hours   Lab Units 03/13/25  0932 03/12/25  0951 03/11/25  0839   SODIUM mmol/L 144 139 136   POTASSIUM mmol/L 3.3* 3.6 5.0   CHLORIDE mmol/L 111* 108* 106   CO2 mmol/L 23 25 25   BUN mg/dL 8 13 17   CREATININE mg/dL 0.49* 0.59 0.67   GLUCOSE mg/dL 79 100* 139*   CALCIUM mg/dL 7.9* 7.7* 7.6*   ANION GAP mmol/L 13 10 10   EGFR mL/min/1.73m*2 >90 >90 >90   PHOSPHORUS mg/dL 2.4* 1.6* 2.7     Results from last 72 hours   Lab Units 03/13/25  0932 03/12/25  2357 03/12/25  0951   ALK PHOS U/L  --  58  --    BILIRUBIN TOTAL mg/dL  --  0.4  --    BILIRUBIN DIRECT mg/dL  --  0.1  --    PROTEIN TOTAL g/dL  --  4.5*  --    ALT U/L  --  39  --    AST U/L  --  49*  --    ALBUMIN g/dL 2.8* 2.6* 2.7*     Estimated Creatinine Clearance: 121.7 mL/min (A) (by C-G formula based on SCr of 0.49 mg/dL (L)).  C-Reactive Protein "   Date Value Ref Range Status   03/06/2025 6.01 (H) <1.00 mg/dL Final   02/21/2025 4.88 (H) <1.00 mg/dL Final     Microbiology  Susceptibility data from last 14 days.  Collected Specimen Info Organism Amoxicillin/Clavulanate Ampicillin Ampicillin/Sulbactam Cefazolin Ceftriaxone Ciprofloxacin Gentamicin Levofloxacin Piperacillin/Tazobactam Tetracycline Trimethoprim/Sulfamethoxazole   03/07/25 Swab from ABSCESS Providencia rettgeri  R  R  S  R  S  S  S  S  S  R  S     Mixed Skin Microorganisms               03/07/25 Swab from ABSCESS Mixed Gram-Positive and Gram-Negative Bacteria                  Assessment/Plan   84-year-old gentleman underwent lumbar spine surgery on 1/31/2025 and developed early postoperative wound dehiscence.  Patient readmitted 2/21/2025 to [facility] and started on Unasyn and changed to oral Augmentin (for 2-week duration).  Patient seen by Dr. Sanches and Dr. Manuel ([facility], infectious disease).  2/21/2025 culture showed mixed anaerobic and gram-positive/gram-negative bacteria.  Specific organism was not identified.  However, this generally indicates skin gt and minor enteric organisms.       Preop Photo:       Patient re-presented with additional wound breakdown/exudate and odor.  At SNF receiving wound care and packing.  Photograph shows minimal surrounding erythema.  Neurosurgery observed some superficial necrosis exudate and odor.  Operative note not concerned about deeper penetrating infection.     Anticipate cultures to show a mixture of gt that could easily include resistant hospital-acquired or healthcare associated organisms like Pseudomonas, Serratia for example.  For now continue broad-spectrum antibiotics.  Will sort out culture results and advise.     Hopefully will not need extended antibiotic course if deeper infection not suspected.  Will again discuss with neurosurgery.     Duration of therapy then will be determined by wound healing.  Hopefully could continue antibiotics  "for 2 to 4 weeks and see additional wound healing with aggressive wound care management.     Might be able to consider oral antibiotic regimen but will advise.     # Postoperative lumbar wound dehiscence  # 01/31/2025 Lumbar spine surgery            L2-L5 laminectomies and bilateral foraminotomies            L4-L5 transforaminal interbody fusion;             L4-L5 instrumented posterolateral fusion; use of autograft and allograft for fusion  # 3/8/2025 C. difficile (PCR positive, EIA positive)       Started on oral regimen of vancomycin x 10 days  # 3/12/2025 encephalopathy and hallucinationS     Patient clinically stable.  I have not seen the patient since 3/8/2025     03/08/2025     During rounds cultures negative to date.     Continue cefepime and vancomycin pending final culture and antibiotic testing     Will de-escalate antibiotics when culture data available and to assist with treatment of C. difficile.  Will need p.o. vancomycin and plan for 10-day course for initial episode.  If long-term antibiotics required then may put on slow taper until antibiotic treatment of back wound completed.  Will advise     03/12/2025 Update:     During late afternoon rounds.  Patient hallucinating.  Seems primarily auditory but cannot rule out visual.  Patient clinically stable in no acute distress.  I spoke directly with the patient's wife who had been on the phone with the patient shortly before I arrived and was still on the phone but the patient was not speaking into the  (which was lying at the foot of the bed).     On 3/10/2025 patient had debridement of lumbar wound down to muscle and fascia.      3/10/25 OP note noted:              \". . . no obvious purulence and there appeared to be healthy granulation tissue that was beginning to form.  There is also no evidence of fascial dehiscence or drainage.  Therefore, I then began the procedure by performing excisional debridement of the skin edges  . . .   extended " "all the way down to the muscle fascia using a curette. . .'                \" . . . I then performed wide undermining of adipose cutaneous flaps which were elevated bilaterally to reduce the tension of closure.  I then performed copious irrigation of the wound using both sterile saline and antiseptic solution . . \"             3/13/2025 update:     Patient still hallucinating and clinically stable.  Patient's wife indicates that this has happened before after anesthesia.  This may be if a contributing factor but still need to stop cefepime.  I do not think patient needs systemic antibiotics given less concern for deep lumbar surgical site infection per surgery.  I think the culture results showing Providencia  consistent with an open packed wound.  Not certain that this is pathologic at this time.  However would treat conservatively based on current and recent culture.       Thus targeting mixed skin gt and Providencia rettgeri       Seems like patient has less diarrhea per his report.  Need to complete 10-day course of oral vancomycin for C. difficile diarrhea    Recommendations     1. Discontinue Cefepime as primary culprit for ongoing hallucinations and encephalopathy  Should improve over several days cefepime main culprit            *If does not improve then should have LP to evaluate CSF since patient also at risk for secondary, post surgical meningitis given complicated course*    2. 3/12/25 discontinued vancomycin     3.  Okay to resume oral antibiotics for lumbar wound.  Will treat for 2 weeks provided there is ongoing healing            (a) begin oral Augmentin 875 mg p.o. twice daily (directed at mixed gt per 2/21/2025 culture            (b) begin oral TMP-SMX DS 1 tablet p.o. twice daily (directed at Providencia rettgeri -though most likely a secondary colonizer in the setting of wound dressing changes.            (c) administer oral Augmentin and TMP SMX for 2 weeks ending from 3/10/2025 wound " closure with plastic surgery.    Stop date 3/24/2025    4. 3/8/2025 started p.o. Vancomycin, 125 mg p.o. 4 times daily and usually complete 10 course.  HOWEVER, SINCE patient will be on oral Augmentin and Bactrim through 3/24/2025, I would extend vancomycin treatment 125 mg p.o. 4 times daily through 4/1/2025    5.  Patient has ID clinic follow-up appointment with Dr. Christensen on 4/4/2025 at 10 AM in [place] [address]    Discussed with primary team who will monitor resolution of encephalopathy. If patient does not improve then needs spinal tap    ID plans formulated.  ID team will sign off at this time  Please call for updates changes or additional questions  Thank you  Hayden Christensen MD  ID Staff  Pager [phone] or Epic mailbox staff or chat messaging

## 2025-03-13 NOTE — PROGRESS NOTES
"  Division of Plastic and Reconstructive Surgery  Progress Note    Patient Name: Sebas Soto  MRN: [id number]  Date:  03/13/25     Subjective    Patient disoriented to time and place upon AM assessment with inappropriate responses to questions which is c/w prior exam yesterday. He denies acute pain or concerns on limited report d/t AMS. Incisional wound vac without issue overnight.     Objective    Vital Signs  /84 (BP Location: Right arm, Patient Position: Lying)   Pulse 100   Temp 36.1 °C (97 °F) (Temporal)   Resp 22   Ht 1.727 m (5' 8\")   Wt 85.7 kg (189 lb)   SpO2 94%   BMI 28.74 kg/m²      Physical Exam  Constitutional: A&Ox1 to self, disoriented to time and place, NAD.  Eyes: EOMI, clear sclera.  ENMT: Moist mucous membranes.  Head/Neck: NC/AT. Neck supple.   Cardiovascular: Normal rate.   Respiratory/Thorax: Breathing comfortably with regular respirations on RA. Good symmetric chest expansion.   Gastrointestinal: Abdomen soft, non-tender.   Extremities: Moves all 4 extremities actively, strength appropriate.   Neurological: A&Ox1, disoriented to time and place, does not respond appropriately to questions.   Psychological: Disoriented.   Skin: Warm and dry.   Back: Incisional wound vac intact at midline spinal surgical site, maintaining low continuous suction at -125mmHg, no alarms for leak, obstruction or malfunction, scant bloody drainage collecting in vac cannister. X1 KASANDRA drain exiting paravertebral spine with bloody serous output, drain patent.     Current Medications  Scheduled medications  acetaminophen, 650 mg, oral, q6h  atorvastatin, 10 mg, oral, Nightly  heparin (porcine), 5,000 Units, subcutaneous, q8h  [Held by provider] metoprolol succinate XL, 25 mg, oral, q AM  oxygen, , inhalation, Continuous - Inhalation  [Held by provider] polyethylene glycol, 17 g, oral, Daily  potassium chloride, 40 mEq, oral, Once  saccharomyces boulardii, 250 mg, oral, BID  vancomycin, 125 mg, oral, 4x " daily    Continuous medications       PRN medications  PRN medications: dextrose, dextrose, glucagon, glucagon, naloxone, ondansetron **OR** ondansetron     Assessment   Sebas Soto is a 84 y.o. male with a past medical history of L2-5 laminectomy with L4-5 transforaminal lumbar interbody fusion on 1/31 with Dr. Qureshi, complicated by post-op hallucinations and encephalopathy, previously presented to ED on 2/22 with concern for post-op wound infection, CT L spine during that admission showed no deep dehiscence, so no acute surgical intervention and patient was discharged back to SNF with local wound care and antibiotics on 2/28. On 3/6, Dr. Qureshi was notified regarding concerns for lumbar wound dehiscence, and it was recommended patient come to  ED for evaluation. Patient was taken to OR on 3/6 with neurosurgery for lumbar wound washout and vac placement. Plastic surgery was consulted for eventual assistance with wound closure.     Plan/Recommendations:  - Persistent AMS noted on exam today from previous assessment yesterday, patient seen per ID yesterday with c/f encephalopathy, defer further investigation of encephalopathy per primary service   - Maintain prevena incisional wound vac overlying closed surgical incision until Friday 3/14  Settings: -125mmHg, low, continuous suction   Nursing to monitor and record vac canister output at least q8h (please record 0 for no output)  For any issues or concerns with vac, please notify plastic surgery team at [phone] or pager #[phone]  Plan for vac dressing removal on Friday 3/14  - Continue drain care to x1 KASANDRA drains present at paravertebral spine which is inserted above fascia (nursing orders placed)  Please ensure that drains are compressed flat and plugged to maintain self suction at all times aside from when emptying   Nursing to strip drain tubing at least q4h and PRN to avoid drain/tubing obstruction   Nursing to please also empty and record output quantities  at least TID and PRN if drains are full   Please notify the plastics team if drain outputs exceed >30 ml in 1 hr or >200 in 24 hrs  Anticipate drain removal at time of wound vac dressing removal on Friday 3/14 per plastics   - IV antibiotics per ID/primary (ID recommending holding abx in setting of AMS)  - Appreciate remaining care per primary team  - Plastic Surgery will continue to follow     Patient and plan discussed with Dr. Zacarias.    Rajwinder Rubio PA-C  Plastic and Reconstructive Surgery   [place]  Pager #[phone]  Team phones: [phone]

## 2025-03-13 NOTE — PROGRESS NOTES
Per 3/12 ID note, antibiotics on hold to see if patients mental status improves. Pending final ID recs. Final ID recs needed to initiate precert. Patient is not ready for discharge. Updates sent to [facility]. LSW notified wife Chrissie. Care Transitions will continue to follow.    RASHAAD Barreto

## 2025-03-13 NOTE — PROGRESS NOTES
Physical Therapy    Physical Therapy Treatment    Patient Name: Sebas Soto  MRN: [id number]  Department: [place] [id number]  Room: [address] [place] [address]  Today's Date: 3/13/2025  Time Calculation  Start Time: 1458  Stop Time: 1536  Time Calculation (min): 38 min    Assessment/Plan   PT Assessment  Barriers to Discharge Home: Cognition needs, Physical needs  Cognition Needs: 24hr supervision for safety awareness needed  Physical Needs: 24hr mobility assistance needed  Evaluation/Treatment Tolerance: Patient tolerated treatment well  End of Session Communication: Bedside nurse  Assessment Comment: pt continues to remain appropriate for MOD intensity PT after DC.  End of Session Patient Position: Bed, 3 rail up, Alarm on     PT Plan  Treatment/Interventions: Bed mobility, Transfer training, Gait training, Balance training, Neuromuscular re-education, Strengthening, Endurance training, Therapeutic exercise, Therapeutic activity, Home exercise program, Positioning, Postural re-education  PT Plan: Ongoing PT  PT Frequency: 5 times per week  PT Discharge Recommendations: Moderate intensity level of continued care  Equipment Recommended upon Discharge:  (n/a)  PT Recommended Transfer Status: Assist x2  PT - OK to Discharge: Yes    General Visit Information:   PT  Visit  PT Received On: 03/13/25  Response to Previous Treatment: Patient unable to report, no changes reported from family or staff  General  Co-Treatment: OT  Co-Treatment Reason: to safely progress functional mobility with pt with AMPAC of 6  Prior to Session Communication: Bedside nurse  Patient Position Received: Bed, 3 rail up, Alarm on  General Comment: Pt supine in bed upon entry to room. Pt pleasant, cooperative and willing to work with PT. Noted tele, seun, wound vac.    Subjective   Precautions:  Precautions  Medical Precautions: Fall precautions  Post-Surgical Precautions: Spinal precautions  Precautions Comment: contact plus precautions    Objective   Pain:  Pain  Assessment  Pain Assessment: 0-10  0-10 (Numeric) Pain Score: 0 - No pain  Cognition:  Cognition  Overall Cognitive Status: Impaired  Orientation Level: Disoriented to place, Disoriented to situation  Following Commands: Follows one step commands with repetition  Cognition Comments: pt with nonsensical conversations throughout session, but following commands and re-directable    Treatments:  Therapeutic Activity  Therapeutic Activity Performed: Yes  Therapeutic Activity 1: pt sitting EOB for ~20 minutes completing self care and static sitting balance with max-cga depending on task and focus on task. Pt with cga-min when cued with BUE and B feet supported with forward lean. Pt with retro R lean preference.    Bed Mobility  Bed Mobility: Yes  Bed Mobility 1  Bed Mobility 1: Supine to sitting, Sitting to supine  Level of Assistance 1: Maximum assistance, Maximum verbal cues, Maximum tactile cues  Bed Mobility Comments 1: x2 assist, HOB partially elevated       Transfers  Transfer: Yes  Transfer 1  Technique 1: Sit to stand, Stand to sit  Transfer Device 1:  (B arm in arm assist)  Transfer Level of Assistance 1: Maximum assistance, Maximum verbal cues, Maximum tactile cues  Trials/Comments 1: x2 assist, B knee blocking    Outcome Measures:  [facility] Basic Mobility  Turning from your back to your side while in a flat bed without using bedrails: Total  Moving from lying on your back to sitting on the side of a flat bed without using bedrails: Total  Moving to and from bed to chair (including a wheelchair): Total  Standing up from a chair using your arms (e.g. wheelchair or bedside chair): Total  To walk in hospital room: Total  Climbing 3-5 steps with railing: Total  Basic Mobility - Total Score: 6    Education Documentation  Precautions, taught by Vilma Madden, PT at 3/13/2025  4:47 PM.  Learner: Patient  Readiness: Acceptance  Method: Explanation  Response: Verbalizes Understanding, Needs Reinforcement  Comment:  spinal precautions    Mobility Training, taught by Vilma Madden, PT at 3/13/2025  4:47 PM.  Learner: Patient  Readiness: Acceptance  Method: Explanation  Response: Verbalizes Understanding, Needs Reinforcement  Comment: spinal precautions    Education Comments  No comments found.        Encounter Problems       Encounter Problems (Active)       PT Problem       Patient will complete bed mobility with MINx1  (Progressing)       Start:  03/07/25    Expected End:  03/25/25            Patient will complete STS with MODx1 using LRAD without acute LOB   (Progressing)       Start:  03/07/25    Expected End:  03/25/25            Patient will participate in BLE there-ex program in order to assist in improving strength and to assist with the completion of functional mobility tasks.  (Progressing)       Start:  03/07/25    Expected End:  03/25/25            Patient will complete static (MINx1) and dynamic (MODx1) standing balance activities using LRAD without acute LOB, while maintaining midline posture.  (Progressing)       Start:  03/07/25    Expected End:  03/25/25            Patient will ambulate >/=5' with LRAD with MODx1 without acute LOB  (Progressing)       Start:  03/07/25    Expected End:  03/25/25               Pain - Adult            Vilma Madden, PT

## 2025-03-13 NOTE — PROGRESS NOTES
Occupational Therapy    OT Treatment    Patient Name: Sebas Soto  MRN: [id number]  Department: [place] [id number]  Room: [address] [place] [address]  Today's Date: 3/13/2025  Time Calculation  Start Time: 1458  Stop Time: 1536  Time Calculation (min): 38 min        Assessment:  OT Assessment: Pt will benefit from continued skilled OT to increase independence in ADLs, functional mobility, activity tolerance, safety, strength, and cognition.  Prognosis: Good  Barriers to Discharge Home: Caregiver assistance, Physical needs, Cognition needs  Caregiver Assistance: Caregiver assistance needed per identified barriers - however, level of patient's required assistance exceeds assistance available at home  Cognition Needs: 24hr supervision for safety awareness needed  Physical Needs: Intermittent mobility assistance needed, Ambulating household distances limited by function/safety, 24hr mobility assistance needed, 24hr ADL assistance needed  Evaluation/Treatment Tolerance: Patient tolerated treatment well  Medical Staff Made Aware: Yes  End of Session Communication: Bedside nurse  End of Session Patient Position: Bed, 3 rail up, Alarm on  OT Assessment Results: Decreased ADL status, Decreased upper extremity strength, Decreased safe judgment during ADL, Decreased cognition, Decreased endurance, Decreased functional mobility, Decreased IADLs  Prognosis: Good  Evaluation/Treatment Tolerance: Patient tolerated treatment well  Medical Staff Made Aware: Yes  Strengths: Attitude of self  Barriers to Participation: Ability to acquire knowledge, Comorbidities  Plan:  Treatment Interventions: ADL retraining, Functional transfer training, UE strengthening/ROM, Endurance training, Cognitive reorientation, Patient/family training, Equipment evaluation/education, Neuromuscular reeducation, Fine motor coordination activities, Compensatory technique education  OT Frequency: 3 times per week  OT Discharge Recommendations: Moderate intensity level of continued  care  Equipment Recommended upon Discharge:  (TBD)  OT Recommended Transfer Status: Assist of 2  OT - OK to Discharge: Yes  Treatment Interventions: ADL retraining, Functional transfer training, UE strengthening/ROM, Endurance training, Cognitive reorientation, Patient/family training, Equipment evaluation/education, Neuromuscular reeducation, Fine motor coordination activities, Compensatory technique education    Subjective   Previous Visit Info:  OT Last Visit  OT Received On: 03/13/25  General:  General  Reason for Referral: 84 year old male  admitted with incisional dehiscence; lumbar wound exploration, revision, washout and wound vac placement on 3/7/25. RE-EVAL now for pt s/p wound closure by plastics on 3/10/25.  Past Medical History Relevant to Rehab: h/o L2-5 lami w/ L4-5 TLIF on 1/31, c/b post-op hallucinations and encephalopathy, 2/22/25. Abnormal ECG, Arthritis, Bilateral lower extremity edema, Diverticulosis, Hyperlipidemia, Neurogenic claudication, Personal history of other diseases of the digestive system, Personal history of other infectious and parasitic diseases, Prostate cancer, Short-term memory loss, and Spondylolisthesis.  Family/Caregiver Present: No  Co-Treatment: PT  Co-Treatment Reason: to safely progress functional mobility with unsuccessful mobiization by nursing and [facility] of <10  Prior to Session Communication: Bedside nurse  Patient Position Received: Bed, 3 rail up, Alarm on  General Comment: Pt supine in bed upon arrival, agreeable to OT  Precautions:  Hearing/Visual Limitations: hearing appears WFL  Medical Precautions: Fall precautions  Post-Surgical Precautions: Spinal precautions  Precautions Comment: contact plus precautions    Pain:  Pain Assessment  Pain Assessment: 0-10  0-10 (Numeric) Pain Score: 0 - No pain    Objective    Cognition:  Cognition  Overall Cognitive Status: Impaired  Orientation Level: Disoriented to place, Disoriented to situation (reports month and  year)  Following Commands: Follows one step commands with repetition  Cognition Comments: Pt with nonsensical talk throughout session, reoriented to place multiple times in session  Insight: Mild  Impulsive: Mildly  Processing Speed: Delayed  Coordination:  Movements are Fluid and Coordinated: No  Upper Body Coordination: LUE impaired  Activities of Daily Living:      Grooming  Grooming Comments: Pt performed teethbrushing seated EOB with setup and min A    UE Dressing  UE Dressing Comments: min A to adjust gown seated EOB    Toileting  Toileting Comments: Pt with BM upon standing, rolling R/L for kasandra care, dep kasandra care supine, extended time to complete 2/2 BM    Bed Mobility/Transfers: Bed Mobility  Bed Mobility: Yes  Bed Mobility 1  Bed Mobility 1: Supine to sitting, Sitting to supine  Level of Assistance 1: Maximum assistance, +2, Minimal verbal cues  Bed Mobility Comments 1: HOB elevated  Bed Mobility 2  Bed Mobility  2: Scooting  Level of Assistance 2: Dependent, +2  Bed Mobility Comments 2: boost up in bed    Transfers  Transfer: Yes  Transfer 1  Transfer From 1: Sit to, Stand to  Transfer to 1: Stand, Sit  Technique 1: Sit to stand, Stand to sit  Transfer Device 1:  (B arm in arm)  Transfer Level of Assistance 1: Maximum assistance, +2, Minimal verbal cues    Sitting Balance:  Static Sitting Balance  Static Sitting-Balance Support: Feet supported  Static Sitting-Level of Assistance:  (CGA - Max A)  Static Sitting-Comment/Number of Minutes: posterior lean    Modalities:  Modalities Used: No    Therapy/Activity:      Therapeutic Activity  Therapeutic Activity Performed: Yes  Therapeutic Activity 1: bed mob, extended time seated EOB CGA- max A 2/2 posterior lean, VCs for posture and positioning throughout    Outcome Measures:[facility] Daily Activity  Putting on and taking off regular lower body clothing: Total  Bathing (including washing, rinsing, drying): A lot  Putting on and taking off regular upper body  clothing: A little  Toileting, which includes using toilet, bedpan or urinal: Total  Taking care of personal grooming such as brushing teeth: A little  Eating Meals: A little  Daily Activity - Total Score: 13    Education Documentation  Body Mechanics, taught by Wilbur Griggs OT at 3/13/2025  4:00 PM.  Learner: Patient  Readiness: Acceptance  Method: Explanation  Response: Verbalizes Understanding, Needs Reinforcement    Precautions, taught by Wilbur Griggs OT at 3/13/2025  4:00 PM.  Learner: Patient  Readiness: Acceptance  Method: Explanation  Response: Verbalizes Understanding, Needs Reinforcement    ADL Training, taught by Wilbur Griggs OT at 3/13/2025  4:00 PM.  Learner: Patient  Readiness: Acceptance  Method: Explanation  Response: Verbalizes Understanding, Needs Reinforcement    Education Comments  No comments found.      Goals:  Encounter Problems       Encounter Problems (Active)       ADLs       Patient with complete lower body dressing with moderate assist level of assistance donning and doffing all LE clothes  with PRN adaptive equipment while supported sitting and standing (Progressing)       Start:  03/11/25    Expected End:  04/01/25            Patient will complete toileting including hygiene clothing management/hygiene with moderate assist level of assistance and bedside commode. (Progressing)       Start:  03/11/25    Expected End:  04/01/25               BALANCE       Pt will maintain dynamic standing balance during ADL task with moderate assist level of assistance in order to demonstrate decreased risk of falling and improved postural control. (Progressing)       Start:  03/11/25    Expected End:  04/01/25               COGNITION/SAFETY       Patient will demonstrated orientation x 4 with verbal cues. (Progressing)       Start:  03/11/25    Expected End:  04/01/25       ORIENTATION            MOBILITY          TRANSFERS       Patient will complete sit to stand transfer with moderate assist  level of assistance and least restrictive device in order to improve safety and prepare for out of bed mobility. (Progressing)       Start:  03/11/25    Expected End:  04/01/25               TRANSFERS       Patient will complete functional transfer with least restrictive device with moderate assist level of assistance. (Progressing)       Start:  03/11/25    Expected End:  04/01/25                 ITZEL Aguilera/NAI

## 2025-03-14 LAB
ALBUMIN SERPL BCP-MCNC: 3 G/DL (ref 3.4–5)
ANION GAP SERPL CALC-SCNC: 15 MMOL/L (ref 10–20)
BUN SERPL-MCNC: 8 MG/DL (ref 6–23)
CALCIUM SERPL-MCNC: 8.2 MG/DL (ref 8.6–10.6)
CHLORIDE SERPL-SCNC: 108 MMOL/L (ref 98–107)
CO2 SERPL-SCNC: 23 MMOL/L (ref 21–32)
CREAT SERPL-MCNC: 0.46 MG/DL (ref 0.5–1.3)
EGFRCR SERPLBLD CKD-EPI 2021: >90 ML/MIN/1.73M*2
ERYTHROCYTE [DISTWIDTH] IN BLOOD BY AUTOMATED COUNT: 17.1 % (ref 11.5–14.5)
GLUCOSE BLD MANUAL STRIP-MCNC: 78 MG/DL (ref 74–99)
GLUCOSE SERPL-MCNC: 73 MG/DL (ref 74–99)
HCT VFR BLD AUTO: 33.5 % (ref 41–52)
HGB BLD-MCNC: 10.7 G/DL (ref 13.5–17.5)
MCH RBC QN AUTO: 30.7 PG (ref 26–34)
MCHC RBC AUTO-ENTMCNC: 31.9 G/DL (ref 32–36)
MCV RBC AUTO: 96 FL (ref 80–100)
NRBC BLD-RTO: 0 /100 WBCS (ref 0–0)
PHOSPHATE SERPL-MCNC: 2.8 MG/DL (ref 2.5–4.9)
PLATELET # BLD AUTO: 283 X10*3/UL (ref 150–450)
POTASSIUM SERPL-SCNC: 3.3 MMOL/L (ref 3.5–5.3)
RBC # BLD AUTO: 3.48 X10*6/UL (ref 4.5–5.9)
SODIUM SERPL-SCNC: 143 MMOL/L (ref 136–145)
WBC # BLD AUTO: 7.1 X10*3/UL (ref 4.4–11.3)

## 2025-03-14 PROCEDURE — 80069 RENAL FUNCTION PANEL: CPT

## 2025-03-14 PROCEDURE — 2500000004 HC RX 250 GENERAL PHARMACY W/ HCPCS (ALT 636 FOR OP/ED)

## 2025-03-14 PROCEDURE — 2500000002 HC RX 250 W HCPCS SELF ADMINISTERED DRUGS (ALT 637 FOR MEDICARE OP, ALT 636 FOR OP/ED)

## 2025-03-14 PROCEDURE — 82947 ASSAY GLUCOSE BLOOD QUANT: CPT

## 2025-03-14 PROCEDURE — 2500000001 HC RX 250 WO HCPCS SELF ADMINISTERED DRUGS (ALT 637 FOR MEDICARE OP)

## 2025-03-14 PROCEDURE — 85027 COMPLETE CBC AUTOMATED: CPT

## 2025-03-14 PROCEDURE — 36415 COLL VENOUS BLD VENIPUNCTURE: CPT

## 2025-03-14 PROCEDURE — 1100000001 HC PRIVATE ROOM DAILY

## 2025-03-14 RX ORDER — SULFAMETHOXAZOLE AND TRIMETHOPRIM 800; 160 MG/1; MG/1
1 TABLET ORAL EVERY 12 HOURS SCHEDULED
Status: DISCONTINUED | OUTPATIENT
Start: 2025-03-14 | End: 2025-03-15 | Stop reason: HOSPADM

## 2025-03-14 RX ORDER — SULFAMETHOXAZOLE AND TRIMETHOPRIM 800; 160 MG/1; MG/1
1 TABLET ORAL EVERY 12 HOURS SCHEDULED
Status: CANCELLED
Start: 2025-03-14 | End: 2025-03-26

## 2025-03-14 RX ORDER — POTASSIUM CHLORIDE 1.5 G/1.58G
40 POWDER, FOR SOLUTION ORAL ONCE
Status: COMPLETED | OUTPATIENT
Start: 2025-03-14 | End: 2025-03-14

## 2025-03-14 RX ADMIN — Medication 250 MG: at 09:39

## 2025-03-14 RX ADMIN — HEPARIN SODIUM 5000 UNITS: 5000 INJECTION, SOLUTION INTRAVENOUS; SUBCUTANEOUS at 05:58

## 2025-03-14 RX ADMIN — VANCOMYCIN HYDROCHLORIDE 125 MG: 125 CAPSULE ORAL at 13:27

## 2025-03-14 RX ADMIN — POTASSIUM CHLORIDE 40 MEQ: 1.5 POWDER, FOR SOLUTION ORAL at 13:27

## 2025-03-14 RX ADMIN — ACETAMINOPHEN 650 MG: 325 TABLET, FILM COATED ORAL at 17:46

## 2025-03-14 RX ADMIN — ATORVASTATIN CALCIUM 10 MG: 10 TABLET, FILM COATED ORAL at 20:14

## 2025-03-14 RX ADMIN — HEPARIN SODIUM 5000 UNITS: 5000 INJECTION, SOLUTION INTRAVENOUS; SUBCUTANEOUS at 13:28

## 2025-03-14 RX ADMIN — SULFAMETHOXAZOLE AND TRIMETHOPRIM 1 TABLET: 800; 160 TABLET ORAL at 09:39

## 2025-03-14 RX ADMIN — Medication 250 MG: at 20:14

## 2025-03-14 RX ADMIN — VANCOMYCIN HYDROCHLORIDE 125 MG: 125 CAPSULE ORAL at 20:14

## 2025-03-14 RX ADMIN — ACETAMINOPHEN 650 MG: 325 TABLET, FILM COATED ORAL at 03:17

## 2025-03-14 RX ADMIN — VANCOMYCIN HYDROCHLORIDE 125 MG: 125 CAPSULE ORAL at 06:00

## 2025-03-14 RX ADMIN — ACETAMINOPHEN 650 MG: 325 TABLET, FILM COATED ORAL at 09:39

## 2025-03-14 RX ADMIN — SULFAMETHOXAZOLE AND TRIMETHOPRIM 1 TABLET: 800; 160 TABLET ORAL at 20:14

## 2025-03-14 RX ADMIN — AMOXICILLIN AND CLAVULANATE POTASSIUM 1 TABLET: 875; 125 TABLET, FILM COATED ORAL at 09:39

## 2025-03-14 RX ADMIN — HEPARIN SODIUM 5000 UNITS: 5000 INJECTION, SOLUTION INTRAVENOUS; SUBCUTANEOUS at 21:12

## 2025-03-14 RX ADMIN — AMOXICILLIN AND CLAVULANATE POTASSIUM 1 TABLET: 875; 125 TABLET, FILM COATED ORAL at 20:14

## 2025-03-14 RX ADMIN — VANCOMYCIN HYDROCHLORIDE 125 MG: 125 CAPSULE ORAL at 17:45

## 2025-03-14 ASSESSMENT — PAIN SCALES - GENERAL
PAINLEVEL_OUTOF10: 0 - NO PAIN
PAINLEVEL_OUTOF10: 0 - NO PAIN

## 2025-03-14 ASSESSMENT — PAIN - FUNCTIONAL ASSESSMENT: PAIN_FUNCTIONAL_ASSESSMENT: 0-10

## 2025-03-14 NOTE — PROGRESS NOTES
Final ID recs are in, patient will discharge on p.o. meds. Precert team started precert. Updates sent to [facility]. Patient is ready one auth is received. Care Transitions will continue to follow.       RASHAAD Barreto

## 2025-03-14 NOTE — PROGRESS NOTES
"  Division of Plastic and Reconstructive Surgery  Progress Note    Patient Name: Sebas Soto  MRN: [id number]  Date:  03/14/25     Subjective    Patient disoriented to time and place upon AM assessment with inappropriate responses to questions which is c/w prior exam yesterday. He denies acute pain or concerns on limited report d/t AMS. Incisional wound vac without issue overnight.     Objective    Vital Signs  /76   Pulse 85   Temp 36 °C (96.8 °F)   Resp 13   Ht 1.727 m (5' 8\")   Wt 85.7 kg (189 lb)   SpO2 91%   BMI 28.74 kg/m²      Physical Exam  Constitutional: A&Ox1 to self, disoriented to time and place, NAD.  Eyes: EOMI, clear sclera.  ENMT: Moist mucous membranes.  Head/Neck: NC/AT. Neck supple.   Cardiovascular: Normal rate.   Respiratory/Thorax: Breathing comfortably with regular respirations on RA. Good symmetric chest expansion.   Gastrointestinal: Abdomen soft, non-tender.   Extremities: Moves all 4 extremities actively, strength appropriate.   Neurological: A&Ox1, disoriented to time and place, does not respond appropriately to questions.   Psychological: Disoriented.   Skin: Warm and dry.   Back: Incisional wound vac removed revealing intact incision well approximated with staples, no areas of breakdown, dehiscence or drainage. X1 KASANDRA drain exiting paravertebral spine with bloody serous output, drain patent.     Current Medications  Scheduled medications  acetaminophen, 650 mg, oral, q6h  amoxicillin-pot clavulanate, 1 tablet, oral, q12h ANGELES  atorvastatin, 10 mg, oral, Nightly  heparin (porcine), 5,000 Units, subcutaneous, q8h  [Held by provider] metoprolol succinate XL, 25 mg, oral, q AM  oxygen, , inhalation, Continuous - Inhalation  [Held by provider] polyethylene glycol, 17 g, oral, Daily  potassium chloride, 40 mEq, oral, Once  saccharomyces boulardii, 250 mg, oral, BID  sulfamethoxazole-trimethoprim, 1 tablet, oral, q12h ANGELES  vancomycin, 125 mg, oral, 4x daily    Continuous " medications       PRN medications  PRN medications: dextrose, dextrose, glucagon, glucagon, naloxone, ondansetron **OR** ondansetron     Assessment   Sebas Soto is a 84 y.o. male with a past medical history of L2-5 laminectomy with L4-5 transforaminal lumbar interbody fusion on 1/31 with Dr. Qureshi, complicated by post-op hallucinations and encephalopathy, previously presented to ED on 2/22 with concern for post-op wound infection, CT L spine during that admission showed no deep dehiscence, so no acute surgical intervention and patient was discharged back to SNF with local wound care and antibiotics on 2/28. On 3/6, Dr. Qureshi was notified regarding concerns for lumbar wound dehiscence, and it was recommended patient come to  ED for evaluation. Patient was taken to OR on 3/6 with neurosurgery for lumbar wound washout and vac placement. Plastic surgery was consulted for eventual assistance with wound closure.     Plan/Recommendations:  - Persistent AMS noted on exam today from previous assessment yesterday, patient seen per ID yesterday with c/f encephalopathy, defer further investigation of encephalopathy per primary service   - Maintain prevena incisional wound vac overlying closed surgical incision until Friday 3/14  Settings: -125mmHg, low, continuous suction   Nursing to monitor and record vac canister output at least q8h (please record 0 for no output)  For any issues or concerns with vac, please notify plastic surgery team at [phone] or pager #[phone]  Plan for vac dressing removal on Friday 3/14  - Continue drain care to x1 KASANDRA drains present at paravertebral spine which is inserted above fascia (nursing orders placed)  Please ensure that drains are compressed flat and plugged to maintain self suction at all times aside from when emptying   Nursing to strip drain tubing at least q4h and PRN to avoid drain/tubing obstruction   Nursing to please also empty and record output quantities at least TID and  PRN if drains are full   Please notify the plastics team if drain outputs exceed >30 ml in 1 hr or >200 in 24 hrs  Anticipate drain removal at time of discharge  - IV antibiotics per ID/primary (ID recommending holding abx in setting of AMS)  - Appreciate remaining care per primary team  - Plastic Surgery will continue to follow     Patient and plan discussed with Dr. Zacarias.    Dayne Whitaker, SHELLY-CNP  Plastic and Reconstructive Surgery   Available via Epic chat, pager: [phone] or team phones: [phone]

## 2025-03-14 NOTE — PROGRESS NOTES
"Sebas Soto is a 84 y.o. male on day 7 of admission presenting with Postoperative infection, unspecified type, initial encounter.    Subjective   Improving from mentation perspective     Objective     Physical Exam  AOx3  RUE D4+ B5 T5 HG4 IO4  LUE D0 (chronic) B5 T5 HG4 IO4  RLE HF3 KE4 PF5 DF5  LLE HF3 KE3 PF4 DF4    Last Recorded Vitals  Blood pressure 148/76, pulse 83, temperature 36.5 °C (97.7 °F), resp. rate 16, height 1.727 m (5' 8\"), weight 85.7 kg (189 lb), SpO2 95%.  Intake/Output last 3 Shifts:  I/O last 3 completed shifts:  In: 1423 (16.6 mL/kg) [I.V.:1423 (16.6 mL/kg)]  Out: 2810 (32.8 mL/kg) [Urine:2775 (0.9 mL/kg/hr); Drains:35]  Weight: 85.7 kg     Results from last 72 hours   Lab Units 03/13/25  0932 03/12/25  2357 03/12/25  0951 03/11/25  0839   GLUCOSE mg/dL 79  --  100* 139*   SODIUM mmol/L 144  --  139 136   POTASSIUM mmol/L 3.3*  --  3.6 5.0   CHLORIDE mmol/L 111*  --  108* 106   CO2 mmol/L 23  --  25 25   ANION GAP mmol/L 13  --  10 10   BUN mg/dL 8  --  13 17   CREATININE mg/dL 0.49*  --  0.59 0.67   EGFR mL/min/1.73m*2 >90  --  >90 >90   CALCIUM mg/dL 7.9*  --  7.7* 7.6*   PHOSPHORUS mg/dL 2.4*  --  1.6* 2.7   ALBUMIN g/dL 2.8* 2.6* 2.7* 2.6*   MAGNESIUM mg/dL  --   --   --  2.23      Results from last 72 hours   Lab Units 03/13/25  0932 03/12/25  0951   WBC AUTO x10*3/uL 7.5 9.4   NRBC AUTO /100 WBCs 0.0 0.0   RBC AUTO x10*6/uL 3.24* 3.12*   HEMOGLOBIN g/dL 10.0* 9.8*   HEMATOCRIT % 31.3* 30.6*   MCV fL 97 98   MCH pg 30.9 31.4   MCHC g/dL 31.9* 32.0   RDW % 17.0* 17.2*   PLATELETS AUTO x10*3/uL 258 235            Assessment/Plan   Assessment & Plan  Postoperative infection, unspecified type, initial encounter    CAD (coronary artery disease)    Postoperative surgical complication involving musculoskeletal system associated with musculoskeletal procedure    Disruption of wound, unspecified, initial encounter    Chronic neck pain    Lumbar disc disease    Difficult intubation    Chronic " low back pain    Dementia    Prostate cancer (Multi)    HTN (hypertension)    Hyperlipidemia    Neuromuscular disease (Multi)    Anemia    Abnormal EKG    Postoperative delirium    Gastroesophageal reflux disease without esophagitis    Anxiety    Sebas Soto is a 84 y.o. male w/ h/o L2-5 lami w/ L4-5 TLIF on 1/31, c/b post-op hallucinations and encephalopathy, 2/22 p/w incisional dehiscence, discharged on Abx, 3/6 p/w incisional dehiscence.     3/7 s/p lumbar wound exploration, revision, washout and wound vac placement  Started on IV vanco and cefepime.  ID consulted.  3/10 s/p wound closure by plastics    Plan  Tele  Maintain wound vac until Friday 3/14  Plastics recs  OR Cx  ID recs- Stop vanc and cefepime, continue PO vanc (4/1), Augmentin and bactrim until 3/27  PTOT- SNF  SCD, H      Julienne Urias MD

## 2025-03-15 VITALS
TEMPERATURE: 98.1 F | DIASTOLIC BLOOD PRESSURE: 70 MMHG | HEIGHT: 68 IN | OXYGEN SATURATION: 94 % | WEIGHT: 189 LBS | HEART RATE: 97 BPM | BODY MASS INDEX: 28.64 KG/M2 | RESPIRATION RATE: 18 BRPM | SYSTOLIC BLOOD PRESSURE: 120 MMHG

## 2025-03-15 LAB
ALBUMIN SERPL BCP-MCNC: 2.9 G/DL (ref 3.4–5)
ANION GAP SERPL CALC-SCNC: 12 MMOL/L (ref 10–20)
BUN SERPL-MCNC: 9 MG/DL (ref 6–23)
CALCIUM SERPL-MCNC: 8.3 MG/DL (ref 8.6–10.6)
CHLORIDE SERPL-SCNC: 108 MMOL/L (ref 98–107)
CO2 SERPL-SCNC: 25 MMOL/L (ref 21–32)
CREAT SERPL-MCNC: 0.61 MG/DL (ref 0.5–1.3)
EGFRCR SERPLBLD CKD-EPI 2021: >90 ML/MIN/1.73M*2
ERYTHROCYTE [DISTWIDTH] IN BLOOD BY AUTOMATED COUNT: 17.4 % (ref 11.5–14.5)
GLUCOSE SERPL-MCNC: 194 MG/DL (ref 74–99)
HCT VFR BLD AUTO: 35 % (ref 41–52)
HGB BLD-MCNC: 11.2 G/DL (ref 13.5–17.5)
MCH RBC QN AUTO: 30 PG (ref 26–34)
MCHC RBC AUTO-ENTMCNC: 32 G/DL (ref 32–36)
MCV RBC AUTO: 94 FL (ref 80–100)
NRBC BLD-RTO: 0 /100 WBCS (ref 0–0)
PHOSPHATE SERPL-MCNC: 2.3 MG/DL (ref 2.5–4.9)
PLATELET # BLD AUTO: 276 X10*3/UL (ref 150–450)
POTASSIUM SERPL-SCNC: 3.4 MMOL/L (ref 3.5–5.3)
RBC # BLD AUTO: 3.73 X10*6/UL (ref 4.5–5.9)
SODIUM SERPL-SCNC: 142 MMOL/L (ref 136–145)
WBC # BLD AUTO: 7.1 X10*3/UL (ref 4.4–11.3)

## 2025-03-15 PROCEDURE — 80069 RENAL FUNCTION PANEL: CPT

## 2025-03-15 PROCEDURE — 85027 COMPLETE CBC AUTOMATED: CPT

## 2025-03-15 PROCEDURE — 2500000002 HC RX 250 W HCPCS SELF ADMINISTERED DRUGS (ALT 637 FOR MEDICARE OP, ALT 636 FOR OP/ED)

## 2025-03-15 PROCEDURE — 36415 COLL VENOUS BLD VENIPUNCTURE: CPT

## 2025-03-15 PROCEDURE — 2500000001 HC RX 250 WO HCPCS SELF ADMINISTERED DRUGS (ALT 637 FOR MEDICARE OP)

## 2025-03-15 PROCEDURE — 99232 SBSQ HOSP IP/OBS MODERATE 35: CPT

## 2025-03-15 PROCEDURE — 2500000004 HC RX 250 GENERAL PHARMACY W/ HCPCS (ALT 636 FOR OP/ED)

## 2025-03-15 RX ORDER — SULFAMETHOXAZOLE AND TRIMETHOPRIM 800; 160 MG/1; MG/1
1 TABLET ORAL EVERY 12 HOURS SCHEDULED
Qty: 21 TABLET | Refills: 0 | Status: SHIPPED | OUTPATIENT
Start: 2025-03-15 | End: 2025-03-26

## 2025-03-15 RX ORDER — BUTYROSPERMUM PARKII(SHEA BUTTER), SIMMONDSIA CHINENSIS (JOJOBA) SEED OIL, ALOE BARBADENSIS LEAF EXTRACT .01; 1; 3.5 G/100G; G/100G; G/100G
250 LIQUID TOPICAL 2 TIMES DAILY
Start: 2025-03-15

## 2025-03-15 RX ORDER — AMOXICILLIN AND CLAVULANATE POTASSIUM 875; 125 MG/1; MG/1
1 TABLET, FILM COATED ORAL EVERY 12 HOURS SCHEDULED
Start: 2025-03-15 | End: 2025-03-26

## 2025-03-15 RX ORDER — POTASSIUM CHLORIDE 1.5 G/1.58G
40 POWDER, FOR SOLUTION ORAL ONCE
Status: COMPLETED | OUTPATIENT
Start: 2025-03-15 | End: 2025-03-15

## 2025-03-15 RX ORDER — VANCOMYCIN HYDROCHLORIDE 125 MG/1
125 CAPSULE ORAL 4 TIMES DAILY
Start: 2025-03-15 | End: 2025-04-02

## 2025-03-15 RX ADMIN — AMOXICILLIN AND CLAVULANATE POTASSIUM 1 TABLET: 875; 125 TABLET, FILM COATED ORAL at 09:13

## 2025-03-15 RX ADMIN — HEPARIN SODIUM 5000 UNITS: 5000 INJECTION, SOLUTION INTRAVENOUS; SUBCUTANEOUS at 05:18

## 2025-03-15 RX ADMIN — VANCOMYCIN HYDROCHLORIDE 125 MG: 125 CAPSULE ORAL at 13:07

## 2025-03-15 RX ADMIN — SULFAMETHOXAZOLE AND TRIMETHOPRIM 1 TABLET: 800; 160 TABLET ORAL at 09:12

## 2025-03-15 RX ADMIN — POTASSIUM CHLORIDE 40 MEQ: 1.5 POWDER, FOR SOLUTION ORAL at 09:12

## 2025-03-15 RX ADMIN — VANCOMYCIN HYDROCHLORIDE 125 MG: 125 CAPSULE ORAL at 05:18

## 2025-03-15 RX ADMIN — Medication 250 MG: at 09:12

## 2025-03-15 RX ADMIN — ACETAMINOPHEN 650 MG: 325 TABLET, FILM COATED ORAL at 09:13

## 2025-03-15 NOTE — NURSING NOTE
Patient discharged to SNF, report telephoned into [facility]. IV's x3 removed without incident. Tip intact.

## 2025-03-15 NOTE — CARE PLAN
The patient's goals for the shift include To rest    The clinical goals for the shift include pt will remain safe and free from falls/injury    Over the shift, the patient did not have any falls or injury    Problem: Pain - Adult  Goal: Verbalizes/displays adequate comfort level or baseline comfort level  Outcome: Progressing     Problem: Safety - Adult  Goal: Free from fall injury  Outcome: Progressing     Problem: Discharge Planning  Goal: Discharge to home or other facility with appropriate resources  Outcome: Progressing     Problem: Chronic Conditions and Co-morbidities  Goal: Patient's chronic conditions and co-morbidity symptoms are monitored and maintained or improved  Outcome: Progressing     Problem: Skin  Goal: Prevent/minimize sheer/friction injuries  Outcome: Progressing  Flowsheets (Taken 3/15/2025 3614)  Prevent/minimize sheer/friction injuries:   HOB 30 degrees or less   Use pull sheet

## 2025-03-15 NOTE — PROGRESS NOTES
"Sebas Soto is a 84 y.o. male on day 8 of admission presenting with Postoperative infection, unspecified type, initial encounter.    Subjective   Improving from mentation perspective, fluctuating overnight     Objective     Physical Exam  AOx3  RUE D4+ B5 T5 HG4 IO4  LUE D0 (chronic) B5 T5 HG4 IO4  RLE HF3 KE4 PF5 DF5  LLE HF3 KE3 PF4 DF4    Last Recorded Vitals  Blood pressure 151/74, pulse 76, temperature 36.2 °C (97.2 °F), resp. rate 18, height 1.727 m (5' 8\"), weight 85.7 kg (189 lb), SpO2 92%.  Intake/Output last 3 Shifts:  I/O last 3 completed shifts:  In: - (0 mL/kg)   Out: 910 (10.6 mL/kg) [Urine:900 (0.3 mL/kg/hr); Drains:10]  Weight: 85.7 kg     Results from last 72 hours   Lab Units 03/14/25  0831 03/13/25  0932   GLUCOSE mg/dL 73* 79   SODIUM mmol/L 143 144   POTASSIUM mmol/L 3.3* 3.3*   CHLORIDE mmol/L 108* 111*   CO2 mmol/L 23 23   ANION GAP mmol/L 15 13   BUN mg/dL 8 8   CREATININE mg/dL 0.46* 0.49*   EGFR mL/min/1.73m*2 >90 >90   CALCIUM mg/dL 8.2* 7.9*   PHOSPHORUS mg/dL 2.8 2.4*   ALBUMIN g/dL 3.0* 2.8*      Results from last 72 hours   Lab Units 03/14/25  0831 03/13/25  0932   WBC AUTO x10*3/uL 7.1 7.5   NRBC AUTO /100 WBCs 0.0 0.0   RBC AUTO x10*6/uL 3.48* 3.24*   HEMOGLOBIN g/dL 10.7* 10.0*   HEMATOCRIT % 33.5* 31.3*   MCV fL 96 97   MCH pg 30.7 30.9   MCHC g/dL 31.9* 31.9*   RDW % 17.1* 17.0*   PLATELETS AUTO x10*3/uL 283 258            Assessment/Plan   Assessment & Plan  Postoperative infection, unspecified type, initial encounter    CAD (coronary artery disease)    Postoperative surgical complication involving musculoskeletal system associated with musculoskeletal procedure    Disruption of wound, unspecified, initial encounter    Chronic neck pain    Lumbar disc disease    Difficult intubation    Chronic low back pain    Dementia    Prostate cancer (Multi)    HTN (hypertension)    Hyperlipidemia    Neuromuscular disease (Multi)    Anemia    Abnormal EKG    Postoperative " delirium    Gastroesophageal reflux disease without esophagitis    Anxiety    Sebas Soto is a 84 y.o. male w/ h/o L2-5 lami w/ L4-5 TLIF on 1/31, c/b post-op hallucinations and encephalopathy, 2/22 p/w incisional dehiscence, discharged on Abx, 3/6 p/w incisional dehiscence.     3/7 s/p lumbar wound exploration, revision, washout and wound vac placement  Started on IV vanco and cefepime.  ID consulted.  3/10 s/p wound closure by plastics    Plan  Tele  Plastics recs - dc drain before leaving, will call them   OR Cx  ID recs- Stop vanc and cefepime, continue PO vanc (4/1), Augmentin and bactrim until 3/27  PTOT- SNF when able  SCD, [facility]    Marc Aquino MD

## 2025-03-15 NOTE — CARE PLAN
Met with pt and introduced myself as care coordinator with Care Transitions Team for discharge planning. Pt is agreeable to discharge to SNF. Pt/Wife is aware of the  of time of 1pm with [facility]. Transport information given to the . MD is aware of Pt discharge along with  the Nurse. Pt is leaving [facility] with [facility]. Pt reported no safety concerns at home.  Pt's address, phone number, and contact information was verified.  Discharge Planning: [facility] is [facility]

## 2025-03-15 NOTE — PROGRESS NOTES
"  Division of Plastic and Reconstructive Surgery  Progress Note    Patient Name: Sebas Soto  MRN: [id number]  Date:  03/15/25     Subjective    Patient slightly improving mentation, but still disoriented to place upon AM assessment with inappropriate responses to questions which is c/w prior exam yesterday. He denies acute pain or concerns on limited report d/t AMS. KASANDRA drain removed today on exam without issue.    Objective    Vital Signs  /70 (BP Location: Left arm, Patient Position: Lying)   Pulse 97   Temp 36.7 °C (98.1 °F) (Temporal)   Resp 18   Ht 1.727 m (5' 8\")   Wt 85.7 kg (189 lb)   SpO2 94%   BMI 28.74 kg/m²      Physical Exam  Constitutional: Disoriented to time and place, NAD.  Eyes: EOMI, clear sclera.  ENMT: Moist mucous membranes.  Head/Neck: NC/AT. Neck supple.   Cardiovascular: Normal rate.   Respiratory/Thorax: Breathing comfortably with regular respirations on RA. Good symmetric chest expansion.   Gastrointestinal: Abdomen soft, non-tender.   Extremities: Moves all 4 extremities actively, strength appropriate.   Neurological: A&Ox1, disoriented to time and place, does not respond appropriately to questions.   Psychological: Disoriented.   Skin: Warm and dry.   Back: Incision covered with Silver Mepilex dressing on exam, no surrounding erythema or drainage noted. X1 KASANDRA drain exiting paravertebral spine removed today without issue, Mepilex dressing placed over drain site.    Current Medications  Scheduled medications  acetaminophen, 650 mg, oral, q6h  amoxicillin-pot clavulanate, 1 tablet, oral, q12h ANGELES  atorvastatin, 10 mg, oral, Nightly  heparin (porcine), 5,000 Units, subcutaneous, q8h  [Held by provider] metoprolol succinate XL, 25 mg, oral, q AM  oxygen, , inhalation, Continuous - Inhalation  [Held by provider] polyethylene glycol, 17 g, oral, Daily  saccharomyces boulardii, 250 mg, oral, BID  sulfamethoxazole-trimethoprim, 1 tablet, oral, q12h ANGELES  vancomycin, 125 mg, oral, 4x " daily    Continuous medications       PRN medications  PRN medications: dextrose, dextrose, glucagon, glucagon, naloxone, ondansetron **OR** ondansetron     Assessment   Sebas Soto is a 84 y.o. male with a past medical history of L2-5 laminectomy with L4-5 transforaminal lumbar interbody fusion on 1/31 with Dr. Qureshi, complicated by post-op hallucinations and encephalopathy, previously presented to ED on 2/22 with concern for post-op wound infection, CT L spine during that admission showed no deep dehiscence, so no acute surgical intervention and patient was discharged back to SNF with local wound care and antibiotics on 2/28. On 3/6, Dr. Qureshi was notified regarding concerns for lumbar wound dehiscence, and it was recommended patient come to  ED for evaluation. Patient was taken to OR on 3/6 with neurosurgery for lumbar wound washout and vac placement. Plastic surgery was consulted for eventual assistance with wound closure.     Plan/Recommendations:  - Slightly improved AMS noted on exam today from previous assessment yesterday, patient seen per ID yesterday with c/f encephalopathy, defer further investigation of encephalopathy per primary service   - Incisional wound vac removed 3/14 without issue, silver Mepilex dressing placed over incision, OK to be [facility]  - KASANDRA drain removed today (3/15) without issue, Mepilex dressing placed over drain site, OK to remove bandage on 3/17 and leave LEBRON  - IV antibiotics per ID/primary  - Appreciate remaining care per primary team  - Plastic Surgery will continue to follow     Patient and plan discussed with Dr. Zacarias.    Francesca Ca PA-C  Plastic and Reconstructive Surgery   Available via Epic chat, pager: [phone] or team phones: [phone]

## 2025-03-15 NOTE — DISCHARGE SUMMARY
Discharge Diagnosis  Postoperative infection, unspecified type, initial encounter    Issues Requiring Follow-Up  Incision check, plastic surgery follow up, Infectious disease follow up (PO vanc, augmentin, bactrim)    Test Results Pending At Discharge  Pending Labs       Order Current Status    Fungal Culture/Smear Preliminary result    Fungal Culture/Smear Preliminary result            Hospital Course  Sebas Soto is a 84 y.o. male with a past medical history of L2-5 lami w/ L4-5 TLIF on 1/31, c/b post-op hallucinations and encephalopathy, 2/22 p/w incisional dehiscence, discharged on Abx, 3/6 p/w incisional dehiscence and was admitted to Neurosurgery for surgical management.   3/7 s/p lumbar wound exploration, revision, washout and wound vac placement  Started on IV vanco and cefepime.  ID consulted.  3/10 s/p lumbar wound debridement muscle and fascia; complex closure lumbar wound by Plastics  3/12 noted to have some confusion and hallucinations, encephalopathy labs sent and are wnl, ID recs to stop cefepime for concerns it is causing mental status change d/t toxicity. IV vanco stopped, continuing PO vanco for cdiff for a total of 10 days with end date of 4/1. He was started on PO augmentin and Bactrim for 2 weeks with end date of 3/24.   3/14 prevena d/c'd by plastics  3/15 drain dc'd by plastic surgery, dressings can be removed in the next 2-3 days and replaced as needed      PT/OT evaluated patient and recommended SNF.    On the day of discharge, the patient was seen and evaluated by the neurosurgery team and deemed suitable for discharge. The patient was given detailed discharge instructions and were scheduled to follow up as an outpatient.      Pertinent Physical Exam At Time of Discharge  Physical Exam  No acute distress  ON room air, symmetric chest rise  Ox3  RUE D4+, B5, T5, HG4, IO4  LUE D0 (chronic), B5, T5, HG4, IO4  RLE HF 3, KE3, PF5, DF5  LLE HF 3, KE4, PF4, DF4  Incision c/d/i    Home  Medications     Medication List      START taking these medications     saccharomyces boulardii 250 mg capsule; Commonly known as: Florastor;   Take 1 capsule (250 mg) by mouth 2 times a day.   sulfamethoxazole-trimethoprim 800-160 mg tablet; Commonly known as:   Bactrim DS; Take 1 tablet by mouth every 12 hours for 21 doses.   vancomycin 125 mg capsule; Commonly known as: Vancocin; Take 1 capsule   (125 mg) by mouth 4 times a day for 18 days.     CHANGE how you take these medications     amoxicillin-pot clavulanate 875-125 mg tablet; Commonly known as:   Augmentin; Take 1 tablet by mouth every 12 hours for 11 days.; What   changed: when to take this     CONTINUE taking these medications     acetaminophen 325 mg tablet; Commonly known as: Tylenol; Take 2 tablets   (650 mg) by mouth every 6 hours if needed for mild pain (1 - 3).   atorvastatin 10 mg tablet; Commonly known as: Lipitor   metoprolol succinate XL 25 mg 24 hr tablet; Commonly known as: Toprol-XL     STOP taking these medications     chlorhexidine 0.12 % solution; Commonly known as: Peridex       Outpatient Follow-Up  Future Appointments   Date Time Provider Department Center   4/4/2025 10:00 AM Hayden Christensen MD [id number] [facility]   4/17/2025 10:30 AM Philippe Qureshi MD PhD [id number] [facility]   6/24/2025  1:30 PM SHELLY Dominguez-CNP [id number] [place]       Abiodun Ibanez MD

## 2025-03-17 LAB
FUNGUS SPEC CULT: NORMAL
FUNGUS SPEC CULT: NORMAL
FUNGUS SPEC FUNGUS STN: NORMAL
FUNGUS SPEC FUNGUS STN: NORMAL

## 2025-03-18 LAB
ATRIAL RATE: 88 BPM
P AXIS: 26 DEGREES
P OFFSET: 180 MS
P ONSET: 124 MS
PR INTERVAL: 156 MS
Q ONSET: 202 MS
QRS COUNT: 15 BEATS
QRS DURATION: 128 MS
QT INTERVAL: 402 MS
QTC CALCULATION(BAZETT): 486 MS
QTC FREDERICIA: 456 MS
R AXIS: -38 DEGREES
T AXIS: 78 DEGREES
T OFFSET: 403 MS
VENTRICULAR RATE: 88 BPM

## 2025-04-02 ENCOUNTER — TELEPHONE (OUTPATIENT)
Dept: INFECTIOUS DISEASES | Facility: HOSPITAL | Age: 85
End: 2025-04-02
Payer: MEDICARE

## 2025-04-02 NOTE — TELEPHONE ENCOUNTER
Taylor Packer from [facility] (declan: [phone]) called on behalf of the patient's wife, Chrissie Soto. Mrs. Soto doesn't want her  tranported this far for a hospital follow up on April 4, 2025. He has completed antibiotic therapy so she is unsure why a hospital follow up appointment is necessary. Mrs. Soto also expressed to Taylor that the facility can do any labs you want. Lastly, she told Taylor that when her  was admitted, you had mentioned that he could follow with Dr. Manuel in [place]. Please advise as to whether appointment should be canceled.

## 2025-04-03 NOTE — TELEPHONE ENCOUNTER
4/01 10:12 am Taylor asher [facility] was left a vm to make sure family or staff can be with patient for video visit.    Spoke with Nurse Rosario at 11:24 AM and she will fax over labs

## 2025-04-04 ENCOUNTER — APPOINTMENT (OUTPATIENT)
Dept: INFECTIOUS DISEASES | Facility: CLINIC | Age: 85
End: 2025-04-04
Payer: MEDICARE

## 2025-04-04 DIAGNOSIS — M46.26 INFECTION OF LUMBAR SPINE (MULTI): Primary | ICD-10-CM

## 2025-04-04 DIAGNOSIS — A49.9 POLYMICROBIAL BACTERIAL INFECTION: ICD-10-CM

## 2025-04-04 DIAGNOSIS — T81.49XA POSTOPERATIVE WOUND INFECTION: ICD-10-CM

## 2025-04-04 PROCEDURE — 99213 OFFICE O/P EST LOW 20 MIN: CPT | Performed by: INTERNAL MEDICINE

## 2025-04-04 PROCEDURE — 1111F DSCHRG MED/CURRENT MED MERGE: CPT | Performed by: INTERNAL MEDICINE

## 2025-04-04 NOTE — TELEPHONE ENCOUNTER
Spoke with Tori at [facility] (declan: [phone]) gave her Dr. Christensen message of: I have no orders for [facility].  Please let them know as no phone number was provided to me.      Tori did provide the fax number of [phone] when AVS is ready.

## 2025-04-04 NOTE — PROGRESS NOTES
Infectious Diseases Clinic Follow-up:    Reason for Visit: Scheduled  Hospital follow-up    History of Current Issue  Virtual or Telephone Consent    While technically available, the patient was unable or unwilling to consent to connect via audio/video telehealth technology; therefore, I performed this visit using a real-time audio only connection between Sebas Soto & Hayden Christensen MD.    Verbal consent was requested and obtained from Sebas Soto on this date, 04/04/25 for a telehealth visit and the patient's location was confirmed at the time of the visit.    HPI and events  84-year-old gentleman underwent lumbar spine surgery on 1/31/2025 and developed early postoperative wound dehiscence.  Patient readmitted 2/21/2025 to [facility] and started on Unasyn and changed to oral Augmentin (for 2-week duration).  Patient seen by Dr. Sanches and Dr. Manuel ([facility], infectious disease).  2/21/2025 culture showed mixed anaerobic and gram-positive/gram-negative bacteria.  Specific organism was not identified.  However, this generally indicates skin gt and minor enteric organisms.       Preop Photo:       Patient re-presented with additional wound breakdown/exudate and odor.  At SNF receiving wound care and packing.  Photograph shows minimal surrounding erythema.  Neurosurgery observed some superficial necrosis exudate and odor.  Operative note not concerned about deeper penetrating infection.    # Postoperative lumbar wound dehiscence  # 01/31/2025 Lumbar spine surgery            L2-L5 laminectomies and bilateral foraminotomies            L4-L5 transforaminal interbody fusion;             L4-L5 instrumented posterolateral fusion; use of autograft and allograft for fusion  # 3/8/2025 C. difficile (PCR positive, EIA positive)       Started on oral regimen of vancomycin x 10 days  # 3/12/2025 encephalopathy and hallucinationS      03/08/2025     During rounds cultures negative to date.     Continue cefepime and  "vancomycin pending final culture and antibiotic testing     Will de-escalate antibiotics when culture data available and to assist with treatment of C. difficile.  Will need p.o. vancomycin and plan for 10-day course for initial episode.  If long-term antibiotics required then may put on slow taper until antibiotic treatment of back wound completed.  Will advise     03/12/2025 Update:     During late afternoon rounds.  Patient hallucinating.  Seems primarily auditory but cannot rule out visual.  Patient clinically stable in no acute distress.  I spoke directly with the patient's wife who had been on the phone with the patient shortly before I arrived and was still on the phone but the patient was not speaking into the  (which was lying at the foot of the bed).     On 3/10/2025 patient had debridement of lumbar wound down to muscle and fascia.      3/10/25 OP note noted:              \". . . no obvious purulence and there appeared to be healthy granulation tissue that was beginning to form.  There is also no evidence of fascial dehiscence or drainage.  Therefore, I then began the procedure by performing excisional debridement of the skin edges  . . .   extended all the way down to the muscle fascia using a curette. . .'                \" . . . I then performed wide undermining of adipose cutaneous flaps which were elevated bilaterally to reduce the tension of closure.  I then performed copious irrigation of the wound using both sterile saline and antiseptic solution . . \"             3/13/2025 update:     Patient still hallucinating and clinically stable.  Patient's wife indicates that this has happened before after anesthesia.  This may be if a contributing factor but still need to stop cefepime.  I do not think patient needs systemic antibiotics given less concern for deep lumbar surgical site infection per surgery.  I think the culture results showing Providencia  consistent with an open packed wound.  Not " certain that this is pathologic at this time.  However would treat conservatively based on current and recent culture.       Thus targeting mixed skin gt and Providencia rettgeri         Recommendations       1. Discontinue Cefepime as primary culprit for ongoing hallucinations and encephalopathy       Should improve over several days cefepime main culprit       2. 3/12/25 discontinued vancomycin   3.  Okay to resume oral antibiotics for lumbar wound.  Will treat for 2 weeks provided there is ongoing healing          (a) begin oral Augmentin 875 mg p.o. twice daily (directed at mixed gt per 2/21/2025 culture          (b) begin oral TMP-SMX DS 1 tablet p.o. twice daily (directed at Providencia rettgeri -though most likely a secondary colonizer in the setting of wound dressing changes.            (c) administer oral Augmentin and TMP SMX for 2 weeks ending from 3/10/2025 wound closure with plastic surgery.    Stop date 3/24/2025     3.  03/8/2025 started p.o. Vancomycin, 125 mg p.o. 4 times daily and usually complete 10 course.  HOWEVER, SINCE patient will be on oral Augmentin and Bactrim through 3/24/2025, I would extend vancomycin treatment 125 mg p.o. 4 times daily through 4/1/2025    TODAY 4/4/25:     I spoke to the patient and his wife.  Patient residing within nursing facility and wife comes to facility every day to assist with his care.  Wife is pleased that her 's lumbar wound has closed and healed.  No signs of recrudescent infection at this time.  Specifically she denied knowing about redness, swelling, or exudate.     Doing okay.  No recent falls.  Denies fever, chills, chest pain, shortness of breath.  Per the wife patient still encephalopathic BUT better.  Difficult to pin down to specific symptoms          PAST MEDICAL HISTORY:  Past Medical History:   Diagnosis Date    Abnormal ECG     Arthritis     Bilateral lower extremity edema     Diverticulosis     Hyperlipidemia     Neurogenic  claudication     Personal history of other diseases of the digestive system     History of diverticulitis    Personal history of other infectious and parasitic diseases     History of herpes zoster    Prostate cancer (Multi)     Short-term memory loss     Spondylolisthesis        PAST SURGICAL HISTORY:  Past Surgical History:   Procedure Laterality Date    COLONOSCOPY  07/30/2018    Colonoscopy (Fiberoptic)    HERNIA REPAIR  07/30/2018    Hernia Repair-x3    NECK SURGERY  2007    TONSILLECTOMY  07/30/2018    Tonsillectomy     PHYSICAL EXAMINATION:     Visit Vitals  Smoking Status Former     ASSESSMENT / RECOMMENDATIONS:  Patient re-presented with additional wound breakdown/exudate and odor.  At SNF receiving wound care and packing.  Photograph shows minimal surrounding erythema.  Neurosurgery observed some superficial necrosis exudate and odor.  Operative note not concerned about deeper penetrating infection.    # Postoperative lumbar wound dehiscence  # 01/31/2025 Lumbar spine surgery            L2-L5 laminectomies and bilateral foraminotomies            L4-L5 transforaminal interbody fusion;             L4-L5 instrumented posterolateral fusion; use of autograft and allograft for fusion  # 3/8/2025 C. difficile (PCR positive, EIA positive)       Started on oral regimen of vancomycin x 10 days  # 3/12/2025 encephalopathy and hallucinations      Patient completed course of oral Augmentin and TMP-SMX.  Now doing well.  Patient's wife reports healthy healed lumbar incision.  No additional erythema, swelling and no discharge.  Patient still has some chronic pain but not complaining of additional lower back pain.  Overall less pain.    Patient's stool is formed.  Patient completed oral vancomycin treatment that extended 7 to 10 days beyond completing oral antibiotics.  Patient completed oral vancomycin around 3/24/2025 after having completed Augmentin and TMP-SMX X around 3/10/2025.    Encouraged that patient has done  well.  Not planning long-term antibiotics at this time since wound was described as nonpurulent and superficial.  Fascia was intact.  So far patient doing well.  Counseled about signs and symptoms of recrudescent or recurrent infection.  Signs and symptoms of infection might include increasing pain, increasing altered mental status, fever, chills, for example.    Wife concerned about patient's mental status which is improved.  He is much less encephalopathic.  Working with SNF staff about neurology/dementia referral.    Recommendations  1.  Monitor off Augmentin  2.  Monitor off TMP-SMX  3.  Monitor for recurrent infection  4.  Monitor mental status which is better.  Discussed when patient was hospitalized that CSF exam would be needed if there was ongoing encephalopathy.  Waxing and waning.  Patient discussed with medical provider left neurologist.  Declines neurology referral at this time  4.  Patient's wife expressed some concern about treatment at [place].  I encouraged the patient to contact the patient advocate or ombudsman to express her concerns.  5.  Infectious disease follow-up as needed.      Hayden Christensen MD       I spent 45 minutes in the professional and overall care of this patient.

## 2025-04-07 ENCOUNTER — APPOINTMENT (OUTPATIENT)
Dept: PLASTIC SURGERY | Facility: CLINIC | Age: 85
End: 2025-04-07
Payer: MEDICARE

## 2025-04-07 DIAGNOSIS — S31.000A OPEN WOUND OF LUMBAR REGION: Primary | ICD-10-CM

## 2025-04-07 PROCEDURE — 99024 POSTOP FOLLOW-UP VISIT: CPT | Performed by: SURGERY

## 2025-04-07 PROCEDURE — 1036F TOBACCO NON-USER: CPT | Performed by: SURGERY

## 2025-04-07 PROCEDURE — 1159F MED LIST DOCD IN RCRD: CPT | Performed by: SURGERY

## 2025-04-07 PROCEDURE — 1160F RVW MEDS BY RX/DR IN RCRD: CPT | Performed by: SURGERY

## 2025-04-07 PROCEDURE — 1111F DSCHRG MED/CURRENT MED MERGE: CPT | Performed by: SURGERY

## 2025-04-07 NOTE — PROGRESS NOTES
Postop Clinic Visit    An interactive audio and video telecommunication system which permits real time communications between the patient was utilized to provide this telehealth service. Verbal consent was requested and obtained from the patient.     Subjective  Sebas Soto is a 84 y.o. male who underwent posterior spinal fusion by Dr. Qureshi on January 31, 2025.  He was subsequently discharged to a nursing facility but developed wound dehiscence.  [facility] and wound care without improvement.  He was subsequently transferred back to [facility] and underwent surgical debridement by the neurosurgery team on 3/7/2025.  I was subsequently consulted to assist with wound closure.  He underwent additional debridement and lumbar wound closure on 3/10/2025.    He now presents for his first postoperative appointment along with his wife.  Patient is still at the nursing facility and that which is why this is being performed as a virtual visit.  The wife reports that Sebas has been doing well overall and the wound appears to be healing appropriately with no evidence of dehiscence or infection.  He has also made some progress in terms of ambulation and is able to walk a few steps.       Physical Exam  On exam, Sebas Soto is well-appearing and well-developed.  He is breathing comfortably on room air and is in no distress.  Focused examination of His affected region reveals:    Examination is limited by the virtual platform.  Family was submitted a photograph of the wound for further examination.    Assessment/Plan     Sebas Soto is a 84 y.o. male who is now nearly 1 month status post lumbar wound revision due to wound dehiscence after posterior spinal fusion.  He is overall doing well and I will have the family sending a photograph of the wound for further examination.  I discussed with the patient's wife that at this point, he may not require any  additional dressing and it is okay to shower or bathe.  I would like to see him back in another 3 months for an in person visit or whenever he is discharged from the nursing facility.    Edd Zacarias MD

## 2025-04-15 ENCOUNTER — DOCUMENTATION (OUTPATIENT)
Dept: PLASTIC SURGERY | Facility: HOSPITAL | Age: 85
End: 2025-04-15
Payer: MEDICARE

## 2025-04-15 NOTE — PROGRESS NOTES
Subjective   Patient ID: Sebas Soto is a 84 y.o. male.        Objective :  Wound care orders for Skilled Nursing Facility    Assessment/Plan :    Examination is limited by the virtual platform. Wife emailed a photo of the wound for assessment.    Lumbar wound care:  Remove every other staple today, apply a thin layer of antibiotic ointment/Bacitracin topically  daily x 3 days to staple removal area.   May leave open to air or if necessary cover with dry gauze and paper tape.    *Recommend use of adhesive sparingly due to red irritated skin from previous use of adhesives.    In 5 - 7 days (approx 4/20 - 4/22/2025) if no signs of dehiscence of wound, remove remaining staples, apply a thin layer of antibiotic ointment/Bacitracin topically  daily x 3 days to staple removal area and may leave open to air.    Edd Zacarias MD / TRISTAN FernandezN, RN  [phone] / [phone]

## 2025-04-17 ENCOUNTER — APPOINTMENT (OUTPATIENT)
Facility: CLINIC | Age: 85
End: 2025-04-17
Payer: MEDICARE

## 2025-04-18 ENCOUNTER — TELEPHONE (OUTPATIENT)
Dept: PLASTIC SURGERY | Facility: HOSPITAL | Age: 85
End: 2025-04-18
Payer: MEDICARE

## 2025-04-18 NOTE — TELEPHONE ENCOUNTER
I spoke to wife and she confirmed that it appeared today that 1/2 the staples were removed.  Wife is quite upset at the time of my call due to Nursing home advising patient should go to long term care and that diagnosis C-diff came back days after finishing Antibiotic therapy.  She does not have a friend or family member to help her with these big decisions.  I suggested that she could speak to the  at the facility and check with her insurance to see if they offer a patient advocate or will reimburse costs of hiring a patient advocate.  I also stated this is not my specialty, but wanted to offer her some hope for getting education/information on the topic of long term care.

## 2025-05-08 ENCOUNTER — OFFICE VISIT (OUTPATIENT)
Facility: CLINIC | Age: 85
End: 2025-05-08
Payer: MEDICARE

## 2025-05-08 VITALS
DIASTOLIC BLOOD PRESSURE: 78 MMHG | HEIGHT: 68 IN | SYSTOLIC BLOOD PRESSURE: 118 MMHG | BODY MASS INDEX: 28.79 KG/M2 | TEMPERATURE: 97.4 F | HEART RATE: 58 BPM | WEIGHT: 190 LBS

## 2025-05-08 DIAGNOSIS — Z98.1 S/P LUMBAR FUSION: Primary | ICD-10-CM

## 2025-05-08 DIAGNOSIS — M47.12 CERVICAL SPONDYLOSIS WITH MYELOPATHY: ICD-10-CM

## 2025-05-08 PROCEDURE — 1125F AMNT PAIN NOTED PAIN PRSNT: CPT | Performed by: NEUROLOGICAL SURGERY

## 2025-05-08 PROCEDURE — 1036F TOBACCO NON-USER: CPT | Performed by: NEUROLOGICAL SURGERY

## 2025-05-08 PROCEDURE — 1159F MED LIST DOCD IN RCRD: CPT | Performed by: NEUROLOGICAL SURGERY

## 2025-05-08 PROCEDURE — 99211 OFF/OP EST MAY X REQ PHY/QHP: CPT | Performed by: NEUROLOGICAL SURGERY

## 2025-05-08 PROCEDURE — 3078F DIAST BP <80 MM HG: CPT | Performed by: NEUROLOGICAL SURGERY

## 2025-05-08 PROCEDURE — 3074F SYST BP LT 130 MM HG: CPT | Performed by: NEUROLOGICAL SURGERY

## 2025-05-08 ASSESSMENT — PAIN SCALES - GENERAL: PAINLEVEL_OUTOF10: 4

## 2025-05-08 NOTE — PROGRESS NOTES
[facility] [place]  Department of Neurological Surgery  Post Operative Patient Visit      History of Present Illness:  Sebas Soto is a 84 y.o. year old male who presents to the spine clinic for a post operative visit. They are status post L2-5 laminectomies and L4-5 TLIF on January 31, 2025.  He then developed a delayed wound dehiscence and underwent a wound washout on March 7, 2025.  He then had a definitive complex plastics closure on March 10, 2025.  He was treated with a long course of antibiotics, which is now completed.  He recently started physical therapy yesterday.  He is home now at this time, he states that his low back and leg pain has improved, although he does still have occasional aches and pains.  He is very deconditioned from his multiple hospitalizations.  He has extensive bilateral lower extremity edema, which he had before surgery, although it is now worse.    14/14 systems reviewed and negative other than what is listed in the history of present illness    Problem List[1]  Medical History[2]  Surgical History[3]  Social History     Tobacco Use    Smoking status: Former     Types: Cigarettes     Passive exposure: Never    Smokeless tobacco: Never   Substance Use Topics    Alcohol use: Never     family history includes Alzheimer's disease in his mother; Anemia in his mother; Breast cancer in his sister; Stroke in his sister.  Current Medications[4]  Allergies[5]    Physical Examination:  General: well developed, awake/alert/oriented x3, no distress, alert and cooperative  Head/Neck: neck Supple, no apparent injury  ENMT: mucous membranes moist, no apparent injury, no lesions seen  Cardiovascular: no pitting edema, no JVD  Respiratory/Thorax: Normal breath sounds with good chest expansion, thorax symmetric  Skin: well-healed incision; pitting edema below the knees bilaterally    Neurological/Musculoskeletal:    - Paraspinal muscle spasm/tenderness absent    - Motor Strength:  Grossly full strength in bilateral lower extremities, although limited as he is in a wheelchair and has significant bilateral lower extremity peripheral edema    - Sensation: intact to light touch      Results  No new imaging    Assessment/Plan   Sebas Soto is a 84 y.o. year old male who presents to the spine clinic for a post operative visit. They are status post L2-5 laminectomies and L4-5 TLIF on January 31, 2025.  He then developed a delayed wound dehiscence and underwent a wound washout on March 7, 2025.  He then had a definitive complex plastics closure on March 10, 2025.  He was treated with a long course of antibiotics, which is now completed.  He recently started physical therapy yesterday.  He is home now at this time, he states that his low back and leg pain has improved, although he does still have occasional aches and pains.  He is very deconditioned from his multiple hospitalizations.  He has extensive bilateral lower extremity edema, which he had before surgery, although it is now worse.  Overall, I think that he has recovered from his wound infection, but that he remains very deconditioned.  I have encouraged him to be active with physical therapy, which will be in the home twice a week.  I have also encouraged him to follow-up with his primary care physician regarding his lower extremity edema.  I will plan to see him back in clinic in 3 months to assess his progress.  He will have new x-rays at that time.      The above clinical summary has been dictated with voice recognition software. It has not been proofread for grammatical errors, typographical mistakes, or other semantic inconsistencies.    Thank you for visiting our office today. It was our pleasure to take part in your healthcare.     Do not hesitate to call with any questions regarding your plan of care after leaving at [phone] M-F 8am-4pm.     To clinicians, thank you very much for this kind referral. It is a privilege to  partner with you in the care of your patients. My office would be delighted to assist you with any further consultations or with questions regarding the plan of care outlined. Do not hesitate to call the office or contact me directly.       Sincerely,      Philippe Qureshi MD PhD  Attending Neurosurgeon  [facility]   of Neurological Surgery  [employer]    [facility]  [address]  [place] [address]    [facility]  [address]  [address]  [place] [address]    Office: [phone]  Fax: [phone]             [1]   Patient Active Problem List  Diagnosis    Other melanin hyperpigmentation    Other hypertrophic disorders of the skin    Other benign neoplasm of skin, unspecified    Hemangioma of skin and subcutaneous tissue    Melanocytic nevi of trunk    Melanocytic nevi of left upper limb, including shoulder    Melanocytic nevi of left lower limb, including hip    Right inguinal hernia    Incarcerated right inguinal hernia    Pigmented purpuric dermatosis    Other seborrheic keratosis    Actinic keratosis    Mixed conductive and sensorineural hearing loss of right ear with restricted hearing of left ear    Cervical spondylosis with myelopathy    Neurogenic claudication due to lumbar spinal stenosis    Klippel-Feil anomaly, myopathy, and facial dysmorphism syndrome (Multi)    Spondylolisthesis of lumbar region    Myelomalacia of cervical cord    Spondylolisthesis, lumbar region    Lumbar stenosis with neurogenic claudication    Postoperative infection, unspecified type, initial encounter    CAD (coronary artery disease)    Postoperative surgical complication involving musculoskeletal system associated with musculoskeletal procedure    Disruption of wound, unspecified, initial encounter    Chronic neck pain    Lumbar disc disease     Difficult intubation    Chronic low back pain    Dementia    Prostate cancer (Multi)    HTN (hypertension)    Hyperlipidemia    Neuromuscular disease (Multi)    Anemia    Abnormal EKG    Postoperative delirium    Gastroesophageal reflux disease without esophagitis    Anxiety    Polymicrobial bacterial infection   [2]   Past Medical History:  Diagnosis Date    Abnormal ECG     Arthritis     Bilateral lower extremity edema     Diverticulosis     Hyperlipidemia     Neurogenic claudication     Personal history of other diseases of the digestive system     History of diverticulitis    Personal history of other infectious and parasitic diseases     History of herpes zoster    Prostate cancer (Multi)     Short-term memory loss     Spondylolisthesis    [3]   Past Surgical History:  Procedure Laterality Date    COLONOSCOPY  07/30/2018    Colonoscopy (Fiberoptic)    HERNIA REPAIR  07/30/2018    Hernia Repair-x3    NECK SURGERY  2007    TONSILLECTOMY  07/30/2018    Tonsillectomy   [4]   Current Outpatient Medications:     acetaminophen (Tylenol) 325 mg tablet, Take 2 tablets (650 mg) by mouth every 6 hours if needed for mild pain (1 - 3)., Disp: 240 tablet, Rfl: 0    atorvastatin (Lipitor) 10 mg tablet, Take 1 tablet (10 mg) by mouth once daily at bedtime., Disp: , Rfl:     metoprolol succinate XL (Toprol-XL) 25 mg 24 hr tablet, Take 1 tablet (25 mg) by mouth once daily in the morning., Disp: , Rfl:     saccharomyces boulardii (Florastor) 250 mg capsule, Take 1 capsule (250 mg) by mouth 2 times a day., Disp: , Rfl:   [5] No Known Allergies

## 2025-05-09 ENCOUNTER — TELEPHONE (OUTPATIENT)
Dept: HOME HEALTH SERVICES | Facility: HOME HEALTH | Age: 85
End: 2025-05-09
Payer: MEDICARE

## 2025-05-09 NOTE — TELEPHONE ENCOUNTER
Home Care received a referral for Physical Therapy and Occupational Therapy. Unfortunately, we are unable to accept and process the referral at this time.    Reason:  Patient is Active with Another Home Care Agency     Patients, please reach out to the referring provider or your PCP to assist in obtaining an alternative home care agency and/or guidance to meet your needs.    Providers, please reach out to  Home Care at [phone] with any questions regarding the declined referral.

## 2025-06-23 ENCOUNTER — APPOINTMENT (OUTPATIENT)
Dept: DERMATOLOGY | Facility: CLINIC | Age: 85
End: 2025-06-23
Payer: MEDICARE

## 2025-06-24 ENCOUNTER — APPOINTMENT (OUTPATIENT)
Dept: DERMATOLOGY | Facility: CLINIC | Age: 85
End: 2025-06-24
Payer: MEDICARE

## 2025-07-21 ENCOUNTER — APPOINTMENT (OUTPATIENT)
Dept: GASTROENTEROLOGY | Facility: CLINIC | Age: 85
End: 2025-07-21
Payer: MEDICARE

## 2025-08-07 ENCOUNTER — OFFICE VISIT (OUTPATIENT)
Facility: CLINIC | Age: 85
End: 2025-08-07
Payer: MEDICARE

## 2025-08-07 ENCOUNTER — HOSPITAL ENCOUNTER (OUTPATIENT)
Dept: RADIOLOGY | Facility: CLINIC | Age: 85
Discharge: HOME | End: 2025-08-07
Payer: MEDICARE

## 2025-08-07 VITALS
SYSTOLIC BLOOD PRESSURE: 140 MMHG | BODY MASS INDEX: 29.34 KG/M2 | HEART RATE: 66 BPM | DIASTOLIC BLOOD PRESSURE: 76 MMHG | HEIGHT: 68 IN | WEIGHT: 193.6 LBS | TEMPERATURE: 97 F

## 2025-08-07 DIAGNOSIS — Z98.1 S/P LUMBAR FUSION: ICD-10-CM

## 2025-08-07 DIAGNOSIS — Z98.1 S/P LUMBAR FUSION: Primary | ICD-10-CM

## 2025-08-07 PROCEDURE — 1126F AMNT PAIN NOTED NONE PRSNT: CPT | Performed by: NEUROLOGICAL SURGERY

## 2025-08-07 PROCEDURE — 72100 X-RAY EXAM L-S SPINE 2/3 VWS: CPT

## 2025-08-07 PROCEDURE — 1159F MED LIST DOCD IN RCRD: CPT | Performed by: NEUROLOGICAL SURGERY

## 2025-08-07 PROCEDURE — 3078F DIAST BP <80 MM HG: CPT | Performed by: NEUROLOGICAL SURGERY

## 2025-08-07 PROCEDURE — 72100 X-RAY EXAM L-S SPINE 2/3 VWS: CPT | Performed by: RADIOLOGY

## 2025-08-07 PROCEDURE — 99213 OFFICE O/P EST LOW 20 MIN: CPT | Performed by: NEUROLOGICAL SURGERY

## 2025-08-07 PROCEDURE — 99213 OFFICE O/P EST LOW 20 MIN: CPT

## 2025-08-07 PROCEDURE — 3077F SYST BP >= 140 MM HG: CPT | Performed by: NEUROLOGICAL SURGERY

## 2025-08-07 RX ORDER — FUROSEMIDE 20 MG/1
20 TABLET ORAL DAILY
COMMUNITY
Start: 2025-07-21

## 2025-08-07 ASSESSMENT — PAIN SCALES - GENERAL: PAINLEVEL_OUTOF10: 0-NO PAIN

## 2025-08-07 NOTE — PROGRESS NOTES
[facility] [place]  Department of Neurological Surgery  Established Patient Visit    History of Present Illness  Sebas Soto is a 84 y.o. year old male who presents to the spine clinic for routine follow-up visit.  I performed a L2-5 decompression and L4-5 TLIF on January 31, 2025.  He then developed a delayed wound dehiscence and underwent a wound washout on March 7, 2025.  He then had a definitive complex plastics closure on March 10, 2025.  He was treated with a long course of antibiotics.  I last saw him in May, at which time his low back and leg pain had improved, but he was dealing with severe deconditioning as well as worsening of his chronic lower extremity pitting edema.  Since that visit, he was hospitalized for over 2 weeks in June for a severe pneumonia and UTI with sepsis.  He was then discharged to a nursing facility, but has been home for the last week.  He denies any significant low back or leg pain, but does have some pain and stiffness when he gets out of bed.  He still dealing with significant bilateral lower extremity pitting edema as well as difficulty ambulating due to his deconditioning.  He uses a walker at home, but is in a wheelchair today.  He is doing home physical therapy.    BMI: Body mass index is 29.44 kg/m².    14/14 systems reviewed and negative other than what is listed in the history of present illness    Problem List[1]  Medical History[2]  Surgical History[3]  Social History     Tobacco Use    Smoking status: Former     Types: Cigarettes     Passive exposure: Never    Smokeless tobacco: Never   Substance Use Topics    Alcohol use: Never     family history includes Alzheimer's disease in his mother; Anemia in his mother; Breast cancer in his sister; Stroke in his sister.  Current Medications[4]  Allergies[5]    Physical Examination:  General: well developed, awake/alert/oriented x3, no distress, alert and cooperative  Head/Neck: neck Supple, no apparent  injury  ENMT: mucous membranes moist, no apparent injury, no lesions seen  Cardiovascular: no pitting edema, no JVD  Respiratory/Thorax: Normal breath sounds with good chest expansion, thorax symmetric  Skin: Well-healed lumbar incision, severe pitting edema up to the knees bilaterally    Neurological/Musculoskeletal:    - Posture: normal coronal & sagittal alignment    - Range of motion: full active & passive range of motion    - Muscle Bulk: normal and symmetric in all extremities    - Paraspinal muscle spasm/tenderness absent    - Motor Strength: Mild diffuse weakness in the left upper and lower extremities    - Sensation: intact to light touch      Results:  I personally reviewed and interpreted the imaging results, which included x-rays of the lumbar spine performed on August 7, 2025.  These show stable well-positioned instrumentation.  There is worsening lumbar dextroscoliosis, which may be postural.    Assessment and Plan:  Sebas Soto is a 84 y.o. year old male who presents to the spine clinic for routine follow-up visit.  I performed a L2-5 decompression and L4-5 TLIF on January 31, 2025.  He then developed a delayed wound dehiscence and underwent a wound washout on March 7, 2025.  He then had a definitive complex plastics closure on March 10, 2025.  He was treated with a long course of antibiotics.  I last saw him in May, at which time his low back and leg pain had improved, but he was dealing with severe deconditioning as well as worsening of his chronic lower extremity pitting edema.  Since that visit, he was hospitalized for over 2 weeks in June for a severe pneumonia and UTI with sepsis.  He was then discharged to a nursing facility, but has been home for the last week.  He denies any significant low back or leg pain, but does have some pain and stiffness when he gets out of bed.  He still dealing with significant bilateral lower extremity pitting edema as well as difficulty ambulating due to his  deconditioning.  He uses a walker at home, but is in a wheelchair today.  He is doing home physical therapy.  I had a lengthy discussion with the patient regarding his condition and treatment options.  I think that his surgical outcome is good, but that he is very deconditioned from all of his hospitalizations.  I have encouraged him to follow-up with his referrals to pulmonology and vascular medicine.  I have also encouraged him to continue with physical therapy and to advance his activity level as tolerated.  I will plan to see him back in clinic in January 2026 for a 1 year postop visit.  He will have new x-rays at that time.      I have reviewed all prior documentation and reviewed the electronic medical record since admission. I have personally have reviewed all advanced imaging not just the reports and used my interpretation as documented as the relevant findings. I have reviewed the risks and benefits of all treatment recommendations listed in this note with the patient and family.       The above clinical summary has been dictated with voice recognition software. It has not been proofread for grammatical errors, typographical mistakes, or other semantic inconsistencies.    Thank you for visiting our office today. It was our pleasure to take part in your healthcare.     Do not hesitate to call with any questions regarding your plan of care after leaving at [phone] M-F 8am-4pm.     To clinicians, thank you very much for this kind referral. It is a privilege to partner with you in the care of your patients. My office would be delighted to assist you with any further consultations or with questions regarding the plan of care outlined. Do not hesitate to call the office or contact me directly.       Sincerely,      Philippe Qureshi MD PhD  Attending Neurosurgeon  [facility]   of Neurological Surgery  [employer]    [facility]  [address]  [place] [address]    [facility]  [address]  [address]  [place] [address]    Office: [phone]  Fax: [phone]           [1]   Patient Active Problem List  Diagnosis    Other melanin hyperpigmentation    Other hypertrophic disorders of the skin    Other benign neoplasm of skin, unspecified    Hemangioma of skin and subcutaneous tissue    Melanocytic nevi of trunk    Melanocytic nevi of left upper limb, including shoulder    Melanocytic nevi of left lower limb, including hip    Right inguinal hernia    Incarcerated right inguinal hernia    Pigmented purpuric dermatosis    Other seborrheic keratosis    Actinic keratosis    Mixed conductive and sensorineural hearing loss of right ear with restricted hearing of left ear    Cervical spondylosis with myelopathy    Neurogenic claudication due to lumbar spinal stenosis    Klippel-Feil anomaly, myopathy, and facial dysmorphism syndrome (Multi)    Spondylolisthesis of lumbar region    Myelomalacia of cervical cord    Spondylolisthesis, lumbar region    Lumbar stenosis with neurogenic claudication    Postoperative infection, unspecified type, initial encounter    CAD (coronary artery disease)    Postoperative surgical complication involving musculoskeletal system associated with musculoskeletal procedure    Disruption of wound, unspecified, initial encounter    Chronic neck pain    Lumbar disc disease    Difficult intubation    Chronic low back pain    Dementia    Prostate cancer (Multi)    HTN (hypertension)    Hyperlipidemia    Neuromuscular disease (Multi)    Anemia    Abnormal EKG    Postoperative delirium    Gastroesophageal reflux disease without esophagitis    Anxiety    Polymicrobial bacterial infection   [2]   Past Medical History:  Diagnosis Date    Abnormal ECG     Arthritis     Bilateral lower extremity edema      Diverticulosis     Hyperlipidemia     Neurogenic claudication     Personal history of other diseases of the digestive system     History of diverticulitis    Personal history of other infectious and parasitic diseases     History of herpes zoster    Prostate cancer (Multi)     Short-term memory loss     Spondylolisthesis    [3]   Past Surgical History:  Procedure Laterality Date    COLONOSCOPY  07/30/2018    Colonoscopy (Fiberoptic)    HERNIA REPAIR  07/30/2018    Hernia Repair-x3    NECK SURGERY  2007    TONSILLECTOMY  07/30/2018    Tonsillectomy   [4]   Current Outpatient Medications:     acetaminophen (Tylenol) 325 mg tablet, Take 2 tablets (650 mg) by mouth every 6 hours if needed for mild pain (1 - 3)., Disp: 240 tablet, Rfl: 0    atorvastatin (Lipitor) 10 mg tablet, Take 1 tablet (10 mg) by mouth once daily at bedtime., Disp: , Rfl:     furosemide (Lasix) 20 mg tablet, Take 1 tablet (20 mg) by mouth once daily., Disp: , Rfl:     saccharomyces boulardii (Florastor) 250 mg capsule, Take 1 capsule (250 mg) by mouth 2 times a day., Disp: , Rfl:   [5] No Known Allergies

## 2025-08-29 ENCOUNTER — PATIENT OUTREACH (OUTPATIENT)
Dept: CARE COORDINATION | Facility: CLINIC | Age: 85
End: 2025-08-29
Payer: MEDICARE

## (undated) DEVICE — SEALANT, HEMOSTATIC, FLOSEAL, 10 ML

## (undated) DEVICE — TOOL, MR8 14CM MATCH 3MM

## (undated) DEVICE — TIP, SUCTION, FRAZIER, W/CONTROL VENT, 12 FR

## (undated) DEVICE — SUTURE, SILK, 2-0, 18 IN, FSL, BLACK

## (undated) DEVICE — EVACUATOR, WOUND, SUCTION, CLOSED, JACKSON-PRATT, 100 CC, SILICONE

## (undated) DEVICE — RESERVOIR, WOUND, W/TROCAR, PVC, MEDIUM, 400CC, DAVOL, 1/8 IN, 10FR

## (undated) DEVICE — WOUND SYSTEM, DEBRIDEMENT & CLEANING, O.R DUOPAK

## (undated) DEVICE — BLADE, MILL, MEDIUM

## (undated) DEVICE — SUTURE, PROLENE, 6-0, 24 IN, BV1, DA, BLUE

## (undated) DEVICE — Device

## (undated) DEVICE — SUTURE, POLYSORB 2/0  36  UNDYED  GS-21  209P"

## (undated) DEVICE — SEALER, BIPOLAR, AQUA MANTYS 6.0

## (undated) DEVICE — STAPLER, SKIN PROXIMATE, 35 WIDE

## (undated) DEVICE — ELECTRODE, ELECTROSURGICAL, BLADE EXT 4 INCH, INSULATED

## (undated) DEVICE — COVER, BACK TABLE, STERILE-Z

## (undated) DEVICE — SYRINGE, HYPODERMIC, LUER LOCK, 6 CC

## (undated) DEVICE — CARTRIDGE, THE PREP+

## (undated) DEVICE — CORD, BIPOLAR,  12 FT, DISPOSABLE, LF

## (undated) DEVICE — DRESSING, ADHESIVE, ISLAND, TELFA, 4 X 14 IN

## (undated) DEVICE — LOOP, VESSEL, MAXI, BLUE

## (undated) DEVICE — CAUTERY, PENCIL, PUSH BUTTON, SMOKE EVAC, 70MM

## (undated) DEVICE — INTERPULSE HANDPIECE SET W/ 10FT SUCTION TUBING

## (undated) DEVICE — BAG, DECANTER

## (undated) DEVICE — MANIFOLD, 4 PORT NEPTUNE STANDARD

## (undated) DEVICE — STAPLER, SKIN, PLUS, WIDE, 35

## (undated) DEVICE — SPHERE, STEALTHSTATION, 5-PK

## (undated) DEVICE — DRAIN, WOUND, ROUND, W/TROCAR, HOLE PATTERN, 10 IN, MEDIUM/LARGE, 3/16 X 49 IN

## (undated) DEVICE — DRAPE, MICROSCOPE, W/REMOVABLE LENS

## (undated) DEVICE — COUNTER, NEEDLE, FOAM BLOCK, W/MAGNET, W/BLADE GUARD, 10 COUNT, RED, LF

## (undated) DEVICE — LOOP, VESSEL, MAXI, RED

## (undated) DEVICE — APPLIER, LIGACLIP, MULTIPLE, SMALL 9-3/8IN

## (undated) DEVICE — SPONGE, HEMOSTAT, SURGICEL FIBRILLAR, ABS, 4 X 4, LF

## (undated) DEVICE — TOWEL PACK, STERILE, 4/PACK, BLUE

## (undated) DEVICE — SUTURE, VICRYL, 0, 18 IN, CT-1, UNDYED

## (undated) DEVICE — APPLICATOR, CHLORAPREP, W/ORANGE TINT, 26ML

## (undated) DEVICE — DRESSING KIT, VACUUM ASSISTED CLOSURE, W/DRAPE/TUBING, MEDIUM, FOAM, BLACK

## (undated) DEVICE — STAY SET, SURGICAL, 5MM SHARP HOOK, 8PK

## (undated) DEVICE — HIGH FLOW TIP FOR INTERPULSE HANDPIECE SET

## (undated) DEVICE — GLOVE, SURGICAL, PROTEXIS PI BLUE W/NEUTHERA, 8.5, PF, LF

## (undated) DEVICE — CLEANER, WIPE, INSTRUMENT, 3.25 X 3.25 IN

## (undated) DEVICE — SUTURE, STRATAFIX, 3-0, SPIRAL MONOCRYL PLUS, PS, 70CM, UNDYED

## (undated) DEVICE — COVER, CART, 45 X 27 X 48 IN, CLEAR

## (undated) DEVICE — CLOSURE SYSTEM, DERMABOND, PRINEO, 22CM, STERILE

## (undated) DEVICE — PREP TRAY, SKIN, DRY, W/GLOVES

## (undated) DEVICE — CANNULA, 27G X 22MM, ANTERIOR, CHAMBER, RYCROFT

## (undated) DEVICE — COVER PROBE, SOFT FLEX W/ GEL, 5 X 48 IN (13X122CM)

## (undated) DEVICE — TRAY, SURESTEP, SILICONE DRAINAGE BAG, STATLOCK, 16FR

## (undated) DEVICE — TOWEL, SURGICAL, NEURO, O/R, 16 X 26, BLUE, STERILE